# Patient Record
Sex: MALE | Race: WHITE | NOT HISPANIC OR LATINO | Employment: UNEMPLOYED | URBAN - NONMETROPOLITAN AREA
[De-identification: names, ages, dates, MRNs, and addresses within clinical notes are randomized per-mention and may not be internally consistent; named-entity substitution may affect disease eponyms.]

---

## 2017-03-21 ENCOUNTER — OFFICE VISIT (OUTPATIENT)
Dept: FAMILY MEDICINE | Facility: CLINIC | Age: 47
End: 2017-03-21
Payer: COMMERCIAL

## 2017-03-21 VITALS
HEART RATE: 74 BPM | OXYGEN SATURATION: 98 % | BODY MASS INDEX: 33.6 KG/M2 | HEIGHT: 71 IN | RESPIRATION RATE: 18 BRPM | DIASTOLIC BLOOD PRESSURE: 86 MMHG | SYSTOLIC BLOOD PRESSURE: 138 MMHG | WEIGHT: 240 LBS | TEMPERATURE: 97.1 F

## 2017-03-21 DIAGNOSIS — Z82.49 FAMILY HISTORY OF ISCHEMIC HEART DISEASE: ICD-10-CM

## 2017-03-21 DIAGNOSIS — Z83.3 FAMILY HISTORY OF DIABETES MELLITUS: ICD-10-CM

## 2017-03-21 DIAGNOSIS — H60.501 ACUTE OTITIS EXTERNA OF RIGHT EAR, UNSPECIFIED TYPE: ICD-10-CM

## 2017-03-21 DIAGNOSIS — I10 ESSENTIAL HYPERTENSION: Primary | ICD-10-CM

## 2017-03-21 DIAGNOSIS — Z13.220 LIPID SCREENING: ICD-10-CM

## 2017-03-21 DIAGNOSIS — G89.4 CHRONIC PAIN SYNDROME: ICD-10-CM

## 2017-03-21 DIAGNOSIS — H61.22 IMPACTED CERUMEN OF LEFT EAR: ICD-10-CM

## 2017-03-21 LAB
ALBUMIN SERPL-MCNC: 3.9 G/DL (ref 3.4–5)
ALP SERPL-CCNC: 67 U/L (ref 40–150)
ALT SERPL W P-5'-P-CCNC: 28 U/L (ref 0–70)
ANION GAP SERPL CALCULATED.3IONS-SCNC: 6 MMOL/L (ref 3–14)
AST SERPL W P-5'-P-CCNC: 18 U/L (ref 0–45)
BILIRUB SERPL-MCNC: 0.2 MG/DL (ref 0.2–1.3)
BUN SERPL-MCNC: 17 MG/DL (ref 7–30)
CALCIUM SERPL-MCNC: 8.9 MG/DL (ref 8.5–10.1)
CHLORIDE SERPL-SCNC: 106 MMOL/L (ref 94–109)
CHOLEST SERPL-MCNC: 214 MG/DL
CO2 SERPL-SCNC: 28 MMOL/L (ref 20–32)
CREAT SERPL-MCNC: 0.96 MG/DL (ref 0.66–1.25)
GFR SERPL CREATININE-BSD FRML MDRD: 83 ML/MIN/1.7M2
GLUCOSE SERPL-MCNC: 85 MG/DL (ref 70–99)
HDLC SERPL-MCNC: 42 MG/DL
LDLC SERPL CALC-MCNC: 119 MG/DL
NONHDLC SERPL-MCNC: 172 MG/DL
POTASSIUM SERPL-SCNC: 4.6 MMOL/L (ref 3.4–5.3)
PROT SERPL-MCNC: 7.3 G/DL (ref 6.8–8.8)
SODIUM SERPL-SCNC: 140 MMOL/L (ref 133–144)
TRIGL SERPL-MCNC: 263 MG/DL

## 2017-03-21 PROCEDURE — 80061 LIPID PANEL: CPT | Performed by: NURSE PRACTITIONER

## 2017-03-21 PROCEDURE — 36415 COLL VENOUS BLD VENIPUNCTURE: CPT | Performed by: NURSE PRACTITIONER

## 2017-03-21 PROCEDURE — 99203 OFFICE O/P NEW LOW 30 MIN: CPT | Mod: 25 | Performed by: NURSE PRACTITIONER

## 2017-03-21 PROCEDURE — 80053 COMPREHEN METABOLIC PANEL: CPT | Performed by: NURSE PRACTITIONER

## 2017-03-21 PROCEDURE — 69209 REMOVE IMPACTED EAR WAX UNI: CPT | Mod: LT | Performed by: NURSE PRACTITIONER

## 2017-03-21 RX ORDER — HYDROCODONE BITARTRATE AND ACETAMINOPHEN 5; 325 MG/1; MG/1
1 TABLET ORAL EVERY 4 HOURS PRN
COMMUNITY
Start: 2015-09-16 | End: 2017-03-21

## 2017-03-21 RX ORDER — CYCLOBENZAPRINE HCL 10 MG
10 TABLET ORAL 3 TIMES DAILY
COMMUNITY
Start: 2015-11-18 | End: 2017-03-21

## 2017-03-21 RX ORDER — PROPRANOLOL HYDROCHLORIDE 80 MG/1
80 TABLET ORAL DAILY
COMMUNITY
End: 2017-03-21

## 2017-03-21 RX ORDER — NAPROXEN 500 MG/1
500 TABLET ORAL 2 TIMES DAILY
COMMUNITY
Start: 2015-11-18 | End: 2017-03-21

## 2017-03-21 RX ORDER — PROPRANOLOL HYDROCHLORIDE 80 MG/1
80 CAPSULE, EXTENDED RELEASE ORAL DAILY
Qty: 90 CAPSULE | Refills: 3 | Status: SHIPPED | OUTPATIENT
Start: 2017-03-21 | End: 2018-01-31

## 2017-03-21 RX ORDER — PROPRANOLOL HYDROCHLORIDE 80 MG/1
80 TABLET ORAL DAILY
Qty: 90 TABLET | Status: CANCELLED | OUTPATIENT
Start: 2017-03-21

## 2017-03-21 RX ORDER — AZITHROMYCIN 250 MG/1
TABLET, FILM COATED ORAL
Qty: 6 TABLET | Refills: 0 | Status: SHIPPED | OUTPATIENT
Start: 2017-03-21 | End: 2017-04-18

## 2017-03-21 ASSESSMENT — PAIN SCALES - GENERAL: PAINLEVEL: MILD PAIN (2)

## 2017-03-21 NOTE — NURSING NOTE
"Chief Complaint   Patient presents with     Ear Problem     Medication Reconciliation     out of all medicatuions for 1+ month, was on medication for BP and lisinopril     Hypertension     Lipids     Neck Pain       Initial /86 (BP Location: Right arm, Patient Position: Chair, Cuff Size: Adult Regular)  Pulse 74  Temp 97.1  F (36.2  C) (Tympanic)  Resp 18  Ht 5' 10.5\" (1.791 m)  Wt 240 lb (108.9 kg)  SpO2 98%  BMI 33.95 kg/m2 Estimated body mass index is 33.95 kg/(m^2) as calculated from the following:    Height as of this encounter: 5' 10.5\" (1.791 m).    Weight as of this encounter: 240 lb (108.9 kg).  Medication Reconciliation: complete  "

## 2017-03-21 NOTE — PATIENT INSTRUCTIONS
You can take the Diclofenac 50 mg up to 3 times a day as needed.    Stop the Ibuprofen and Naproxen. You can't take them with Diclofenac.    Complete full course of antibiotics     We will call you with the results of your labs          Your clinic record indicates that you are due for:   fasting labs  If fasting labs are indicated, call before coming in to let the  know when you are coming in.  Need to be fasting for 10 hrs so just coming in before eating breakfast is the easiest.
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
## 2017-03-21 NOTE — MR AVS SNAPSHOT
After Visit Summary   3/21/2017    Jose Elias Reaves    MRN: [id number]           Patient Information     Date Of Birth          1970        Visit Information        Provider Department      3/21/2017 10:20 AM Patricia Booker APRN CNP [facility]        Today's Diagnoses     Essential hypertension    -  1    Lipid screening        Family history of diabetes mellitus        Family history of ischemic heart disease        Chronic pain syndrome        Impacted cerumen of left ear        Acute otitis externa of right ear, unspecified type          Care Instructions    You can take the Diclofenac 50 mg up to 3 times a day as needed.    Stop the Ibuprofen and Naproxen. You can't take them with Diclofenac.    Complete full course of antibiotics     We will call you with the results of your labs          Your clinic record indicates that you are due for:   fasting labs  If fasting labs are indicated, call before coming in to let the  know when you are coming in.  Need to be fasting for 10 hrs so just coming in before eating breakfast is the easiest.             Follow-ups after your visit        Who to contact     If you have questions or need follow up information about today's clinic visit or your schedule please contact [facility] directly at [phone].  Normal or non-critical lab and imaging results will be communicated to you by MyChart, letter or phone within 4 business days after the clinic has received the results. If you do not hear from us within 7 days, please contact the clinic through [facility] [employer] [facility] or phone. If you have a critical or abnormal lab result, we will notify you by phone as soon as possible.  Submit refill requests through [employer] or call your pharmacy and they will forward the refill request to us. Please allow 3 business days for your refill to be completed.          Additional Information About Your Visit        MyChart Information      "[employer] lets you send messages to your doctor, view your test results, renew your prescriptions, schedule appointments and more. To sign up, go to [email or web address] . Click on \"Log in\" on the left side of the screen, which will take you to the Welcome page. Then click on \"Sign up Now\" on the right side of the page.     You will be asked to enter the access code listed below, as well as some personal information. Please follow the directions to create your username and password.     Your access code is: [email or web address]  Expires: 2017 12:26 PM     Your access code will  in 90 days. If you need help or a new code, please call your [place] clinic or [phone].        Care EveryWhere ID     This is your Care EveryWhere ID. This could be used by other organizations to access your [place] medical records  [id number]        Your Vitals Were     Pulse Temperature Respirations Height Pulse Oximetry BMI (Body Mass Index)    74 97.1  F (36.2  C) (Tympanic) 18 5' 10.5\" (1.791 m) 98% 33.95 kg/m2       Blood Pressure from Last 3 Encounters:   17 138/86    Weight from Last 3 Encounters:   17 240 lb (108.9 kg)              We Performed the Following     Comprehensive metabolic panel (BMP + Alb, Alk Phos, ALT, AST, Total. Bili, TP)     Lipid panel reflex to direct LDL - FUTURE  S+90     REMOVE IMPACTED CERUMEN          Today's Medication Changes          These changes are accurate as of: 3/21/17 12:26 PM.  If you have any questions, ask your nurse or doctor.               Start taking these medicines.        Dose/Directions    diclofenac 50 MG EC tablet   Commonly known as:  VOLTAREN   Used for:  Chronic pain syndrome   Started by:  Patricia Booker APRN CNP        Dose:  50 mg   Take 1 tablet (50 mg) by mouth 3 times daily as needed for moderate pain   Quantity:  90 tablet   Refills:  1       ofloxacin 200 MG tablet   Commonly known as:  FLOXIN   Used for:  Acute otitis externa of right ear, " unspecified type   Started by:  Patricia Booker APRN CNP        Dose:  200 mg   Take 1 tablet (200 mg) by mouth 2 times daily   Quantity:  20 tablet   Refills:  0       propranolol 80 MG 24 hr capsule   Commonly known as:  INDERAL LA   Used for:  Essential hypertension   Started by:  Patricia Booker APRN CNP        Dose:  80 mg   Take 1 capsule (80 mg) by mouth daily   Quantity:  90 capsule   Refills:  3         Stop taking these medicines if you haven't already. Please contact your care team if you have questions.     HARRIET CHENEY   Stopped by:  Patricia Booker APRN CNP                Where to get your medicines      These medications were sent to [facility] [place] - [address] [address]     Phone:  [phone]     diclofenac 50 MG EC tablet    ofloxacin 200 MG tablet    propranolol 80 MG 24 hr capsule                Primary Care Provider Office Phone # Fax #    ANATOLY Morse [phone]       [address] [facility] [address]        Thank you!     Thank you for choosing [facility]  for your care. Our goal is always to provide you with excellent care. Hearing back from our patients is one way we can continue to improve our services. Please take a few minutes to complete the written survey that you may receive in the mail after your visit with us. Thank you!             Your Updated Medication List - Protect others around you: Learn how to safely use, store and throw away your medicines at www.disposemymeds.org.          This list is accurate as of: 3/21/17 12:26 PM.  Always use your most recent med list.                   Brand Name Dispense Instructions for use    diclofenac 50 MG EC tablet    VOLTAREN    90 tablet    Take 1 tablet (50 mg) by mouth 3 times daily as needed for moderate pain       ofloxacin 200 MG tablet    FLOXIN    20 tablet    Take 1 tablet (200 mg) by  mouth 2 times daily       propranolol 80 MG 24 hr capsule    INDERAL LA    90 capsule    Take 1 capsule (80 mg) by mouth daily       TYLENOL PO      Take 4,000 mg by mouth daily

## 2017-03-21 NOTE — NURSING NOTE
Jose Elias Reaves is a 46 year old  male who presents today for Ear Wash. with the complaint of pain and fullness.    Ear exam showing wax occlusion in the left ear.    The patients ear(s) were irrigated using a elephant ear was with hydrogen peroxide and tap water with mild amount of wax extracted.  Patient tolerated procedure well.        Outcome:both TMs were visualized using an otoscope. The provider checked the ear after ear irrigation using an otoscope

## 2017-04-18 ENCOUNTER — OFFICE VISIT (OUTPATIENT)
Dept: FAMILY MEDICINE | Facility: CLINIC | Age: 47
End: 2017-04-18
Payer: COMMERCIAL

## 2017-04-18 VITALS
SYSTOLIC BLOOD PRESSURE: 130 MMHG | TEMPERATURE: 96.8 F | RESPIRATION RATE: 24 BRPM | OXYGEN SATURATION: 98 % | WEIGHT: 236.2 LBS | DIASTOLIC BLOOD PRESSURE: 78 MMHG | BODY MASS INDEX: 33.41 KG/M2 | HEART RATE: 87 BPM

## 2017-04-18 DIAGNOSIS — G89.4 CHRONIC PAIN SYNDROME: ICD-10-CM

## 2017-04-18 DIAGNOSIS — I10 ESSENTIAL HYPERTENSION: Primary | ICD-10-CM

## 2017-04-18 DIAGNOSIS — E78.5 ELEVATED FASTING LIPID PROFILE: ICD-10-CM

## 2017-04-18 DIAGNOSIS — H65.05 RECURRENT ACUTE SEROUS OTITIS MEDIA OF LEFT EAR: ICD-10-CM

## 2017-04-18 PROCEDURE — 99214 OFFICE O/P EST MOD 30 MIN: CPT | Performed by: NURSE PRACTITIONER

## 2017-04-18 RX ORDER — CLARITHROMYCIN 500 MG
500 TABLET ORAL 2 TIMES DAILY
Qty: 20 TABLET | Refills: 0 | Status: SHIPPED | OUTPATIENT
Start: 2017-04-18 | End: 2017-05-10

## 2017-04-18 RX ORDER — PSEUDOEPHEDRINE HCL 30 MG
30 TABLET ORAL EVERY 4 HOURS PRN
Qty: 112 TABLET | Refills: 0 | Status: SHIPPED | OUTPATIENT
Start: 2017-04-18 | End: 2017-05-10

## 2017-04-18 NOTE — MR AVS SNAPSHOT
After Visit Summary   4/18/2017    Jose Elias Reaves    MRN: [id number]           Patient Information     Date Of Birth          1970        Visit Information        Provider Department      4/18/2017 8:00 AM Patricia Booker APRN [facility]        Today's Diagnoses     Essential hypertension    -  1    Recurrent acute serous otitis media of left ear          Care Instructions      Complete full course of antibiotics even if you start to feel better.    Work on diet changes and increase your exercise to lower your cholesterol.    We will recheck your cholesterol levels in 3 months.    Lifestyle Changes to Control Cholesterol  Diet, exercise, weight management, quitting smoking, stress management, and taking your medications right can help you control your cholesterol.    Diet  Your health care provider will give you information on changes to your diet you may need to make, based on your situation. Your provider may recommend that you see a registered dietitian for help with diet changes. Changes may include:    Reducing the amount of fat and cholesterol in your meals    Reducing the amount of sodium (salt) in your food, especially if you have high blood pressure    Eating more fresh vegetables and fruits    Eating lean proteins, such as fish, poultry, and legumes (beans and peas), and eating less red meat and processed meats    Using low-fat dairy products    Using vegetable and nut oils in limited amounts    Limiting how many sweets and processed foods like chips, cookies, and baked goods that you eat   Exercise  Regular exercise is a good way to help your body control cholesterol. Regular exercise has many benefits. It can:    Raise your good cholesterol.    Help lower your bad cholesterol.    Let blood flow better through your body.    Give more oxygen to your muscles and tissues.    Help you manage your weight.  Your health care provider may recommend that you get more  physical activity if you haven't been active. Depending on your situation, your provider may recommend that you get moderate to vigorous physical activity for at least 40 minutes each day and for at least 3 to 4 days each week. A few examples of moderate to vigorous activity include:    Walking at a brisk pace, about 3 to 4 miles per hour    Jogging or running    Swimming or water aerobics    Hiking    Dancing    Martial arts    Tennis    Riding a bicycle or stationary bike    Dancing  Weight management  If you are overweight or obese, your health care provider will work with you to help you lose weight and lower your BMI (body mass index) to a normal or near-normal level. Making diet changes and getting more physical activity can help.    Quitting smoking  Smoking and other tobacco use can raise cholesterol and make it harder to control. Quitting is tough. But millions of people have given up tobacco for good. You can quit, too! Think about some of the reasons below to quit smoking. Do any of them make you think twice about your smoking habit?  Stop smoking because it:    Keeps your cholesterol high, even if you make all the other changes you re supposed to.    Damages your body, especially your heart, lungs, and blood vessels.    Makes you more likely to have a heart attack (also known as acute myocardial infarction, or AMI), stroke, or cancer.    Stains your teeth and makes your skin, clothes, and breath smell bad.    Costs a lot of money.  Stress   Learn stress-management techniques to help you deal with stress in your home and work life.   Making the most of medications  Healthy eating and exercise are a good start to keeping your cholesterol down. But you may need some extra help from medication. If your doctor prescribes medication, be sure to take it exactly as directed. Remember:    Tell your doctor about all other medications you take, including vitamins and herbs.    Tell your doctor if you have any side  effects after starting to take a medication. Examples of side effects to watch for include: muscle aches, weakness, blurred vision, rust-colored urine, yellowing of eyes or skin (jaundice), or headache.    Don t skip a dose or stop taking your medication because you feel better or because your cholesterol numbers go down. Never stop taking your medication unless your doctor has told you it s OK.    3341-7843 [employer]. [address] [place] [address], [place] [address]. All rights reserved. This information is not intended as a substitute for professional medical care. Always follow your healthcare professional's instructions.        Diet: Low Cholesterol  Cholesterol is needed by the body to build new cells and create certain hormones. There are 2 kinds of cholesterol in the blood:      HDL, or  good  cholesterol, prevents fat deposits (plaque) from building up in the arteries. In this way it protects against heart disease and stroke.    LDL,  bad  cholesterol, stays in the body and sticks to artery walls. It may eventually block blood flow to the heart and brain causing heart attack or stroke.  Seventy-five percent of the body s cholesterol is made in the liver. Only 25% of the body s cholesterol comes from the food you eat. While the amount of cholesterol in your diet should be limited, it is the cholesterol that your body makes that creates the greatest disease risk. The biggest influence on cholesterol made by your body is the mixture of fat types in your diet. There are 2 kinds of fats you can eat:     Good fats  are the unsaturated fats (mono-unsaturated and poly-unsaturated). They raise the level of good cholesterol and lower the level of bad cholesterol. Good fats are found in vegetable oils such as olive, sunflower, corn and soybean oils, and in nuts and seeds.     Bad fats  are the saturated fats (including foods high in cholesterol) and  trans  fats. These increase the risk of disease. They lower  the good cholesterol and raise the level of bad cholesterol. Bad fats are found in animal products, including meat, whole-milk dairy products, and butter. Some plants are also high in bad fats (coconut and palm plants). Trans fats are found in hard (stick) margarines and many fast foods and commercially baked goods. Soft margarine sold in tubs has fewer trans fats and is safer to use.  High blood cholesterol is usually a due to a diet high in saturated fat combined with an inactive lifestyle. In some cases, genetics plays a role in causing high cholesterol. The following tips will help you create healthy eating habits that will help lower your blood cholesterol level.  Create a diet high in good fat, low in bad fats (and low in cholesterol)  The following steps will help you create a diet high in good fats and low in bad fats:  1. Talk with your doctor before starting a low cholesterol diet or weight loss program.  2. Learn to read nutrition labels and select appropriate portion sizes.  3. When cooking, use plant-based unsaturated vegetable oils (sunflower, corn, soybean, canola, peanut, and olive oils).  4. Avoid saturated fats found in animal products such as meat, dairy (whole-milk, cheese and ice cream), poultry skin, and egg yolks. Plants high in saturated oils include coconut oil, palm oil, and palm kernel oil.  5. If you eat meat, choose smaller portions and lean cuts, such as round, julio, sirloin, or loin. Eat more meatless meals.  6. Replace meat with fish at least 2 times a week. Fish is an important source of the unsaturated fat called omega-3 fatty acids. This fat has potential to lower the risk of heart disease.  7. Replace whole-milk dairy products with low-fat or nonfat products. Try soy products. Soy helps to reduce total cholesterol.  8. Supplement your diet with protective fibers. Eat nuts, seeds, and whole grains rather than white rice and bread. These foods lower both cholesterol and  "triglyceride levels. (Triglycerides are another fat found in the blood.) Walnuts are one of the best sources of omega-3 fatty acids.  9. Eat plenty of fresh fruits and vegetables daily.  10. Avoid fast foods and commercial baked goods. Assume they contain saturated fat unless labeled otherwise.    4611-6553 [employer]. [address] [place] [address], [place] [address]. All rights reserved. This information is not intended as a substitute for professional medical care. Always follow your healthcare professional's instructions.        Lipid Panel  Does this test have other names?  Lipid profile, lipoprotein profile  What is this test?  This group of tests measures the amount of cholesterol and other fats in your blood.  Cholesterol and triglycerides are lipids, or fats. These fats are important for cell health, but they can be harmful when they build up in the blood. Sometimes they can lead to clogged, inflamed arteries, a condition call atherosclerosis. This may keep your heart from working normally.  This panel of tests helps predict your risk for heart disease and stroke.  A lipid panel measures these fats:    Total cholesterol    LDL (\"bad\") cholesterol    HDL (\"good\") cholesterol    Triglycerides, another type of fat that causes hardening of the arteries  Why do I need this test?  You may need this panel of tests if you have a family history of heart disease or stroke.  You may also have this test if your healthcare provider believes you're at risk for heart disease. These are risk factors:    High blood pressure    Diabetes or prediabetes    Overweight or obesity    Smoking    Lack of exercise    Diet of unhealthy foods    Stress    High total cholesterol  If you are already being treated for heart disease, you may have this test to see whether treatment is working.  What other tests might I have along with this test?  Your healthcare provider may also order other tests to look at how well your heart is " working. These tests may include:    Electrocardiogram, or ECG, which tests your heart's electrical impulses to see if it is beating normally    Stress test, in which you may have to exercise while being monitored by ECG    Echocardiogram, which uses sound waves to make pictures of your heart    Cardiac catheterization. For this test, a healthcare provider puts a tube into your blood vessels and injects dye. X-rays are then done to look for clogs in the vessels.  Your provider may also order tests for high blood pressure or blood sugar, or glucose.  What do my test results mean?  Many things may affect your lab test results. These include the method each lab uses to do the test. Even if your test results are different from the normal value, you may not have a problem. To learn what the results mean for you, talk with your healthcare provider.  Results are given in milligrams per deciliter (mg/dL). Here are the ranges for total cholesterol in adults:    Normal: Less than 200 mg/dL    Borderline high: 200 to 239 mg/dL    High: At or above 240 mg/dL  These are the adult ranges for LDL cholesterol:    Optimal: Less than 100 mg/dL (this is the goal for people with diabetes or heart disease)    Near optimal: 100 to 129 mg/dL    Borderline high: 130 to 159 mg/dL    High: 160 to 189 mg/dL    Very high: 190 mg/dL and higher  The above numbers are general guidelines, because actual goals depend on the number of risk factors you have for heart disease.  Your HDL cholesterol levels should be above 40 mg/dL. This type of fat is actually good for you because it lowers your risk of heart disease. The higher the number, the lower your risk. Sixty mg/dL or above is considered the level to protect you against heart disease.    High levels of triglycerides are linked with a higher heart disease risk. Here are the adult ranges:    Normal: Less than 150 mg/dL    Borderline high: 150 to 199 mg/dL    High: 200 to 499 mg/dL    Very high:  Above 500 mg/dL  Depending on your test results, your healthcare provider will decide whether you need lifestyle changes or medicines to lower your cholesterol.  Your results and targets will vary according to your age and health. If you have high blood pressure or diabetes, you're at higher risk of having heart disease. You may have to take medicine to get your cholesterol and triglyceride levels even lower.  How is this test done?  The test requires a blood sample, which is drawn through a needle from a vein in your arm.  Does this test pose any risks?  Taking a blood sample with a needle carries risks that include bleeding, infection, bruising, or feeling dizzy. When the needle pricks your arm, you may feel a slight stinging sensation or pain. Afterward, the site may be slightly sore.  What might affect my test results?  Being sick or under stress, and taking certain medicines can affect your results.  What you eat, how often you exercise, and whether you smoke can also affect your lipid profile.  How do I prepare for the test?  You may need to not eat or drink anything but water for 12 to 14 hours before this test. In addition, be sure your healthcare provider knows about all medicines, herbs, vitamins, and supplements you are taking. This includes medicines that don't need a prescription and any illicit drugs you may use.        4989-9734 [employer]. [address] [place] [address], [place] [address]. All rights reserved. This information is not intended as a substitute for professional medical care. Always follow your healthcare professional's instructions.              Follow-ups after your visit        Who to contact     If you have questions or need follow up information about today's clinic visit or your schedule please contact [facility] directly at [phone].  Normal or non-critical lab and imaging results will be communicated to you by MyChart, letter or phone within 4 business  "days after the clinic has received the results. If you do not hear from us within 7 days, please contact the clinic through [facility] or phone. If you have a critical or abnormal lab result, we will notify you by phone as soon as possible.  Submit refill requests through [facility] or call your pharmacy and they will forward the refill request to us. Please allow 3 business days for your refill to be completed.          Additional Information About Your Visit        [facility] Information     [facility] lets you send messages to your doctor, view your test results, renew your prescriptions, schedule appointments and more. To sign up, go to [email or web address] . Click on \"Log in\" on the left side of the screen, which will take you to the Welcome page. Then click on \"Sign up Now\" on the right side of the page.     You will be asked to enter the access code listed below, as well as some personal information. Please follow the directions to create your username and password.     Your access code is: [email or web address]  Expires: 2017 12:26 PM     Your access code will  in 90 days. If you need help or a new code, please call your [facility] clinic or [phone].        Care EveryWhere ID     This is your Care EveryWhere ID. This could be used by other organizations to access your [facility] medical records  [id number]        Your Vitals Were     Pulse Temperature Respirations Pulse Oximetry BMI (Body Mass Index)       87 96.8  F (36  C) (Tympanic) 24 98% 33.41 kg/m2        Blood Pressure from Last 3 Encounters:   17 130/78   17 138/86    Weight from Last 3 Encounters:   17 236 lb 3.2 oz (107.1 kg)   17 240 lb (108.9 kg)              Today, you had the following     No orders found for display         Today's Medication Changes          These changes are accurate as of: 17  9:14 AM.  If you have any questions, ask your nurse or doctor.               Start taking these medicines.        " Dose/Directions    clarithromycin 500 MG tablet   Commonly known as:  BIAXIN   Used for:  Recurrent acute serous otitis media of left ear   Started by:  Patricia Booker APRN CNP        Dose:  500 mg   Take 1 tablet (500 mg) by mouth 2 times daily   Quantity:  20 tablet   Refills:  0       pseudoePHEDrine 30 MG tablet   Commonly known as:  SUDAFED   Used for:  Recurrent acute serous otitis media of left ear   Started by:  Patricia Booker APRN CNP        Dose:  30 mg   Take 1 tablet (30 mg) by mouth every 4 hours as needed for congestion   Quantity:  112 tablet   Refills:  0            Where to get your medicines      These medications were sent to [facility] [address]   [address] [facility] [address]     Phone:  [phone]     clarithromycin 500 MG tablet         Some of these will need a paper prescription and others can be bought over the counter.  Ask your nurse if you have questions.     Bring a paper prescription for each of these medications     pseudoePHEDrine 30 MG tablet                Primary Care Provider Office Phone # Fax #    ANATOLY Morse [phone]       [facility] [address] [facility] [address]        Thank you!     Thank you for choosing [facility]  for your care. Our goal is always to provide you with excellent care. Hearing back from our patients is one way we can continue to improve our services. Please take a few minutes to complete the written survey that you may receive in the mail after your visit with us. Thank you!             Your Updated Medication List - Protect others around you: Learn how to safely use, store and throw away your medicines at www.disposemymeds.org.          This list is accurate as of: 4/18/17  9:14 AM.  Always use your most recent med list.                   Brand Name Dispense Instructions for use    clarithromycin 500 MG tablet    BIAXIN    20  tablet    Take 1 tablet (500 mg) by mouth 2 times daily       diclofenac 50 MG EC tablet    VOLTAREN    90 tablet    Take 1 tablet (50 mg) by mouth 3 times daily as needed for moderate pain       propranolol 80 MG 24 hr capsule    INDERAL LA    90 capsule    Take 1 capsule (80 mg) by mouth daily       pseudoePHEDrine 30 MG tablet    SUDAFED    112 tablet    Take 1 tablet (30 mg) by mouth every 4 hours as needed for congestion       TYLENOL PO      Take 4,000 mg by mouth daily

## 2017-04-18 NOTE — PROGRESS NOTES
SUBJECTIVE:                                                    Jose Elias Reaves is a 47 year old male who presents to clinic today for the following health issues:      Hyperlipidemia Follow-Up      Rate your low fat/cholesterol diet?: not monitoring fat    Taking statin?  No    Other lipid medications/supplements?:  none     Lab Results   Component Value Date    CHOL 214 03/21/2017     Lab Results   Component Value Date    HDL 42 03/21/2017     Lab Results   Component Value Date     03/21/2017     Lab Results   Component Value Date    TRIG 263 03/21/2017     No results found for: CHOLHDLRATIO      Amount of exercise or physical activity: physical job 5-6 days a week    Problems taking medications regularly: was on a medication for cholesterol but ran out and did not get back on a medication    Medication side effects: not applicable    Diet: regular (no restrictions)          Neck Pain       Duration: 5 years    Description:  Location: neck  Radiation: into the right shoulder    Intensity: moderate    Accompanying signs and symptoms: 0    History (similar episodes/previous evaluation): MRI    Precipitating or alleviating factors: None    Therapies tried and outcome: tylenol 4000 mg and Aleve 1100 mg daily does help, Lortab 7.5 has helped in past,     Rx for Voltaren on 3/21/2017 has been some help with the pain but does not totally relieve the pain. Does not like taking stronger pain meds.      Hypertension Follow-up      Outpatient blood pressures are not being checked.    Low Salt Diet: not monitoring salt       Amount of exercise or physical activity: physical job 5-6 days a week    Problems taking medications regularly: No    Medication side effects: none    Diet: regular (no restrictions)    BP Readings from Last 6 Encounters:   04/18/17 130/78   03/21/17 138/86           ENT SYMPTOMS      Duration: 2 months    Description  ear plugged right and hard of hearing    Severity: moderate    Accompanying signs  and symptoms: None    History (predisposing factors):  tobacco abuse    Precipitating or alleviating factors: None    Therapies tried and outcome:  Ofloxacin tablets (3/21/2017) did not seem to help, seems to be getting more hard of hearing       Problem list and histories reviewed & adjusted, as indicated.  Additional history: as documented    Reviewed and updated as needed this visit by clinical staff       Reviewed and updated as needed this visit by Provider         ROS:  Constitutional, HEENT, cardiovascular, pulmonary, GI, , musculoskeletal, neuro, skin, endocrine and psych systems are negative, except as otherwise noted.    OBJECTIVE:                                                    /78 (BP Location: Left arm, Patient Position: Chair, Cuff Size: Adult Regular)  Pulse 87  Temp 96.8  F (36  C) (Tympanic)  Resp 24  Wt 236 lb 3.2 oz (107.1 kg)  SpO2 98%  BMI 33.41 kg/m2  Body mass index is 33.41 kg/(m^2).  GENERAL: healthy, alert and no distress  HENT: normal cephalic/atraumatic, right ear: normal: no effusions, no erythema, normal landmarks, left ear: clear effusion and erythematous, nose and mouth without ulcers or lesions, oropharynx clear and oral mucous membranes moist  NECK: no adenopathy, no asymmetry, masses, or scars and thyroid normal to palpation  RESP: lungs clear to auscultation - no rales, rhonchi or wheezes  CV: regular rate and rhythm, normal S1 S2, no S3 or S4, no murmur, click or rub, no peripheral edema and peripheral pulses strong  MS: no gross musculoskeletal defects noted, no edema  NEURO: Normal strength and tone, mentation intact and speech normal  PSYCH: mentation appears normal, affect normal/bright    Diagnostic Test Results:       ASSESSMENT/PLAN:                                                        1. Essential hypertension  Blood pressure in desired range. Continue propranolol.  No change in plan of care.      2. Recurrent acute serous otitis media of left ear  Did  not respond to ofloxacin.  Remains symptomatic and infection noted on exam  - clarithromycin (BIAXIN) 500 MG tablet; Take 1 tablet (500 mg) by mouth 2 times daily  Dispense: 20 tablet; Refill: 0  - pseudoePHEDrine (SUDAFED) 30 MG tablet; Take 1 tablet (30 mg) by mouth every 4 hours as needed for congestion  Dispense: 112 tablet; Refill: 0    3. Elevated fasting lipid profile  Labs are borderline.  Patient would like to work on diet changes and increasing his exercise to see if he can bring labs in 2 acceptable range  - Lipid Profile; Future    4. Chronic pain syndrome  Continues to have some pain, however, improvement noted with diclofenac.  Recommend take him that he take 2 tablets of diclofenac with 1000 mg of Tylenol.      Patient Instructions     Complete full course of antibiotics even if you start to feel better.    Work on diet changes and increase your exercise to lower your cholesterol.    We will recheck your cholesterol levels in 3 months.    Lifestyle Changes to Control Cholesterol  Diet, exercise, weight management, quitting smoking, stress management, and taking your medications right can help you control your cholesterol.    Diet  Your health care provider will give you information on changes to your diet you may need to make, based on your situation. Your provider may recommend that you see a registered dietitian for help with diet changes. Changes may include:    Reducing the amount of fat and cholesterol in your meals    Reducing the amount of sodium (salt) in your food, especially if you have high blood pressure    Eating more fresh vegetables and fruits    Eating lean proteins, such as fish, poultry, and legumes (beans and peas), and eating less red meat and processed meats    Using low-fat dairy products    Using vegetable and nut oils in limited amounts    Limiting how many sweets and processed foods like chips, cookies, and baked goods that you eat   Exercise  Regular exercise is a good way to  help your body control cholesterol. Regular exercise has many benefits. It can:    Raise your good cholesterol.    Help lower your bad cholesterol.    Let blood flow better through your body.    Give more oxygen to your muscles and tissues.    Help you manage your weight.  Your health care provider may recommend that you get more physical activity if you haven't been active. Depending on your situation, your provider may recommend that you get moderate to vigorous physical activity for at least 40 minutes each day and for at least 3 to 4 days each week. A few examples of moderate to vigorous activity include:    Walking at a brisk pace, about 3 to 4 miles per hour    Jogging or running    Swimming or water aerobics    Hiking    Dancing    Martial arts    Tennis    Riding a bicycle or stationary bike    Dancing  Weight management  If you are overweight or obese, your health care provider will work with you to help you lose weight and lower your BMI (body mass index) to a normal or near-normal level. Making diet changes and getting more physical activity can help.    Quitting smoking  Smoking and other tobacco use can raise cholesterol and make it harder to control. Quitting is tough. But millions of people have given up tobacco for good. You can quit, too! Think about some of the reasons below to quit smoking. Do any of them make you think twice about your smoking habit?  Stop smoking because it:    Keeps your cholesterol high, even if you make all the other changes you re supposed to.    Damages your body, especially your heart, lungs, and blood vessels.    Makes you more likely to have a heart attack (also known as acute myocardial infarction, or AMI), stroke, or cancer.    Stains your teeth and makes your skin, clothes, and breath smell bad.    Costs a lot of money.  Stress   Learn stress-management techniques to help you deal with stress in your home and work life.   Making the most of medications  Healthy eating  and exercise are a good start to keeping your cholesterol down. But you may need some extra help from medication. If your doctor prescribes medication, be sure to take it exactly as directed. Remember:    Tell your doctor about all other medications you take, including vitamins and herbs.    Tell your doctor if you have any side effects after starting to take a medication. Examples of side effects to watch for include: muscle aches, weakness, blurred vision, rust-colored urine, yellowing of eyes or skin (jaundice), or headache.    Don t skip a dose or stop taking your medication because you feel better or because your cholesterol numbers go down. Never stop taking your medication unless your doctor has told you it s OK.    2165-6739 The [employer]. [address] [place] [address], [place] [address]. All rights reserved. This information is not intended as a substitute for professional medical care. Always follow your healthcare professional's instructions.        Diet: Low Cholesterol  Cholesterol is needed by the body to build new cells and create certain hormones. There are 2 kinds of cholesterol in the blood:      HDL, or  good  cholesterol, prevents fat deposits (plaque) from building up in the arteries. In this way it protects against heart disease and stroke.    LDL,  bad  cholesterol, stays in the body and sticks to artery walls. It may eventually block blood flow to the heart and brain causing heart attack or stroke.  Seventy-five percent of the body s cholesterol is made in the liver. Only 25% of the body s cholesterol comes from the food you eat. While the amount of cholesterol in your diet should be limited, it is the cholesterol that your body makes that creates the greatest disease risk. The biggest influence on cholesterol made by your body is the mixture of fat types in your diet. There are 2 kinds of fats you can eat:     Good fats  are the unsaturated fats (mono-unsaturated and poly-unsaturated).  They raise the level of good cholesterol and lower the level of bad cholesterol. Good fats are found in vegetable oils such as olive, sunflower, corn and soybean oils, and in nuts and seeds.     Bad fats  are the saturated fats (including foods high in cholesterol) and  trans  fats. These increase the risk of disease. They lower the good cholesterol and raise the level of bad cholesterol. Bad fats are found in animal products, including meat, whole-milk dairy products, and butter. Some plants are also high in bad fats (coconut and palm plants). Trans fats are found in hard (stick) margarines and many fast foods and commercially baked goods. Soft margarine sold in tubs has fewer trans fats and is safer to use.  High blood cholesterol is usually a due to a diet high in saturated fat combined with an inactive lifestyle. In some cases, genetics plays a role in causing high cholesterol. The following tips will help you create healthy eating habits that will help lower your blood cholesterol level.  Create a diet high in good fat, low in bad fats (and low in cholesterol)  The following steps will help you create a diet high in good fats and low in bad fats:  1. Talk with your doctor before starting a low cholesterol diet or weight loss program.  2. Learn to read nutrition labels and select appropriate portion sizes.  3. When cooking, use plant-based unsaturated vegetable oils (sunflower, corn, soybean, canola, peanut, and olive oils).  4. Avoid saturated fats found in animal products such as meat, dairy (whole-milk, cheese and ice cream), poultry skin, and egg yolks. Plants high in saturated oils include coconut oil, palm oil, and palm kernel oil.  5. If you eat meat, choose smaller portions and lean cuts, such as round, julio, sirloin, or loin. Eat more meatless meals.  6. Replace meat with fish at least 2 times a week. Fish is an important source of the unsaturated fat called omega-3 fatty acids. This fat has potential  "to lower the risk of heart disease.  7. Replace whole-milk dairy products with low-fat or nonfat products. Try soy products. Soy helps to reduce total cholesterol.  8. Supplement your diet with protective fibers. Eat nuts, seeds, and whole grains rather than white rice and bread. These foods lower both cholesterol and triglyceride levels. (Triglycerides are another fat found in the blood.) Walnuts are one of the best sources of omega-3 fatty acids.  9. Eat plenty of fresh fruits and vegetables daily.  10. Avoid fast foods and commercial baked goods. Assume they contain saturated fat unless labeled otherwise.    3548-5213 [employer]. [address] [place] [address], [place] [address]. All rights reserved. This information is not intended as a substitute for professional medical care. Always follow your healthcare professional's instructions.        Lipid Panel  Does this test have other names?  Lipid profile, lipoprotein profile  What is this test?  This group of tests measures the amount of cholesterol and other fats in your blood.  Cholesterol and triglycerides are lipids, or fats. These fats are important for cell health, but they can be harmful when they build up in the blood. Sometimes they can lead to clogged, inflamed arteries, a condition call atherosclerosis. This may keep your heart from working normally.  This panel of tests helps predict your risk for heart disease and stroke.  A lipid panel measures these fats:    Total cholesterol    LDL (\"bad\") cholesterol    HDL (\"good\") cholesterol    Triglycerides, another type of fat that causes hardening of the arteries  Why do I need this test?  You may need this panel of tests if you have a family history of heart disease or stroke.  You may also have this test if your healthcare provider believes you're at risk for heart disease. These are risk factors:    High blood pressure    Diabetes or prediabetes    Overweight or obesity    Smoking    Lack of " exercise    Diet of unhealthy foods    Stress    High total cholesterol  If you are already being treated for heart disease, you may have this test to see whether treatment is working.  What other tests might I have along with this test?  Your healthcare provider may also order other tests to look at how well your heart is working. These tests may include:    Electrocardiogram, or ECG, which tests your heart's electrical impulses to see if it is beating normally    Stress test, in which you may have to exercise while being monitored by ECG    Echocardiogram, which uses sound waves to make pictures of your heart    Cardiac catheterization. For this test, a healthcare provider puts a tube into your blood vessels and injects dye. X-rays are then done to look for clogs in the vessels.  Your provider may also order tests for high blood pressure or blood sugar, or glucose.  What do my test results mean?  Many things may affect your lab test results. These include the method each lab uses to do the test. Even if your test results are different from the normal value, you may not have a problem. To learn what the results mean for you, talk with your healthcare provider.  Results are given in milligrams per deciliter (mg/dL). Here are the ranges for total cholesterol in adults:    Normal: Less than 200 mg/dL    Borderline high: 200 to 239 mg/dL    High: At or above 240 mg/dL  These are the adult ranges for LDL cholesterol:    Optimal: Less than 100 mg/dL (this is the goal for people with diabetes or heart disease)    Near optimal: 100 to 129 mg/dL    Borderline high: 130 to 159 mg/dL    High: 160 to 189 mg/dL    Very high: 190 mg/dL and higher  The above numbers are general guidelines, because actual goals depend on the number of risk factors you have for heart disease.  Your HDL cholesterol levels should be above 40 mg/dL. This type of fat is actually good for you because it lowers your risk of heart disease. The higher the  number, the lower your risk. Sixty mg/dL or above is considered the level to protect you against heart disease.    High levels of triglycerides are linked with a higher heart disease risk. Here are the adult ranges:    Normal: Less than 150 mg/dL    Borderline high: 150 to 199 mg/dL    High: 200 to 499 mg/dL    Very high: Above 500 mg/dL  Depending on your test results, your healthcare provider will decide whether you need lifestyle changes or medicines to lower your cholesterol.  Your results and targets will vary according to your age and health. If you have high blood pressure or diabetes, you're at higher risk of having heart disease. You may have to take medicine to get your cholesterol and triglyceride levels even lower.  How is this test done?  The test requires a blood sample, which is drawn through a needle from a vein in your arm.  Does this test pose any risks?  Taking a blood sample with a needle carries risks that include bleeding, infection, bruising, or feeling dizzy. When the needle pricks your arm, you may feel a slight stinging sensation or pain. Afterward, the site may be slightly sore.  What might affect my test results?  Being sick or under stress, and taking certain medicines can affect your results.  What you eat, how often you exercise, and whether you smoke can also affect your lipid profile.  How do I prepare for the test?  You may need to not eat or drink anything but water for 12 to 14 hours before this test. In addition, be sure your healthcare provider knows about all medicines, herbs, vitamins, and supplements you are taking. This includes medicines that don't need a prescription and any illicit drugs you may use.        [id number] [employer] [address] [place] [address], [place] [address]. All rights reserved. This information is not intended as a substitute for professional medical care. Always follow your healthcare professional's instructions.            Patricia Barba  Ade NP, APRN CNP  [facility]

## 2017-04-18 NOTE — PATIENT INSTRUCTIONS
Complete full course of antibiotics even if you start to feel better.    Work on diet changes and increase your exercise to lower your cholesterol.    We will recheck your cholesterol levels in 3 months.    Lifestyle Changes to Control Cholesterol  Diet, exercise, weight management, quitting smoking, stress management, and taking your medications right can help you control your cholesterol.    Diet  Your health care provider will give you information on changes to your diet you may need to make, based on your situation. Your provider may recommend that you see a registered dietitian for help with diet changes. Changes may include:    Reducing the amount of fat and cholesterol in your meals    Reducing the amount of sodium (salt) in your food, especially if you have high blood pressure    Eating more fresh vegetables and fruits    Eating lean proteins, such as fish, poultry, and legumes (beans and peas), and eating less red meat and processed meats    Using low-fat dairy products    Using vegetable and nut oils in limited amounts    Limiting how many sweets and processed foods like chips, cookies, and baked goods that you eat   Exercise  Regular exercise is a good way to help your body control cholesterol. Regular exercise has many benefits. It can:    Raise your good cholesterol.    Help lower your bad cholesterol.    Let blood flow better through your body.    Give more oxygen to your muscles and tissues.    Help you manage your weight.  Your health care provider may recommend that you get more physical activity if you haven't been active. Depending on your situation, your provider may recommend that you get moderate to vigorous physical activity for at least 40 minutes each day and for at least 3 to 4 days each week. A few examples of moderate to vigorous activity include:    Walking at a brisk pace, about 3 to 4 miles per hour    Jogging or running    Swimming or water aerobics    [employer]  arts    Tennis    Riding a bicycle or stationary bike    Dancing  Weight management  If you are overweight or obese, your health care provider will work with you to help you lose weight and lower your BMI (body mass index) to a normal or near-normal level. Making diet changes and getting more physical activity can help.    Quitting smoking  Smoking and other tobacco use can raise cholesterol and make it harder to control. Quitting is tough. But millions of people have given up tobacco for good. You can quit, too! Think about some of the reasons below to quit smoking. Do any of them make you think twice about your smoking habit?  Stop smoking because it:    Keeps your cholesterol high, even if you make all the other changes you re supposed to.    Damages your body, especially your heart, lungs, and blood vessels.    Makes you more likely to have a heart attack (also known as acute myocardial infarction, or AMI), stroke, or cancer.    Stains your teeth and makes your skin, clothes, and breath smell bad.    Costs a lot of money.  Stress   Learn stress-management techniques to help you deal with stress in your home and work life.   Making the most of medications  Healthy eating and exercise are a good start to keeping your cholesterol down. But you may need some extra help from medication. If your doctor prescribes medication, be sure to take it exactly as directed. Remember:    Tell your doctor about all other medications you take, including vitamins and herbs.    Tell your doctor if you have any side effects after starting to take a medication. Examples of side effects to watch for include: muscle aches, weakness, blurred vision, rust-colored urine, yellowing of eyes or skin (jaundice), or headache.    Don t skip a dose or stop taking your medication because you feel better or because your cholesterol numbers go down. Never stop taking your medication unless your doctor has told you it s OK.    2804-4034 The [facility]  [employer]. [address] [place] [address]. All rights reserved. This information is not intended as a substitute for professional medical care. Always follow your healthcare professional's instructions.        Diet: Low Cholesterol  Cholesterol is needed by the body to build new cells and create certain hormones. There are 2 kinds of cholesterol in the blood:      HDL, or  good  cholesterol, prevents fat deposits (plaque) from building up in the arteries. In this way it protects against heart disease and stroke.    LDL,  bad  cholesterol, stays in the body and sticks to artery walls. It may eventually block blood flow to the heart and brain causing heart attack or stroke.  Seventy-five percent of the body s cholesterol is made in the liver. Only 25% of the body s cholesterol comes from the food you eat. While the amount of cholesterol in your diet should be limited, it is the cholesterol that your body makes that creates the greatest disease risk. The biggest influence on cholesterol made by your body is the mixture of fat types in your diet. There are 2 kinds of fats you can eat:     Good fats  are the unsaturated fats (mono-unsaturated and poly-unsaturated). They raise the level of good cholesterol and lower the level of bad cholesterol. Good fats are found in vegetable oils such as olive, sunflower, corn and soybean oils, and in nuts and seeds.     Bad fats  are the saturated fats (including foods high in cholesterol) and  trans  fats. These increase the risk of disease. They lower the good cholesterol and raise the level of bad cholesterol. Bad fats are found in animal products, including meat, whole-milk dairy products, and butter. Some plants are also high in bad fats (coconut and palm plants). Trans fats are found in hard (stick) margarines and many fast foods and commercially baked goods. Soft margarine sold in tubs has fewer trans fats and is safer to use.  High blood cholesterol is usually a  due to a diet high in saturated fat combined with an inactive lifestyle. In some cases, genetics plays a role in causing high cholesterol. The following tips will help you create healthy eating habits that will help lower your blood cholesterol level.  Create a diet high in good fat, low in bad fats (and low in cholesterol)  The following steps will help you create a diet high in good fats and low in bad fats:  1. Talk with your doctor before starting a low cholesterol diet or weight loss program.  2. Learn to read nutrition labels and select appropriate portion sizes.  3. When cooking, use plant-based unsaturated vegetable oils (sunflower, corn, soybean, canola, peanut, and olive oils).  4. Avoid saturated fats found in animal products such as meat, dairy (whole-milk, cheese and ice cream), poultry skin, and egg yolks. Plants high in saturated oils include coconut oil, palm oil, and palm kernel oil.  5. If you eat meat, choose smaller portions and lean cuts, such as round, julio, sirloin, or loin. Eat more meatless meals.  6. Replace meat with fish at least 2 times a week. Fish is an important source of the unsaturated fat called omega-3 fatty acids. This fat has potential to lower the risk of heart disease.  7. Replace whole-milk dairy products with low-fat or nonfat products. Try soy products. Soy helps to reduce total cholesterol.  8. Supplement your diet with protective fibers. Eat nuts, seeds, and whole grains rather than white rice and bread. These foods lower both cholesterol and triglyceride levels. (Triglycerides are another fat found in the blood.) Walnuts are one of the best sources of omega-3 fatty acids.  9. Eat plenty of fresh fruits and vegetables daily.  10. Avoid fast foods and commercial baked goods. Assume they contain saturated fat unless labeled otherwise.    3009-6883 The [employer]. [address] [place] [address], [place] [address]. All rights reserved. This information is not intended as  "a substitute for professional medical care. Always follow your healthcare professional's instructions.        Lipid Panel  Does this test have other names?  Lipid profile, lipoprotein profile  What is this test?  This group of tests measures the amount of cholesterol and other fats in your blood.  Cholesterol and triglycerides are lipids, or fats. These fats are important for cell health, but they can be harmful when they build up in the blood. Sometimes they can lead to clogged, inflamed arteries, a condition call atherosclerosis. This may keep your heart from working normally.  This panel of tests helps predict your risk for heart disease and stroke.  A lipid panel measures these fats:    Total cholesterol    LDL (\"bad\") cholesterol    HDL (\"good\") cholesterol    Triglycerides, another type of fat that causes hardening of the arteries  Why do I need this test?  You may need this panel of tests if you have a family history of heart disease or stroke.  You may also have this test if your healthcare provider believes you're at risk for heart disease. These are risk factors:    High blood pressure    Diabetes or prediabetes    Overweight or obesity    Smoking    Lack of exercise    Diet of unhealthy foods    Stress    High total cholesterol  If you are already being treated for heart disease, you may have this test to see whether treatment is working.  What other tests might I have along with this test?  Your healthcare provider may also order other tests to look at how well your heart is working. These tests may include:    Electrocardiogram, or ECG, which tests your heart's electrical impulses to see if it is beating normally    Stress test, in which you may have to exercise while being monitored by ECG    Echocardiogram, which uses sound waves to make pictures of your heart    Cardiac catheterization. For this test, a healthcare provider puts a tube into your blood vessels and injects dye. X-rays are then done to " look for clogs in the vessels.  Your provider may also order tests for high blood pressure or blood sugar, or glucose.  What do my test results mean?  Many things may affect your lab test results. These include the method each lab uses to do the test. Even if your test results are different from the normal value, you may not have a problem. To learn what the results mean for you, talk with your healthcare provider.  Results are given in milligrams per deciliter (mg/dL). Here are the ranges for total cholesterol in adults:    Normal: Less than 200 mg/dL    Borderline high: 200 to 239 mg/dL    High: At or above 240 mg/dL  These are the adult ranges for LDL cholesterol:    Optimal: Less than 100 mg/dL (this is the goal for people with diabetes or heart disease)    Near optimal: 100 to 129 mg/dL    Borderline high: 130 to 159 mg/dL    High: 160 to 189 mg/dL    Very high: 190 mg/dL and higher  The above numbers are general guidelines, because actual goals depend on the number of risk factors you have for heart disease.  Your HDL cholesterol levels should be above 40 mg/dL. This type of fat is actually good for you because it lowers your risk of heart disease. The higher the number, the lower your risk. Sixty mg/dL or above is considered the level to protect you against heart disease.    High levels of triglycerides are linked with a higher heart disease risk. Here are the adult ranges:    Normal: Less than 150 mg/dL    Borderline high: 150 to 199 mg/dL    High: 200 to 499 mg/dL    Very high: Above 500 mg/dL  Depending on your test results, your healthcare provider will decide whether you need lifestyle changes or medicines to lower your cholesterol.  Your results and targets will vary according to your age and health. If you have high blood pressure or diabetes, you're at higher risk of having heart disease. You may have to take medicine to get your cholesterol and triglyceride levels even lower.  How is this test  done?  The test requires a blood sample, which is drawn through a needle from a vein in your arm.  Does this test pose any risks?  Taking a blood sample with a needle carries risks that include bleeding, infection, bruising, or feeling dizzy. When the needle pricks your arm, you may feel a slight stinging sensation or pain. Afterward, the site may be slightly sore.  What might affect my test results?  Being sick or under stress, and taking certain medicines can affect your results.  What you eat, how often you exercise, and whether you smoke can also affect your lipid profile.  How do I prepare for the test?  You may need to not eat or drink anything but water for 12 to 14 hours before this test. In addition, be sure your healthcare provider knows about all medicines, herbs, vitamins, and supplements you are taking. This includes medicines that don't need a prescription and any illicit drugs you may use.        8954-1820 The [employer]. [address] [place] [address], [place] [address]. All rights reserved. This information is not intended as a substitute for professional medical care. Always follow your healthcare professional's instructions.

## 2017-04-26 ENCOUNTER — TELEPHONE (OUTPATIENT)
Dept: FAMILY MEDICINE | Facility: CLINIC | Age: 47
End: 2017-04-26

## 2017-04-26 DIAGNOSIS — M54.2 NECK PAIN: ICD-10-CM

## 2017-04-26 DIAGNOSIS — H65.192 OTHER ACUTE NONSUPPURATIVE OTITIS MEDIA OF LEFT EAR, RECURRENCE NOT SPECIFIED: Primary | ICD-10-CM

## 2017-04-26 NOTE — TELEPHONE ENCOUNTER
Jose Elias calling stating he has been having issues with a fever on and off since starting to take Biaxin. States he has stopped taking and pharmacy advised him to call PCP.    Alejandra Roberts CSS

## 2017-04-28 RX ORDER — ERYTHROMYCIN 500 MG/1
500 TABLET, DELAYED RELEASE ORAL 3 TIMES DAILY
Qty: 30 TABLET | Refills: 0 | Status: SHIPPED | OUTPATIENT
Start: 2017-04-28 | End: 2017-05-10

## 2017-04-28 RX ORDER — DICLOFENAC SODIUM 100 MG/1
100 TABLET, FILM COATED, EXTENDED RELEASE ORAL DAILY
Qty: 30 TABLET | Refills: 1 | Status: SHIPPED | OUTPATIENT
Start: 2017-04-28 | End: 2018-01-31

## 2017-04-28 NOTE — TELEPHONE ENCOUNTER
Spoke with patient. Did not tolerate the Biaxin- felt lethargic and fatigued. Also had temp to 104. Will change antibiotics to Erythromycin which he has tolerated in the past. He asked about increasing the dose of the Diclofenac, wondering if he could take more than 3 tabs daily. Educated that he cannot take more than 3 per 24 hours. He is willing to try extended relief so order placed for Voltaren  mg 24hr tablet. Patient voiced understanding. Patricia Booker RNC, NP  [facility]

## 2017-05-10 ENCOUNTER — TELEPHONE (OUTPATIENT)
Dept: FAMILY MEDICINE | Facility: CLINIC | Age: 47
End: 2017-05-10

## 2017-05-10 DIAGNOSIS — H66.91 RECURRENT OTITIS MEDIA OF RIGHT EAR: Primary | ICD-10-CM

## 2017-05-10 NOTE — TELEPHONE ENCOUNTER
Call patient- referral has been entered for ENT referral. Patricia Booker RNC, NP  [facility]

## 2017-05-10 NOTE — TELEPHONE ENCOUNTER
Patient has 2 days left of the biaxin and still has ear pain and hard to hear with popping. Patient had follow-up with Alise Booker NP but missed appointment due to work. Patient states wants to see ENT.   Please have staff call patient back with ENT phone number to schedule if this is ok.

## 2017-05-10 NOTE — TELEPHONE ENCOUNTER
Patient given phone number to schedule ENT, patient should call for an appointment with Alise Booker NP if it is a long wait to see ENT. Patient understood

## 2017-05-15 ENCOUNTER — APPOINTMENT (OUTPATIENT)
Dept: GENERAL RADIOLOGY | Facility: CLINIC | Age: 47
End: 2017-05-15
Attending: EMERGENCY MEDICINE
Payer: COMMERCIAL

## 2017-05-15 ENCOUNTER — HOSPITAL ENCOUNTER (EMERGENCY)
Facility: CLINIC | Age: 47
Discharge: HOME | End: 2017-05-15
Attending: EMERGENCY MEDICINE | Admitting: EMERGENCY MEDICINE
Payer: COMMERCIAL

## 2017-05-15 VITALS
WEIGHT: 220 LBS | BODY MASS INDEX: 30.8 KG/M2 | RESPIRATION RATE: 20 BRPM | TEMPERATURE: 97.7 F | DIASTOLIC BLOOD PRESSURE: 85 MMHG | HEIGHT: 71 IN | HEART RATE: 88 BPM | OXYGEN SATURATION: 97 % | SYSTOLIC BLOOD PRESSURE: 126 MMHG

## 2017-05-15 DIAGNOSIS — J20.9 ACUTE BRONCHITIS, UNSPECIFIED ORGANISM: ICD-10-CM

## 2017-05-15 DIAGNOSIS — H69.90 ETD (EUSTACHIAN TUBE DYSFUNCTION), UNSPECIFIED LATERALITY: ICD-10-CM

## 2017-05-15 PROCEDURE — 99283 EMERGENCY DEPT VISIT LOW MDM: CPT | Mod: 25

## 2017-05-15 PROCEDURE — 25000125 ZZHC RX 250: Performed by: EMERGENCY MEDICINE

## 2017-05-15 PROCEDURE — 94640 AIRWAY INHALATION TREATMENT: CPT

## 2017-05-15 PROCEDURE — 99283 EMERGENCY DEPT VISIT LOW MDM: CPT | Performed by: EMERGENCY MEDICINE

## 2017-05-15 PROCEDURE — 71020 XR CHEST 2 VW: CPT

## 2017-05-15 RX ORDER — ALBUTEROL SULFATE 90 UG/1
2 AEROSOL, METERED RESPIRATORY (INHALATION) EVERY 6 HOURS
Qty: 1 INHALER | Refills: 0 | Status: SHIPPED | OUTPATIENT
Start: 2017-05-15 | End: 2018-02-20

## 2017-05-15 RX ORDER — PREDNISONE 20 MG/1
40 TABLET ORAL DAILY
Qty: 14 TABLET | Refills: 0 | Status: SHIPPED | OUTPATIENT
Start: 2017-05-15 | End: 2017-05-22

## 2017-05-15 RX ORDER — IPRATROPIUM BROMIDE AND ALBUTEROL SULFATE 2.5; .5 MG/3ML; MG/3ML
3 SOLUTION RESPIRATORY (INHALATION) ONCE
Status: COMPLETED | OUTPATIENT
Start: 2017-05-15 | End: 2017-05-15

## 2017-05-15 RX ADMIN — IPRATROPIUM BROMIDE AND ALBUTEROL SULFATE 3 ML: .5; 3 SOLUTION RESPIRATORY (INHALATION) at 08:29

## 2017-05-15 NOTE — ED AVS SNAPSHOT
[facility] Emergency Department    [address] [facility] [address]    Phone:  [phone]    Fax:  [phone]                                       Jose Elias Reaves   MRN: [id number]    Department:  [facility] Emergency Department   Date of Visit:  5/15/2017           Patient Information     Date Of Birth          1970        Your diagnoses for this visit were:     Acute bronchitis, unspecified organism     ETD (eustachian tube dysfunction), unspecified laterality        You were seen by Graeme Dubon MD.        Discharge Instructions         Bronchitis with Wheezing (Viral or Bacterial: Adult)    Bronchitis is an infection of the air passages. It often occurs during the common cold and is usually caused by a virus. Symptoms include cough with mucus (phlegm) and low-grade fever. This illness is contagious during the first few days and is spread through the air by coughing and sneezing, or by direct contact (touching the sick person and then touching your own eyes, nose, or mouth).  If there is a lot of inflammation, air flow is restricted. The air passages may also go into spasm, especially if you have asthma. This causes wheezing and difficulty breathing even in people who do not have asthma.  Bronchitis usually lasts 7 to 14 days. The wheezing should improve with treatment during the first week. An inhaler is often prescribed to relax the air passages and stop wheezing. Antibiotics will be prescribed if your doctor thinks there is also a secondary bacterial infection.  Home care    If symptoms are severe, rest at home for the first 2 to 3 days. When you go back to your usual activities, don't let yourself get too tired.    Do not smoke. Also avoid being exposed to secondhand smoke.    You may use over-the-counter medicine to control fever or pain, unless another medicine was prescribed. Note: If you have chronic liver or kidney disease or have ever had a stomach ulcer or  gastrointestinal bleeding, talk with your healthcare provider before using these medicines. Also talk to your provider if you are taking medicine to prevent blood clots.) Aspirin should never be given to anyone younger than 18 years of age who is ill with a viral infection or fever. It may cause severe liver or brain damage.    Your appetite may be poor, so a light diet is fine. Avoid dehydration by drinking 6 to 8 glasses of fluids per day (such as water, soft drinks, sports drinks, juices, tea, or soup). Extra fluids will help loosen secretions in the nose and lungs.    Over-the-counter cough, cold, and sore-throat medicines will not shorten the length of the illness, but they may be helpful to reduce symptoms. (Note: Do not use decongestants if you have high blood pressure.)    If you were given an inhaler, use it exactly as directed. If you need to use it more often than prescribed, your condition may be worsening. If this happens, contact your healthcare provider.    If prescribed, finish all antibiotic medicine, even if you are feeling better after only a few days.  Follow-up care  Follow up with your healthcare provider, or as advised. If you had an X-ray or ECG (electrocardiogram), a specialist will review it. You will be notified of any new findings that may affect your care.  Note: If you are age 65 or older, or if you have a chronic lung disease or condition that affects your immune system, or you smoke, talk to your healthcare provider about having a pneumococcal vaccinations and a yearly influenza vaccination (flu shot).  When to seek medical advice   Call your healthcare provider right away if any of these occur:    Fever of 100.4 F (38 C) or higher    Coughing up increasing amounts of colored sputum    Weakness, drowsiness, headache, facial pain, ear pain, or a stiff neck  Call 911, or get immediate medical care  Contact emergency services right away if any of these occur.    Coughing up  blood    Worsening weakness, drowsiness, headache, or stiff neck    Increased wheezing not helped with medication, shortness of breath, or pain with breathing    9695-7799 The [employer]. [address] [place] [address], [place] [address]. All rights reserved. This information is not intended as a substitute for professional medical care. Always follow your healthcare professional's instructions.          Future Appointments        Provider Department Dept Phone Center    7/18/2017 4:30 PM [facility] [phone] [facility]      24 Hour Appointment Hotline       To make an appointment at any [facility], call [phone] ([phone]). If you don't have a family doctor or clinic, we will help you find one. [facility] are conveniently located to serve the needs of you and your family.             Review of your medicines      START taking        Dose / Directions Last dose taken    albuterol 108 (90 BASE) MCG/ACT Inhaler   Commonly known as:  PROAIR HFA/PROVENTIL HFA/VENTOLIN HFA   Dose:  2 puff   Quantity:  1 Inhaler        Inhale 2 puffs into the lungs every 6 hours for 10 days   Refills:  0        predniSONE 20 MG tablet   Commonly known as:  DELTASONE   Dose:  40 mg   Quantity:  14 tablet        Take 2 tablets (40 mg) by mouth daily for 7 days   Refills:  0          Our records show that you are taking the medicines listed below. If these are incorrect, please call your family doctor or clinic.        Dose / Directions Last dose taken    diclofenac 100 MG 24 hr tablet   Commonly known as:  VOLTAREN-XR   Dose:  100 mg   Quantity:  30 tablet        Take 1 tablet (100 mg) by mouth daily   Refills:  1        propranolol 80 MG 24 hr capsule   Commonly known as:  INDERAL LA   Dose:  80 mg   Quantity:  90 capsule        Take 1 capsule (80 mg) by mouth daily   Refills:  3        TYLENOL PO   Dose:  4000 mg        Take 4,000 mg by mouth daily   Refills:  0                Prescriptions  were sent or printed at these locations (2 Prescriptions)                   [facility] - [place], [place] - [address] [facility]   [address], [place] [address]    Telephone:  [phone]   Fax:  [phone]   Hours:                  E-Prescribed (2 of 2)         predniSONE (DELTASONE) 20 MG tablet               albuterol (PROAIR HFA/PROVENTIL HFA/VENTOLIN HFA) 108 (90 BASE) MCG/ACT Inhaler                Procedures and tests performed during your visit     Chest XR,  PA & LAT      Orders Needing Specimen Collection     None      Pending Results     No orders found from 5/13/2017 to 5/16/2017.            Pending Culture Results     No orders found from 5/13/2017 to 5/16/2017.            Pending Results Instructions     If you had any lab results that were not finalized at the time of your Discharge, you can call the ED Lab Result RN at [phone]. You will be contacted by this team for any positive Lab results or changes in treatment. The nurses are available 7 days a week from 10A to 6:30P.  You can leave a message 24 hours per day and they will return your call.        Test Results From Your Hospital Stay        5/15/2017  9:04 AM      Narrative     CHEST TWO VIEWS  5/15/2017 8:42 AM     HISTORY: Cough.  Short of air.         Impression     IMPRESSION: PA and lateral views of the chest. Lungs are clear. Heart  is normal in size. No effusions are evident. No pneumothorax.    STEPHAN BREWSTER MD                Thank you for choosing [facility]       Thank you for choosing [facility] for your care. Our goal is always to provide you with excellent care. Hearing back from our patients is one way we can continue to improve our services. Please take a few minutes to complete the written survey that you may receive in the mail after you visit with us. Thank you!        [employer] Information     [employer] lets you send messages to your doctor, view your test results, renew your prescriptions, schedule appointments  "and more. To sign up, go to [email or web address] . Click on \"Log in\" on the left side of the screen, which will take you to the Welcome page. Then click on \"Sign up Now\" on the right side of the page.     You will be asked to enter the access code listed below, as well as some personal information. Please follow the directions to create your username and password.     Your access code is: [email or web address]  Expires: 2017 12:26 PM     Your access code will  in 90 days. If you need help or a new code, please call your [facility] clinic or [phone].        Care EveryWhere ID     This is your Care EveryWhere ID. This could be used by other organizations to access your [facility] medical records  [id number]        After Visit Summary       This is your record. Keep this with you and show to your community pharmacist(s) and doctor(s) at your next visit.                  "

## 2017-05-15 NOTE — ED NOTES
C/o cough and congestion.  Has been trying OTC meds.  Recently done with EES for ear infection.  Cough and SOA for one week. Productive cough with green phlegm. Sore throat.  Ear fullness. No nasal congestion

## 2017-05-15 NOTE — ED AVS SNAPSHOT
[facility] Emergency Department    [address] [address]    Phone:  [phone]    Fax:  [phone]                                       Jose Elias Reaves   MRN: [id number]    Department:  [facility] Emergency Department   Date of Visit:  5/15/2017           After Visit Summary Signature Page     I have received my discharge instructions, and my questions have been answered. I have discussed any challenges I see with this plan with the nurse or doctor.    ..........................................................................................................................................  Patient/Patient Representative Signature      ..........................................................................................................................................  Patient Representative Print Name and Relationship to Patient    ..................................................               ................................................  Date                                            Time    ..........................................................................................................................................  Reviewed by Signature/Title    ...................................................              ..............................................  Date                                                            Time

## 2017-05-15 NOTE — DISCHARGE INSTRUCTIONS
Bronchitis with Wheezing (Viral or Bacterial: Adult)    Bronchitis is an infection of the air passages. It often occurs during the common cold and is usually caused by a virus. Symptoms include cough with mucus (phlegm) and low-grade fever. This illness is contagious during the first few days and is spread through the air by coughing and sneezing, or by direct contact (touching the sick person and then touching your own eyes, nose, or mouth).  If there is a lot of inflammation, air flow is restricted. The air passages may also go into spasm, especially if you have asthma. This causes wheezing and difficulty breathing even in people who do not have asthma.  Bronchitis usually lasts 7 to 14 days. The wheezing should improve with treatment during the first week. An inhaler is often prescribed to relax the air passages and stop wheezing. Antibiotics will be prescribed if your doctor thinks there is also a secondary bacterial infection.  Home care    If symptoms are severe, rest at home for the first 2 to 3 days. When you go back to your usual activities, don't let yourself get too tired.    Do not smoke. Also avoid being exposed to secondhand smoke.    You may use over-the-counter medicine to control fever or pain, unless another medicine was prescribed. Note: If you have chronic liver or kidney disease or have ever had a stomach ulcer or gastrointestinal bleeding, talk with your healthcare provider before using these medicines. Also talk to your provider if you are taking medicine to prevent blood clots.) Aspirin should never be given to anyone younger than 18 years of age who is ill with a viral infection or fever. It may cause severe liver or brain damage.    Your appetite may be poor, so a light diet is fine. Avoid dehydration by drinking 6 to 8 glasses of fluids per day (such as water, soft drinks, sports drinks, juices, tea, or soup). Extra fluids will help loosen secretions in the nose  and lungs.    Over-the-counter cough, cold, and sore-throat medicines will not shorten the length of the illness, but they may be helpful to reduce symptoms. (Note: Do not use decongestants if you have high blood pressure.)    If you were given an inhaler, use it exactly as directed. If you need to use it more often than prescribed, your condition may be worsening. If this happens, contact your healthcare provider.    If prescribed, finish all antibiotic medicine, even if you are feeling better after only a few days.  Follow-up care  Follow up with your healthcare provider, or as advised. If you had an X-ray or ECG (electrocardiogram), a specialist will review it. You will be notified of any new findings that may affect your care.  Note: If you are age 65 or older, or if you have a chronic lung disease or condition that affects your immune system, or you smoke, talk to your healthcare provider about having a pneumococcal vaccinations and a yearly influenza vaccination (flu shot).  When to seek medical advice   Call your healthcare provider right away if any of these occur:    Fever of 100.4 F (38 C) or higher    Coughing up increasing amounts of colored sputum    Weakness, drowsiness, headache, facial pain, ear pain, or a stiff neck  Call 911, or get immediate medical care  Contact emergency services right away if any of these occur.    Coughing up blood    Worsening weakness, drowsiness, headache, or stiff neck    Increased wheezing not helped with medication, shortness of breath, or pain with breathing    1262-5591 [employer] (?????). [address] [place] [address], [place] [address]. All rights reserved. This information is not intended as a substitute for professional medical care. Always follow your healthcare professional's instructions.

## 2017-05-16 NOTE — ED PROVIDER NOTES
"Chief complaint cough congestion    47-year-old male presents with protracted cough, no fever, mild shortness of air, smokes a pack per day.  Initially treated with Biaxin, switched to erythromycin, minimal to no response.  Denies history of asthma.    Past medical history, medications, allergies, social history, family history all reviewed.  Patient Active Problem List   Diagnosis     Essential hypertension     Family history of ischemic heart disease     Family history of diabetes mellitus     Elevated fasting lipid profile     Chronic pain syndrome     ROS: All other systems reviewed and are negative.    /85  Pulse 88  Temp 97.7  F (36.5  C) (Oral)  Resp 20  Ht 1.803 m (5' 11\")  Wt 99.8 kg (220 lb)  SpO2 97%  BMI 30.68 kg/m2  Nontoxic appearing no respiratory distress alert and oriented ×3  Head atraumatic normocephalic  TMs/EACs unremarkable, conjunctiva noninjected, oropharynx moist without lesions or erythema  No cervical adenopathy neck supple full active painless range of motion  Lungs clear to auscultation    MDM: 47-year-old male smoker presents with protracted cough, recent treatment with antibiotic.  No indication for further antibiotics.  Short course prednisone are scribed as well as albuterol inhaler.  Return criteria.    Impression: Bronchitis         Graeme Dubon MD  05/16/17 9898    "

## 2018-01-31 ENCOUNTER — OFFICE VISIT (OUTPATIENT)
Dept: OTOLARYNGOLOGY | Facility: OTHER | Age: 48
End: 2018-01-31
Payer: COMMERCIAL

## 2018-01-31 ENCOUNTER — TELEPHONE (OUTPATIENT)
Dept: OTOLARYNGOLOGY | Facility: OTHER | Age: 48
End: 2018-01-31

## 2018-01-31 VITALS — WEIGHT: 232 LBS | BODY MASS INDEX: 32.36 KG/M2 | OXYGEN SATURATION: 99 % | HEART RATE: 90 BPM

## 2018-01-31 DIAGNOSIS — H69.93 DYSFUNCTION OF BOTH EUSTACHIAN TUBES: Primary | ICD-10-CM

## 2018-01-31 PROCEDURE — 99207 ZZC DROP WITH A PROCEDURE: CPT | Performed by: OTOLARYNGOLOGY

## 2018-01-31 PROCEDURE — 69433 CREATE EARDRUM OPENING: CPT | Mod: 50 | Performed by: OTOLARYNGOLOGY

## 2018-01-31 NOTE — TELEPHONE ENCOUNTER
Surgery Scheduled    Date of Surgery 3/13/2018   Procedure: Septoplasty, SMR turbinates  Hospital/Surgical Facility: [facility]  Surgeon: Richie  Type of Anesthesia : General  Pre-op 2/20 with Tanesha Johnson  2 week post op:3/19 with Dr. Quiroz        Surgery packet given to patient in clinic. Patients instructed to arrive 1 hours prior to surgery. Patient understood and agrees to plan.    Dalia Smiley  Surgical Scheduler

## 2018-01-31 NOTE — LETTER
1/31/2018         RE: Jose Elias Reaves  [address] [facility] [address]        Dear Colleague,    Thank you for referring your patient, Jose Elias Reaves, to the [facility]. Please see a copy of my visit note below.    Procedure: Myringotomy with Tube Placement    History of Present Illness: Jose Elias presents to me today to have bilateral  myringotomy tubes placed for severe eustachian tube dysfunction. We discussed the risks and benefits of myringotomy tubes.  The risks include but are not limited to early tube extrusion or blockage requiring replacement, risks of continued ear infections, possibility of the need to repair the tympanic membrane for a non-healing myringotomy, and the possibility other more rare complications of tube placement. He voiced understanding.      Technique- After discussion of the risks and benefits of myringotomy, including the risk of otorrhea and residual persistent perforation, informed consent was signed and placed in the chart.  I began with the right  side.  I proceeded to position the patient in a semi-supine position in the examination chair.  Using the binocular surgical microscope, I then proceeded to clean the canal of cerumen and squamous debris.  I was able to see the tympanic membrane.  Using a small suction tip, I applied a tiny coating of phenol onto the tympanic membrane.  After visualizing a good leonor, I then proceeded to use a myringotomy knife to make a radially oriented incision in the tympanic membrane.  the middle ear was clear of fluid  Next, I proceeded to place a 1.04mm inner diameter Paparella tube through the incision.  After confirming good positioning and a clearly visible open tube, the procedure was complete. I then repeated the procedure on the left placing the PE tube in ant inferior quadrant.      A/P - The patient was instructed on water precautions and given a prescription for otic antibiotic drops to use for three days.  I will see them  in 2 to 4 weeks for follow up and repeat audiogram.  The patient was instructed to call in or return sooner if there was any drainage from the ear.    Also upon our previous discussions at the [facility] the patient wishes to undergo septoplasty and SMR of turbinates.  This document serves as a record of the services and decisions personally performed and made by Dr. Piotr Quiroz MD. It was created on his behalf by Sandra Mark, a trained medical scribe. The creation of this document is based the provider's statements to the medical scribe.  Sandra Mark 12:59 PM 1/31/2018    Provider:   The information in this document, created by the medical scribe for me, accurately reflects the services I personally performed and the decisions made by me. I have reviewed and approved this document for accuracy prior to leaving the patient care area.  Dr. Piotr Quiroz MD 12:59 PM 1/31/2018    Piotr Quiroz MD      Again, thank you for allowing me to participate in the care of your patient.        Sincerely,        Piotr Quiroz MD, MD

## 2018-01-31 NOTE — PROGRESS NOTES
Procedure: Myringotomy with Tube Placement    History of Present Illness: Jose Elias presents to me today to have bilateral  myringotomy tubes placed for severe eustachian tube dysfunction. We discussed the risks and benefits of myringotomy tubes.  The risks include but are not limited to early tube extrusion or blockage requiring replacement, risks of continued ear infections, possibility of the need to repair the tympanic membrane for a non-healing myringotomy, and the possibility other more rare complications of tube placement. He voiced understanding.      Technique- After discussion of the risks and benefits of myringotomy, including the risk of otorrhea and residual persistent perforation, informed consent was signed and placed in the chart.  I began with the right  side.  I proceeded to position the patient in a semi-supine position in the examination chair.  Using the binocular surgical microscope, I then proceeded to clean the canal of cerumen and squamous debris.  I was able to see the tympanic membrane.  Using a small suction tip, I applied a tiny coating of phenol onto the tympanic membrane.  After visualizing a good leonor, I then proceeded to use a myringotomy knife to make a radially oriented incision in the tympanic membrane.  the middle ear was clear of fluid  Next, I proceeded to place a 1.04mm inner diameter Paparella tube through the incision.  After confirming good positioning and a clearly visible open tube, the procedure was complete. I then repeated the procedure on the left placing the PE tube in ant inferior quadrant.      A/P - The patient was instructed on water precautions and given a prescription for otic antibiotic drops to use for three days.  I will see them in 2 to 4 weeks for follow up and repeat audiogram.  The patient was instructed to call in or return sooner if there was any drainage from the ear.    Also upon our previous discussions at the [facility] the patient  wishes to undergo septoplasty and SMR of turbinates.  This document serves as a record of the services and decisions personally performed and made by Dr. Piotr Quiroz MD. It was created on his behalf by Sandra Mark, a trained medical scribe. The creation of this document is based the provider's statements to the medical scribe.  Sandra Mark 12:59 PM 1/31/2018    Provider:   The information in this document, created by the medical scribe for me, accurately reflects the services I personally performed and the decisions made by me. I have reviewed and approved this document for accuracy prior to leaving the patient care area.  Dr. Piotr Quiroz MD 12:59 PM 1/31/2018    Piotr Quiroz MD

## 2018-01-31 NOTE — NURSING NOTE
"Chief Complaint   Patient presents with     Procedure       Initial Pulse 90  Wt 105.2 kg (232 lb)  SpO2 99%  BMI 32.36 kg/m2 Estimated body mass index is 32.36 kg/(m^2) as calculated from the following:    Height as of 5/15/17: 1.803 m (5' 11\").    Weight as of this encounter: 105.2 kg (232 lb).  Medication Reconciliation: complete  "

## 2018-01-31 NOTE — MR AVS SNAPSHOT
"              After Visit Summary   2018    Jose Elias Reaves    MRN: [id number]           Patient Information     Date Of Birth          1970        Visit Information        Provider Department      2018 11:45 AM Piotr Quiroz MD [facility]         Follow-ups after your visit        Who to contact     If you have questions or need follow up information about today's clinic visit or your schedule please contact [facility] [place] directly at [phone].  Normal or non-critical lab and imaging results will be communicated to you by MyChart, letter or phone within 4 business days after the clinic has received the results. If you do not hear from us within 7 days, please contact the clinic through [employer] or phone. If you have a critical or abnormal lab result, we will notify you by phone as soon as possible.  Submit refill requests through [employer] or call your pharmacy and they will forward the refill request to us. Please allow 3 business days for your refill to be completed.          Additional Information About Your Visit        [employer] [facility] Information     [employer] lets you send messages to your doctor, view your test results, renew your prescriptions, schedule appointments and more. To sign up, go to [email or web address] . Click on \"Log in\" on the left side of the screen, which will take you to the Welcome page. Then click on \"Sign up Now\" on the right side of the page.     You will be asked to enter the access code listed below, as well as some personal information. Please follow the directions to create your username and password.     Your access code is: [email or web address]  Expires: 2018  1:51 PM     Your access code will  in 90 days. If you need help or a new code, please call your [facility] or [phone].        Care EveryWhere ID     This is your Care EveryWhere ID. This could be used by other organizations to access your [place] medical " records  [id number]        Your Vitals Were     Pulse Pulse Oximetry BMI (Body Mass Index)             90 99% 32.36 kg/m2          Blood Pressure from Last 3 Encounters:   05/15/17 126/85   04/18/17 130/78   03/21/17 138/86    Weight from Last 3 Encounters:   01/31/18 105.2 kg (232 lb)   05/15/17 99.8 kg (220 lb)   04/18/17 107.1 kg (236 lb 3.2 oz)              Today, you had the following     No orders found for display       Primary Care Provider Office Phone # Fax #    ANATOLY Morse [facility] [phone]       [address] [place] [address]        Equal Access to Services     COLLETTE PALMA : Hadii cuco trejoo Socarmencita, waaxda luqadaha, qaybta kaalmada adeegyada, ton garcia . So [facility] [phone].    ATENCIÓN: Si habla español, tiene a garza disposición servicios gratuitos de asistencia lingüística. [facility] [phone].    We comply with applicable federal civil rights laws and [place] laws. We do not discriminate on the basis of race, color, national origin, age, disability, sex, sexual orientation, or gender identity.            Thank you!     Thank you for choosing [facility]  for your care. Our goal is always to provide you with excellent care. Hearing back from our patients is one way we can continue to improve our services. Please take a few minutes to complete the written survey that you may receive in the mail after your visit with us. Thank you!             Your Updated Medication List - Protect others around you: Learn how to safely use, store and throw away your medicines at www.disposemymeds.org.          This list is accurate as of 1/31/18  1:51 PM.  Always use your most recent med list.                   Brand Name Dispense Instructions for use Diagnosis    albuterol 108 (90 BASE) MCG/ACT Inhaler    PROAIR HFA/PROVENTIL HFA/VENTOLIN HFA    1 Inhaler    Inhale 2 puffs into the lungs every 6 hours for 10 days        TYLENOL PO       Take 4,000 mg by mouth daily

## 2018-02-02 ENCOUNTER — TELEPHONE (OUTPATIENT)
Dept: OTOLARYNGOLOGY | Facility: CLINIC | Age: 48
End: 2018-02-02

## 2018-02-02 NOTE — TELEPHONE ENCOUNTER
Spoke to patient. He states [facility] also had him scheduled for 3/1. He wants [facility].  I informed him to call [facility] and cancel surgery with them.

## 2018-02-02 NOTE — TELEPHONE ENCOUNTER
Pt had PE tubes inserted on the 31st with  Dr. Quiroz. Pt found a soft piece of tan silicone from his left ear last evening. He states the left ear is still plugged. Per MD notes he was to have drops in his ears but no RX and pt has none.  I left MD a message on this.   Pt wants to use [facility] in [place], [place] on [address]. I told pt to call there first before he goes to see if rx is there. JASEN Maldonado

## 2018-02-02 NOTE — TELEPHONE ENCOUNTER
Reason for Call:  Other returning call    Detailed comments: Patient is calling because he is still has a plugged ear on his right side. He says that his left side cleared up pretty good but the right one is still plugged after having tubes placed on Wednesday by Dr. Quiroz. Would like to discuss this further. Please call him.     Phone Number Patient can be reached at: Home number on file   (home) [phone]     Best Time: anyt    Can we leave a detailed message on this number? YES    Call taken on 2/2/2018 at 4:02 PM by Kaycee Cotto

## 2018-02-02 NOTE — TELEPHONE ENCOUNTER
Patient is calling back with questions on time and dates that have been set up for surgery.  Please call

## 2018-02-05 NOTE — TELEPHONE ENCOUNTER
I did speak with Dr. Quiroz after I had left work. MD states there was an error in the charting, he did NOT want pt to have ear drops. Pt is to plug his nose and act like he is blowing his nose, to help him pop his ears.  I have left a message for pt to call me today sometime. JASEN Maldonado

## 2018-02-05 NOTE — TELEPHONE ENCOUNTER
Pt called back. His left ear still is plugged. I instructed him to try popping his ears for the next few days and see what happens. If he still is having trouble he is to let us know. JASEN Maldonado

## 2018-02-12 NOTE — PROGRESS NOTES
[address]  [facility] [address]  [phone]  Dept: [phone]    PRE-OP EVALUATION:  Today's date: 2018    Jose Elias Reaves (: 1970) presents for pre-operative evaluation assessment as requested by Dr. Quiroz.  He requires evaluation and anesthesia risk assessment prior to undergoing surgery/procedure for treatment of chronic rhinitis, chronic sinusitis.  Proposed procedure:   SEPTOPLASTY N/A General   septoplasty, submucosal resection of the turbinates         Date of Surgery/ Procedure: 3/13/18   Time of Surgery/ Procedure: 10:00  Hospital/Surgical Facility: [facility]  Primary Physician: Patricia Booker  Type of Anesthesia Anticipated: General  Patient has a Health Care Directive or Living Will:  NO  Primary Physician: Patricia Booker  Type of Anesthesia Anticipated: General      Preop Questions 2018   Who is doing your surgery? Dr Quiroz   What are you having done? nose   Date of Surgery/Procedure: 3\13\2018   Facility or Hospital where procedure/surgery will be performed: [facility]   1.  Do you have a history of Heart attack, stroke, stent, coronary bypass surgery, or other heart surgery? No   2.  Do you ever have any pain or discomfort in your chest? No   3.  Do you have a history of  Heart Failure? No   4.   Are you troubled by shortness of breath when:  walking on a level surface, or up a slight hill, or at night? No   5.  Do you currently have a cold, bronchitis or other respiratory infection? No   6.  Do you have a cough, shortness of breath, or wheezing? No   7.  Do you sometimes get pains in the calves of your legs when you walk? No   8. Do you or anyone in your family have previous history of blood clots? No   9.  Do you or does anyone in your family have a serious bleeding problem such as prolonged bleeding following surgeries or cuts? No   10. Have you ever had problems with anemia or been told to take iron pills? No    11. Have you had any abnormal blood loss such as black, tarry or bloody stools? No   12. Have you ever had a blood transfusion? No   13. Have you or any of your relatives ever had problems with anesthesia? No   14. Do you have sleep apnea, excessive snoring or daytime drowsiness? No   15. Do you have any prosthetic heart valves? No   16. Do you have prosthetic joints? No         HPI:                                                      Brief HPI related to upcoming procedure: Chronic ear infections and rhinitis       See problem list for active medical problems.  Problems all longstanding and stable, except as noted/documented.  See ROS for pertinent symptoms related to these conditions.                                                                                                    HYPERTENSION - Patient has longstanding history of HTN , currently denies any symptoms referable to elevated blood pressure. Specifically denies chest pain, palpitations, dyspnea, orthopnea, PND or peripheral edema. Blood pressure readings have been in normal range. Current medication regimen is as listed below. Patient denies any side effects of medication.                                                                                                                                                                                             MEDICAL HISTORY:                                                      Patient Active Problem List    Diagnosis Date Noted     Other chronic rhinitis 02/20/2018     Priority: Medium     Chronic sinusitis, unspecified location 02/20/2018     Priority: Medium     Dysfunction of both eustachian tubes 02/20/2018     Priority: Medium     Mild intermittent asthma 02/20/2018     Priority: Medium     Elevated fasting lipid profile 04/18/2017     Priority: Medium     Chronic pain syndrome 04/18/2017     Priority: Medium     Essential hypertension 03/21/2017     Priority: Medium     Family history of  "ischemic heart disease 03/21/2017     Priority: Medium     Family history of diabetes mellitus 03/21/2017     Priority: Medium      History reviewed. No pertinent past medical history.     Past Surgical History:   Procedure Laterality Date     HERNIA REPAIR Left      KNEE SURGERY Left     ACL repair, patella graft     Current Outpatient Prescriptions   Medication Sig Dispense Refill     NAPROXEN PO Take 500 mg by mouth 2 times daily (with meals)       propranolol (INDERAL LA) 80 MG 24 hr capsule Take 1 capsule (80 mg) by mouth daily 30 capsule 1     albuterol (PROAIR HFA/PROVENTIL HFA/VENTOLIN HFA) 108 (90 BASE) MCG/ACT Inhaler Inhale 2 puffs into the lungs every 6 hours 1 Inhaler 11     Acetaminophen (TYLENOL PO) Take 4,000 mg by mouth daily       [DISCONTINUED] albuterol (PROAIR HFA/PROVENTIL HFA/VENTOLIN HFA) 108 (90 BASE) MCG/ACT Inhaler Inhale 2 puffs into the lungs every 6 hours for 10 days 1 Inhaler 0     OTC products: None, except as noted above    Allergies   Allergen Reactions     Penicillins Anaphylaxis     Tolerated cefazolin on 09/16/2015.     Hydrocodone-Acetaminophen Itching     Patient claims had problem with one generic brand of Norco (but did not know which brand).      Latex Allergy: NO    Social History   Substance Use Topics     Smoking status: Current Every Day Smoker     Packs/day: 1.00     Smokeless tobacco: Never Used     Alcohol use Not on file     History   Drug Use No       REVIEW OF SYSTEMS:                                                    Constitutional, neuro, ENT, endocrine, pulmonary, cardiac, gastrointestinal, genitourinary, musculoskeletal, integument and psychiatric systems are negative, except as otherwise noted.    EXAM:                                                    /90  Pulse 82  Temp 97.4  F (36.3  C) (Oral)  Resp 18  Ht 5' 11.06\" (1.805 m)  Wt 226 lb 9.6 oz (102.8 kg)  BMI 31.55 kg/m2    GENERAL APPEARANCE: healthy, alert and no distress     EYES: EOMI,  " PERRL     HENT: ear canals and TM's normal and nose and mouth without ulcers or lesions     NECK: no adenopathy, no asymmetry, masses, or scars and thyroid normal to palpation     RESP: lungs clear to auscultation - no rales, rhonchi or wheezes     CV: regular rates and rhythm, normal S1 S2, no S3 or S4 and no murmur, click or rub     ABDOMEN:  soft, nontender, no HSM or masses and bowel sounds normal     MS: extremities normal- no gross deformities noted, no evidence of inflammation in joints, FROM in all extremities.     SKIN: no suspicious lesions or rashes     NEURO: Normal strength and tone, sensory exam grossly normal, mentation intact and speech normal     PSYCH: mentation appears normal. and affect normal/bright     LYMPHATICS: No cervical adenopathy    DIAGNOSTICS:                                                    EKG: appears normal, NSR, normal axis, normal intervals, no acute ST/T changes c/w ischemia, no LVH by voltage criteria, there are no prior tracings available    Recent Labs   Lab Test  03/21/17   1142   NA  140   POTASSIUM  4.6   CR  0.96      See orders pending in Epic     IMPRESSION:                                                    Reason for surgery/procedure: Chronic rhinitis/sinusitis and deviated septum   Diagnosis/reason for consult: Pre operative consult     The proposed surgical procedure is considered INTERMEDIATE risk.    REVISED CARDIAC RISK INDEX  The patient has the following serious cardiovascular risks for perioperative complications such as (MI, PE, VFib and 3  AV Block):  No serious cardiac risks  INTERPRETATION: 0 risks: Class I (very low risk - 0.4% complication rate)    The patient has the following additional risks for perioperative complications:  No identified additional risks  The 10-year ASCVD risk score ([place] MADELINE Jr, et al., 2013) is: 4.4%    Values used to calculate the score:      Age: 47 years      Sex: Male      Is Non- : No      Diabetic:  No      Tobacco smoker: No      Systolic Blood Pressure: 128 mmHg      Is BP treated: Yes      HDL Cholesterol: 42 mg/dL      Total Cholesterol: 214 mg/dL      ICD-10-CM    1. Preop general physical exam Z01.818 Basic metabolic panel     EKG 12-lead complete w/read - Clinics   2. Dysfunction of both eustachian tubes H69.83    3. Chronic sinusitis, unspecified location J32.9    4. Other chronic rhinitis J31.0    5. Essential hypertension I10 Basic metabolic panel     EKG 12-lead complete w/read - Clinics     propranolol (INDERAL LA) 80 MG 24 hr capsule   6. Elevated fasting lipid profile E78.5 Lipid panel reflex to direct LDL Fasting   7. Mild intermittent asthma without complication J45.20 albuterol (PROAIR HFA/PROVENTIL HFA/VENTOLIN HFA) 108 (90 BASE) MCG/ACT Inhaler   8. Deviated nasal septum J34.2    9. Chronic pain syndrome G89.4 meloxicam (MOBIC) 15 MG tablet   10. Tobacco use Z72.0    11. Family history of diabetes mellitus Z83.3    12. Family history of ischemic heart disease Z82.49        RECOMMENDATIONS:                                                      Cardiovascular Risk  Patient is already on a Beta Blocker. Continue Betablocker therapy after surgery, using Beta blocker order set as necessary for NPO status.  -- Patient has been off this awhile, will restart, has tolerated well in the past   -- Recommend BP recheck (nurse only) in 1-2 weeks (see other note for further plans)         --Patient is to take all scheduled medications on the day of surgery EXCEPT for modifications listed below.  - Hold Meloxicam 7-10 days before surgery   - TAKE only Inderal morning of procedure with small sip of water     Anesthesia - be advised patient has woken up during procedures before with general anesthesia.       APPROVAL GIVEN to proceed with proposed procedure, without further diagnostic evaluation     Patient was seen and examined additionally by PA student from [facility].       A total of 45  minutes was spent with the patient today (20 minutes for pre op and 25 min addressing other issues) , with greater than 50% of the visit involving counseling and coordination of care.        Signed Electronically by: Tanesha Posada PA-C    Copy of this evaluation report is provided to requesting physician.    [facility] Preop Guidelines

## 2018-02-12 NOTE — PATIENT INSTRUCTIONS
- Start Inderal   - Blood pressure recheck 1-2 weeks   - Recheck with a provider 1-2 months     - Start Meloxicam (mobic) once a day as needed for neck pain      Stop 7-10 days before surgery     - Take Inderal with small sip of water morning of procedure nothing else         Before Your Surgery      Call your surgeon if there is any change in your health. This includes signs of a cold or flu (such as a sore throat, runny nose, cough, rash or fever).    Do not smoke, drink alcohol or take over the counter medicine (unless your surgeon or primary care doctor tells you to) for the 24 hours before and after surgery.    If you take prescribed drugs: Follow your doctor s orders about which medicines to take and which to stop until after surgery.    Eating and drinking prior to surgery: follow the instructions from your surgeon    Take a shower or bath the night before surgery. Use the soap your surgeon gave you to gently clean your skin. If you do not have soap from your surgeon, use your regular soap. Do not shave or scrub the surgery site.  Wear clean pajamas and have clean sheets on your bed.

## 2018-02-20 ENCOUNTER — OFFICE VISIT (OUTPATIENT)
Dept: FAMILY MEDICINE | Facility: OTHER | Age: 48
End: 2018-02-20
Payer: COMMERCIAL

## 2018-02-20 VITALS
WEIGHT: 226.6 LBS | RESPIRATION RATE: 18 BRPM | SYSTOLIC BLOOD PRESSURE: 140 MMHG | DIASTOLIC BLOOD PRESSURE: 90 MMHG | BODY MASS INDEX: 31.72 KG/M2 | HEART RATE: 82 BPM | TEMPERATURE: 97.4 F | HEIGHT: 71 IN

## 2018-02-20 DIAGNOSIS — E78.5 ELEVATED FASTING LIPID PROFILE: ICD-10-CM

## 2018-02-20 DIAGNOSIS — Z82.49 FAMILY HISTORY OF ISCHEMIC HEART DISEASE: ICD-10-CM

## 2018-02-20 DIAGNOSIS — H69.93 DYSFUNCTION OF BOTH EUSTACHIAN TUBES: ICD-10-CM

## 2018-02-20 DIAGNOSIS — J34.2 DEVIATED NASAL SEPTUM: ICD-10-CM

## 2018-02-20 DIAGNOSIS — J32.9 CHRONIC SINUSITIS, UNSPECIFIED LOCATION: ICD-10-CM

## 2018-02-20 DIAGNOSIS — J31.0 OTHER CHRONIC RHINITIS: ICD-10-CM

## 2018-02-20 DIAGNOSIS — G89.4 CHRONIC PAIN SYNDROME: ICD-10-CM

## 2018-02-20 DIAGNOSIS — Z72.0 TOBACCO USE: ICD-10-CM

## 2018-02-20 DIAGNOSIS — Z83.3 FAMILY HISTORY OF DIABETES MELLITUS: ICD-10-CM

## 2018-02-20 DIAGNOSIS — I10 ESSENTIAL HYPERTENSION: ICD-10-CM

## 2018-02-20 DIAGNOSIS — J45.20 MILD INTERMITTENT ASTHMA WITHOUT COMPLICATION: ICD-10-CM

## 2018-02-20 DIAGNOSIS — Z01.818 PREOP GENERAL PHYSICAL EXAM: Primary | ICD-10-CM

## 2018-02-20 LAB
ANION GAP SERPL CALCULATED.3IONS-SCNC: 7 MMOL/L (ref 3–14)
BUN SERPL-MCNC: 23 MG/DL (ref 7–30)
CALCIUM SERPL-MCNC: 9.1 MG/DL (ref 8.5–10.1)
CHLORIDE SERPL-SCNC: 106 MMOL/L (ref 94–109)
CHOLEST SERPL-MCNC: 220 MG/DL
CO2 SERPL-SCNC: 27 MMOL/L (ref 20–32)
CREAT SERPL-MCNC: 1.01 MG/DL (ref 0.66–1.25)
GFR SERPL CREATININE-BSD FRML MDRD: 79 ML/MIN/1.7M2
GLUCOSE SERPL-MCNC: 103 MG/DL (ref 70–99)
HDLC SERPL-MCNC: 42 MG/DL
LDLC SERPL CALC-MCNC: 126 MG/DL
NONHDLC SERPL-MCNC: 178 MG/DL
POTASSIUM SERPL-SCNC: 4.4 MMOL/L (ref 3.4–5.3)
SODIUM SERPL-SCNC: 140 MMOL/L (ref 133–144)
TRIGL SERPL-MCNC: 261 MG/DL

## 2018-02-20 PROCEDURE — 80061 LIPID PANEL: CPT | Performed by: PHYSICIAN ASSISTANT

## 2018-02-20 PROCEDURE — 80048 BASIC METABOLIC PNL TOTAL CA: CPT | Performed by: PHYSICIAN ASSISTANT

## 2018-02-20 PROCEDURE — 99215 OFFICE O/P EST HI 40 MIN: CPT | Performed by: PHYSICIAN ASSISTANT

## 2018-02-20 PROCEDURE — 36415 COLL VENOUS BLD VENIPUNCTURE: CPT | Performed by: PHYSICIAN ASSISTANT

## 2018-02-20 PROCEDURE — 93000 ELECTROCARDIOGRAM COMPLETE: CPT | Performed by: PHYSICIAN ASSISTANT

## 2018-02-20 RX ORDER — MELOXICAM 15 MG/1
15 TABLET ORAL DAILY
Qty: 30 TABLET | Refills: 1 | Status: SHIPPED | OUTPATIENT
Start: 2018-02-20 | End: 2019-12-13

## 2018-02-20 RX ORDER — ALBUTEROL SULFATE 90 UG/1
2 AEROSOL, METERED RESPIRATORY (INHALATION) EVERY 6 HOURS
Qty: 1 INHALER | Refills: 11 | Status: SHIPPED | OUTPATIENT
Start: 2018-02-20 | End: 2019-12-13

## 2018-02-20 RX ORDER — PROPRANOLOL HYDROCHLORIDE 80 MG/1
80 CAPSULE, EXTENDED RELEASE ORAL DAILY
Qty: 30 CAPSULE | Refills: 1 | Status: SHIPPED | OUTPATIENT
Start: 2018-02-20 | End: 2019-12-13

## 2018-02-20 ASSESSMENT — ANXIETY QUESTIONNAIRES
GAD7 TOTAL SCORE: 0
6. BECOMING EASILY ANNOYED OR IRRITABLE: NOT AT ALL
1. FEELING NERVOUS, ANXIOUS, OR ON EDGE: NOT AT ALL
3. WORRYING TOO MUCH ABOUT DIFFERENT THINGS: NOT AT ALL
7. FEELING AFRAID AS IF SOMETHING AWFUL MIGHT HAPPEN: NOT AT ALL
2. NOT BEING ABLE TO STOP OR CONTROL WORRYING: NOT AT ALL
GAD7 TOTAL SCORE: 0
4. TROUBLE RELAXING: NOT AT ALL
GAD7 TOTAL SCORE: 0
7. FEELING AFRAID AS IF SOMETHING AWFUL MIGHT HAPPEN: NOT AT ALL
5. BEING SO RESTLESS THAT IT IS HARD TO SIT STILL: NOT AT ALL

## 2018-02-20 ASSESSMENT — PAIN SCALES - GENERAL: PAINLEVEL: SEVERE PAIN (6)

## 2018-02-20 ASSESSMENT — PATIENT HEALTH QUESTIONNAIRE - PHQ9
10. IF YOU CHECKED OFF ANY PROBLEMS, HOW DIFFICULT HAVE THESE PROBLEMS MADE IT FOR YOU TO DO YOUR WORK, TAKE CARE OF THINGS AT HOME, OR GET ALONG WITH OTHER PEOPLE: NOT DIFFICULT AT ALL
SUM OF ALL RESPONSES TO PHQ QUESTIONS 1-9: 0
SUM OF ALL RESPONSES TO PHQ QUESTIONS 1-9: 0

## 2018-02-20 NOTE — PROGRESS NOTES
"  SUBJECTIVE:   Jose Elias Reaves is a 47 year old male who presents to clinic today for the following health issues:    Establish care     HPI  Hypertension Follow-up      Outpatient blood pressures are not being checked.    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  No      Respiratory symptoms:   Cough: No   Wheezing: No   Shortness of breath: No    Use of short- acting(rescue) inhaler: Rarely     Taking controlled (daily) meds as prescribed: None     ER/UC visits or hospital admissions since last visit: none     Recent asthma triggers that patient is dealing with: upper respiratory infections, occupational exposure and cold air         Problem list and histories reviewed & adjusted, as indicated.  Additional history: as documented    BP Readings from Last 3 Encounters:   02/20/18 140/90   05/15/17 126/85   04/18/17 130/78    Wt Readings from Last 3 Encounters:   02/20/18 226 lb 9.6 oz (102.8 kg)   01/31/18 232 lb (105.2 kg)   05/15/17 220 lb (99.8 kg)                  Labs reviewed in EPIC    ROS:  Constitutional, HEENT, cardiovascular, pulmonary, gi and gu systems are negative, except as otherwise noted.    OBJECTIVE:   /90  Pulse 82  Temp 97.4  F (36.3  C) (Oral)  Resp 18  Ht 5' 11.06\" (1.805 m)  Wt 226 lb 9.6 oz (102.8 kg)  BMI 31.55 kg/m2  Body mass index is 31.55 kg/(m^2).  GENERAL APPEARANCE: healthy, alert and " no distress  EYES: Eyes grossly normal to inspection, PERRLA, conjunctivae and sclerae without injection or discharge, EOM intact   HENT: Bilateral ear canals without erythema or cerumen, bilateral TM's pearly grey with normal light reflex, no effusion, injection, or bulging, nasal turbinates without swelling, erythema, or discharge, nasal septum deviation present, mouth without ulcers or lesions, oropharynx clear and oral mucous membranes moist, no sinus tenderness   NECK: No adenopathy in cervical or supraclavicular regions, no asymmetry, masses, or scars, and thyroid normal to palpation, no JVD, normal ROM, no midline tenderness, no paracervical tenderness    RESP: Lungs clear to auscultation - no rales, rhonchi or wheezes   CV: Regular rates and rhythm, normal S1 S2, no S3 or S4, no murmur, click or rub, no peripheral edema and peripheral pulses strong and symmetric bilaterally   ABDOMEN: Soft, nontender, no hepatosplenomegaly, no masses and bowel sounds normal   MS: No musculoskeletal defects are noted and gait is age appropriate without ataxia   SKIN: No suspicious lesions or rashes, hydration status appears adeuqate with normal skin turgor   PSYCH: Alert and oriented x3; speech- coherent , normal rate and volume; able to articulate logical thoughts, able to abstract reason, no tangential thoughts, no hallucinations or delusions, mentation appears normal, Mood is euthymic. Affect is appropriate for this mood state and bright. Thought content is free of suicidal ideation, hallucinations, and delusions. Dress is adequate and upkept. Eye contact is good during conversation.       Diagnostic Test Results:  none     ASSESSMENT/PLAN:       ICD-10-CM    1. Preop general physical exam Z01.818 Basic metabolic panel     EKG 12-lead complete w/read - Clinics   2. Dysfunction of both eustachian tubes H69.83    3. Chronic sinusitis, unspecified location J32.9    4. Other chronic rhinitis J31.0    5. Essential hypertension  I10 Basic metabolic panel     EKG 12-lead complete w/read - Clinics     propranolol (INDERAL LA) 80 MG 24 hr capsule   6. Elevated fasting lipid profile E78.5 Lipid panel reflex to direct LDL Fasting   7. Mild intermittent asthma without complication J45.20 albuterol (PROAIR HFA/PROVENTIL HFA/VENTOLIN HFA) 108 (90 BASE) MCG/ACT Inhaler   8. Deviated nasal septum J34.2    9. Chronic pain syndrome G89.4 meloxicam (MOBIC) 15 MG tablet   10. Tobacco use Z72.0    11. Family history of diabetes mellitus Z83.3    12. Family history of ischemic heart disease Z82.49      - See other note for Pre-operative clearance     - Blood pressure      Elevated x2 today     Restart Inderal, reviewed use and side effects      BP recheck in clinic in 1-2 weeks      Recheck with provider in 1-2 months     - Asthma      Stable on occasional albuterol use, reviewed use and side effects     - Will get updated fasting labs today   The 10-year ASCVD risk score ([place] CORERA Jr, et al., 2013) is: 5.2%    Values used to calculate the score:      Age: 47 years      Sex: Male      Is Non- : No      Diabetic: No      Tobacco smoker: No      Systolic Blood Pressure: 140 mmHg      Is BP treated: Yes      HDL Cholesterol: 42 mg/dL      Total Cholesterol: 214 mg/dL    - Chronic pain (neck)        - CT scan of cervical spine from 11/18/2015 is reviewed.  CT scan was done at his prior clinic.  Results show moderate multilevel degenerative d, no acute abnormality, no fracture. A CT of the lumbar spine was done on the same day: Advanced disc disease at L5-S1 with moderate broad based bulge of the disc with moderate left Protrusion causing mild-to-moderate canal Stenosis.  Age indeterminant.     Will do trial of Mobic      Reviewed use and side effects      Should not take 7-10 days before his surgery (see other note)      Recheck 1-2 months      If fails on this, could try a topical Voltaren vs. Cymbalta vs. Toradol tablets vs. Imaging       (Failed on Lortab, Flexeril, and Effexor)       reviewed - no fills in the last year in [place] or [place]         Patient [facility] and [facility]       A total of 45 minutes was spent with the patient today (20 minutes for pre op and 25 min addressing other issues) , with greater than 50% of the visit involving counseling and coordination of care.      Tanesha Posada PA-C  [facility]

## 2018-02-20 NOTE — PROGRESS NOTES
Please call patient with the following message    Labs all normal. OK to proceed with surgery. Cholesterol is borderline high, no need for medication at this time. Will discuss at next appointment.     Dennis Posada PA-C    The 10-year ASCVD risk score ([place] CORREA Jr, et al., 2013) is: 5.4%    Values used to calculate the score:      Age: 47 years      Sex: Male      Is Non- : No      Diabetic: No      Tobacco smoker: No      Systolic Blood Pressure: 140 mmHg      Is BP treated: Yes      HDL Cholesterol: 42 mg/dL      Total Cholesterol: 220 mg/dL

## 2018-02-20 NOTE — NURSING NOTE
"Chief Complaint   Patient presents with     Pre-Op Exam     Panel Management     Height, Immunizations, Tobacco quit plan, PHQ-9, JOSEF-7, CSA, AUBREY       Initial BP (!) 128/94  Pulse 82  Temp 97.4  F (36.3  C) (Oral)  Resp 18  Ht 5' 11.06\" (1.805 m)  Wt 226 lb 9.6 oz (102.8 kg)  BMI 31.55 kg/m2 Estimated body mass index is 31.55 kg/(m^2) as calculated from the following:    Height as of this encounter: 5' 11.06\" (1.805 m).    Weight as of this encounter: 226 lb 9.6 oz (102.8 kg).  Medication Reconciliation: complete    "

## 2018-02-20 NOTE — Clinical Note
Sharmaine  Not sure how we should code this as I spend a very long time with him. A pre-op would be a E4, then maybe all the rest another E3-4? Or just code as a E5?   Dennis Posada PA-C [facility]

## 2018-02-20 NOTE — MR AVS SNAPSHOT
After Visit Summary   2/20/2018    Jose Elias Reaves    MRN: [id number]           Patient Information     Date Of Birth          1970        Visit Information        Provider Department      2/20/2018 9:00 AM Tanesha Posada PA-C [facility]        Today's Diagnoses     Preop general physical exam    -  1    Dysfunction of both eustachian tubes        Chronic sinusitis, unspecified location        Other chronic rhinitis        Essential hypertension        Elevated fasting lipid profile        Mild intermittent asthma without complication        Deviated nasal septum        Chronic pain syndrome          Care Instructions    - Start Inderal   - Blood pressure recheck 1-2 weeks   - Recheck with a provider 1-2 months     - Start Meloxicam (mobic) once a day as needed for neck pain     - Take Inderal with small sip of water morning of procedure nothing else         Before Your Surgery      Call your surgeon if there is any change in your health. This includes signs of a cold or flu (such as a sore throat, runny nose, cough, rash or fever).    Do not smoke, drink alcohol or take over the counter medicine (unless your surgeon or primary care doctor tells you to) for the 24 hours before and after surgery.    If you take prescribed drugs: Follow your doctor s orders about which medicines to take and which to stop until after surgery.    Eating and drinking prior to surgery: follow the instructions from your surgeon    Take a shower or bath the night before surgery. Use the soap your surgeon gave you to gently clean your skin. If you do not have soap from your surgeon, use your regular soap. Do not shave or scrub the surgery site.  Wear clean pajamas and have clean sheets on your bed.           Follow-ups after your visit        Your next 10 appointments already scheduled     Mar 13, 2018   Procedure with Piotr Quiroz MD   [facility] ([place]  "[facility])    [address]   [place] [id number] [phone]           From [address]: Exit at [address] on south side of [place]. Turn right on [address]. Turn left at stoplight on [address]. [facility] will be in view two blocks ahead            Mar 19, 2018  9:15 AM CDT   [facility] with [facility]iot[facility]   [facility]    [address] [id number] [phone]              Who to contact     If you have questions or need follow up information about today's clinic visit or your schedule please contact [facility] directly at [phone].  Normal or non-critical lab and imaging results will be communicated to you by [employer], letter or phone within 4 business days after the clinic has received the results. If you do not hear from us within 7 days, please contact the clinic through [employer] or phone. If you have a critical or abnormal lab result, we will notify you by phone as soon as possible.  Submit refill requests through [employer] or call your pharmacy and they will forward the refill request to us. Please allow 3 business days for your refill to be completed.          Additional Information About Your Visit        [facility] Information     i[employer]T lets you send messages to your doctor, view your test results, renew your prescriptions, schedule appointments and more. To sign up, go to [email or web address] . Click on \"Log in\" on the left side of the screen, which will take you to the Welcome page. Then click on \"Sign up Now\" on the right side of the page.     You will be asked to enter the access code listed below, as well as some personal information. Please follow the directions to create your username and password.     Your access code is: [email or web address]  Expires: 2018  1:51 PM     Your access code will  in 90 days. If you need help or a new code, please call your [place] " "clinic or [phone].        Care EveryWhere ID     This is your Care EveryWhere ID. This could be used by other organizations to access your [facility] medical records  [id number]        Your Vitals Were     Pulse Temperature Respirations Height BMI (Body Mass Index)       82 97.4  F (36.3  C) (Oral) 18 5' 11.06\" (1.805 m) 31.55 kg/m2        Blood Pressure from Last 3 Encounters:   02/20/18 (!) 128/94   05/15/17 126/85   04/18/17 130/78    Weight from Last 3 Encounters:   02/20/18 226 lb 9.6 oz (102.8 kg)   01/31/18 232 lb (105.2 kg)   05/15/17 220 lb (99.8 kg)              We Performed the Following     Basic metabolic panel     EKG 12-lead complete w/read - Clinics     Lipid panel reflex to direct LDL Fasting          Today's Medication Changes          These changes are accurate as of 2/20/18 10:56 AM.  If you have any questions, ask your nurse or doctor.               Start taking these medicines.        Dose/Directions    meloxicam 15 MG tablet   Commonly known as:  MOBIC   Used for:  Chronic pain syndrome   Started by:  Tanesha Posada PA-C        Dose:  15 mg   Take 1 tablet (15 mg) by mouth daily   Quantity:  30 tablet   Refills:  1       propranolol 80 MG 24 hr capsule   Commonly known as:  INDERAL LA   Used for:  Essential hypertension   Started by:  Tanesha Posada PA-C        Dose:  80 mg   Take 1 capsule (80 mg) by mouth daily   Quantity:  30 capsule   Refills:  1            Where to get your medicines      These medications were sent to [id number] [address]  [address] [address]    Hours:  Closed 10-16-08 business to [facility] Phone:  [phone]     albuterol 108 (90 BASE) MCG/ACT Inhaler    meloxicam 15 MG tablet    propranolol 80 MG 24 hr capsule                Primary Care Provider Office Phone # Fax #    ANATOLY Morse [facility] [phone]       [address] [place] [address] " [phone]        Equal Access to Services     [facility] : Hadii cuco gonzalez neilmichaelle Nehemias, waeugenieda luqadaha, qaybta maggiereedprakash alamo, ton hubbard. So [facility] [phone].    ATENCIÓN: Si habla español, tiene a garza disposición servicios gratuitos de asistencia lingüística. Meganame al [phone].    We comply with applicable federal civil rights laws and [place] laws. We do not discriminate on the basis of race, color, national origin, age, disability, sex, sexual orientation, or gender identity.            Thank you!     Thank you for choosing [facility]  for your care. Our goal is always to provide you with excellent care. Hearing back from our patients is one way we can continue to improve our services. Please take a few minutes to complete the written survey that you may receive in the mail after your visit with us. Thank you!             Your Updated Medication List - Protect others around you: Learn how to safely use, store and throw away your medicines at www.disposemymeds.org.          This list is accurate as of 2/20/18 10:56 AM.  Always use your most recent med list.                   Brand Name Dispense Instructions for use Diagnosis    albuterol 108 (90 BASE) MCG/ACT Inhaler    PROAIR HFA/PROVENTIL HFA/VENTOLIN HFA    1 Inhaler    Inhale 2 puffs into the lungs every 6 hours    Mild intermittent asthma without complication       meloxicam 15 MG tablet    MOBIC    30 tablet    Take 1 tablet (15 mg) by mouth daily    Chronic pain syndrome       NAPROXEN PO      Take 500 mg by mouth 2 times daily (with meals)        propranolol 80 MG 24 hr capsule    INDERAL LA    30 capsule    Take 1 capsule (80 mg) by mouth daily    Essential hypertension       TYLENOL PO      Take 4,000 mg by mouth daily

## 2018-02-21 ASSESSMENT — ANXIETY QUESTIONNAIRES: GAD7 TOTAL SCORE: 0

## 2018-02-21 ASSESSMENT — PATIENT HEALTH QUESTIONNAIRE - PHQ9: SUM OF ALL RESPONSES TO PHQ QUESTIONS 1-9: 0

## 2018-02-27 ENCOUNTER — DOCUMENTATION ONLY (OUTPATIENT)
Dept: FAMILY MEDICINE | Facility: OTHER | Age: 48
End: 2018-02-27

## 2018-03-12 NOTE — H&P (VIEW-ONLY)
[address]  [facility] [address]  [phone]  Dept: [phone]    PRE-OP EVALUATION:  Today's date: 2018    Jose Elias Reaves (: 1970) presents for pre-operative evaluation assessment as requested by Dr. Quiroz.  He requires evaluation and anesthesia risk assessment prior to undergoing surgery/procedure for treatment of chronic rhinitis, chronic sinusitis.  Proposed procedure:   SEPTOPLASTY N/A General   septoplasty, submucosal resection of the turbinates         Date of Surgery/ Procedure: 3/13/18   Time of Surgery/ Procedure: 10:00  Hospital/Surgical Facility: [facility]  Primary Physician: Patricia Booker  Type of Anesthesia Anticipated: General  Patient has a Health Care Directive or Living Will:  NO  Primary Physician: Patricia Booker  Type of Anesthesia Anticipated: General      Preop Questions 2018   Who is doing your surgery? Dr Quiroz   What are you having done? nose   Date of Surgery/Procedure: 3\13\2018   Facility or Hospital where procedure/surgery will be performed: [facility]   1.  Do you have a history of Heart attack, stroke, stent, coronary bypass surgery, or other heart surgery? No   2.  Do you ever have any pain or discomfort in your chest? No   3.  Do you have a history of  Heart Failure? No   4.   Are you troubled by shortness of breath when:  walking on a level surface, or up a slight hill, or at night? No   5.  Do you currently have a cold, bronchitis or other respiratory infection? No   6.  Do you have a cough, shortness of breath, or wheezing? No   7.  Do you sometimes get pains in the calves of your legs when you walk? No   8. Do you or anyone in your family have previous history of blood clots? No   9.  Do you or does anyone in your family have a serious bleeding problem such as prolonged bleeding following surgeries or cuts? No   10. Have you ever had problems with anemia or been told to take iron pills? No    11. Have you had any abnormal blood loss such as black, tarry or bloody stools? No   12. Have you ever had a blood transfusion? No   13. Have you or any of your relatives ever had problems with anesthesia? No   14. Do you have sleep apnea, excessive snoring or daytime drowsiness? No   15. Do you have any prosthetic heart valves? No   16. Do you have prosthetic joints? No         HPI:                                                      Brief HPI related to upcoming procedure: Chronic ear infections and rhinitis       See problem list for active medical problems.  Problems all longstanding and stable, except as noted/documented.  See ROS for pertinent symptoms related to these conditions.                                                                                                    HYPERTENSION - Patient has longstanding history of HTN , currently denies any symptoms referable to elevated blood pressure. Specifically denies chest pain, palpitations, dyspnea, orthopnea, PND or peripheral edema. Blood pressure readings have been in normal range. Current medication regimen is as listed below. Patient denies any side effects of medication.                                                                                                                                                                                             MEDICAL HISTORY:                                                      Patient Active Problem List    Diagnosis Date Noted     Other chronic rhinitis 02/20/2018     Priority: Medium     Chronic sinusitis, unspecified location 02/20/2018     Priority: Medium     Dysfunction of both eustachian tubes 02/20/2018     Priority: Medium     Mild intermittent asthma 02/20/2018     Priority: Medium     Elevated fasting lipid profile 04/18/2017     Priority: Medium     Chronic pain syndrome 04/18/2017     Priority: Medium     Essential hypertension 03/21/2017     Priority: Medium     Family history of  "ischemic heart disease 03/21/2017     Priority: Medium     Family history of diabetes mellitus 03/21/2017     Priority: Medium      History reviewed. No pertinent past medical history.     Past Surgical History:   Procedure Laterality Date     HERNIA REPAIR Left      KNEE SURGERY Left     ACL repair, patella graft     Current Outpatient Prescriptions   Medication Sig Dispense Refill     NAPROXEN PO Take 500 mg by mouth 2 times daily (with meals)       propranolol (INDERAL LA) 80 MG 24 hr capsule Take 1 capsule (80 mg) by mouth daily 30 capsule 1     albuterol (PROAIR HFA/PROVENTIL HFA/VENTOLIN HFA) 108 (90 BASE) MCG/ACT Inhaler Inhale 2 puffs into the lungs every 6 hours 1 Inhaler 11     Acetaminophen (TYLENOL PO) Take 4,000 mg by mouth daily       [DISCONTINUED] albuterol (PROAIR HFA/PROVENTIL HFA/VENTOLIN HFA) 108 (90 BASE) MCG/ACT Inhaler Inhale 2 puffs into the lungs every 6 hours for 10 days 1 Inhaler 0     OTC products: None, except as noted above    Allergies   Allergen Reactions     Penicillins Anaphylaxis     Tolerated cefazolin on 09/16/2015.     Hydrocodone-Acetaminophen Itching     Patient claims had problem with one generic brand of Norco (but did not know which brand).      Latex Allergy: NO    Social History   Substance Use Topics     Smoking status: Current Every Day Smoker     Packs/day: 1.00     Smokeless tobacco: Never Used     Alcohol use Not on file     History   Drug Use No       REVIEW OF SYSTEMS:                                                    Constitutional, neuro, ENT, endocrine, pulmonary, cardiac, gastrointestinal, genitourinary, musculoskeletal, integument and psychiatric systems are negative, except as otherwise noted.    EXAM:                                                    /90  Pulse 82  Temp 97.4  F (36.3  C) (Oral)  Resp 18  Ht 5' 11.06\" (1.805 m)  Wt 226 lb 9.6 oz (102.8 kg)  BMI 31.55 kg/m2    GENERAL APPEARANCE: healthy, alert and no distress     EYES: EOMI,  " PERRL     HENT: ear canals and TM's normal and nose and mouth without ulcers or lesions     NECK: no adenopathy, no asymmetry, masses, or scars and thyroid normal to palpation     RESP: lungs clear to auscultation - no rales, rhonchi or wheezes     CV: regular rates and rhythm, normal S1 S2, no S3 or S4 and no murmur, click or rub     ABDOMEN:  soft, nontender, no HSM or masses and bowel sounds normal     MS: extremities normal- no gross deformities noted, no evidence of inflammation in joints, FROM in all extremities.     SKIN: no suspicious lesions or rashes     NEURO: Normal strength and tone, sensory exam grossly normal, mentation intact and speech normal     PSYCH: mentation appears normal. and affect normal/bright     LYMPHATICS: No cervical adenopathy    DIAGNOSTICS:                                                    EKG: appears normal, NSR, normal axis, normal intervals, no acute ST/T changes c/w ischemia, no LVH by voltage criteria, there are no prior tracings available    Recent Labs   Lab Test  03/21/17   1142   NA  140   POTASSIUM  4.6   CR  0.96      See orders pending in Epic     IMPRESSION:                                                    Reason for surgery/procedure: Chronic rhinitis/sinusitis and deviated septum   Diagnosis/reason for consult: Pre operative consult     The proposed surgical procedure is considered INTERMEDIATE risk.    REVISED CARDIAC RISK INDEX  The patient has the following serious cardiovascular risks for perioperative complications such as (MI, PE, VFib and 3  AV Block):  No serious cardiac risks  INTERPRETATION: 0 risks: Class I (very low risk - 0.4% complication rate)    The patient has the following additional risks for perioperative complications:  No identified additional risks  The 10-year ASCVD risk score (Girard MADELINE Jr, et al., 2013) is: 4.4%    Values used to calculate the score:      Age: 47 years      Sex: Male      Is Non- : No      Diabetic:  No      Tobacco smoker: No      Systolic Blood Pressure: 128 mmHg      Is BP treated: Yes      HDL Cholesterol: 42 mg/dL      Total Cholesterol: 214 mg/dL      ICD-10-CM    1. Preop general physical exam Z01.818 Basic metabolic panel     EKG 12-lead complete w/read - Clinics   2. Dysfunction of both eustachian tubes H69.83    3. Chronic sinusitis, unspecified location J32.9    4. Other chronic rhinitis J31.0    5. Essential hypertension I10 Basic metabolic panel     EKG 12-lead complete w/read - Clinics     propranolol (INDERAL LA) 80 MG 24 hr capsule   6. Elevated fasting lipid profile E78.5 Lipid panel reflex to direct LDL Fasting   7. Mild intermittent asthma without complication J45.20 albuterol (PROAIR HFA/PROVENTIL HFA/VENTOLIN HFA) 108 (90 BASE) MCG/ACT Inhaler   8. Deviated nasal septum J34.2    9. Chronic pain syndrome G89.4 meloxicam (MOBIC) 15 MG tablet   10. Tobacco use Z72.0    11. Family history of diabetes mellitus Z83.3    12. Family history of ischemic heart disease Z82.49        RECOMMENDATIONS:                                                      Cardiovascular Risk  Patient is already on a Beta Blocker. Continue Betablocker therapy after surgery, using Beta blocker order set as necessary for NPO status.  -- Patient has been off this awhile, will restart, has tolerated well in the past   -- Recommend BP recheck (nurse only) in 1-2 weeks (see other note for further plans)         --Patient is to take all scheduled medications on the day of surgery EXCEPT for modifications listed below.  - Hold Meloxicam 7-10 days before surgery   - TAKE only Inderal morning of procedure with small sip of water     Anesthesia - be advised patient has woken up during procedures before with general anesthesia.       APPROVAL GIVEN to proceed with proposed procedure, without further diagnostic evaluation     Patient was seen and examined additionally by PA student from [employer] [facility].       A total of 45  minutes was spent with the patient today (20 minutes for pre op and 25 min addressing other issues) , with greater than 50% of the visit involving counseling and coordination of care.        Signed Electronically by: Tanesha Posada PA-C    Copy of this evaluation report is provided to requesting physician.    [facility] Preop Guidelines

## 2018-03-13 ENCOUNTER — ANESTHESIA (OUTPATIENT)
Dept: SURGERY | Facility: CLINIC | Age: 48
End: 2018-03-13
Payer: COMMERCIAL

## 2018-03-13 ENCOUNTER — HOSPITAL ENCOUNTER (OUTPATIENT)
Facility: CLINIC | Age: 48
Discharge: HOME | End: 2018-03-13
Attending: OTOLARYNGOLOGY | Admitting: OTOLARYNGOLOGY
Payer: COMMERCIAL

## 2018-03-13 ENCOUNTER — SURGERY (OUTPATIENT)
Age: 48
End: 2018-03-13

## 2018-03-13 ENCOUNTER — NURSE TRIAGE (OUTPATIENT)
Dept: NURSING | Facility: CLINIC | Age: 48
End: 2018-03-13

## 2018-03-13 ENCOUNTER — HOSPITAL ENCOUNTER (EMERGENCY)
Facility: CLINIC | Age: 48
Discharge: HOME | End: 2018-03-13
Attending: FAMILY MEDICINE | Admitting: FAMILY MEDICINE
Payer: COMMERCIAL

## 2018-03-13 ENCOUNTER — ANESTHESIA EVENT (OUTPATIENT)
Dept: SURGERY | Facility: CLINIC | Age: 48
End: 2018-03-13
Payer: COMMERCIAL

## 2018-03-13 VITALS
SYSTOLIC BLOOD PRESSURE: 118 MMHG | HEART RATE: 86 BPM | OXYGEN SATURATION: 93 % | RESPIRATION RATE: 17 BRPM | TEMPERATURE: 97.2 F | DIASTOLIC BLOOD PRESSURE: 98 MMHG

## 2018-03-13 VITALS
OXYGEN SATURATION: 93 % | DIASTOLIC BLOOD PRESSURE: 95 MMHG | RESPIRATION RATE: 9 BRPM | SYSTOLIC BLOOD PRESSURE: 141 MMHG | TEMPERATURE: 97.5 F

## 2018-03-13 DIAGNOSIS — R11.2 POST-OPERATIVE NAUSEA AND VOMITING: ICD-10-CM

## 2018-03-13 DIAGNOSIS — Z98.890 POST-OPERATIVE NAUSEA AND VOMITING: ICD-10-CM

## 2018-03-13 DIAGNOSIS — R04.0 EPISTAXIS: ICD-10-CM

## 2018-03-13 DIAGNOSIS — Z98.890 STATUS POST NASAL SEPTOPLASTY: ICD-10-CM

## 2018-03-13 DIAGNOSIS — L76.22 POSTOPERATIVE HEMORRHAGE OF SUBCUTANEOUS TISSUE FOLLOWING NON-DERMATOLOGIC PROCEDURE: ICD-10-CM

## 2018-03-13 DIAGNOSIS — T81.40XA POSTOPERATIVE INFECTION, INITIAL ENCOUNTER: ICD-10-CM

## 2018-03-13 DIAGNOSIS — G89.18 POST-OP PAIN: Primary | ICD-10-CM

## 2018-03-13 LAB — HGB BLD-MCNC: 15.1 G/DL (ref 13.3–17.7)

## 2018-03-13 PROCEDURE — 71000027 ZZH RECOVERY PHASE 2 EACH 15 MINS: Performed by: OTOLARYNGOLOGY

## 2018-03-13 PROCEDURE — 37000008 ZZH ANESTHESIA TECHNICAL FEE, 1ST 30 MIN: Performed by: OTOLARYNGOLOGY

## 2018-03-13 PROCEDURE — 36000056 ZZH SURGERY LEVEL 3 1ST 30 MIN: Performed by: OTOLARYNGOLOGY

## 2018-03-13 PROCEDURE — 30140 RESECT INFERIOR TURBINATE: CPT | Mod: 50 | Performed by: OTOLARYNGOLOGY

## 2018-03-13 PROCEDURE — 25000125 ZZHC RX 250: Performed by: NURSE ANESTHETIST, CERTIFIED REGISTERED

## 2018-03-13 PROCEDURE — 30520 REPAIR OF NASAL SEPTUM: CPT | Performed by: OTOLARYNGOLOGY

## 2018-03-13 PROCEDURE — 99284 EMERGENCY DEPT VISIT MOD MDM: CPT | Mod: Z6 | Performed by: FAMILY MEDICINE

## 2018-03-13 PROCEDURE — 37000009 ZZH ANESTHESIA TECHNICAL FEE, EACH ADDTL 15 MIN: Performed by: OTOLARYNGOLOGY

## 2018-03-13 PROCEDURE — 36000058 ZZH SURGERY LEVEL 3 EA 15 ADDTL MIN: Performed by: OTOLARYNGOLOGY

## 2018-03-13 PROCEDURE — 25000128 H RX IP 250 OP 636: Performed by: NURSE ANESTHETIST, CERTIFIED REGISTERED

## 2018-03-13 PROCEDURE — 25000566 ZZH SEVOFLURANE, EA 15 MIN: Performed by: OTOLARYNGOLOGY

## 2018-03-13 PROCEDURE — 25000132 ZZH RX MED GY IP 250 OP 250 PS 637: Performed by: NURSE ANESTHETIST, CERTIFIED REGISTERED

## 2018-03-13 PROCEDURE — 25000125 ZZHC RX 250: Performed by: FAMILY MEDICINE

## 2018-03-13 PROCEDURE — C9399 UNCLASSIFIED DRUGS OR BIOLOG: HCPCS | Performed by: NURSE ANESTHETIST, CERTIFIED REGISTERED

## 2018-03-13 PROCEDURE — 85018 HEMOGLOBIN: CPT | Performed by: FAMILY MEDICINE

## 2018-03-13 PROCEDURE — 99284 EMERGENCY DEPT VISIT MOD MDM: CPT

## 2018-03-13 PROCEDURE — 27210794 ZZH OR GENERAL SUPPLY STERILE: Performed by: OTOLARYNGOLOGY

## 2018-03-13 PROCEDURE — 25000128 H RX IP 250 OP 636: Performed by: OTOLARYNGOLOGY

## 2018-03-13 PROCEDURE — 71000014 ZZH RECOVERY PHASE 1 LEVEL 2 FIRST HR: Performed by: OTOLARYNGOLOGY

## 2018-03-13 PROCEDURE — 40000306 ZZH STATISTIC PRE PROC ASSESS II: Performed by: OTOLARYNGOLOGY

## 2018-03-13 PROCEDURE — 25000132 ZZH RX MED GY IP 250 OP 250 PS 637: Performed by: FAMILY MEDICINE

## 2018-03-13 RX ORDER — TRANEXAMIC ACID 100 MG/ML
500 INJECTION, SOLUTION INTRAVENOUS ONCE
Status: COMPLETED | OUTPATIENT
Start: 2018-03-13 | End: 2018-03-13

## 2018-03-13 RX ORDER — FENTANYL CITRATE 50 UG/ML
25-50 INJECTION, SOLUTION INTRAMUSCULAR; INTRAVENOUS
Status: DISCONTINUED | OUTPATIENT
Start: 2018-03-13 | End: 2018-03-13 | Stop reason: HOSPADM

## 2018-03-13 RX ORDER — ONDANSETRON 4 MG/1
4-8 TABLET, ORALLY DISINTEGRATING ORAL EVERY 8 HOURS PRN
Qty: 4 TABLET | Refills: 0 | Status: SHIPPED | OUTPATIENT
Start: 2018-03-13 | End: 2019-12-13

## 2018-03-13 RX ORDER — HYDROCODONE BITARTRATE AND ACETAMINOPHEN 5; 325 MG/1; MG/1
1-2 TABLET ORAL EVERY 4 HOURS PRN
Qty: 40 TABLET | Refills: 0 | Status: SHIPPED | OUTPATIENT
Start: 2018-03-13 | End: 2019-12-13

## 2018-03-13 RX ORDER — ONDANSETRON 2 MG/ML
4 INJECTION INTRAMUSCULAR; INTRAVENOUS EVERY 30 MIN PRN
Status: DISCONTINUED | OUTPATIENT
Start: 2018-03-13 | End: 2018-03-13 | Stop reason: HOSPADM

## 2018-03-13 RX ORDER — PROPOFOL 10 MG/ML
INJECTION, EMULSION INTRAVENOUS PRN
Status: DISCONTINUED | OUTPATIENT
Start: 2018-03-13 | End: 2018-03-13

## 2018-03-13 RX ORDER — ONDANSETRON 2 MG/ML
INJECTION INTRAMUSCULAR; INTRAVENOUS PRN
Status: DISCONTINUED | OUTPATIENT
Start: 2018-03-13 | End: 2018-03-13

## 2018-03-13 RX ORDER — OXYMETAZOLINE HYDROCHLORIDE 0.05 G/100ML
2 SPRAY NASAL 2 TIMES DAILY
Status: DISCONTINUED | OUTPATIENT
Start: 2018-03-13 | End: 2018-03-14 | Stop reason: HOSPADM

## 2018-03-13 RX ORDER — FENTANYL CITRATE 50 UG/ML
INJECTION, SOLUTION INTRAMUSCULAR; INTRAVENOUS PRN
Status: DISCONTINUED | OUTPATIENT
Start: 2018-03-13 | End: 2018-03-13

## 2018-03-13 RX ORDER — SODIUM CHLORIDE, SODIUM LACTATE, POTASSIUM CHLORIDE, CALCIUM CHLORIDE 600; 310; 30; 20 MG/100ML; MG/100ML; MG/100ML; MG/100ML
INJECTION, SOLUTION INTRAVENOUS CONTINUOUS
Status: DISCONTINUED | OUTPATIENT
Start: 2018-03-13 | End: 2018-03-13 | Stop reason: HOSPADM

## 2018-03-13 RX ORDER — HYDROCODONE BITARTRATE AND ACETAMINOPHEN 5; 325 MG/1; MG/1
2 TABLET ORAL ONCE
Status: COMPLETED | OUTPATIENT
Start: 2018-03-13 | End: 2018-03-13

## 2018-03-13 RX ORDER — NALOXONE HYDROCHLORIDE 0.4 MG/ML
.1-.4 INJECTION, SOLUTION INTRAMUSCULAR; INTRAVENOUS; SUBCUTANEOUS
Status: DISCONTINUED | OUTPATIENT
Start: 2018-03-13 | End: 2018-03-13 | Stop reason: HOSPADM

## 2018-03-13 RX ORDER — DIMENHYDRINATE 50 MG/ML
12.5 INJECTION, SOLUTION INTRAMUSCULAR; INTRAVENOUS EVERY 10 MIN PRN
Status: DISCONTINUED | OUTPATIENT
Start: 2018-03-13 | End: 2018-03-13 | Stop reason: HOSPADM

## 2018-03-13 RX ORDER — DEXAMETHASONE SODIUM PHOSPHATE 10 MG/ML
10 INJECTION INTRAMUSCULAR; INTRAVENOUS ONCE
Status: COMPLETED | OUTPATIENT
Start: 2018-03-13 | End: 2018-03-13

## 2018-03-13 RX ORDER — CIPROFLOXACIN 500 MG/1
500 TABLET, FILM COATED ORAL 2 TIMES DAILY
Qty: 14 TABLET | Refills: 0 | Status: SHIPPED | OUTPATIENT
Start: 2018-03-13 | End: 2018-04-06

## 2018-03-13 RX ORDER — ALBUTEROL SULFATE 0.83 MG/ML
2.5 SOLUTION RESPIRATORY (INHALATION) EVERY 4 HOURS PRN
Status: DISCONTINUED | OUTPATIENT
Start: 2018-03-13 | End: 2018-03-13 | Stop reason: HOSPADM

## 2018-03-13 RX ORDER — ONDANSETRON 4 MG/1
4 TABLET, ORALLY DISINTEGRATING ORAL EVERY 30 MIN PRN
Status: DISCONTINUED | OUTPATIENT
Start: 2018-03-13 | End: 2018-03-13 | Stop reason: HOSPADM

## 2018-03-13 RX ORDER — OXYCODONE HYDROCHLORIDE 5 MG/1
10 TABLET ORAL EVERY 4 HOURS PRN
Status: DISCONTINUED | OUTPATIENT
Start: 2018-03-13 | End: 2018-03-13 | Stop reason: HOSPADM

## 2018-03-13 RX ORDER — GLYCOPYRROLATE 0.2 MG/ML
INJECTION, SOLUTION INTRAMUSCULAR; INTRAVENOUS PRN
Status: DISCONTINUED | OUTPATIENT
Start: 2018-03-13 | End: 2018-03-13

## 2018-03-13 RX ORDER — EPHEDRINE SULFATE 50 MG/ML
INJECTION, SOLUTION INTRAMUSCULAR; INTRAVENOUS; SUBCUTANEOUS PRN
Status: DISCONTINUED | OUTPATIENT
Start: 2018-03-13 | End: 2018-03-13

## 2018-03-13 RX ORDER — MEPERIDINE HYDROCHLORIDE 25 MG/ML
12.5 INJECTION INTRAMUSCULAR; INTRAVENOUS; SUBCUTANEOUS
Status: DISCONTINUED | OUTPATIENT
Start: 2018-03-13 | End: 2018-03-13 | Stop reason: HOSPADM

## 2018-03-13 RX ORDER — LIDOCAINE HYDROCHLORIDE 20 MG/ML
INJECTION, SOLUTION INFILTRATION; PERINEURAL PRN
Status: DISCONTINUED | OUTPATIENT
Start: 2018-03-13 | End: 2018-03-13

## 2018-03-13 RX ADMIN — ROCURONIUM BROMIDE 50 MG: 10 INJECTION INTRAVENOUS at 10:11

## 2018-03-13 RX ADMIN — TRANEXAMIC ACID 500 MG: 100 INJECTION, SOLUTION INTRAVENOUS at 20:13

## 2018-03-13 RX ADMIN — OXYCODONE HYDROCHLORIDE 10 MG: 5 TABLET ORAL at 12:03

## 2018-03-13 RX ADMIN — MIDAZOLAM 1 MG: 1 INJECTION INTRAMUSCULAR; INTRAVENOUS at 10:03

## 2018-03-13 RX ADMIN — ONDANSETRON 4 MG: 2 INJECTION INTRAMUSCULAR; INTRAVENOUS at 10:24

## 2018-03-13 RX ADMIN — LIDOCAINE HYDROCHLORIDE 40 MG: 20 INJECTION, SOLUTION INFILTRATION; PERINEURAL at 10:11

## 2018-03-13 RX ADMIN — Medication 5 MG: at 11:04

## 2018-03-13 RX ADMIN — Medication 5 MG: at 11:00

## 2018-03-13 RX ADMIN — SODIUM CHLORIDE, POTASSIUM CHLORIDE, SODIUM LACTATE AND CALCIUM CHLORIDE: 600; 310; 30; 20 INJECTION, SOLUTION INTRAVENOUS at 09:27

## 2018-03-13 RX ADMIN — FENTANYL CITRATE 50 MCG: 50 INJECTION, SOLUTION INTRAMUSCULAR; INTRAVENOUS at 11:35

## 2018-03-13 RX ADMIN — DEXMEDETOMIDINE HYDROCHLORIDE 4 MCG: 100 INJECTION, SOLUTION INTRAVENOUS at 10:00

## 2018-03-13 RX ADMIN — DEXAMETHASONE SODIUM PHOSPHATE 10 MG: 10 INJECTION INTRAMUSCULAR; INTRAVENOUS at 10:22

## 2018-03-13 RX ADMIN — DEXMEDETOMIDINE HYDROCHLORIDE 2 MCG: 100 INJECTION, SOLUTION INTRAVENOUS at 10:06

## 2018-03-13 RX ADMIN — Medication 5 MG: at 11:05

## 2018-03-13 RX ADMIN — OXYMETAZOLINE HYDROCHLORIDE 2 SPRAY: 5 SPRAY NASAL at 21:42

## 2018-03-13 RX ADMIN — GLYCOPYRROLATE 0.2 MG: 0.2 INJECTION, SOLUTION INTRAMUSCULAR; INTRAVENOUS at 10:27

## 2018-03-13 RX ADMIN — HYDROCODONE BITARTRATE AND ACETAMINOPHEN 2 TABLET: 5; 325 TABLET ORAL at 20:10

## 2018-03-13 RX ADMIN — ONDANSETRON HYDROCHLORIDE 4 MG: 2 SOLUTION INTRAMUSCULAR; INTRAVENOUS at 11:55

## 2018-03-13 RX ADMIN — FENTANYL CITRATE 50 MCG: 50 INJECTION, SOLUTION INTRAMUSCULAR; INTRAVENOUS at 10:19

## 2018-03-13 RX ADMIN — LIDOCAINE HYDROCHLORIDE 1 ML: 10 INJECTION, SOLUTION EPIDURAL; INFILTRATION; INTRACAUDAL; PERINEURAL at 09:27

## 2018-03-13 RX ADMIN — FENTANYL CITRATE 50 MCG: 50 INJECTION, SOLUTION INTRAMUSCULAR; INTRAVENOUS at 12:02

## 2018-03-13 RX ADMIN — FENTANYL CITRATE 50 MCG: 50 INJECTION, SOLUTION INTRAMUSCULAR; INTRAVENOUS at 11:30

## 2018-03-13 RX ADMIN — FENTANYL CITRATE 50 MCG: 50 INJECTION, SOLUTION INTRAMUSCULAR; INTRAVENOUS at 11:57

## 2018-03-13 RX ADMIN — SUGAMMADEX 200 MG: 100 INJECTION, SOLUTION INTRAVENOUS at 11:16

## 2018-03-13 RX ADMIN — SODIUM CHLORIDE, POTASSIUM CHLORIDE, SODIUM LACTATE AND CALCIUM CHLORIDE: 600; 310; 30; 20 INJECTION, SOLUTION INTRAVENOUS at 10:44

## 2018-03-13 RX ADMIN — DEXMEDETOMIDINE HYDROCHLORIDE 4 MCG: 100 INJECTION, SOLUTION INTRAVENOUS at 10:03

## 2018-03-13 RX ADMIN — PROPOFOL 200 MG: 10 INJECTION, EMULSION INTRAVENOUS at 10:11

## 2018-03-13 ASSESSMENT — LIFESTYLE VARIABLES: TOBACCO_USE: 1

## 2018-03-13 NOTE — IP AVS SNAPSHOT
MRN:[id number]                      After Visit Summary   3/13/2018    Jose Elias Reaves    MRN: [id number]           Thank you!     Thank you for choosing [facility] for your care. Our goal is always to provide you with excellent care. Hearing back from our patients is one way we can continue to improve our services. Please take a few minutes to complete the written survey that you may receive in the mail after you visit with us. Thank you!        Patient Information     Date Of Birth          1970        About your hospital stay     You were admitted on:  March 13, 2018 You last received care in the:  [facility]    You were discharged on:  March 13, 2018       Who to Call     For medical emergencies, please call 911.  For non-urgent questions about your medical care, please call your primary care provider or clinic, [phone]  For questions related to your surgery, please call your surgery clinic        Attending Provider     Provider Specialty    Piotr Quiroz MD Otolaryngology       Primary Care Provider Office Phone # Fax #    Tanesha Posada PA-C [phone]      After Care Instructions     Check with Provider when to start anticoagulants           Diet Instructions       Resume pre procedure diet            Discharge Instructions - Lifting restrictions       Lifting Restrictions 30 pounds until seen at Post-op follow up appointment            No Alcohol       For 24 hours following procedure            No Aspirin, Ibuprofen or Naproxen products       for 7 - 10 days following surgery            No blowing nose           No driving or operating machinery       until the day after procedure            Notify Physician        If bleeding or dressing becomes loose or dislodged            Wound care       Keep dressing clean and dry and intact                  Your next 10 appointments already scheduled     Mar 19, 2018  9:15 AM LALIT    [facility]    [address] [id number] [phone]            Apr 20, 2018  9:45 AM CDT   [facility]ada [facility]-C   [facility]    [address]  [place] [id number] [phone]           Bring a current list of meds and any records pertaining to this visit. For Physicals, please bring immunization records and any forms needing to be filled out. Please arrive 10 minutes early to complete paperwork.              Further instructions from your care team                 HOME CARE INSTRUCTIONS AFTER ENDOSCOPIC SINUS SURGERY WITH OR WITHOUT SEPTOPLASTY AND TURBINOPLASTY    Dr. Quiroz      1.  If nasal packing has been placed, do NOT blow nose until the day after it has been removed, and then blow gently.  Most nasal packing is removed the day after surgery, but may be left in place for 5-10 days.  2.  Unless otherwise instructed, begin saline irrigations the day following your surgery, to both sides of your nose with warm salt water, three times a day.  Use a NEW rubber bulb or baby syringe, which can be purchased at a drug store.  Purchase 1 gallon of distilled water, add to that, 5   tsp salt.  Shake bottle to dissolve.  Fill clean cup with mixture and heat to lukewarm in microwave, or place entire bottle under hot tap water long enough to warm mixture to lukewarm.  Fill syringe and irrigate into each nostril, using one or 2 syringes full for each side of the nose.  Direct stream straight back.  Sit in a chair in front of the sink, irrigate gently and let the solution run out of the nostrils with head leaning forward.  Irrigations are especially important during the first month, but may be continued longer.  You may use OTC (over the counter) [place] brand of saline solution which can be purchased at a drug store/pharmacy  for irrigating.  3.  Avoid sneezing.  If sneezing cannot be avoided, sneeze with your mouth open.  4. Keep head elevated about 30 degrees for the first week after surgery.  This will reduce swelling and pain.  5. Arrange to have extra humidity in the home.  This will prevent a sore throat and promote comfort.  Keep humidity at 30%, if possible.  6. No lifting beyond 5-10 pounds for the first week after surgery.  Then, you may increase lifting gradually.  After 10-14 days, restrictions are usually lifted.  7. No heavy exercise until your physician gives you approval.  As with lifting restrictions, exercise restrictions are usually lifted after 14 days.  8. Do not strain.  Pain medications can cause constipation.  It is recommended to take a stool softener (or fiber laxative) while on prescribed pain medications.  9. Do not bend over or hang your head down for the first 2-3 weeks.        10. IF BLEEDING OCCURS:  A.  Afrin spray (OTC) every 2 hours sprayed into nostril IF bleeding.    B. Sit upright, leaning slightly forward with head tilted downward, irrigate with salt water solution.  C. Pinch nostrils together tightly for 5 minutes, timing by the clock.  D. Release  and observe with head still tilted downward.  If bleeding continues, repeat B and C.  E. If bleeding continues, go to the nearest emergency room or doctor s office.  Continue to squeeze nose tightly and keep head tilted downward.  F. If bleeding stops after the above treatment, see your physician for an exam as soon as possible, to be sure that appropriate measures are taken to prevent future problems.    11.  CALL YOUR PHYSICIAN IF:  Uncontrolled bleeding, fever develops, or if pain increases rather than decreases.  It is normal to feel very tired during the first week, or longer following surgery.  Get plenty of rest and drink lots of fluids.  Patients who have had Endoscopic Sinus Surgery should take a minimum of one week off work.      If the  nasal septum has been straightened, soft plastic splints are placed against the side of the septum.  These will be removed 5-7 days after surgery.  For the first 2 months after sinus surgery, clinic visits are usually scheduled every 2-3 weeks for endoscopic sinus cleaning.  This is done to ensure proper healing.  Our staff doctors are assisted with two Associate Doctors to promote quality post care.  DO NOT MISS these very important post-op appointments!    If you have any questions or problems, please call us at [phone].  You can reach a doctor at this number 24 hours a day or call [facility] Nurse Advice Line at [phone].  [facility]   Adult Discharge Orders & Instructions     For 24 hours after surgery    1. Get plenty of rest.  A responsible adult must stay with you for at least 24 hours after you leave the hospital.   2. Do not drive or use heavy equipment.  If you have weakness or tingling, don't drive or use heavy equipment until this feeling goes away.  3. Do not drink alcohol.  4. Avoid strenuous or risky activities.  Ask for help when climbing stairs.   5. You may feel lightheaded.  IF so, sit for a few minutes before standing.  Have someone help you get up.   6. If you have nausea (feel sick to your stomach): Drink only clear liquids such as apple juice, ginger ale, broth or 7-Up.  Rest may also help.  Be sure to drink enough fluids.  Move to a regular diet as you feel able.  7. You may have a slight fever. Call the doctor if your fever is over 100 F (37.7 C) (taken under the tongue) or lasts longer than 24 hours.  8. You may have a dry mouth, a sore throat, muscle aches or trouble sleeping.  These should go away after 24 hours.  9. Do not make important or legal decisions.   Call your doctor for any of the followin.  Signs of infection (fever, growing tenderness at the surgery site, a large amount of drainage or bleeding, severe pain, foul-smelling  "drainage, redness, swelling).    2. It has been over 8 to 10 hours since surgery and you are still not able to urinate (pass water).      [phone]    Pending Results     No orders found from 3/11/2018 to 3/14/2018.            Admission Information     Date & Time Provider Department Dept. Phone    3/13/2018 Piotr Quiroz MD [facility] [phone]      Your Vitals Were     Blood Pressure Temperature Respirations Pulse Oximetry          135/96 97.5  F (36.4  C) (Temporal) 9 92%        MyChart Information     [facility] lets you send messages to your doctor, view your test results, renew your prescriptions, schedule appointments and more. To sign up, go to [email or web address] . Click on \"Log in\" on the left side of the screen, which will take you to the Welcome page. Then click on \"Sign up Now\" on the right side of the page.     You will be asked to enter the access code listed below, as well as some personal information. Please follow the directions to create your username and password.     Your access code is: [email or web address]  Expires: 2018  2:51 PM     Your access code will  in 90 days. If you need help or a new code, please call your [place] clinic or [phone].        Care EveryWhere ID     This is your Care EveryWhere ID. This could be used by other organizations to access your [facility] medical records  [id number]        Equal Access to Services     [facility] : Hadii aad ku hadasho Soomaali, waaxda luqadaha, qaybta kaalmada adeegyada, waxay essie garcia . So [facility] [phone].    ATENCIÓN: Si habla español, tiene a garza disposición servicios gratuitos de asistencia lingüística. Llame al [phone].    We comply with applicable federal civil rights laws and [place] laws. We do not discriminate on the basis of race, color, national origin, age, disability, sex, sexual orientation, or gender identity.               Review of your " medicines      START taking        Dose / Directions    ciprofloxacin 500 MG tablet   Commonly known as:  CIPRO   Used for:  Postoperative infection, initial encounter        Dose:  500 mg   Take 1 tablet (500 mg) by mouth 2 times daily   Quantity:  14 tablet   Refills:  0       HYDROcodone-acetaminophen 5-325 MG per tablet   Commonly known as:  NORCO   Used for:  Post-op pain        Dose:  1-2 tablet   Take 1-2 tablets by mouth every 4 hours as needed for other (Moderate to Severe Pain)   Quantity:  40 tablet   Refills:  0       ondansetron 4 MG ODT tab   Commonly known as:  ZOFRAN-ODT   Used for:  Post-operative nausea and vomiting        Dose:  4-8 mg   Take 1-2 tablets (4-8 mg) by mouth every 8 hours as needed for nausea Dissolve ON the tongue.   Quantity:  4 tablet   Refills:  0         CONTINUE these medicines which have NOT CHANGED        Dose / Directions    albuterol 108 (90 BASE) MCG/ACT Inhaler   Commonly known as:  PROAIR HFA/PROVENTIL HFA/VENTOLIN HFA   Used for:  Mild intermittent asthma without complication        Dose:  2 puff   Inhale 2 puffs into the lungs every 6 hours   Quantity:  1 Inhaler   Refills:  11       meloxicam 15 MG tablet   Commonly known as:  MOBIC   Used for:  Chronic pain syndrome        Dose:  15 mg   Take 1 tablet (15 mg) by mouth daily   Quantity:  30 tablet   Refills:  1       NAPROXEN PO        Dose:  500 mg   Take 500 mg by mouth 2 times daily (with meals)   Refills:  0       propranolol 80 MG 24 hr capsule   Commonly known as:  INDERAL LA   Used for:  Essential hypertension        Dose:  80 mg   Take 1 capsule (80 mg) by mouth daily   Quantity:  30 capsule   Refills:  1       TYLENOL PO        Dose:  4000 mg   Take 4,000 mg by mouth daily   Refills:  0            Where to get your medicines      Some of these will need a paper prescription and others can be bought over the counter. Ask your nurse if you have questions.     Bring a paper prescription for each of these  medications     ciprofloxacin 500 MG tablet    HYDROcodone-acetaminophen 5-325 MG per tablet    ondansetron 4 MG ODT tab                Protect others around you: Learn how to safely use, store and throw away your medicines at www.disposemymeds.org.        ANTIBIOTIC INSTRUCTION     You've Been Prescribed an Antibiotic - Now What?  Your healthcare team thinks that you or your loved one might have an infection. Some infections can be treated with antibiotics, which are powerful, life-saving drugs. Like all medications, antibiotics have side effects and should only be used when necessary. There are some important things you should know about your antibiotic treatment.      Your healthcare team may run tests before you start taking an antibiotic.    Your team may take samples (e.g., from your blood, urine or other areas) to run tests to look for bacteria. These test can be important to determine if you need an antibiotic at all and, if you do, which antibiotic will work best.      Within a few days, your healthcare team might change or even stop your antibiotic.    Your team may start you on an antibiotic while they are working to find out what is making you sick.    Your team might change your antibiotic because test results show that a different antibiotic would be better to treat your infection.    In some cases, once your team has more information, they learn that you do not need an antibiotic at all. They may find out that you don't have an infection, or that the antibiotic you're taking won't work against your infection. For example, an infection caused by a virus can't be treated with antibiotics. Staying on an antibiotic when you don't need it is more likely to be harmful than helpful.      You may experience side effects from your antibiotic.    Like all medications, antibiotics have side effects. Some of these can be serious.    Let you healthcare team know if you have any known allergies when you are  admitted to the hospital.    One significant side effect of nearly all antibiotics is the risk of severe and sometimes deadly diarrhea caused by Clostridium difficile (C. Difficile). This occurs when a person takes antibiotics because some good germs are destroyed. Antibiotic use allows C. diificile to take over, putting patients at high risk for this serious infection.    As a patient or caregiver, it is important to understand your or your loved one's antibiotic treatment. It is especially important for caregivers to speak up when patients can't speak for themselves. Here are some important questions to ask your healthcare team.    What infection is this antibiotic treating and how do you know I have that infection?    What side effects might occur from this antibiotic?    How long will I need to take this antibiotic?    Is it safe to take this antibiotic with other medications or supplements (e.g., vitamins) that I am taking?     Are there any special directions I need to know about taking this antibiotic? For example, should I take it with food?    How will I be monitored to know whether my infection is responding to the antibiotic?    What tests may help to make sure the right antibiotic is prescribed for me?      Information provided by:  www.cdc.gov/getsmart  U.S. Department of Health and Human Services  Centers for disease Control and Prevention  National Center for Emerging and Zoonotic Infectious Diseases  Division of Healthcare Quality Promotion        Information about OPIOIDS     PRESCRIPTION OPIOIDS: WHAT YOU NEED TO KNOW    Prescription opioids can be used to help relieve moderate to severe pain and are often prescribed following a surgery or injury, or for certain health conditions. These medications can be an important part of treatment but also come with serious risks. It is important to work with your health care provider to make sure you are getting the safest, most effective care.    WHAT ARE  THE RISKS AND SIDE EFFECTS OF OPIOID USE?  Prescription opioids carry serious risks of addiction and overdose, especially with prolonged use. An opioid overdose, often marked by slowed breathing can cause sudden death. The use of prescription opioids can have a number of side effects as well, even when taken as directed:      Tolerance - meaning you might need to take more of a medication for the same pain relief    Physical dependence - meaning you have symptoms of withdrawal when a medication is stopped    Increased sensitivity to pain    Constipation    Nausea, vomiting, and dry mouth    Sleepiness and dizziness    Confusion    Depression    Low levels of testosterone that can result in lower sex drive, energy, and strength    Itching and sweating    RISKS ARE GREATER WITH:    History of drug misuse, substance use disorder, or overdose    Mental health conditions (such as depression or anxiety)    Sleep apnea    Older age (65 years or older)    Pregnancy    Avoid alcohol while taking prescription opioids.   Also, unless specifically advised by your health care provider, medications to avoid include:    Benzodiazepines (such as Xanax or Valium)    Muscle relaxants (such as Soma or Flexeril)    Hypnotics (such as Ambien or Lunesta)    Other prescription opioids    KNOW YOUR OPTIONS:  Talk to your health care provider about ways to manage your pain that do not involve prescription opioids. Some of these options may actually work better and have fewer risks and side effects:    Pain relievers such as acetaminophen, ibuprofen, and naproxen    Some medications that are also used for depression or seizures    Physical therapy and exercise    Cognitive behavioral therapy, a psychological, goal-directed approach, in which patients learn how to modify physical, behavioral, and emotional triggers of pain and stress    IF YOU ARE PRESCRIBED OPIOIDS FOR PAIN:    Never take opioids in greater amounts or more often than  prescribed    Follow up with your primary health care provider and work together to create a plan on how to manage your pain.    Talk about ways to help manage your pain that do not involve prescription opioids    Talk about all concerns and side effects    Help prevent misuse and abuse    Never sell or share prescription opioids    Never use another person's prescription opioids    Store prescription opioids in a secure place and out of reach of others (this may include visitors, children, friends, and family)    Visit www.cdc.gov/drugoverdose to learn about risks of opioid abuse and overdose    If you believe you may be struggling with addiction, tell your health care provider and ask for guidance or call [facility]'s National Helpline at 8-107-760-HELP    LEARN MORE / www.cdc.gov/drugoverdose/prescribing/guideline.html    Safely dispose of unused prescription opioids: Find your local drug take-back programs and more information about the importance of safe disposal at www.doseofreality.mn.gov             Medication List: This is a list of all your medications and when to take them. Check marks below indicate your daily home schedule. Keep this list as a reference.      Medications           Morning Afternoon Evening Bedtime As Needed    albuterol 108 (90 BASE) MCG/ACT Inhaler   Commonly known as:  PROAIR HFA/PROVENTIL HFA/VENTOLIN HFA   Inhale 2 puffs into the lungs every 6 hours                                ciprofloxacin 500 MG tablet   Commonly known as:  CIPRO   Take 1 tablet (500 mg) by mouth 2 times daily                                HYDROcodone-acetaminophen 5-325 MG per tablet   Commonly known as:  NORCO   Take 1-2 tablets by mouth every 4 hours as needed for other (Moderate to Severe Pain)                                meloxicam 15 MG tablet   Commonly known as:  MOBIC   Take 1 tablet (15 mg) by mouth daily                                NAPROXEN PO   Take 500 mg by mouth 2 times daily (with meals)                                 ondansetron 4 MG ODT tab   Commonly known as:  ZOFRAN-ODT   Take 1-2 tablets (4-8 mg) by mouth every 8 hours as needed for nausea Dissolve ON the tongue.                                propranolol 80 MG 24 hr capsule   Commonly known as:  INDERAL LA   Take 1 capsule (80 mg) by mouth daily                                TYLENOL PO   Take 4,000 mg by mouth daily

## 2018-03-13 NOTE — OP NOTE
OTOLARYNGOLOGY OPERATIVE NOTE    SURGEON: Piotr Quiroz MD     ASSISTANT: none     PREOPERATIVE DIAGNOSES:   1. Nasal obstruction  2. Deviated septum  3. Inferior turbinate hypertrophy    POSTOPERATIVE DIAGNOSES:   Same    PROCEDURE:   1. Septoplasty  2. Submucosal resection of turbinates    INDICATIONS: As above.     SURGICAL FINDINGS:   deviated septum to the right, large turbinates    BRIEF HISTORY: Patient has a history of deviated septum, large inferior turbinates. The patient understands the risks and benefits of surgery, limitations, complications including but not limited to bleeding, infection, nasal septum perforation, recurrence of symptoms, need for additional procedures, need for repeat procedures, chronic epistaxis, risks related to anesthesia amongst others. Wishes surgery and now fully agrees to it.     DESCRIPTION OF PROCEDURE: The patient is taken to the operating room, placed under general endotracheal anesthetic, appropriately positioned, prepped and draped. We examined the nose, saw that indeed she is quite deflected to the right side. We started a septoplasty on the left side in an anterior hemitransfixion position, carrying down the incision to the floor of the nose. We identified the anterior aspect of the quadrangular cartilage and with the conor elevator, carefully cleared up the mucoperichondrium. Once we started elevating the flap, we switched to a [place] elevator. Further developed the flap superiorly, posteriorly, inferiorly towards the floor of the nose. We identified bony cartilaginous . About 1/3 of the way in, we bisected the cartilage in a vertical fashion and in a swing-door type of opening, started elevation of the mucoperichondrium on both sides. We used a swivel knife to remove some of the posterior quadrangular cartilage. We now defined the upper plate of the ethmoid and had some spurs as well as some spurs in the vomer and maxillary crest. We used 4 mm osteotome to remove  some of the spurs off the maxillary crest and the vomer. We elevated the mucoperiosteum with Judge elevator. We also removed some of the spurs including the perpendicular plate of the ethmoid with a double-action rongeur. We then morcellized some of the posterior quadrangular cartilage for posterior support and stability. We brought the flap back into anatomic position, closed it anteriorly with interrupted 4-0 chromic sutures. At this point, we also addressed the inferior turbinates.  We injected the inferior turbinates with 1% lidocaine with epinephrine at 1:100,000.  Using a 2.9mm noa microdebrider, two insertion points were created and the microdebrider was used to remove submucosal tissue and bone in each of the inferior turbinates.  Bipolar cautery was used to control hemostasis. We  used the Breathe Easy splints to stabilize the septum and secure it with a 3-0 nylon suture. The patient tolerated the procedure well with about 25mL blood loss. Extubated in the OR, taken to recovery in stable condition.   MONTY GUZMAN MD

## 2018-03-13 NOTE — DISCHARGE INSTRUCTIONS
HOME CARE INSTRUCTIONS AFTER ENDOSCOPIC SINUS SURGERY WITH OR WITHOUT SEPTOPLASTY AND TURBINOPLASTY    Dr. Quiroz      1.  If nasal packing has been placed, do NOT blow nose until the day after it has been removed, and then blow gently.  Most nasal packing is removed the day after surgery, but may be left in place for 5-10 days.  2.  Unless otherwise instructed, begin saline irrigations the day following your surgery, to both sides of your nose with warm salt water, three times a day.  Use a NEW rubber bulb or baby syringe, which can be purchased at a drug store.  Purchase 1 gallon of distilled water, add to that, 5   tsp salt.  Shake bottle to dissolve.  Fill clean cup with mixture and heat to lukewarm in microwave, or place entire bottle under hot tap water long enough to warm mixture to lukewarm.  Fill syringe and irrigate into each nostril, using one or 2 syringes full for each side of the nose.  Direct stream straight back.  Sit in a chair in front of the sink, irrigate gently and let the solution run out of the nostrils with head leaning forward.  Irrigations are especially important during the first month, but may be continued longer.  You may use OTC (over the counter) Ayr brand of saline solution which can be purchased at a drug store/pharmacy for irrigating.  3.  Avoid sneezing.  If sneezing cannot be avoided, sneeze with your mouth open.  4. Keep head elevated about 30 degrees for the first week after surgery.  This will reduce swelling and pain.  5. Arrange to have extra humidity in the home.  This will prevent a sore throat and promote comfort.  Keep humidity at 30%, if possible.  6. No lifting beyond 5-10 pounds for the first week after surgery.  Then, you may increase lifting gradually.  After 10-14 days, restrictions are usually lifted.  7. No heavy exercise until your physician gives you approval.  As with lifting restrictions, exercise restrictions are usually lifted after 14  days.  8. Do not strain.  Pain medications can cause constipation.  It is recommended to take a stool softener (or fiber laxative) while on prescribed pain medications.  9. Do not bend over or hang your head down for the first 2-3 weeks.        10. IF BLEEDING OCCURS:  A.  Afrin spray (OTC) every 2 hours sprayed into nostril IF bleeding.    B. Sit upright, leaning slightly forward with head tilted downward, irrigate with salt water solution.  C. Pinch nostrils together tightly for 5 minutes, timing by the clock.  D. Release  and observe with head still tilted downward.  If bleeding continues, repeat B and C.  E. If bleeding continues, go to the nearest emergency room or doctor s office.  Continue to squeeze nose tightly and keep head tilted downward.  F. If bleeding stops after the above treatment, see your physician for an exam as soon as possible, to be sure that appropriate measures are taken to prevent future problems.    11.  CALL YOUR PHYSICIAN IF:  Uncontrolled bleeding, fever develops, or if pain increases rather than decreases.  It is normal to feel very tired during the first week, or longer following surgery.  Get plenty of rest and drink lots of fluids.  Patients who have had Endoscopic Sinus Surgery should take a minimum of one week off work.      If the nasal septum has been straightened, soft plastic splints are placed against the side of the septum.  These will be removed 5-7 days after surgery.  For the first 2 months after sinus surgery, clinic visits are usually scheduled every 2-3 weeks for endoscopic sinus cleaning.  This is done to ensure proper healing.  Our staff doctors are assisted with two Associate Doctors to promote quality post care.  DO NOT MISS these very important post-op appointments!    If you have any questions or problems, please call us at [phone].  You can reach a doctor at this number 24 hours a day or call [facility] Nurse Advice Line at  [phone].  [facility]   Adult Discharge Orders & Instructions     For 24 hours after surgery    1. Get plenty of rest.  A responsible adult must stay with you for at least 24 hours after you leave the hospital.   2. Do not drive or use heavy equipment.  If you have weakness or tingling, don't drive or use heavy equipment until this feeling goes away.  3. Do not drink alcohol.  4. Avoid strenuous or risky activities.  Ask for help when climbing stairs.   5. You may feel lightheaded.  IF so, sit for a few minutes before standing.  Have someone help you get up.   6. If you have nausea (feel sick to your stomach): Drink only clear liquids such as apple juice, ginger ale, broth or 7-Up.  Rest may also help.  Be sure to drink enough fluids.  Move to a regular diet as you feel able.  7. You may have a slight fever. Call the doctor if your fever is over 100 F (37.7 C) (taken under the tongue) or lasts longer than 24 hours.  8. You may have a dry mouth, a sore throat, muscle aches or trouble sleeping.  These should go away after 24 hours.  9. Do not make important or legal decisions.   Call your doctor for any of the followin.  Signs of infection (fever, growing tenderness at the surgery site, a large amount of drainage or bleeding, severe pain, foul-smelling drainage, redness, swelling).    2. It has been over 8 to 10 hours since surgery and you are still not able to urinate (pass water).      [phone]

## 2018-03-13 NOTE — ANESTHESIA POSTPROCEDURE EVALUATION
Patient: Jose Elias Reaves    Procedure(s):  septoplasty, submucosal resection of the turbinates - Wound Class: II-Clean Contaminated    Diagnosis:deviated septum large turbinates  Diagnosis Additional Information: No value filed.    Anesthesia Type:  General, ETT    Note:  Anesthesia Post Evaluation    Patient location during evaluation: Phase 2  Patient participation: Able to fully participate in evaluation  Level of consciousness: awake and alert  Pain management: adequate  Airway patency: patent  Cardiovascular status: acceptable  Respiratory status: acceptable  Hydration status: acceptable  PONV: none     Anesthetic complications: None          Last vitals:  Vitals:    03/13/18 1230 03/13/18 1234 03/13/18 1240   BP: (!) 142/99  (!) 141/95   Resp:      Temp:      SpO2: 93% 93%          Electronically Signed By: ANATOLY Choi CRNA  March 13, 2018  1:20 PM

## 2018-03-13 NOTE — OR NURSING
Patient discharged to home via wheelchair, accompanied by KRISTINA Escalona S.O. picked up prescriptions from pharmacy. Pt and S.O. verbally state understanding of all discharge instructions. VSS. Pt reports pain moderate; oral pain meds effective. Pt states readiness for discharge at this time.

## 2018-03-13 NOTE — ANESTHESIA CARE TRANSFER NOTE
Patient: Jose Elias Reaves    Procedure(s):  septoplasty, submucosal resection of the turbinates - Wound Class: II-Clean Contaminated    Diagnosis: deviated septum large turbinates  Diagnosis Additional Information: No value filed.    Anesthesia Type:   General, ETT     Note:  Airway :Face Mask  Patient transferred to:PACU  Handoff Report: Identifed the Patient, Identified the Reponsible Provider, Reviewed the pertinent medical history, Discussed the surgical course, Reviewed Intra-OP anesthesia mangement and issues during anesthesia, Set expectations for post-procedure period and Allowed opportunity for questions and acknowledgement of understanding      Vitals: (Last set prior to Anesthesia Care Transfer)    CRNA VITALS  3/13/2018 1106 - 3/13/2018 1137      3/13/2018             SpO2: 100 %                Electronically Signed By: ANATOLY Choi CRNA  March 13, 2018  11:37 AM

## 2018-03-13 NOTE — ANESTHESIA PREPROCEDURE EVALUATION
Anesthesia Evaluation     . Pt has had prior anesthetic. Type: General           ROS/MED HX    ENT/Pulmonary:     (+)MITZY risk factors hypertension, obese, tobacco use, Current use Intermittent asthma , . .    Neurologic:  - neg neurologic ROS     Cardiovascular:     (+) hypertension----. : . . . :. . Previous cardiac testing date:results:date: results:ECG reviewed date: results:NSR date: results:          METS/Exercise Tolerance:  >4 METS   Hematologic:  - neg hematologic  ROS       Musculoskeletal:   (+) , , other musculoskeletal- S/P knee surgery      GI/Hepatic:  - neg GI/hepatic ROS       Renal/Genitourinary:  - ROS Renal section negative       Endo:     (+) Obesity, .      Psychiatric:  - neg psychiatric ROS       Infectious Disease:  - neg infectious disease ROS       Malignancy:      - no malignancy   Other:    (+) H/O Chronic Pain,                   Physical Exam  Normal systems: cardiovascular, pulmonary and dental    Airway   Mallampati: I  TM distance: >3 FB  Neck ROM: full    Dental   (+) missing  Comment: Upper left incisor and canine missing    Cardiovascular   Rhythm and rate: regular and normal      Pulmonary    breath sounds clear to auscultation    Other findings: Took his Inderal this am                Anesthesia Plan      History & Physical Review  History and physical reviewed and following examination; no interval change.    ASA Status:  3 .    NPO Status:  > 8 hours    Plan for General and ETT with Intravenous and Propofol induction. Maintenance will be Inhalation.    PONV prophylaxis:  Ondansetron (or other 5HT-3) and Dexamethasone or Solumedrol       Postoperative Care  Postoperative pain management:  IV analgesics and Oral pain medications.      Consents  Anesthetic plan, risks, benefits and alternatives discussed with:  Patient..                          .

## 2018-03-13 NOTE — TELEPHONE ENCOUNTER
Patient had nasal surgery today. Said his nose has been dripping blood since 1PM when he was discharged from the hospital. Has used the Afrin Nasal Spray as directed, but the bleeding continues. Nurse told patient to go to the ER for evaluation and to have another adult drive him there. He was agreeable with this plan.     Protocol and care advice reviewed.   Caller states understanding of the recommended disposition.    Nicki Bey RN/DANNY    Reason for Disposition    Sounds like a serious complication to the triager    Additional Information    Negative: Sounds like a life-threatening emergency to the triager    Negative: Chest pain    Negative: Difficulty breathing    Negative: Surgical incision symptoms and questions    Negative: [1] Discomfort (pain, burning or stinging) when passing urine AND [2] male    Negative: [1] Discomfort (pain, burning or stinging) when passing urine AND [2] female    Negative: Constipation    Negative: Calf pain    Negative: Dizziness is severe, or persists > 24 hours after surgery    Negative: Pain, redness, swelling, or pus at IV Site    Negative: Symptoms arising from use of a urinary catheter (Alvares or Coude)    Negative: Cast problems or questions    Negative: Medication question    Negative: [1] Widespread rash AND [2] bright red, sunburn-like    Negative: [1] SEVERE headache AND [2] after spinal (epidural) anesthesia    Negative: [1] Vomiting AND [2] persists > 4 hours    Negative: [1] Vomiting AND [2] abdomen looks much more swollen than usual    Negative: [1] Drinking very little AND [2] dehydration suspected (e.g., no urine > 12 hours, very dry mouth, very lightheaded)    Negative: Patient sounds very sick or weak to the triager    Protocols used: POST-OP SYMPTOMS AND QUESTIONS[facility]

## 2018-03-13 NOTE — IP AVS SNAPSHOT
[facility]    [address] [place] [address]    Phone:  [phone]                                       After Visit Summary   3/13/2018    Jose Elias Reaves    MRN: [id number]           After Visit Summary Signature Page     I have received my discharge instructions, and my questions have been answered. I have discussed any challenges I see with this plan with the nurse or doctor.    ..........................................................................................................................................  Patient/Patient Representative Signature      ..........................................................................................................................................  Patient Representative Print Name and Relationship to Patient    ..................................................               ................................................  Date                                            Time    ..........................................................................................................................................  Reviewed by Signature/Title    ...................................................              ..............................................  Date                                                            Time

## 2018-03-13 NOTE — ED AVS SNAPSHOT
[facility]    [address] [address]    Phone:  [phone]    Fax:  [phone]                                       Jose Elias Reaves   MRN: [id number]    Department:  [facility]   Date of Visit:  3/13/2018           Patient Information     Date Of Birth          1970        Your diagnoses for this visit were:     Epistaxis afrin twice daily for 2 days.  irrigation may start tomorrow if no bleeding. ice to the area on for 20 min/hour.  intermittent clamp.  return for worsening,    Postoperative hemorrhage of subcutaneous tissue following non-dermatologic procedure     Status post nasal septoplasty        You were seen by Graeme Fisher MD.      Follow-up Information     Call Dr. Brown.    Why:  if persistent symptoms/bleeding        Follow up with [facility].    Specialty:  EMERGENCY MEDICINE    Why:  As needed, If symptoms worsen    Contact information:    [address] [place] [id number] [phone]    Additional information:    The medical center is located at   [address] [place] [address]. (between [facility] and   [facility] in [place], four miles north   of [place]).        Discharge Instructions         ICD-10-CM    1. Epistaxis R04.0     afrin twice daily for 2 days.  irrigation may start tomorrow if no bleeding. ice to the area on for 20 min/hour.  intermittent clamp.  return for worsening,   2. Postoperative hemorrhage of subcutaneous tissue following non-dermatologic procedure L76.22    3. Status post nasal septoplasty Z98.890          Nosebleed (Adult)    Bleeding from the nose most commonly occurs because of injury or drying and cracking of the inner lining of the nose. Most nosebleeds are because of dry air or nose-picking. They can occur during a common cold or an allergy attack. They can also occur on a very hot day, or from dry air in the winter.  If the bleeding site is found, it may be  cauterized. This means it is treated to cause a blood clot to form. This may be done with a chemical, heat, or electricity. If the bleeding continues after the site is cauterized, or if the site cannot be found, packing may be put in your nose. This is to apply pressure and stop the bleeding. The packing may be made of gauze or sponge. A small balloon catheter is sometimes used. These must be removed by your doctor. Some types of packing dissolve on their own.  Home care    If packing was put in your nose, unless told otherwise, do not pull on it or try to remove it yourself. You will be given an appointment to have it removed. You may also have been given antibiotics to prevent a sinus infection. If so, finish all of the medicine.    Do not blow your nose for 12 hours after the bleeding stops. This will allow a strong blood clot to form. Do not pick your nose. This may restart bleeding.    Avoid drinking alcohol and hot liquids for the next 2 days. Alcohol or hot liquids in your mouth can dilate blood vessels in your nose. This can cause bleeding to start again.    Do not take ibuprofen, naproxen, or medicines that contain aspirin. These thin the blood and may cause your nose to bleed. You may take acetaminophen for pain, unless another pain medicine was prescribed.    If the bleeding starts again, sit up and lean forward to prevent swallowing blood. Pinch your nose tightly on both sides, as shown above, for 10 to 15 minutes. Time yourself. Don t release the pressure on your nose until 10 minutes is up. If bleeding does not stop, continue to pinch your nose and call your healthcare provider or return to this facility.    If you have a cold, allergies, or dry nasal membranes, lubricate the nasal passages. Apply a small amount of petroleum jelly inside the nose with a cotton swab twice a day (morning and night).    Avoid overheating your home. This can dry the air and make your condition worse.    Put a humidifier in  the room where you sleep. This will add moisture to the air.    Use a saline nasal spray to keep nasal passages moist.    Do not pick your nose. Keep fingernails trimmed to decrease risk of bleeds.    Do not smoke.  Follow-up care  Follow up with your healthcare provider, or as advised. Nasal packing should be rechecked or removed within 2 to 3 days.  When to seek medical advice  Call your healthcare provider right away if any of these occur.    You have another nosebleed that you cannot control    Dizziness, weakness, or fainting    You become tired or confused    Fever of 100.4 F (38 C) or higher, or as directed by your healthcare provider    Headache    Sinus or facial pain    Shortness of breath or trouble breathing  Date Last Reviewed: 3/22/2015    5522-5879 [employer]. [address] [place] [address]. All rights reserved. This information is not intended as a substitute for professional medical care. Always follow your healthcare professional's instructions.          Future Appointments        Provider Department Dept Phone Center    3/19/2018 9:15 AM Piotr Quiroz MD, MD [employer] [phone] [facility]    4/20/2018 9:45 AM Tanesha Posada PA-C [facility] [phone] [facility]      24 Hour Appointment Hotline       To make an appointment at any [facility], call [phone] ([phone]). If you don't have a family doctor or clinic, we will help you find one. [facility] are conveniently located to serve the needs of you and your family.             Review of your medicines      Our records show that you are taking the medicines listed below. If these are incorrect, please call your family doctor or clinic.        Dose / Directions Last dose taken    AFRIN 12 HOUR NA        Refills:  0        albuterol 108 (90 BASE) MCG/ACT Inhaler   Commonly known as:  PROAIR HFA/PROVENTIL HFA/VENTOLIN HFA   Dose:  2 puff    Quantity:  1 Inhaler        Inhale 2 puffs into the lungs every 6 hours   Refills:  11        ciprofloxacin 500 MG tablet   Commonly known as:  CIPRO   Dose:  500 mg   Quantity:  14 tablet        Take 1 tablet (500 mg) by mouth 2 times daily   Refills:  0        HYDROcodone-acetaminophen 5-325 MG per tablet   Commonly known as:  NORCO   Dose:  1-2 tablet   Quantity:  40 tablet        Take 1-2 tablets by mouth every 4 hours as needed for other (Moderate to Severe Pain)   Refills:  0        meloxicam 15 MG tablet   Commonly known as:  MOBIC   Dose:  15 mg   Quantity:  30 tablet        Take 1 tablet (15 mg) by mouth daily   Refills:  1        NAPROXEN PO   Dose:  500 mg        Take 500 mg by mouth 2 times daily (with meals)   Refills:  0        ondansetron 4 MG ODT tab   Commonly known as:  ZOFRAN-ODT   Dose:  4-8 mg   Quantity:  4 tablet        Take 1-2 tablets (4-8 mg) by mouth every 8 hours as needed for nausea Dissolve ON the tongue.   Refills:  0        propranolol 80 MG 24 hr capsule   Commonly known as:  INDERAL LA   Dose:  80 mg   Quantity:  30 capsule        Take 1 capsule (80 mg) by mouth daily   Refills:  1        TYLENOL PO   Dose:  1000 mg        Take 1,000 mg by mouth daily   Refills:  0                Procedures and tests performed during your visit     Hemaglobin      Orders Needing Specimen Collection     None      Pending Results     No orders found from 3/11/2018 to 3/14/2018.            Pending Culture Results     No orders found from 3/11/2018 to 3/14/2018.            Pending Results Instructions     If you had any lab results that were not finalized at the time of your Discharge, you can call the ED Lab Result RN at [phone]. You will be contacted by this team for any positive Lab results or changes in treatment. The nurses are available 7 days a week from 10A to 6:30P.  You can leave a message 24 hours per day and they will return your call.        Test Results From Your Hospital Stay       "  3/13/2018  8:32 PM      Component Results     Component Value Ref Range & Units Status    Hemoglobin 15.1 13.3 - 17.7 g/dL Final                Thank you for choosing [facility]       Thank you for choosing [facility] for your care. Our goal is always to provide you with excellent care. Hearing back from our patients is one way we can continue to improve our services. Please take a few minutes to complete the written survey that you may receive in the mail after you visit with us. Thank you!        [employer] Information     [employer] lets you send messages to your doctor, view your test results, renew your prescriptions, schedule appointments and more. To sign up, go to [email or web address] . Click on \"Log in\" on the left side of the screen, which will take you to the Welcome page. Then click on \"Sign up Now\" on the right side of the page.     You will be asked to enter the access code listed below, as well as some personal information. Please follow the directions to create your username and password.     Your access code is: [email or web address]  Expires: 2018  2:51 PM     Your access code will  in 90 days. If you need help or a new code, please call your [facility] clinic or [phone].        Care EveryWhere ID     This is your Care EveryWhere ID. This could be used by other organizations to access your [facility] medical records  [id number]        Equal Access to Services     COLLETTE PALMA AH: Hadii cuco Del Cid, waaxda lukaciadaha, qaybta kaalmada cally, ton garcia . So [facility] [phone].    ATENCIÓN: Si habla español, tiene a garza disposición servicios gratuitos de asistencia lingüística. Llame al [phone].    We comply with applicable federal civil rights laws and [place] laws. We do not discriminate on the basis of race, color, national origin, age, disability, sex, sexual orientation, or gender identity.            After Visit Summary       This is your record. " Keep this with you and show to your community pharmacist(s) and doctor(s) at your next visit.

## 2018-03-13 NOTE — ED AVS SNAPSHOT
[facility]    [address] [address]    Phone:  [phone]    Fax:  [phone]                                       Jose Elias Reaves   MRN: [id number]    Department:  [facility]   Date of Visit:  3/13/2018           After Visit Summary Signature Page     I have received my discharge instructions, and my questions have been answered. I have discussed any challenges I see with this plan with the nurse or doctor.    ..........................................................................................................................................  Patient/Patient Representative Signature      ..........................................................................................................................................  Patient Representative Print Name and Relationship to Patient    ..................................................               ................................................  Date                                            Time    ..........................................................................................................................................  Reviewed by Signature/Title    ...................................................              ..............................................  Date                                                            Time

## 2018-03-14 ENCOUNTER — TELEPHONE (OUTPATIENT)
Dept: OTOLARYNGOLOGY | Facility: CLINIC | Age: 48
End: 2018-03-14

## 2018-03-14 NOTE — PROVIDER NOTIFICATION
Safe Accounts (purposefully retained items) Examples: KERRI's, Hemovac, penrose,Wound packing, Ureteral stents, Alvares, PIV/Picc Line    We care about the coordination of your care  1. Do you still have nasal splints  in place? yew  2. If the item is in place, Can you review the plan for removal with me? At my follow up visit on the 19th

## 2018-03-14 NOTE — TELEPHONE ENCOUNTER
Pt had septoplasty with Dr. Quiroz yesterday. Went to ED at [facility] last night due to bldg. States he continues to bleed. He has a 4x4 folded into quarters under his nose. He is going to change this for the 3rd time, amts have been dime size. I told pt this was not excessive. He had not looked or read his AVS so has not used the Afrin today, I instructed him to use it now and then at bedtime and the he was to use it BID tomorrow and only start saline irrigations tomorrow if he has stopped bldg, if he is still doing some bldg then he should wait another day to irrigate. Did not sleep much last noc, I said that was not unusual, he is going to try Benadryl tonight. Drinking OK, no nausea. Instructed to sleep upright. Can feel something hard at the end of his nostril, asking if this is the stent, I said probably but try not to disturb it at all. Tried ice to nose-too heavy, told him to put it on cheeks/forehead/eyes. Drink cold fluids. Did note more bldg if up moving much so I said for now stay down mostly.  Confirmed he knew to put pressure on his nose with excess bldg, if this will not stop it then to ED again. Pt will call again if questions. JASEN Maldonado

## 2018-03-14 NOTE — DISCHARGE INSTRUCTIONS
ICD-10-CM    1. Epistaxis R04.0     afrin twice daily for 2 days.  irrigation may start tomorrow if no bleeding. ice to the area on for 20 min/hour.  intermittent clamp.  return for worsening,   2. Postoperative hemorrhage of subcutaneous tissue following non-dermatologic procedure L76.22    3. Status post nasal septoplasty Z98.890          Nosebleed (Adult)    Bleeding from the nose most commonly occurs because of injury or drying and cracking of the inner lining of the nose. Most nosebleeds are because of dry air or nose-picking. They can occur during a common cold or an allergy attack. They can also occur on a very hot day, or from dry air in the winter.  If the bleeding site is found, it may be cauterized. This means it is treated to cause a blood clot to form. This may be done with a chemical, heat, or electricity. If the bleeding continues after the site is cauterized, or if the site cannot be found, packing may be put in your nose. This is to apply pressure and stop the bleeding. The packing may be made of gauze or sponge. A small balloon catheter is sometimes used. These must be removed by your doctor. Some types of packing dissolve on their own.  Home care    If packing was put in your nose, unless told otherwise, do not pull on it or try to remove it yourself. You will be given an appointment to have it removed. You may also have been given antibiotics to prevent a sinus infection. If so, finish all of the medicine.    Do not blow your nose for 12 hours after the bleeding stops. This will allow a strong blood clot to form. Do not pick your nose. This may restart bleeding.    Avoid drinking alcohol and hot liquids for the next 2 days. Alcohol or hot liquids in your mouth can dilate blood vessels in your nose. This can cause bleeding to start again.    Do not take ibuprofen, naproxen, or medicines that contain aspirin. These thin the blood and may cause your nose to bleed. You may take acetaminophen for  pain, unless another pain medicine was prescribed.    If the bleeding starts again, sit up and lean forward to prevent swallowing blood. Pinch your nose tightly on both sides, as shown above, for 10 to 15 minutes. Time yourself. Don t release the pressure on your nose until 10 minutes is up. If bleeding does not stop, continue to pinch your nose and call your healthcare provider or return to this facility.    If you have a cold, allergies, or dry nasal membranes, lubricate the nasal passages. Apply a small amount of petroleum jelly inside the nose with a cotton swab twice a day (morning and night).    Avoid overheating your home. This can dry the air and make your condition worse.    Put a humidifier in the room where you sleep. This will add moisture to the air.    Use a saline nasal spray to keep nasal passages moist.    Do not pick your nose. Keep fingernails trimmed to decrease risk of bleeds.    Do not smoke.  Follow-up care  Follow up with your healthcare provider, or as advised. Nasal packing should be rechecked or removed within 2 to 3 days.  When to seek medical advice  Call your healthcare provider right away if any of these occur.    You have another nosebleed that you cannot control    Dizziness, weakness, or fainting    You become tired or confused    Fever of 100.4 F (38 C) or higher, or as directed by your healthcare provider    Headache    Sinus or facial pain    Shortness of breath or trouble breathing  Date Last Reviewed: 3/22/2015    3427-7844 [employer]. [address] [place] [address], [place] [address]. All rights reserved. This information is not intended as a substitute for professional medical care. Always follow your healthcare professional's instructions.

## 2018-03-14 NOTE — TELEPHONE ENCOUNTER
Reason for Call:  Other NASAL STENTS?????    Detailed comments: Please call patient regarding his sinus surgery. Has questions about his stents.     Phone Number Patient can be reached at: Home number on file [phone] (home)    Best Time: na    Can we leave a detailed message on this number? YES    Call taken on 3/14/2018 at 2:59 PM by Karli Joseph

## 2018-03-14 NOTE — ED PROVIDER NOTES
History     Chief Complaint   Patient presents with     Post-op Problem     had surgery this am, now continues to bleed through      HPI  Jose Elias Reaves is a 47 year old male who presents with a history of prior tobacco use deviated nasal septum eustachian tube dysfunction and chronic sinusitis who underwent a septoplasty and submucosal resection of turbinates today by  at [facility].  He presents here after bleeding for most of the afternoon as a steady drip from bilateral nares.  He notes that the bleeding was even present at the time of his discharge but told that this would stop.  He used Afrin at home without success.  He is not on anticoagulants or antiplatelet drugs.  Takes no aspirin.  Has stopped at ibuprofen at least 4 days before procedure.  There was no trauma other than the surgery today.  He does not have lightheadedness, no fever, no chest pain or shortness of breath no other associated symptoms.      Problem List:    Patient Active Problem List    Diagnosis Date Noted     Other chronic rhinitis 02/20/2018     Priority: Medium     Chronic sinusitis, unspecified location 02/20/2018     Priority: Medium     Dysfunction of both eustachian tubes 02/20/2018     Priority: Medium     Mild intermittent asthma 02/20/2018     Priority: Medium     Deviated nasal septum 02/20/2018     Priority: Medium     Tobacco use 02/20/2018     Priority: Medium     Elevated fasting lipid profile 04/18/2017     Priority: Medium     Chronic pain syndrome - Neck 04/18/2017     Priority: Medium     Essential hypertension 03/21/2017     Priority: Medium     Family history of ischemic heart disease 03/21/2017     Priority: Medium     Family history of diabetes mellitus 03/21/2017     Priority: Medium        Past Medical History:    Past Medical History:   Diagnosis Date     Hypertension        Past Surgical History:    Past Surgical History:   Procedure Laterality Date     HERNIA REPAIR Left      KNEE SURGERY  Left     ACL repair, patella graft       Family History:    Family History   Problem Relation Age of Onset     DIABETES Mother      Myocardial Infarction Father        Social History:  Marital Status:   [4]  Social History   Substance Use Topics     Smoking status: Former Smoker     Packs/day: 1.00     Smokeless tobacco: Never Used     Alcohol use 1.2 oz/week     2 Cans of beer per week      Comment: moderately        Medications:      HYDROcodone-acetaminophen (NORCO) 5-325 MG per tablet   ciprofloxacin (CIPRO) 500 MG tablet   Oxymetazoline HCl (AFRIN 12 HOUR NA)   NAPROXEN PO   propranolol (INDERAL LA) 80 MG 24 hr capsule   albuterol (PROAIR HFA/PROVENTIL HFA/VENTOLIN HFA) 108 (90 BASE) MCG/ACT Inhaler   meloxicam (MOBIC) 15 MG tablet   Acetaminophen (TYLENOL PO)   ondansetron (ZOFRAN-ODT) 4 MG ODT tab         Review of Systems   5 point ROS negative except as noted above in HPI, including Gen., Resp., CV, GI &  system review.      Physical Exam   BP: (!) 149/101  Pulse: 86  Temp: 97.2  F (36.2  C)  Resp: 17  SpO2: 100 %      Physical Exam  Bilateral nares have a splint in place and this is blood soaked.  There is a small amount of bleeding from the nares that spots his gauze that is holding.  He also has a sense of blood in the back of his throat but I do not see any obvious flow in the posterior pharynx.    His external nose is without obvious signs of deformity or injury.    ED Course     ED Course     Procedures               Critical Care time:  none    Procedure:  On presentation, I placed a clamp and removed clot that was present in the nares.  Instilled tranexamic acid to soak both splints -5 cc per nare he was observed for approximately 2 hours and never had  heavy bleeding but there was a spotting of the gauze that was used and this would continue regardless of any intervention.  I also applied Afrin nasal spray given that it was unclear if he had any.  Installation of it when he tried earlier  at home.  Could not place pack as he is post-op.            Results for orders placed or performed during the hospital encounter of 03/13/18 (from the past 24 hour(s))   Hemaglobin   Result Value Ref Range    Hemoglobin 15.1 13.3 - 17.7 g/dL       Medications   HYDROcodone-acetaminophen (NORCO) 5-325 MG per tablet 2 tablet (2 tablets Oral Given 3/13/18 2010)   tranexamic acid (CYKLOKAPRON) spray 500 mg (500 mg Both Nostrils Given by Other 3/13/18 2013)       Assessments & Plan (with Medical Decision Making)     MDM: Jose Elias Reaves is a 47 year old male who presented with a postoperative complication of septoplasty and turbinate resection with secondary bleeding that had persisted since he returned home.  There is no obvious additional trauma to the nose and he had 2 splints in place for each nare.  Bilateral nares showed blood that was a mild but off-and-on persistent drip -but on observation over time it appeared that the right nare was responsible for most of the bleeding.  I removed clot that was present in the distal nose and applied tranexamic acid topically on his presentation - soaking both splints that are in place.  I later attempted Afrin in the naris because it was unclear if the Afrin he had used at home had any penetration.  There is no continuous hemorrhage but he would spot gauze over time.  There is no significant drop in his hemoglobin although we do not have a baseline but he still over 15 despite a date of having the symptoms.  I spoke with Dr. Quiroz -who noted nothing else inside the nose in terms of any additional packing and agreed with a trial of tranexamic acid although thought it would be less likely to be effective, and with the Afrin.  Described the use starting irrigation in the morning and avoiding any straining tonight and staying primarily at bed rest.  He notes this should stop overnight and requires no additional intervention at this point.    He remained hemodynamically stable  while in the emergency department and is otherwise asymptomatic of the bleeding.    I have given precautions for the patient to return immediately for worsening.      I have reviewed the nursing notes.    I have reviewed the findings, diagnosis, plan and need for follow up with the patient.       New Prescriptions    No medications on file       Final diagnoses:   Epistaxis - afrin twice daily for 2 days.  irrigation may start tomorrow if no bleeding. ice to the area on for 20 min/hour.  intermittent clamp.  return for worsening,   Postoperative hemorrhage of subcutaneous tissue following non-dermatologic procedure   Status post nasal septoplasty       3/13/2018   [facility] EMERGENCY DEPARTMENT     Graeme Fisher MD  03/14/18 [id number]

## 2018-03-16 NOTE — PROGRESS NOTES
[facility] Otolaryngology Post-Op / Progress Note         Assessment and Plan:    Assessment:   Post-operative day #6 days  Septoplasty  Turbinate reduction surgery     Doing well.      Plan:  resume normal activities on Wed.            Interval History:   Doing well.  Continues to improve.  Pain is well-controlled.  No fevers.            Significant Problems:      Patient Active Problem List   Diagnosis     Essential hypertension     Family history of ischemic heart disease     Family history of diabetes mellitus     Elevated fasting lipid profile     Chronic pain syndrome - Neck     Other chronic rhinitis     Chronic sinusitis, unspecified location     Dysfunction of both eustachian tubes     Mild intermittent asthma     Deviated nasal septum     Tobacco use             Review of Systems:    The patient denies any chest pain, shortness of breath, excessive pain, fever, chills, purulent drainage from the wound, nausea or vomiting.          Medications:     Current Outpatient Prescriptions   Medication Sig     HYDROcodone-acetaminophen (NORCO) 5-325 MG per tablet Take 1-2 tablets by mouth every 4 hours as needed for other (Moderate to Severe Pain)     ondansetron (ZOFRAN-ODT) 4 MG ODT tab Take 1-2 tablets (4-8 mg) by mouth every 8 hours as needed for nausea Dissolve ON the tongue.     ciprofloxacin (CIPRO) 500 MG tablet Take 1 tablet (500 mg) by mouth 2 times daily     Oxymetazoline HCl (AFRIN 12 HOUR NA)      NAPROXEN PO Take 500 mg by mouth 2 times daily (with meals)     propranolol (INDERAL LA) 80 MG 24 hr capsule Take 1 capsule (80 mg) by mouth daily     albuterol (PROAIR HFA/PROVENTIL HFA/VENTOLIN HFA) 108 (90 BASE) MCG/ACT Inhaler Inhale 2 puffs into the lungs every 6 hours     meloxicam (MOBIC) 15 MG tablet Take 1 tablet (15 mg) by mouth daily     Acetaminophen (TYLENOL PO) Take 1,000 mg by mouth daily      No current facility-administered medications for this visit.              Physical Exam:   All  vitals have been reviewed  No data found.    [unfilled]  Wound is clean with minimal or no drainage.  Splints were removed without difficulty and patient is breathing much better through his nose.       Data:   All laboratory data related to this surgery reviewed  All imaging studies related to this surgery reviewed    Katey Paz CMA

## 2018-03-19 ENCOUNTER — OFFICE VISIT (OUTPATIENT)
Dept: OTOLARYNGOLOGY | Facility: CLINIC | Age: 48
End: 2018-03-19
Payer: COMMERCIAL

## 2018-03-19 VITALS — BODY MASS INDEX: 31.33 KG/M2 | OXYGEN SATURATION: 98 % | WEIGHT: 225 LBS | HEART RATE: 78 BPM

## 2018-03-19 DIAGNOSIS — Z98.890 POSTOPERATIVE STATE: Primary | ICD-10-CM

## 2018-03-19 PROCEDURE — 99024 POSTOP FOLLOW-UP VISIT: CPT | Performed by: OTOLARYNGOLOGY

## 2018-03-19 NOTE — MR AVS SNAPSHOT
After Visit Summary   3/19/2018    Jose Elias Reaves    MRN: [id number]           Patient Information     Date Of Birth          1970        Visit Information        Provider Department      3/19/2018 9:15 AM Piotr Quiroz MD [facility]         Follow-ups after your visit        Your next 10 appointments already scheduled     Apr 20, 2018  9:45 AM CDT   Office Visit with Tanesha Posada PA-C   [facility] ([facility])    [address]  [facility] [id number] [phone]           Bring a current list of meds and any records pertaining to this visit. For Physicals, please bring immunization records and any forms needing to be filled out. Please arrive 10 minutes early to complete paperwork.            Apr 30, 2018  8:45 AM CDT   Return Visit with Piotr Quiroz MD   [facility] ([facility])    [address] [address] [phone]              Who to contact     If you have questions or need follow up information about today's clinic visit or your schedule please contact [facility] directly at [phone].  Normal or non-critical lab and imaging results will be communicated to you by MyChart, letter or phone within 4 business days after the clinic has received the results. If you do not hear from us within 7 days, please contact the clinic through [employer] or phone. If you have a critical or abnormal lab result, we will notify you by phone as soon as possible.  Submit refill requests through [employer] or call your pharmacy and they will forward the refill request to us. Please allow 3 business days for your refill to be completed.          Additional Information About Your Visit        [employer] Information     [employer] lets you send messages to your doctor, view your test results, renew your prescriptions, schedule appointments and more. To sign up, go to  [email or web address] . Click on \"Log in\" on the left side of the screen, which will take you to the Welcome page. Then click on \"Sign up Now\" on the right side of the page.     You will be asked to enter the access code listed below, as well as some personal information. Please follow the directions to create your username and password.     Your access code is: [email or web address]  Expires: 2018  2:51 PM     Your access code will  in 90 days. If you need help or a new code, please call your [place] clinic or [phone].        Care EveryWhere ID     This is your Care EveryWhere ID. This could be used by other organizations to access your [place] medical records  [id number]        Your Vitals Were     Pulse Pulse Oximetry BMI (Body Mass Index)             78 98% 31.33 kg/m2          Blood Pressure from Last 3 Encounters:   18 (!) 118/98   18 (!) 141/95   18 140/90    Weight from Last 3 Encounters:   18 102.1 kg (225 lb)   18 102.8 kg (226 lb 9.6 oz)   18 105.2 kg (232 lb)              Today, you had the following     No orders found for display       Primary Care Provider Office Phone # Fax #    Tanesha GREENE BEATRIZ Posada [phone]       [address]  [facility] [address]        Equal Access to Services     [facility] AH: Hadii aad ku hadasho Soomaali, waaxda luqadaha, qaybta kaalmada adeegyada, waxay essie garcia . So [facility] [phone].    ATENCIÓN: Si habla español, tiene a garza disposición servicios gratuitos de asistencia lingüística. Llame al [phone].    We comply with applicable federal civil rights laws and [place] laws. We do not discriminate on the basis of race, color, national origin, age, disability, sex, sexual orientation, or gender identity.            Thank you!     Thank you for choosing [facility]  for your care. Our goal is always to provide you with excellent care. Hearing " back from our patients is one way we can continue to improve our services. Please take a few minutes to complete the written survey that you may receive in the mail after your visit with us. Thank you!             Your Updated Medication List - Protect others around you: Learn how to safely use, store and throw away your medicines at www.disposemymeds.org.          This list is accurate as of 3/19/18  9:45 AM.  Always use your most recent med list.                   Brand Name Dispense Instructions for use Diagnosis    AFRIN 12 HOUR NA           albuterol 108 (90 BASE) MCG/ACT Inhaler    PROAIR HFA/PROVENTIL HFA/VENTOLIN HFA    1 Inhaler    Inhale 2 puffs into the lungs every 6 hours    Mild intermittent asthma without complication       ciprofloxacin 500 MG tablet    CIPRO    14 tablet    Take 1 tablet (500 mg) by mouth 2 times daily    Postoperative infection, initial encounter       HYDROcodone-acetaminophen 5-325 MG per tablet    NORCO    40 tablet    Take 1-2 tablets by mouth every 4 hours as needed for other (Moderate to Severe Pain)    Post-op pain       meloxicam 15 MG tablet    MOBIC    30 tablet    Take 1 tablet (15 mg) by mouth daily    Chronic pain syndrome       NAPROXEN PO      Take 500 mg by mouth 2 times daily (with meals)        ondansetron 4 MG ODT tab    ZOFRAN-ODT    4 tablet    Take 1-2 tablets (4-8 mg) by mouth every 8 hours as needed for nausea Dissolve ON the tongue.    Post-operative nausea and vomiting       propranolol 80 MG 24 hr capsule    INDERAL LA    30 capsule    Take 1 capsule (80 mg) by mouth daily    Essential hypertension       TYLENOL PO      Take 1,000 mg by mouth daily

## 2018-03-19 NOTE — LETTER
[address] [address]  Phone: [phone]        March 19, 2018        Jose Elias Reaves  [address] [place] [address] [address] [place] [id number]          To whom it may concern:      RE: Jose Elias Reaves        Patient may return to work 03/21/18 with the following:  No working or lifting restrictions. Patient underwent a Septoplasty with Submucosal resection of the turbinates on 03/13/18.        Please contact me for questions or concerns.      Sincerely,            Piotr Quiroz MD, MD

## 2018-03-19 NOTE — LETTER
3/19/2018         RE: Jose Elias Reaves  [address]  [place] [address]        Dear Colleague,    Thank you for referring your patient, Jose Elias Reaves, to the [facility]. Please see a copy of my visit note below.    [facility] Otolaryngology Post-Op / Progress Note         Assessment and Plan:    Assessment:   Post-operative day #6 days  Septoplasty  Turbinate reduction surgery     Doing well.      Plan:  resume normal activities on Wed.            Interval History:   Doing well.  Continues to improve.  Pain is well-controlled.  No fevers.            Significant Problems:      Patient Active Problem List   Diagnosis     Essential hypertension     Family history of ischemic heart disease     Family history of diabetes mellitus     Elevated fasting lipid profile     Chronic pain syndrome - Neck     Other chronic rhinitis     Chronic sinusitis, unspecified location     Dysfunction of both eustachian tubes     Mild intermittent asthma     Deviated nasal septum     Tobacco use             Review of Systems:    The patient denies any chest pain, shortness of breath, excessive pain, fever, chills, purulent drainage from the wound, nausea or vomiting.          Medications:     Current Outpatient Prescriptions   Medication Sig     HYDROcodone-acetaminophen (NORCO) 5-325 MG per tablet Take 1-2 tablets by mouth every 4 hours as needed for other (Moderate to Severe Pain)     ondansetron (ZOFRAN-ODT) 4 MG ODT tab Take 1-2 tablets (4-8 mg) by mouth every 8 hours as needed for nausea Dissolve ON the tongue.     ciprofloxacin (CIPRO) 500 MG tablet Take 1 tablet (500 mg) by mouth 2 times daily     Oxymetazoline HCl (AFRIN 12 HOUR NA)      NAPROXEN PO Take 500 mg by mouth 2 times daily (with meals)     propranolol (INDERAL LA) 80 MG 24 hr capsule Take 1 capsule (80 mg) by mouth daily     albuterol (PROAIR HFA/PROVENTIL HFA/VENTOLIN HFA) 108 (90 BASE) MCG/ACT Inhaler Inhale 2 puffs into the  lungs every 6 hours     meloxicam (MOBIC) 15 MG tablet Take 1 tablet (15 mg) by mouth daily     Acetaminophen (TYLENOL PO) Take 1,000 mg by mouth daily      No current facility-administered medications for this visit.              Physical Exam:   All vitals have been reviewed  No data found.    [unfilled]  Wound is clean with minimal or no drainage.  Splints were removed without difficulty and patient is breathing much better through his nose.       Data:   All laboratory data related to this surgery reviewed  All imaging studies related to this surgery reviewed    Katey Paz CMA         Again, thank you for allowing me to participate in the care of your patient.        Sincerely,        Piotr Quiroz MD, MD

## 2018-04-01 ENCOUNTER — NURSE TRIAGE (OUTPATIENT)
Dept: NURSING | Facility: CLINIC | Age: 48
End: 2018-04-01

## 2018-04-02 ENCOUNTER — TELEPHONE (OUTPATIENT)
Dept: FAMILY MEDICINE | Facility: OTHER | Age: 48
End: 2018-04-02

## 2018-04-02 ENCOUNTER — HOSPITAL ENCOUNTER (EMERGENCY)
Facility: CLINIC | Age: 48
Discharge: HOME | End: 2018-04-02
Attending: EMERGENCY MEDICINE | Admitting: EMERGENCY MEDICINE
Payer: COMMERCIAL

## 2018-04-02 VITALS
RESPIRATION RATE: 8 BRPM | SYSTOLIC BLOOD PRESSURE: 124 MMHG | DIASTOLIC BLOOD PRESSURE: 70 MMHG | TEMPERATURE: 97.4 F | OXYGEN SATURATION: 99 % | HEART RATE: 79 BPM

## 2018-04-02 DIAGNOSIS — T78.40XA ALLERGIC REACTION, INITIAL ENCOUNTER: ICD-10-CM

## 2018-04-02 DIAGNOSIS — I10 ESSENTIAL HYPERTENSION: Primary | ICD-10-CM

## 2018-04-02 PROCEDURE — 99284 EMERGENCY DEPT VISIT MOD MDM: CPT | Performed by: EMERGENCY MEDICINE

## 2018-04-02 PROCEDURE — 96372 THER/PROPH/DIAG INJ SC/IM: CPT | Performed by: EMERGENCY MEDICINE

## 2018-04-02 PROCEDURE — 25000132 ZZH RX MED GY IP 250 OP 250 PS 637: Performed by: EMERGENCY MEDICINE

## 2018-04-02 PROCEDURE — 25000125 ZZHC RX 250: Performed by: EMERGENCY MEDICINE

## 2018-04-02 PROCEDURE — 99284 EMERGENCY DEPT VISIT MOD MDM: CPT | Mod: Z6 | Performed by: EMERGENCY MEDICINE

## 2018-04-02 RX ORDER — PREDNISONE 20 MG/1
60 TABLET ORAL ONCE
Status: COMPLETED | OUTPATIENT
Start: 2018-04-02 | End: 2018-04-02

## 2018-04-02 RX ORDER — PREDNISONE 20 MG/1
TABLET ORAL
Qty: 10 TABLET | Refills: 0 | Status: SHIPPED | OUTPATIENT
Start: 2018-04-02 | End: 2019-12-13

## 2018-04-02 RX ORDER — DIPHENHYDRAMINE HCL 25 MG
50 CAPSULE ORAL ONCE
Status: COMPLETED | OUTPATIENT
Start: 2018-04-02 | End: 2018-04-02

## 2018-04-02 RX ORDER — EPINEPHRINE 1 MG/ML
0.3 INJECTION, SOLUTION, CONCENTRATE INTRAVENOUS ONCE
Status: DISCONTINUED | OUTPATIENT
Start: 2018-04-02 | End: 2018-04-02

## 2018-04-02 RX ORDER — DIPHENHYDRAMINE HCL 25 MG
25-50 TABLET ORAL EVERY 6 HOURS PRN
Qty: 60 TABLET | Refills: 1 | COMMUNITY
Start: 2018-04-02 | End: 2019-12-13

## 2018-04-02 RX ADMIN — EPINEPHRINE 0.3 MG: 1 INJECTION, SOLUTION, CONCENTRATE INTRAVENOUS at 05:15

## 2018-04-02 RX ADMIN — PREDNISONE 60 MG: 20 TABLET ORAL at 05:16

## 2018-04-02 RX ADMIN — DIPHENHYDRAMINE HYDROCHLORIDE 50 MG: 25 CAPSULE ORAL at 05:16

## 2018-04-02 ASSESSMENT — ENCOUNTER SYMPTOMS
CHILLS: 0
HEADACHES: 0
FATIGUE: 0
FEVER: 0
APPETITE CHANGE: 0
CHEST TIGHTNESS: 0
BACK PAIN: 0
WHEEZING: 0
LIGHT-HEADEDNESS: 0
STRIDOR: 0
ABDOMINAL PAIN: 0
SHORTNESS OF BREATH: 0

## 2018-04-02 NOTE — ED NOTES
Patient stated since Thursday has had rash and hives.  Started on his feet and hands first  Now inner thighs and both hands and lower arms.  Has taken 2 benadryl every 4 hours since Friday.  Last benadryl yesterday morning.  Also tried hydocortisone cream .  No respiratory distress.  No swelling tongue or lips.   No other complaints   Itching and burning to skin

## 2018-04-02 NOTE — ED PROVIDER NOTES
History     Chief Complaint   Patient presents with     Hives     HPI  Jose Elias Reaves is a 47 year old male with a history of hypertension as well as a recent treatment for deviated septum for which she was recently put on clindamycin presents for evaluation of allergic reaction.  He reports roughly 1 week of generalized pruritus with urticaria.  Patient first noticed itchiness of his feet but this is subsequently spread to his entire body.  Denies difficulty swallowing or breathing.  Patient is self treated with Benadryl with temporary relief but symptoms return.  Patient does admit to recent change in laundry detergent but does not believe he is more closed with a new laundry detergent yet.  He did complete a course of clindamycin for his septal surgery recently but symptoms did not begin until several days after his last dose of antibiotic.  No known history of allergic reactions to anything other than penicillin.  No known new soaps or body cleansers.  Denies new clothing.    Problem List:    Patient Active Problem List    Diagnosis Date Noted     Other chronic rhinitis 02/20/2018     Priority: Medium     Chronic sinusitis, unspecified location 02/20/2018     Priority: Medium     Dysfunction of both eustachian tubes 02/20/2018     Priority: Medium     Mild intermittent asthma 02/20/2018     Priority: Medium     Deviated nasal septum 02/20/2018     Priority: Medium     Tobacco use 02/20/2018     Priority: Medium     Elevated fasting lipid profile 04/18/2017     Priority: Medium     Chronic pain syndrome - Neck 04/18/2017     Priority: Medium     Essential hypertension 03/21/2017     Priority: Medium     Family history of ischemic heart disease 03/21/2017     Priority: Medium     Family history of diabetes mellitus 03/21/2017     Priority: Medium        Past Medical History:    Past Medical History:   Diagnosis Date     Hypertension        Past Surgical History:    Past Surgical History:   Procedure Laterality  Date     HERNIA REPAIR Left      KNEE SURGERY Left     ACL repair, patella graft     SEPTOPLASTY N/A 3/13/2018    Procedure: SEPTOPLASTY;  septoplasty, submucosal resection of the turbinates;  Surgeon: Piotr Quiroz MD;  Location: [facility] OR       Family History:    Family History   Problem Relation Age of Onset     DIABETES Mother      Myocardial Infarction Father        Social History:  Marital Status:   [4]  Social History   Substance Use Topics     Smoking status: Former Smoker     Packs/day: 1.00     Smokeless tobacco: Never Used     Alcohol use 1.2 oz/week     2 Cans of beer per week      Comment: moderately        Medications:      predniSONE (DELTASONE) 20 MG tablet   diphenhydrAMINE (BENADRYL) 25 MG tablet   HYDROcodone-acetaminophen (NORCO) 5-325 MG per tablet   ondansetron (ZOFRAN-ODT) 4 MG ODT tab   ciprofloxacin (CIPRO) 500 MG tablet   Oxymetazoline HCl (AFRIN 12 HOUR NA)   NAPROXEN PO   propranolol (INDERAL LA) 80 MG 24 hr capsule   albuterol (PROAIR HFA/PROVENTIL HFA/VENTOLIN HFA) 108 (90 BASE) MCG/ACT Inhaler   meloxicam (MOBIC) 15 MG tablet   Acetaminophen (TYLENOL PO)         Review of Systems   Constitutional: Negative for appetite change, chills, fatigue and fever.   HENT: Negative for congestion.    Respiratory: Negative for chest tightness, shortness of breath, wheezing and stridor.    Cardiovascular: Negative for chest pain.   Gastrointestinal: Negative for abdominal pain.   Musculoskeletal: Negative for back pain.   Skin: Positive for rash.   Neurological: Negative for light-headedness and headaches.   All other systems reviewed and are negative.      Physical Exam   BP: (!) 172/103  Pulse: 89  Temp: 97.4  F (36.3  C)  Resp: 8  SpO2: 99 %      Physical Exam   Constitutional: He is oriented to person, place, and time. He appears well-developed and well-nourished. No distress.   HENT:   Head: Normocephalic and atraumatic.   Mouth/Throat: Oropharynx is clear and moist.   Eyes:  Conjunctivae are normal.   Neck: Normal range of motion. Neck supple.   Cardiovascular: Normal rate and regular rhythm.    Pulmonary/Chest: Effort normal and breath sounds normal. He has no wheezes.   Abdominal: Soft. There is no tenderness.   Musculoskeletal: Normal range of motion.   Neurological: He is alert and oriented to person, place, and time.   Skin: Skin is dry. Rash (Systemic urticarial rash consistent with allergic reaction) noted.   Psychiatric: He has a normal mood and affect.   Nursing note and vitals reviewed.      ED Course     ED Course     Procedures               No results found for this or any previous visit (from the past 24 hour(s)).    Medications   predniSONE (DELTASONE) tablet 60 mg (60 mg Oral Given 4/2/18 0516)   diphenhydrAMINE (BENADRYL) capsule 50 mg (50 mg Oral Given 4/2/18 0516)   EPINEPHrine (ADRENALIN) kit 0.3 mg (0.3 mg Intramuscular Given 4/2/18 0515)     5:31 AM: PT re-assessed. Feeling slightly better. BP improved.     Assessments & Plan (with Medical Decision Making)  47-year-old male presented for evaluation of diffuse pruritic urticarial rash.  Symptoms present for several days.  No clear cause to agents but his symptoms did begin several days after taking a course of clindamycin.  Treated with intramuscular epinephrine, redness own, and Benadryl in the emergency department with a prescription for prednisone to go home.  Recommended follow-up with allergy clinic for further testing and help with identification of the possible cause of his allergen.  Symptoms not anaphylactic and no EpiPen prescription was provided.  Patient advised that if his symptoms do worsen, he should contact 911 and/or return to the emergency department for repeat evaluation.     I have reviewed the nursing notes.    I have reviewed the findings, diagnosis, plan and need for follow up with the patient.       New Prescriptions    DIPHENHYDRAMINE (BENADRYL) 25 MG TABLET    Take 1-2 tablets (25-50 mg) by  mouth every 6 hours as needed for itching or allergies    PREDNISONE (DELTASONE) 20 MG TABLET    Take two tablets (= 40mg) each day for 5 (five) days       Final diagnoses:   Allergic reaction, initial encounter       4/2/2018   [facility] EMERGENCY DEPARTMENT     [place], Lino Moctezuma MD  04/02/18 0595

## 2018-04-02 NOTE — ED AVS SNAPSHOT
[facility] Emergency Department    [address] [address]    Phone:  [phone]    Fax:  [phone]                                       Jose Elias Reaves   MRN: [id number]    Department:  [facility] Emergency Department   Date of Visit:  4/2/2018           After Visit Summary Signature Page     I have received my discharge instructions, and my questions have been answered. I have discussed any challenges I see with this plan with the nurse or doctor.    ..........................................................................................................................................  Patient/Patient Representative Signature      ..........................................................................................................................................  Patient Representative Print Name and Relationship to Patient    ..................................................               ................................................  Date                                            Time    ..........................................................................................................................................  Reviewed by Signature/Title    ...................................................              ..............................................  Date                                                            Time

## 2018-04-02 NOTE — TELEPHONE ENCOUNTER
Recommend appointment to discuss. OK to wait until apt scheduled 4/20/18 or can move sooner.     Dennis Johnson-BEATRIZ Bravo  [facility]

## 2018-04-02 NOTE — TELEPHONE ENCOUNTER
Reason for call:  Patient reporting a symptom    Symptom or request: symptoms    Duration (how long have symptoms been present): seen in the ER today    Have you been treated for this before? Yes    Additional comments: patient states he was seen in the ER for itching he states the medications they gave him doesn't take the itching away for very long. Please advise    Phone Number patient can be reached at:  Home number on file [phone] (home) or Cell number on file:    Telephone Information:   Mobile [phone]       Best Time:  anytime    Can we leave a detailed message on this number:  YES    Call taken on 4/2/2018 at 2:04 PM by Alysha Thomason

## 2018-04-02 NOTE — TELEPHONE ENCOUNTER
Reason for Disposition    [1] MODERATE-SEVERE widespread itching (i.e., interferes with sleep, normal activities or school) AND [2] not improved after 24 hours of itching Care Advice    Additional Information    Negative: [1] Life-threatening reaction (anaphylaxis) in the past to similar substance (e.g., food, insect bite/sting, chemical, etc.) AND [2] < 2 hours since exposure    Negative: Difficulty breathing or wheezing    Negative: [1] Difficulty swallowing or slurred speech AND [2] sudden onset    Negative: Sounds like a life-threatening emergency to the triager    Negative: Could be severe allergic reaction    Negative: Insect bites suspected    Negative: Looks like hives (pink bumps with pale centers)    Negative: Yellowish color of the skin AND sclera (white of the eye)    Negative: Itching in just one area or spot    Negative: Widespread rash also present    Negative: Patient sounds very sick or weak to the triager    Protocols used: ITCHING - WIDESPREAD-ADULT-    Jose Elias calls and says that that he has been itching his skin since Thursday. Jose Elias says that he has been taking Benadryl and using Hydocortisone Cream, for the itching. Jose Elias says that his skin itches all over. Jose Elias will see a , tomorrow, after he is done with his work.

## 2018-04-02 NOTE — ED AVS SNAPSHOT
[facility] Emergency Department    [address] [facility] [address]    Phone:  [phone]    Fax:  [phone]                                       Jose Elias Reaves   MRN: [id number]    Department:  [facility] Emergency Department   Date of Visit:  4/2/2018           Patient Information     Date Of Birth          1970        Your diagnoses for this visit were:     Allergic reaction, initial encounter        You were seen by Lino Middleton MD.      Follow-up Information     Schedule an appointment as soon as possible for a visit with [facility].    Specialty:  Allergy    Why:  For follow up    Contact information:    [address] [facility] [address] [phone]    Additional information:    The medical center is located at   [address] [place] [address] (between [address] in [place], four miles north   of [place]).        Schedule an appointment as soon as possible for a visit with Tanesha Posada PA-C.    Specialty:  Physician Assistant - Medical    Why:  As needed    Contact information:    [address] [place] [address] [address]  [employer] [address]  [phone]          Discharge Instructions         Medicine Reaction: Allergic  You are having an allergic reaction to a medicine you have taken. This causes an itchy rash and sometimes swelling of various parts of the body. It may also cause trouble swallowing or breathing. The rash may take a few hours or up to 2 weeks to go away. In the future, remember to tell your healthcare provider about your allergy to this medicine so that medicines of this type won't be used again.  Any medicine can cause an allergic reaction. But the most allergic reactions are caused by:    Penicillin and related medicines    Aspirin    Ibuprofen    Seizure medicines  Vaccines may also trigger allergies. People whose parents or siblings have allergies are at a higher risk of developing a medicine allergy.  Allergy testing may sometimes be needed to figure out the cause.  Symptoms may occur within minutes, hours, or even weeks after exposure to the medicine. It can be a mild or severe reaction, or potentially life threatening. Most of us think of allergic reactions when we have a rash or itchy skin. Symptoms can include:    Rash, hives, redness, welts, blisters    Itching, burning, stinging, pain    Dry, flaky, cracking, scaly skin    Belly (abdominal) cramps or nausea or stomach pain    Fever.  Sometimes fever is the only symptom of a drug reaction. In older adults, the risk of fever increases with the number of medicines the person takes.  More severe symptoms include:    Swelling of the face or lips, or drooling    Trouble swallowing, feeling like your throat is closing    Trouble breathing, wheezing    Hoarse voice or trouble speaking    Severe nausea or vomiting or diarrhea      Feeling faint or lightheaded, rapid heart rate  Home care    The goal of treatment is to help relieve the symptoms, and get you feeling better. Mild to medium medicine reactions usually respond quickly to antihistamines and steroids. The rash will usually fade over several days. But it can sometimes last a couple of weeks. Over the next couple of days, there may be times when it is gets a little worse, and then better again. Here are some things to do:    Throw the medicine away and don t take it again. The next reaction may be much worse.    Add this medicine reaction to your electronic medical record.    When getting a new medicine, always tell the healthcare provider that you are allergic to this medicine. Make certain the provider writes it down in your medical record.    Avoid tight clothing and anything that heats up your skin (hot showers or baths, direct sunlight). Heat will make itching worse.    An ice pack will relieve local areas of intense itching and redness. To make an ice pack, put ice cubes in a plastic bag that seals at  the top. Wrap the bag in a clean, thin towel or cloth. Don t put ice directly on the skin.    To help prevent an infection, don't scratch the affected area. Scratching may worsen the reaction. It can damage your skin and lead to an infection. Always check the affected site for signs of an infection.    Your provider may give you a prescription antihistamine.    If you are not given a prescription antihistamine, oral diphenhydramine is an over-the-counter antihistamine available at pharmacies and grocery stores. This may be used to reduce itching if large areas of the skin are involved. This antihistamine may make you sleepy, so be careful using it in the daytime or when going to school, working, or driving. Note: Don t use diphenhydramine if you have glaucoma or if you are a man with trouble urinating due to an enlarged prostate. There are other antihistamines that cause less drowsiness and are a good choice for daytime use. Ask your pharmacist for suggestions.    Don't use diphenhydramine cream on your skin. It can cause a further skin reaction for some people.    Contact your healthcare provider and ask what can be used on the affected area to help decrease the itching.  Follow-up care  Follow up with your healthcare provider, or as advised if your symptoms do not continue to improve or they get worse.  Call 911  Call 911 if any of these occur:    Shortness of breath    Cool, moist, pale skin    Swelling in the face, eyelids, mouth, tongue, or lips    Drooling    Trouble breathing or swallowing, wheezing    New or worsening swelling in the mouth, throat, or tongue    Hoarse voice or trouble speaking     Fainting or loss of consciousness    Rapid heart rate    Feeling of dizziness or weakness or a sudden drop in blood pressure    Feeling of doom    Feeling lightheaded    Severe nausea, vomiting, or diarrhea  When to seek medical advice  Call your healthcare provider right away if any of these occur:    Continuing  or recurring symptoms    Nausea, abdominal cramps or stomach pain    Spreading areas of itching, redness or swelling    Signs of infection:    Spreading redness    Increased pain or swelling    Fever of 100.4 F (38 C) or above lasting for 24 to 48 hours, or as directed by your provider    Fluid or colored drainage from the affected area  Date Last Reviewed: 3/1/2017    7304-5830 [employer]. [address] [place] [address]. All rights reserved. This information is not intended as a substitute for professional medical care. Always follow your healthcare professional's instructions.          Your next 10 appointments already scheduled     Apr 20, 2018  9:45 AM CDT   [facility]    [address] [place] [address] [phone]           Bring a current list of meds and any records pertaining to this visit. For Physicals, please bring immunization records and any forms needing to be filled out. Please arrive 10 minutes early to complete paperwork.            Apr 30, 2018  8:45 AM CDT   [facility]iot[facility]z MD   [facility]    [address] [facility] [address] [phone]              24 Hour Appointment Hotline       To make an appointment at any [facility], call [phone] ([phone]). If you don't have a family doctor or clinic, we will help you find one. [facility] are conveniently located to serve the needs of you and your family.             Review of your medicines      START taking        Dose / Directions Last dose taken    diphenhydrAMINE 25 MG tablet   Commonly known as:  BENADRYL   Dose:  25-50 mg   Quantity:  60 tablet        Take 1-2 tablets (25-50 mg) by mouth every 6 hours as needed for itching or allergies   Refills:  1        predniSONE 20 MG tablet   Commonly known as:   DELTASONE   Quantity:  10 tablet        Take two tablets (= 40mg) each day for 5 (five) days   Refills:  0          Our records show that you are taking the medicines listed below. If these are incorrect, please call your family doctor or clinic.        Dose / Directions Last dose taken    AFRIN 12 HOUR NA        Refills:  0        albuterol 108 (90 BASE) MCG/ACT Inhaler   Commonly known as:  PROAIR HFA/PROVENTIL HFA/VENTOLIN HFA   Dose:  2 puff   Quantity:  1 Inhaler        Inhale 2 puffs into the lungs every 6 hours   Refills:  11        ciprofloxacin 500 MG tablet   Commonly known as:  CIPRO   Dose:  500 mg   Quantity:  14 tablet        Take 1 tablet (500 mg) by mouth 2 times daily   Refills:  0        HYDROcodone-acetaminophen 5-325 MG per tablet   Commonly known as:  NORCO   Dose:  1-2 tablet   Quantity:  40 tablet        Take 1-2 tablets by mouth every 4 hours as needed for other (Moderate to Severe Pain)   Refills:  0        meloxicam 15 MG tablet   Commonly known as:  MOBIC   Dose:  15 mg   Quantity:  30 tablet        Take 1 tablet (15 mg) by mouth daily   Refills:  1        NAPROXEN PO   Dose:  500 mg        Take 500 mg by mouth 2 times daily (with meals)   Refills:  0        ondansetron 4 MG ODT tab   Commonly known as:  ZOFRAN-ODT   Dose:  4-8 mg   Quantity:  4 tablet        Take 1-2 tablets (4-8 mg) by mouth every 8 hours as needed for nausea Dissolve ON the tongue.   Refills:  0        propranolol 80 MG 24 hr capsule   Commonly known as:  INDERAL LA   Dose:  80 mg   Quantity:  30 capsule        Take 1 capsule (80 mg) by mouth daily   Refills:  1        TYLENOL PO   Dose:  1000 mg        Take 1,000 mg by mouth daily   Refills:  0                Prescriptions were sent or printed at these locations (2 Prescriptions)                   Other Prescriptions                Not Printed or Sent (1 of 2)         diphenhydrAMINE (BENADRYL) 25 MG tablet                 Printed at Department/Unit printer (1 of 2)     "     predniSONE (DELTASONE) 20 MG tablet                Orders Needing Specimen Collection     None      Pending Results     No orders found from 3/31/2018 to 4/3/2018.            Pending Culture Results     No orders found from 3/31/2018 to 4/3/2018.            Pending Results Instructions     If you had any lab results that were not finalized at the time of your Discharge, you can call the ED Lab Result RN at [phone]. You will be contacted by this team for any positive Lab results or changes in treatment. The nurses are available 7 days a week from 10A to 6:30P.  You can leave a message 24 hours per day and they will return your call.        Test Results From Your Hospital Stay               Thank you for choosing [facility]       Thank you for choosing [facility] for your care. Our goal is always to provide you with excellent care. Hearing back from our patients is one way we can continue to improve our services. Please take a few minutes to complete the written survey that you may receive in the mail after you visit with us. Thank you!        [employer] Information     [employer] lets you send messages to your doctor, view your test results, renew your prescriptions, schedule appointments and more. To sign up, go to [email or web address] . Click on \"Log in\" on the left side of the screen, which will take you to the Welcome page. Then click on \"Sign up Now\" on the right side of the page.     You will be asked to enter the access code listed below, as well as some personal information. Please follow the directions to create your username and password.     Your access code is: [email or web address]  Expires: 2018  2:51 PM     Your access code will  in 90 days. If you need help or a new code, please call your [facility] clinic or [phone].        Care EveryWhere ID     This is your Care EveryWhere ID. This could be used by other organizations to access your [facility] medical records  [id number]        Equal Access " to Services     COLLETTE PALMA : [place], ayse lamb, ton torres. So [facility] [phone].    ATENCIÓN: Si habla español, tiene a garza disposición servicios gratuitos de asistencia lingüística. Llame al [phone].    We comply with applicable federal civil rights laws and [place] laws. We do not discriminate on the basis of race, color, national origin, age, disability, sex, sexual orientation, or gender identity.            After Visit Summary       This is your record. Keep this with you and show to your community pharmacist(s) and doctor(s) at your next visit.

## 2018-04-02 NOTE — TELEPHONE ENCOUNTER
Routing to CDL: Patiet is wondering if there is another medication similar to Propranolol that is better covered by insurance. Please review and advise.      Jose Elias Reaves is a 47 year old male who calls with itching.    NURSING ASSESSMENT:  Description:  Hives with itching was seen in the ED today. Has been taking Benadryl today. Given an epinephrine injection and prednisone. Itching and burning sensation in hands. Using Hydrocortisone cream. Marcie new exposures, breathing concerns. Discussed has a propranolol refill at the pharmacy, wondering if there is another medication similar to Propranolol that is better covered by insurance.   Onset/duration:  2-3 days   Precip. factors:  none  Associated symptoms:  Hives and itching   Improves/worsens symptoms:  Same   Pain scale (0-10)   0/10 pain, itching   Last exam/Treatment:  ED today 04/02/2018  Allergies:   Allergies   Allergen Reactions     Penicillins Anaphylaxis     Tolerated cefazolin on 09/16/2015.     NURSING PLAN: Nursing advice to patient home carea measures with ED recommendations    RECOMMENDED DISPOSITION:  Home care advice   Will comply with recommendation: Yes  If further questions/concerns or if symptoms do not improve, worsen or new symptoms develop, call your PCP or [facility] as soon as possible.    NOTES:  Disposition was determined by the first positive assessment question, therefore all previous assessment questions were negative    Guideline used:  Telephone Triage Protocols for Nurses, Fifth Edition, Cate Ramsey  Itching  Nursing Judgment  Routing to CDL    Roxann Rodriguez RN, BSN

## 2018-04-02 NOTE — DISCHARGE INSTRUCTIONS
Medicine Reaction: Allergic  You are having an allergic reaction to a medicine you have taken. This causes an itchy rash and sometimes swelling of various parts of the body. It may also cause trouble swallowing or breathing. The rash may take a few hours or up to 2 weeks to go away. In the future, remember to tell your healthcare provider about your allergy to this medicine so that medicines of this type won't be used again.  Any medicine can cause an allergic reaction. But the most allergic reactions are caused by:    Penicillin and related medicines    Aspirin    Ibuprofen    Seizure medicines  Vaccines may also trigger allergies. People whose parents or siblings have allergies are at a higher risk of developing a medicine allergy. Allergy testing may sometimes be needed to figure out the cause.  Symptoms may occur within minutes, hours, or even weeks after exposure to the medicine. It can be a mild or severe reaction, or potentially life threatening. Most of us think of allergic reactions when we have a rash or itchy skin. Symptoms can include:    Rash, hives, redness, welts, blisters    Itching, burning, stinging, pain    Dry, flaky, cracking, scaly skin    Belly (abdominal) cramps or nausea or stomach pain    Fever.  Sometimes fever is the only symptom of a drug reaction. In older adults, the risk of fever increases with the number of medicines the person takes.  More severe symptoms include:    Swelling of the face or lips, or drooling    Trouble swallowing, feeling like your throat is closing    Trouble breathing, wheezing    Hoarse voice or trouble speaking    Severe nausea or vomiting or diarrhea      Feeling faint or lightheaded, rapid heart rate  Home care    The goal of treatment is to help relieve the symptoms, and get you feeling better. Mild to medium medicine reactions usually respond quickly to antihistamines and steroids. The rash will usually fade over several days. But it can sometimes last a  couple of weeks. Over the next couple of days, there may be times when it is gets a little worse, and then better again. Here are some things to do:    Throw the medicine away and don t take it again. The next reaction may be much worse.    Add this medicine reaction to your electronic medical record.    When getting a new medicine, always tell the healthcare provider that you are allergic to this medicine. Make certain the provider writes it down in your medical record.    Avoid tight clothing and anything that heats up your skin (hot showers or baths, direct sunlight). Heat will make itching worse.    An ice pack will relieve local areas of intense itching and redness. To make an ice pack, put ice cubes in a plastic bag that seals at the top. Wrap the bag in a clean, thin towel or cloth. Don t put ice directly on the skin.    To help prevent an infection, don't scratch the affected area. Scratching may worsen the reaction. It can damage your skin and lead to an infection. Always check the affected site for signs of an infection.    Your provider may give you a prescription antihistamine.    If you are not given a prescription antihistamine, oral diphenhydramine is an over-the-counter antihistamine available at pharmacies and grocery stores. This may be used to reduce itching if large areas of the skin are involved. This antihistamine may make you sleepy, so be careful using it in the daytime or when going to school, working, or driving. Note: Don t use diphenhydramine if you have glaucoma or if you are a man with trouble urinating due to an enlarged prostate. There are other antihistamines that cause less drowsiness and are a good choice for daytime use. Ask your pharmacist for suggestions.    Don't use diphenhydramine cream on your skin. It can cause a further skin reaction for some people.    Contact your healthcare provider and ask what can be used on the affected area to help decrease the itching.  Follow-up  care  Follow up with your healthcare provider, or as advised if your symptoms do not continue to improve or they get worse.  Call 911  Call 911 if any of these occur:    Shortness of breath    Cool, moist, pale skin    Swelling in the face, eyelids, mouth, tongue, or lips    Drooling    Trouble breathing or swallowing, wheezing    New or worsening swelling in the mouth, throat, or tongue    Hoarse voice or trouble speaking     Fainting or loss of consciousness    Rapid heart rate    Feeling of dizziness or weakness or a sudden drop in blood pressure    Feeling of doom    Feeling lightheaded    Severe nausea, vomiting, or diarrhea  When to seek medical advice  Call your healthcare provider right away if any of these occur:    Continuing or recurring symptoms    Nausea, abdominal cramps or stomach pain    Spreading areas of itching, redness or swelling    Signs of infection:    Spreading redness    Increased pain or swelling    Fever of 100.4 F (38 C) or above lasting for 24 to 48 hours, or as directed by your provider    Fluid or colored drainage from the affected area  Date Last Reviewed: 3/1/2017    7259-0748 [employer]. [address] [place] [address]. All rights reserved. This information is not intended as a substitute for professional medical care. Always follow your healthcare professional's instructions.

## 2018-05-22 ENCOUNTER — TELEPHONE (OUTPATIENT)
Dept: FAMILY MEDICINE | Facility: OTHER | Age: 48
End: 2018-05-22

## 2018-05-22 NOTE — TELEPHONE ENCOUNTER
Summary:    Patient is due/failing the following:   Hypertension follow up    Action needed:   Patient needs office visit for hypertension follow up.    Type of outreach:    Phone, spoke to patient.  patient scheduled     Questions for provider review:    None                                                                                                                                    Jennifer Schneider     Chart routed to Care Team .    Panel Management Review      Patient has the following on his problem list:     Depression / Dysthymia review    Measure:  Needs PHQ-9 score of 4 or less during index window.  Administer PHQ-9 and if score is 5 or more, send encounter to provider for next steps.        PHQ-9 SCORE 9/4/2015 2/20/2018   Total Score MyChart - 0   Total Score 2 0       If PHQ-9 recheck is 5 or more, route to provider for next steps.    Patient is due for:  None    Asthma review   No flowsheet data found.   1. Is Asthma diagnosis on the Problem List? Yes    2. Is Asthma listed on Health Maintenance? Yes    3. Patient is due for:  ACT    Hypertension   Last three blood pressure readings:  BP Readings from Last 3 Encounters:   04/02/18 124/70   03/13/18 (!) 118/98   03/13/18 (!) 141/95     Blood pressure: FAILED    HTN Guidelines:  Age 18-59 BP range:  Less than 140/90  Age 60-85 with Diabetes:  Less than 140/90  Age 60-85 without Diabetes:  less than 150/90      Composite cancer screening  Chart review shows that this patient is due/due soon for the following None

## 2018-05-22 NOTE — LETTER
[facility]  [address]   [facility] [address]  Phone: [phone]  October 19, 2018      Jose Elias Reaves  [address] [place] [address] [address] [place] [id number]      Dear Jose Elias,    We care about your health and have reviewed your health plan including your medical conditions, medications, and lab results.  Based on this review, it is recommended that you follow up regarding the following health topic(s):  -High Blood Pressure    We recommend you take the following action(s):  -schedule a FOLLOWUP APPOINTMENT.     Please call us at the [facility] - [phone] (or use [employer]) to address the above recommendations.     Thank you for trusting [facility] and we appreciate the opportunity to serve you.  We look forward to supporting your healthcare needs in the future.    Healthy Regards,    Your Health Care Team  [facility]

## 2019-04-30 ENCOUNTER — APPOINTMENT (OUTPATIENT)
Dept: OCCUPATIONAL MEDICINE | Facility: CLINIC | Age: 49
End: 2019-04-30

## 2019-04-30 PROCEDURE — 99000 SPECIMEN HANDLING OFFICE-LAB: CPT | Performed by: FAMILY MEDICINE

## 2019-12-13 ENCOUNTER — OFFICE VISIT (OUTPATIENT)
Dept: FAMILY MEDICINE | Facility: OTHER | Age: 49
End: 2019-12-13
Attending: PHYSICIAN ASSISTANT

## 2019-12-13 VITALS
RESPIRATION RATE: 20 BRPM | HEIGHT: 71 IN | SYSTOLIC BLOOD PRESSURE: 148 MMHG | WEIGHT: 227.6 LBS | HEART RATE: 79 BPM | TEMPERATURE: 97.9 F | OXYGEN SATURATION: 98 % | BODY MASS INDEX: 31.86 KG/M2 | DIASTOLIC BLOOD PRESSURE: 98 MMHG

## 2019-12-13 DIAGNOSIS — J06.9 VIRAL URI WITH COUGH: ICD-10-CM

## 2019-12-13 DIAGNOSIS — H61.23 BILATERAL IMPACTED CERUMEN: Primary | ICD-10-CM

## 2019-12-13 DIAGNOSIS — I10 ESSENTIAL HYPERTENSION: ICD-10-CM

## 2019-12-13 PROCEDURE — 69210 REMOVE IMPACTED EAR WAX UNI: CPT | Mod: 52 | Performed by: NURSE PRACTITIONER

## 2019-12-13 PROCEDURE — 99202 OFFICE O/P NEW SF 15 MIN: CPT | Mod: 25 | Performed by: NURSE PRACTITIONER

## 2019-12-13 ASSESSMENT — PAIN SCALES - GENERAL: PAINLEVEL: EXTREME PAIN (8)

## 2019-12-13 ASSESSMENT — MIFFLIN-ST. JEOR: SCORE: 1912.39

## 2019-12-13 NOTE — PROGRESS NOTES
Subjective     Jose Elias Reaves is a 49 year old male who presents to clinic today for the following health issues:    HPI   ENT Symptoms             Symptoms: cc Present Absent Comment   Fever/Chills  x  Gets chills, no fever   Fatigue  x     Muscle Aches  x  Very much so   Eye Irritation   x    Sneezing  x     Nasal Guilherme/Drg  x  green   Sinus Pressure/Pain  x  Pressure in maxillary   Loss of smell   x    Dental pain   x    Sore Throat   x    Swollen Glands   x    Ear Pain/Fullness  x  Both ears   Cough  x  Productive of blood tinged, jovany   Wheeze  x     Chest Pain   x    Shortness of breath  x     Rash   x    Other   x      Symptom duration:  1 week   Symptom severity:  moderate   Treatments tried:  sudafed, tylenol, aleve, vitamin C/airbourne   Contacts:  none known     Patient Active Problem List   Diagnosis     Essential hypertension     Family history of ischemic heart disease     Family history of diabetes mellitus     Hyperlipidemia LDL goal <100     Chronic pain syndrome - Neck     Other chronic rhinitis     Chronic sinusitis, unspecified location     Dysfunction of both eustachian tubes     Mild intermittent asthma     Deviated nasal septum     Tobacco use     Alcohol abuse     Major depressive disorder, recurrent episode, moderate (H)     Personality disorder (H)     Suicidal ideation     Past Surgical History:   Procedure Laterality Date     HERNIA REPAIR Left      KNEE SURGERY Left     ACL repair, patella graft     SEPTOPLASTY N/A 3/13/2018    Procedure: SEPTOPLASTY;  septoplasty, submucosal resection of the turbinates;  Surgeon: Piotr Quiroz MD;  Location:  OR       Social History     Tobacco Use     Smoking status: Light Tobacco Smoker     Packs/day: 1.00     Smokeless tobacco: Never Used   Substance Use Topics     Alcohol use: Yes     Alcohol/week: 2.0 standard drinks     Types: 2 Cans of beer per week     Comment: moderately     Family History   Problem Relation Age of Onset     Diabetes  "Mother      Myocardial Infarction Father          Current Outpatient Medications   Medication Sig Dispense Refill     Acetaminophen (TYLENOL PO) Take 1,000 mg by mouth daily        NAPROXEN PO Take 500 mg by mouth 2 times daily (with meals)       albuterol (PROAIR HFA/PROVENTIL HFA/VENTOLIN HFA) 108 (90 BASE) MCG/ACT Inhaler Inhale 2 puffs into the lungs every 6 hours (Patient not taking: Reported on 12/13/2019) 1 Inhaler 11     Allergies   Allergen Reactions     Penicillins Anaphylaxis     Tolerated cefazolin on 09/16/2015.     Clindamycin Hives     Reviewed and updated as needed this visit by Provider         Review of Systems   ROS COMP: As above      Objective    BP (!) 148/98   Pulse 79   Temp 97.9  F (36.6  C) (Tympanic)   Resp 20   Ht 1.8 m (5' 10.87\")   Wt 103.2 kg (227 lb 9.6 oz)   SpO2 98%   BMI 31.86 kg/m    Body mass index is 31.86 kg/m .  Physical Exam   GENERAL: healthy, alert and no distress  EYES: Eyes grossly normal to inspection, PERRL and conjunctivae and sclerae normal  HENT: normal cephalic/atraumatic, both ears: impacted with cerumen; L ear with visible PE tube in cerumen, R ear with no PE tube visible in cerumen impaction, nasal mucosa edematous , rhinorrhea clear, oral mucous membranes moist, sinuses: not tender and cobblestoning of posterior oropharynx  NECK: no adenopathy, no asymmetry, masses, or scars and thyroid normal to palpation  RESP: lungs clear to auscultation - no rales, rhonchi or wheezes  CV: regular rate and rhythm, normal S1 S2, no S3 or S4, no murmur, click or rub, no peripheral edema and peripheral pulses strong  MS: no gross musculoskeletal defects noted, no edema  SKIN: no suspicious lesions or rashes  NEURO: Normal strength and tone, mentation intact and speech normal  PSYCH: mentation appears normal, tangential and affect normal/bright    Diagnostic Test Results:  Labs reviewed in Epic  none         Assessment & Plan     1. Bilateral impacted cerumen  Attempted " "removal of impacted cerumen with lighted curette. L ear with some cerumen removed, though unable to clear completely. R ear unable to remove cerumen. History of PE tubes, L PE tube noted in cerumen impaction and unable to see R PE tube. Encouraged him to follow up with ENT for cerumen removal and PE tube check.     2. Viral URI with cough  Symptomatic treatments recommended.  -Discussed that antibiotics would not help symptoms of viral URI. Education provided on symptoms of secondary bacterial infection such as new fever, chills, rigors, shortness of breath, increased work of breathing, that can occur with viral URI and need for further evaluation, if they occur.   - Ensure you are staying hydrated by drinking plenty of fluids or eating foods such as popsicles, jello, pudding.  - Honey and Salt water gurgles can help soothe sore throat  - Rest  - Humidifier can help with congestion and help keep mucus membranes such as throat and nose from drying out.  - Sleeping slightly propped up can help with congestion and postnasal drainage that can worsen cough at bedtime.  - As long as you have never been told to take Tylenol and/or Ibuprofen you can use them to manage fever and body aches per package instructions  Make sure you eat when you take ibuprofen to avoid stomach upset.  _OTC Coricidin is safe to take with HTN only. Avoid other OTC medications.   - If sudden onset of new fever, worsening symptoms return for further evaluation.  - OTC antihistamine such as Allegra, Zyrtec, Claritin (generic is okay) can help with nasal/sinus congestion and OTC nasal steroid such as Flonase can help decrease sinus inflammation to help with congestion.    3. Essential Hypertension  History of essential HTN, used to be on medications though has not for \"a long time\" due to a move and lack of return to clinic to establish care in the area. Encouraged this ASAP.       Kourtney Walton NP  [facility]        "

## 2019-12-13 NOTE — LETTER
December 13, 2019      Jose Elias Reaves  [address]  [place] [address]        To Whom It May Concern:    Jose Elias Reaves  was seen on 12/13/19.  Please excuse him until Tuesday due to illness.        Sincerely,        Kourtney Walton NP

## 2019-12-13 NOTE — NURSING NOTE
"Chief Complaint   Patient presents with     Nasal Congestion       Initial BP (!) 148/98   Pulse 79   Temp 97.9  F (36.6  C) (Tympanic)   Resp 20   Ht 1.8 m (5' 10.87\")   Wt 103.2 kg (227 lb 9.6 oz)   SpO2 98%   BMI 31.86 kg/m   Estimated body mass index is 31.86 kg/m  as calculated from the following:    Height as of this encounter: 1.8 m (5' 10.87\").    Weight as of this encounter: 103.2 kg (227 lb 9.6 oz).  Medication Reconciliation: complete    Ainsley Garrett LPN  "

## 2019-12-23 ENCOUNTER — OFFICE VISIT (OUTPATIENT)
Dept: FAMILY MEDICINE | Facility: OTHER | Age: 49
End: 2019-12-23
Attending: NURSE PRACTITIONER

## 2019-12-23 VITALS
TEMPERATURE: 96.1 F | SYSTOLIC BLOOD PRESSURE: 136 MMHG | BODY MASS INDEX: 32.53 KG/M2 | DIASTOLIC BLOOD PRESSURE: 90 MMHG | RESPIRATION RATE: 20 BRPM | HEART RATE: 96 BPM | HEIGHT: 71 IN | OXYGEN SATURATION: 96 % | WEIGHT: 232.38 LBS

## 2019-12-23 DIAGNOSIS — I10 ESSENTIAL HYPERTENSION: ICD-10-CM

## 2019-12-23 DIAGNOSIS — G89.4 CHRONIC PAIN SYNDROME: ICD-10-CM

## 2019-12-23 DIAGNOSIS — J40 BRONCHITIS: ICD-10-CM

## 2019-12-23 DIAGNOSIS — H61.23 BILATERAL IMPACTED CERUMEN: Primary | ICD-10-CM

## 2019-12-23 LAB
ANION GAP SERPL CALCULATED.3IONS-SCNC: 7 MMOL/L (ref 3–14)
BUN SERPL-MCNC: 16 MG/DL (ref 7–25)
CALCIUM SERPL-MCNC: 9 MG/DL (ref 8.6–10.3)
CHLORIDE SERPL-SCNC: 107 MMOL/L (ref 98–107)
CO2 SERPL-SCNC: 24 MMOL/L (ref 21–31)
CREAT SERPL-MCNC: 0.95 MG/DL (ref 0.7–1.3)
GFR SERPL CREATININE-BSD FRML MDRD: 84 ML/MIN/{1.73_M2}
GLUCOSE SERPL-MCNC: 103 MG/DL (ref 70–105)
POTASSIUM SERPL-SCNC: 4.5 MMOL/L (ref 3.5–5.1)
SODIUM SERPL-SCNC: 138 MMOL/L (ref 134–144)

## 2019-12-23 PROCEDURE — 80048 BASIC METABOLIC PNL TOTAL CA: CPT | Mod: ZL | Performed by: NURSE PRACTITIONER

## 2019-12-23 PROCEDURE — 36415 COLL VENOUS BLD VENIPUNCTURE: CPT | Mod: ZL | Performed by: NURSE PRACTITIONER

## 2019-12-23 PROCEDURE — 99214 OFFICE O/P EST MOD 30 MIN: CPT | Performed by: NURSE PRACTITIONER

## 2019-12-23 PROCEDURE — 69209 REMOVE IMPACTED EAR WAX UNI: CPT

## 2019-12-23 RX ORDER — AZITHROMYCIN 250 MG/1
TABLET, FILM COATED ORAL
Qty: 6 TABLET | Refills: 0 | Status: SHIPPED | OUTPATIENT
Start: 2019-12-23 | End: 2020-03-10

## 2019-12-23 RX ORDER — LISINOPRIL 20 MG/1
20 TABLET ORAL DAILY
Qty: 90 TABLET | Refills: 0 | Status: SHIPPED | OUTPATIENT
Start: 2019-12-23 | End: 2020-10-15

## 2019-12-23 RX ORDER — DICLOFENAC POTASSIUM 50 MG/1
50 TABLET, FILM COATED ORAL 3 TIMES DAILY
Qty: 90 TABLET | Refills: 0 | Status: SHIPPED | OUTPATIENT
Start: 2019-12-23 | End: 2020-10-15

## 2019-12-23 ASSESSMENT — MIFFLIN-ST. JEOR: SCORE: 1934.11

## 2019-12-23 ASSESSMENT — PAIN SCALES - GENERAL: PAINLEVEL: EXTREME PAIN (8)

## 2019-12-23 NOTE — NURSING NOTE
Patient presents to clinic today for cough, runny nose, and chest congestion. He states it has been going on for about a week and was seen and was told it was viral. He is also needing his blood pressure checked.     No LMP for male patient.  Medication Reconciliation: complete    Claudia Alejandre LPN  12/23/2019 8:39 AM

## 2019-12-23 NOTE — PROGRESS NOTES
HPI:    Jose Elias Reaves is a 49 year old male who presents to clinic today for multiple issues.    He was seen in the rapid clinic about 1 week ago for cough, congestion and body aches.  He also reports he has had lightheadedness and dizziness.  At that time he was told that he had a viral illness.  He feels that something more is going on.  He denies any fevers.  His symptoms have been present for about 2 to 3 weeks.  He did not get a flu shot this year.  No ill contacts at home.  He has been using ibuprofen and Tylenol, DayQuil and NyQuil and Sudafed.  Reports he does have pro-air at home that he uses for reported asthma.  He does continue to smoke a small amount.    He also has elevated blood pressure.  He was seen in the rapid clinic for the cough and it was noted he was having uncontrolled hypertension.  He states he has a history of being on blood pressure medications but does not know what these were.  He has not had medications in 5 to 6 months as he did not have a primary care provider and was no longer able to get refills.  Per his records I am able to see that he was on lisinopril 40 mg most recently.  He does not check his blood pressures at home.  Denies any symptoms.    He also has chronic neck pain, reports he has arthritis in his neck.  He has been using 5+ tablets of naproxen as well as Tylenol daily.  History of using hydrocodone as well.  He is wondering if there something more that can be done to help with the neck pain.  He cannot tell me what he has been on the past but review of his records do show that he has been on diclofenac as well as meloxicam in the past.  He does not feel the meloxicam was helpful but he cannot speak to the diclofenac.  Past Medical History:   Diagnosis Date     Hypertension        Past Surgical History:   Procedure Laterality Date     HERNIA REPAIR Left      KNEE SURGERY Left     ACL repair, patella graft     SEPTOPLASTY N/A 3/13/2018    Procedure: SEPTOPLASTY;   septoplasty, submucosal resection of the turbinates;  Surgeon: Piotr Quiroz MD;  Location: [facility]       Family History   Problem Relation Age of Onset     Diabetes Mother      Myocardial Infarction Father        Social History     Socioeconomic History     Marital status:      Spouse name: Not on file     Number of children: Not on file     Years of education: Not on file     Highest education level: Not on file   Occupational History     Not on file   Social Needs     Financial resource strain: Not on file     Food insecurity:     Worry: Not on file     Inability: Not on file     Transportation needs:     Medical: Not on file     Non-medical: Not on file   Tobacco Use     Smoking status: Light Tobacco Smoker     Packs/day: 1.00     Smokeless tobacco: Never Used   Substance and Sexual Activity     Alcohol use: Yes     Alcohol/week: 2.0 standard drinks     Types: 2 Cans of beer per week     Comment: moderately     Drug use: No     Sexual activity: Yes     Partners: Female   Lifestyle     Physical activity:     Days per week: Not on file     Minutes per session: Not on file     Stress: Not on file   Relationships     Social connections:     Talks on phone: Not on file     Gets together: Not on file     Attends [employer] service: Not on file     Active member of club or organization: Not on file     Attends meetings of clubs or organizations: Not on file     Relationship status: Not on file     Intimate partner violence:     Fear of current or ex partner: Not on file     Emotionally abused: Not on file     Physically abused: Not on file     Forced sexual activity: Not on file   Other Topics Concern     Parent/sibling w/ CABG, MI or angioplasty before 65F 55M? Not Asked   Social History Narrative     Not on file       Current Outpatient Medications   Medication Sig Dispense Refill     Acetaminophen (TYLENOL PO) Take 1,000 mg by mouth daily        azithromycin (ZITHROMAX) 250 MG tablet Take 2 tablets (500 mg)  "by mouth daily for 1 day, THEN 1 tablet (250 mg) daily for 4 days. 6 tablet 0     diclofenac (CATAFLAM) 50 MG tablet Take 1 tablet (50 mg) by mouth 3 times daily 90 tablet 0     lisinopril (PRINIVIL/ZESTRIL) 20 MG tablet Take 1 tablet (20 mg) by mouth daily 90 tablet 0     NAPROXEN PO Take 500 mg by mouth 2 times daily (with meals)         Allergies   Allergen Reactions     Penicillins Anaphylaxis     Tolerated cefazolin on 09/16/2015.     Clindamycin Hives       ROS:  Pertinent positives and negatives are noted in HPI.    EXAM:  BP (!) 136/90 (BP Location: Right arm, Patient Position: Sitting, Cuff Size: Adult Large)   Pulse 96   Temp 96.1  F (35.6  C) (Tympanic)   Resp 20   Ht 1.8 m (5' 10.87\")   Wt 105.4 kg (232 lb 6 oz)   SpO2 96%   BMI 32.53 kg/m    General appearance: well appearing male, in no acute distress  Head: normocephalic, atraumatic  Ears: TM's with cone of light, no erythema, canals clear bilaterally after flushed by nursing.  PE tubes were embedded in the wax.  Eyes: conjunctivae normal  Oropharynx: moist mucous membranes, tonsils without erythema, exudates or petechiae, no post nasal drip seen  Neck: supple without adenopathy  Respiratory: clear to auscultation bilaterally  Cardiac: RRR with no murmurs  Musculoskeletal: Normal range of motion of cervical spine, tender with palpation  Dermatological: no rashes or lesions  Psychological: normal affect, alert and pleasant  No results found for any visits on 12/23/19.    PHQ Depression Screen  PHQ-9 SCORE 9/4/2015 2/20/2018   PHQ-9 Total Score MyChart - 0   PHQ-9 Total Score 2 0       ASSESSMENT AND PLAN:    1. Bilateral impacted cerumen    2. Essential hypertension    3. Bronchitis    4. Chronic pain syndrome - Neck      Earwax was flushed by nursing.  Blood pressure today is 136/90.  We will start him on lisinopril 20 mg daily.  BMP is pending.  Plan to follow-up in 1 month and will adjust medications as needed.  Treated bronchitis with " azithromycin daily for 5 days.  Recommend smoking cessation and may use inhaler as needed.  Unwilling to use narcotics to treat chronic neck pain.  Started diclofenac 3 times daily as needed, should not use ibuprofen or naproxen but may use Tylenol.  He is not open to any other treatments at this time.  Plan to follow-up in 1 month.      ANATOLY Kaur CNP..................12/23/2019 8:44 AM      This document was prepared using voice generated software.  While every attempt was made for accuracy, grammatical errors may exist.

## 2019-12-23 NOTE — PATIENT INSTRUCTIONS
Azithromycin daily for 5 days  Will call with lab work  Refilled lisinopril 20 mg daily  Follow up in 1 month for blood pressure check and on neck pain  Diclofenac 3 times daily as needed for neck pain-no ibuprofen or naproxen

## 2020-03-10 ENCOUNTER — APPOINTMENT (OUTPATIENT)
Dept: GENERAL RADIOLOGY | Facility: HOSPITAL | Age: 50
End: 2020-03-10
Attending: EMERGENCY MEDICINE
Payer: COMMERCIAL

## 2020-03-10 ENCOUNTER — HOSPITAL ENCOUNTER (EMERGENCY)
Facility: HOSPITAL | Age: 50
Discharge: HOME | End: 2020-03-10
Attending: EMERGENCY MEDICINE | Admitting: EMERGENCY MEDICINE
Payer: COMMERCIAL

## 2020-03-10 VITALS
SYSTOLIC BLOOD PRESSURE: 151 MMHG | TEMPERATURE: 96.3 F | OXYGEN SATURATION: 96 % | DIASTOLIC BLOOD PRESSURE: 104 MMHG | RESPIRATION RATE: 14 BRPM

## 2020-03-10 DIAGNOSIS — R20.2 PARESTHESIA OF BOTH HANDS: ICD-10-CM

## 2020-03-10 DIAGNOSIS — M94.0 COSTOCHONDRITIS: Primary | ICD-10-CM

## 2020-03-10 LAB
ALBUMIN SERPL-MCNC: 3.9 G/DL (ref 3.4–5)
ALP SERPL-CCNC: 66 U/L (ref 40–150)
ALT SERPL W P-5'-P-CCNC: 30 U/L (ref 0–70)
ANION GAP SERPL CALCULATED.3IONS-SCNC: 6 MMOL/L (ref 3–14)
AST SERPL W P-5'-P-CCNC: 16 U/L (ref 0–45)
BASOPHILS # BLD AUTO: 0.1 10E9/L (ref 0–0.2)
BASOPHILS NFR BLD AUTO: 0.7 %
BILIRUB SERPL-MCNC: 0.2 MG/DL (ref 0.2–1.3)
BUN SERPL-MCNC: 19 MG/DL (ref 7–30)
CALCIUM SERPL-MCNC: 9 MG/DL (ref 8.5–10.1)
CHLORIDE SERPL-SCNC: 106 MMOL/L (ref 94–109)
CO2 SERPL-SCNC: 28 MMOL/L (ref 20–32)
CREAT SERPL-MCNC: 1.03 MG/DL (ref 0.66–1.25)
D DIMER PPP FEU-MCNC: 0.4 UG/ML FEU (ref 0–0.5)
DIFFERENTIAL METHOD BLD: NORMAL
EOSINOPHIL # BLD AUTO: 0.2 10E9/L (ref 0–0.7)
EOSINOPHIL NFR BLD AUTO: 2.8 %
ERYTHROCYTE [DISTWIDTH] IN BLOOD BY AUTOMATED COUNT: 14.6 % (ref 10–15)
GFR SERPL CREATININE-BSD FRML MDRD: 84 ML/MIN/{1.73_M2}
GLUCOSE SERPL-MCNC: 86 MG/DL (ref 70–99)
HCT VFR BLD AUTO: 45 % (ref 40–53)
HGB BLD-MCNC: 14.6 G/DL (ref 13.3–17.7)
IMM GRANULOCYTES # BLD: 0 10E9/L (ref 0–0.4)
IMM GRANULOCYTES NFR BLD: 0.4 %
LYMPHOCYTES # BLD AUTO: 1.7 10E9/L (ref 0.8–5.3)
LYMPHOCYTES NFR BLD AUTO: 25.7 %
MCH RBC QN AUTO: 28.5 PG (ref 26.5–33)
MCHC RBC AUTO-ENTMCNC: 32.4 G/DL (ref 31.5–36.5)
MCV RBC AUTO: 88 FL (ref 78–100)
MONOCYTES # BLD AUTO: 1 10E9/L (ref 0–1.3)
MONOCYTES NFR BLD AUTO: 15.3 %
NEUTROPHILS # BLD AUTO: 3.7 10E9/L (ref 1.6–8.3)
NEUTROPHILS NFR BLD AUTO: 55.1 %
NRBC # BLD AUTO: 0 10*3/UL
NRBC BLD AUTO-RTO: 0 /100
PLATELET # BLD AUTO: 404 10E9/L (ref 150–450)
POTASSIUM SERPL-SCNC: 4.2 MMOL/L (ref 3.4–5.3)
PROT SERPL-MCNC: 7.4 G/DL (ref 6.8–8.8)
RBC # BLD AUTO: 5.13 10E12/L (ref 4.4–5.9)
SODIUM SERPL-SCNC: 140 MMOL/L (ref 133–144)
TROPONIN I SERPL-MCNC: <0.015 UG/L (ref 0–0.04)
WBC # BLD AUTO: 6.7 10E9/L (ref 4–11)

## 2020-03-10 PROCEDURE — 93010 ELECTROCARDIOGRAM REPORT: CPT | Performed by: INTERNAL MEDICINE

## 2020-03-10 PROCEDURE — 71046 X-RAY EXAM CHEST 2 VIEWS: CPT | Mod: TC

## 2020-03-10 PROCEDURE — 85379 FIBRIN DEGRADATION QUANT: CPT | Performed by: EMERGENCY MEDICINE

## 2020-03-10 PROCEDURE — 84484 ASSAY OF TROPONIN QUANT: CPT | Performed by: EMERGENCY MEDICINE

## 2020-03-10 PROCEDURE — 85025 COMPLETE CBC W/AUTO DIFF WBC: CPT | Performed by: EMERGENCY MEDICINE

## 2020-03-10 PROCEDURE — 99285 EMERGENCY DEPT VISIT HI MDM: CPT | Mod: Z6 | Performed by: EMERGENCY MEDICINE

## 2020-03-10 PROCEDURE — 36415 COLL VENOUS BLD VENIPUNCTURE: CPT | Performed by: EMERGENCY MEDICINE

## 2020-03-10 PROCEDURE — 93005 ELECTROCARDIOGRAM TRACING: CPT

## 2020-03-10 PROCEDURE — 99285 EMERGENCY DEPT VISIT HI MDM: CPT | Mod: 25

## 2020-03-10 PROCEDURE — 80053 COMPREHEN METABOLIC PANEL: CPT | Performed by: EMERGENCY MEDICINE

## 2020-03-10 ASSESSMENT — ENCOUNTER SYMPTOMS
SHORTNESS OF BREATH: 0
FEVER: 0
ABDOMINAL PAIN: 0

## 2020-03-10 NOTE — ED TRIAGE NOTES
Pt states he has had left lateral pain pointing to left axilla area. For 1.5 weeks that has been worse today. C/O right arm tingling for 1 week. BEFAST negative. Pt c/o varicose in right legs that have become larger.

## 2020-03-10 NOTE — ED PROVIDER NOTES
History     Chief Complaint   Patient presents with     Chest Wall Pain     Tingling right arm/hand.     HPI  Jose Elias Reaves is a 49 year old male who presents to the ED with complaints of left-sided chest wall pain around the axilla.  Pain has been present for the last 1-1/2 weeks.  Pain is associated with tingling and numbness in the fingers of both hands.  He reports history of recurrent left-sided chest pain on and off for several years but normally resolves without any treatment with him days.  He is worried that this pain has persisted for 1-1/2 weeks.  He denies shortness of breath, palpitations, pleuritic chest pain, fever, chills, runny nose.  He denies any history of recent chest wall trauma.  No recent travel or sick contacts.    Allergies:  Allergies   Allergen Reactions     Penicillins Anaphylaxis     Tolerated cefazolin on 09/16/2015.     Clindamycin Hives       Problem List:    Patient Active Problem List    Diagnosis Date Noted     Other chronic rhinitis 02/20/2018     Priority: Medium     Chronic sinusitis, unspecified location 02/20/2018     Priority: Medium     Dysfunction of both eustachian tubes 02/20/2018     Priority: Medium     Mild intermittent asthma 02/20/2018     Priority: Medium     Deviated nasal septum 02/20/2018     Priority: Medium     Tobacco use 02/20/2018     Priority: Medium     Hyperlipidemia LDL goal <100 04/18/2017     Priority: Medium     Chronic pain syndrome - Neck 04/18/2017     Priority: Medium     Essential hypertension 03/21/2017     Priority: Medium     Family history of ischemic heart disease 03/21/2017     Priority: Medium     Family history of diabetes mellitus 03/21/2017     Priority: Medium     Alcohol abuse 05/05/2012     Priority: Medium     Major depressive disorder, recurrent episode, moderate (H) 05/05/2012     Priority: Medium     Personality disorder (H) 05/05/2012     Priority: Medium     Suicidal ideation 05/05/2012     Priority: Medium        Past  Medical History:    Past Medical History:   Diagnosis Date     Hypertension        Past Surgical History:    Past Surgical History:   Procedure Laterality Date     HERNIA REPAIR Left      KNEE SURGERY Left     ACL repair, patella graft     SEPTOPLASTY N/A 3/13/2018    Procedure: SEPTOPLASTY;  septoplasty, submucosal resection of the turbinates;  Surgeon: Piotr Quiroz MD;  Location: [facility] OR       Family History:    Family History   Problem Relation Age of Onset     Diabetes Mother      Myocardial Infarction Father        Social History:  Marital Status:   [4]  Social History     Tobacco Use     Smoking status: Light Tobacco Smoker     Packs/day: 1.00     Smokeless tobacco: Never Used   Substance Use Topics     Alcohol use: Yes     Alcohol/week: 2.0 standard drinks     Types: 2 Cans of beer per week     Comment: moderately     Drug use: No        Medications:    Acetaminophen (TYLENOL PO)  lisinopril (PRINIVIL/ZESTRIL) 20 MG tablet  MELOXICAM PO  NAPROXEN PO  diclofenac (CATAFLAM) 50 MG tablet          Review of Systems   Constitutional: Negative for fever.   Respiratory: Negative for shortness of breath.    Cardiovascular: Positive for chest pain.   Gastrointestinal: Negative for abdominal pain.   All other systems reviewed and are negative.      Physical Exam   BP: 158/100  Heart Rate: 83  Temp: 96.5  F (35.8  C)  Resp: 16  SpO2: 99 %      Physical Exam  Vitals signs and nursing note reviewed.   Constitutional:       General: He is not in acute distress.     Appearance: He is well-developed. He is not diaphoretic.   HENT:      Head: Normocephalic and atraumatic.   Eyes:      Pupils: Pupils are equal, round, and reactive to light.   Cardiovascular:      Rate and Rhythm: Normal rate and regular rhythm.      Heart sounds: Normal heart sounds.   Pulmonary:      Effort: Pulmonary effort is normal. No respiratory distress.      Breath sounds: Normal breath sounds. No stridor.   Chest:      Chest wall:  Tenderness present.       Abdominal:      General: Bowel sounds are normal. There is no distension.      Tenderness: There is no abdominal tenderness.   Musculoskeletal:         General: No tenderness or deformity.   Neurological:      Mental Status: He is alert and oriented to person, place, and time.      Cranial Nerves: No cranial nerve deficit.         ED Course   Patient evaluated upon arrival to the ED and laboratory tests ordered.      Procedures       EKG Interpretation:      Interpreted by Ziggy Hilton MD  Time reviewed: 5:34 PM  Symptoms at time of EKG: Left-sided chest wall pain  Rhythm: normal sinus   Rate: normal  Axis: normal  Ectopy: none  Conduction: normal  ST Segments/ T Waves: No ST-T wave changes  Q Waves: none  Comparison to prior: No old EKG available    Clinical Impression: Normal sinus rhythm      Results for orders placed or performed during the hospital encounter of 03/10/20 (from the past 24 hour(s))   CBC with platelets differential   Result Value Ref Range    WBC 6.7 4.0 - 11.0 10e9/L    RBC Count 5.13 4.4 - 5.9 10e12/L    Hemoglobin 14.6 13.3 - 17.7 g/dL    Hematocrit 45.0 40.0 - 53.0 %    MCV 88 78 - 100 fl    MCH 28.5 26.5 - 33.0 pg    MCHC 32.4 31.5 - 36.5 g/dL    RDW 14.6 10.0 - 15.0 %    Platelet Count 404 150 - 450 10e9/L    Diff Method Automated Method     % Neutrophils 55.1 %    % Lymphocytes 25.7 %    % Monocytes 15.3 %    % Eosinophils 2.8 %    % Basophils 0.7 %    % Immature Granulocytes 0.4 %    Nucleated RBCs 0 0 /100    Absolute Neutrophil 3.7 1.6 - 8.3 10e9/L    Absolute Lymphocytes 1.7 0.8 - 5.3 10e9/L    Absolute Monocytes 1.0 0.0 - 1.3 10e9/L    Absolute Eosinophils 0.2 0.0 - 0.7 10e9/L    Absolute Basophils 0.1 0.0 - 0.2 10e9/L    Abs Immature Granulocytes 0.0 0 - 0.4 10e9/L    Absolute Nucleated RBC 0.0    Comprehensive metabolic panel   Result Value Ref Range    Sodium 140 133 - 144 mmol/L    Potassium 4.2 3.4 - 5.3 mmol/L    Chloride 106 94 - 109 mmol/L     Carbon Dioxide 28 20 - 32 mmol/L    Anion Gap 6 3 - 14 mmol/L    Glucose 86 70 - 99 mg/dL    Urea Nitrogen 19 7 - 30 mg/dL    Creatinine 1.03 0.66 - 1.25 mg/dL    GFR Estimate 84 >60 mL/min/[1.73_m2]    GFR Estimate If Black >90 >60 mL/min/[1.73_m2]    Calcium 9.0 8.5 - 10.1 mg/dL    Bilirubin Total 0.2 0.2 - 1.3 mg/dL    Albumin 3.9 3.4 - 5.0 g/dL    Protein Total 7.4 6.8 - 8.8 g/dL    Alkaline Phosphatase 66 40 - 150 U/L    ALT 30 0 - 70 U/L    AST 16 0 - 45 U/L   D dimer quantitative   Result Value Ref Range    D Dimer 0.4 0.0 - 0.50 ug/ml FEU   Troponin I   Result Value Ref Range    Troponin I ES <0.015 0.000 - 0.045 ug/L   XR Chest 2 Views    Narrative    Procedure:XR CHEST 2 VW    Clinical history:Male, 49 years, Chest pain    Technique: Two views are submitted.    Comparison: 5/15/2017    Findings: The cardiac silhouette is normal. The pulmonary vasculature  is normal.    The lungs are again hyperinflated however appear to be clear. Pleural  thickening is again seen at the lung apices. Bony structures are  unremarkable.      Impression    Impression:   Persistent hyperinflation of the lungs. No evidence of pneumonia, CHF  or other acute abnormality.    WAQAS RAINES MD       Medications - No data to display    Assessments & Plan (with Medical Decision Making)   Costochondritis: Presented to the ED with chest wall pain for the last 1 and half weeks.  EKG did not show any acute changes with chest x-ray showing persistent hyperinflation of lungs but no evidence of pneumonia, CHF or other acute abnormality.  Labs done showed normal CBC, CMP, d-dimer, troponin.  Discussed with patient results with patient and advised to use OTC Motrin or Tylenol as needed for pain.  He has had long history of paresthesia in both hands and he was referred to neurologist for EMG to rule out entrapment syndrome.  Discharged home stable condition and may certainly return to ED if condition worsens.    I have reviewed the nursing  notes.    I have reviewed the findings, diagnosis, plan and need for follow up with the patient.    Discharge Medication List as of 3/10/2020  6:30 PM          Final diagnoses:   Costochondritis   Paresthesia of both hands       3/10/2020   [facility]     Ziggy Hilton MD  03/11/20 0822

## 2020-03-10 NOTE — ED AVS SNAPSHOT
[facility]  [address] [address]  Phone:  [phone]                                    Jose Elias Reaves   MRN: [id number]    Department:  [facility]   Date of Visit:  3/10/2020           After Visit Summary Signature Page    I have received my discharge instructions, and my questions have been answered. I have discussed any challenges I see with this plan with the nurse or doctor.    ..........................................................................................................................................  Patient/Patient Representative Signature      ..........................................................................................................................................  Patient Representative Print Name and Relationship to Patient    ..................................................               ................................................  Date                                   Time    ..........................................................................................................................................  Reviewed by Signature/Title    ...................................................              ..............................................  Date                                               Time          22EPIC Rev 08/18

## 2020-03-23 ENCOUNTER — HOSPITAL ENCOUNTER (EMERGENCY)
Facility: OTHER | Age: 50
Discharge: HOME | End: 2020-03-23
Attending: EMERGENCY MEDICINE | Admitting: EMERGENCY MEDICINE
Payer: COMMERCIAL

## 2020-03-23 ENCOUNTER — APPOINTMENT (OUTPATIENT)
Dept: GENERAL RADIOLOGY | Facility: OTHER | Age: 50
End: 2020-03-23
Attending: EMERGENCY MEDICINE
Payer: COMMERCIAL

## 2020-03-23 VITALS
SYSTOLIC BLOOD PRESSURE: 144 MMHG | BODY MASS INDEX: 34.85 KG/M2 | HEART RATE: 87 BPM | OXYGEN SATURATION: 97 % | DIASTOLIC BLOOD PRESSURE: 93 MMHG | RESPIRATION RATE: 17 BRPM | TEMPERATURE: 97.5 F | HEIGHT: 71 IN | WEIGHT: 248.9 LBS

## 2020-03-23 DIAGNOSIS — J40 BRONCHITIS: ICD-10-CM

## 2020-03-23 DIAGNOSIS — R07.89 CHEST WALL PAIN: ICD-10-CM

## 2020-03-23 LAB
ALBUMIN SERPL-MCNC: 3.9 G/DL (ref 3.5–5.7)
ALP SERPL-CCNC: 45 U/L (ref 34–104)
ALT SERPL W P-5'-P-CCNC: 26 U/L (ref 7–52)
ANION GAP SERPL CALCULATED.3IONS-SCNC: 7 MMOL/L (ref 3–14)
AST SERPL W P-5'-P-CCNC: 19 U/L (ref 13–39)
BASOPHILS # BLD AUTO: 0 10E9/L (ref 0–0.2)
BASOPHILS NFR BLD AUTO: 0.4 %
BILIRUB SERPL-MCNC: 0.2 MG/DL (ref 0.3–1)
BUN SERPL-MCNC: 19 MG/DL (ref 7–25)
CALCIUM SERPL-MCNC: 8.7 MG/DL (ref 8.6–10.3)
CHLORIDE SERPL-SCNC: 103 MMOL/L (ref 98–107)
CO2 SERPL-SCNC: 27 MMOL/L (ref 21–31)
CREAT SERPL-MCNC: 1.01 MG/DL (ref 0.7–1.3)
D DIMER PPP DDU-MCNC: <200 NG/ML D-DU (ref 0–230)
DIFFERENTIAL METHOD BLD: ABNORMAL
EOSINOPHIL # BLD AUTO: 0.2 10E9/L (ref 0–0.7)
EOSINOPHIL NFR BLD AUTO: 1.6 %
ERYTHROCYTE [DISTWIDTH] IN BLOOD BY AUTOMATED COUNT: 15.2 % (ref 10–15)
GFR SERPL CREATININE-BSD FRML MDRD: 78 ML/MIN/{1.73_M2}
GLUCOSE SERPL-MCNC: 142 MG/DL (ref 70–105)
HCT VFR BLD AUTO: 42.2 % (ref 40–53)
HGB BLD-MCNC: 13.6 G/DL (ref 13.3–17.7)
IMM GRANULOCYTES # BLD: 0.1 10E9/L (ref 0–0.4)
IMM GRANULOCYTES NFR BLD: 0.8 %
LYMPHOCYTES # BLD AUTO: 2.5 10E9/L (ref 0.8–5.3)
LYMPHOCYTES NFR BLD AUTO: 22.6 %
MCH RBC QN AUTO: 28.8 PG (ref 26.5–33)
MCHC RBC AUTO-ENTMCNC: 32.2 G/DL (ref 31.5–36.5)
MCV RBC AUTO: 89 FL (ref 78–100)
MONOCYTES # BLD AUTO: 1 10E9/L (ref 0–1.3)
MONOCYTES NFR BLD AUTO: 8.9 %
NEUTROPHILS # BLD AUTO: 7.3 10E9/L (ref 1.6–8.3)
NEUTROPHILS NFR BLD AUTO: 65.7 %
PLATELET # BLD AUTO: 405 10E9/L (ref 150–450)
POTASSIUM SERPL-SCNC: 4.2 MMOL/L (ref 3.5–5.1)
PROT SERPL-MCNC: 6.2 G/DL (ref 6.4–8.9)
RBC # BLD AUTO: 4.72 10E12/L (ref 4.4–5.9)
SODIUM SERPL-SCNC: 137 MMOL/L (ref 134–144)
WBC # BLD AUTO: 11.1 10E9/L (ref 4–11)

## 2020-03-23 PROCEDURE — 25000132 ZZH RX MED GY IP 250 OP 250 PS 637: Performed by: EMERGENCY MEDICINE

## 2020-03-23 PROCEDURE — 85379 FIBRIN DEGRADATION QUANT: CPT | Performed by: EMERGENCY MEDICINE

## 2020-03-23 PROCEDURE — 80053 COMPREHEN METABOLIC PANEL: CPT | Performed by: EMERGENCY MEDICINE

## 2020-03-23 PROCEDURE — 36415 COLL VENOUS BLD VENIPUNCTURE: CPT | Performed by: EMERGENCY MEDICINE

## 2020-03-23 PROCEDURE — 85025 COMPLETE CBC W/AUTO DIFF WBC: CPT | Performed by: EMERGENCY MEDICINE

## 2020-03-23 PROCEDURE — 93010 ELECTROCARDIOGRAM REPORT: CPT | Performed by: INTERNAL MEDICINE

## 2020-03-23 PROCEDURE — 99283 EMERGENCY DEPT VISIT LOW MDM: CPT | Mod: Z6 | Performed by: EMERGENCY MEDICINE

## 2020-03-23 PROCEDURE — 93005 ELECTROCARDIOGRAM TRACING: CPT | Performed by: EMERGENCY MEDICINE

## 2020-03-23 PROCEDURE — 99285 EMERGENCY DEPT VISIT HI MDM: CPT | Mod: 25 | Performed by: EMERGENCY MEDICINE

## 2020-03-23 PROCEDURE — 71045 X-RAY EXAM CHEST 1 VIEW: CPT

## 2020-03-23 RX ORDER — LORAZEPAM 0.5 MG/1
0.5 TABLET ORAL ONCE
Status: COMPLETED | OUTPATIENT
Start: 2020-03-23 | End: 2020-03-23

## 2020-03-23 RX ORDER — OXYCODONE HYDROCHLORIDE 5 MG/1
5 TABLET ORAL EVERY 4 HOURS PRN
Qty: 10 TABLET | Refills: 0 | Status: SHIPPED | OUTPATIENT
Start: 2020-03-23 | End: 2020-10-15

## 2020-03-23 RX ADMIN — LORAZEPAM 0.5 MG: 0.5 TABLET ORAL at 19:00

## 2020-03-23 RX ADMIN — ACETAMINOPHEN AND CODEINE 2 TABLET: 300; 30 TABLET ORAL at 17:50

## 2020-03-23 ASSESSMENT — ENCOUNTER SYMPTOMS
NAUSEA: 0
VOMITING: 0
ARTHRALGIAS: 0
CHILLS: 0
COUGH: 1
DYSURIA: 0
SHORTNESS OF BREATH: 1
CHEST TIGHTNESS: 0
LIGHT-HEADEDNESS: 0
AGITATION: 0
FEVER: 0

## 2020-03-23 ASSESSMENT — MIFFLIN-ST. JEOR: SCORE: 2011.13

## 2020-03-23 NOTE — ED AVS SNAPSHOT
[facility]  [address]  [place] [address]  Phone:  [phone]  Fax:  [phone]                                    Jose Elias Reaves   MRN: [id number]    Department:  [facility]   Date of Visit:  3/23/2020           After Visit Summary Signature Page    I have received my discharge instructions, and my questions have been answered. I have discussed any challenges I see with this plan with the nurse or doctor.    ..........................................................................................................................................  Patient/Patient Representative Signature      ..........................................................................................................................................  Patient Representative Print Name and Relationship to Patient    ..................................................               ................................................  Date                                   Time    ..........................................................................................................................................  Reviewed by Signature/Title    ...................................................              ..............................................  Date                                               Time          22EPIC Rev 08/18

## 2020-03-23 NOTE — ED PROVIDER NOTES
History     Chief Complaint   Patient presents with     Shortness of Breath     HPI  Jose Elias Reaves is a 49 year old male who is here complaining of cough and shortness of breath.  He says he has been ill now for about 15 days.  He has been seen twice, first in the emergency department up in [place] and subsequently in clinic.  Initially it was felt to be costochondritis, however on the second visit he was diagnosed with a bronchitis and given Zithromax and prednisone.  This was about a week ago.  He says he is feeling absolutely no better after those medications.  He reports coughing fits that will make him have to stop and hold onto something so that he does not fall.  These are very painful.  He is coughing up a small amount of blood.  He is sleeping okay but does occasionally get woken up with a cough.  He can lie flat and does not need to prop himself up.  He has not noticed any increasing pedal edema.  Denies any fevers or chills that he is aware of however he does sometimes wake up in sweats.  During both of his last visits he was afebrile.  Does have some body aches.    Allergies:  Allergies   Allergen Reactions     Penicillins Anaphylaxis     Tolerated cefazolin on 09/16/2015.     Clindamycin Hives       Problem List:    Patient Active Problem List    Diagnosis Date Noted     Other chronic rhinitis 02/20/2018     Priority: Medium     Chronic sinusitis, unspecified location 02/20/2018     Priority: Medium     Dysfunction of both eustachian tubes 02/20/2018     Priority: Medium     Mild intermittent asthma 02/20/2018     Priority: Medium     Deviated nasal septum 02/20/2018     Priority: Medium     Tobacco use 02/20/2018     Priority: Medium     Hyperlipidemia LDL goal <100 04/18/2017     Priority: Medium     Chronic pain syndrome - Neck 04/18/2017     Priority: Medium     Essential hypertension 03/21/2017     Priority: Medium     Family history of ischemic heart disease 03/21/2017     Priority: Medium      Family history of diabetes mellitus 03/21/2017     Priority: Medium     Alcohol abuse 05/05/2012     Priority: Medium     Major depressive disorder, recurrent episode, moderate (H) 05/05/2012     Priority: Medium     Personality disorder (H) 05/05/2012     Priority: Medium     Suicidal ideation 05/05/2012     Priority: Medium        Past Medical History:    Past Medical History:   Diagnosis Date     Hypertension        Past Surgical History:    Past Surgical History:   Procedure Laterality Date     HERNIA REPAIR Left      KNEE SURGERY Left     ACL repair, patella graft     SEPTOPLASTY N/A 3/13/2018    Procedure: SEPTOPLASTY;  septoplasty, submucosal resection of the turbinates;  Surgeon: Piotr Quiroz MD;  Location: [facility] OR       Family History:    Family History   Problem Relation Age of Onset     Diabetes Mother      Myocardial Infarction Father        Social History:  Marital Status:   [4]  Social History     Tobacco Use     Smoking status: Light Tobacco Smoker     Packs/day: 1.00     Smokeless tobacco: Never Used     Tobacco comment: cutting back   Substance Use Topics     Alcohol use: Yes     Alcohol/week: 2.0 standard drinks     Types: 2 Cans of beer per week     Comment: moderately     Drug use: No        Medications:    Acetaminophen (TYLENOL PO)  lisinopril (PRINIVIL/ZESTRIL) 20 MG tablet  MEDS UNK - RECORDS REQUESTED  MELOXICAM PO  NAPROXEN PO  oxyCODONE (ROXICODONE) 5 MG tablet  diclofenac (CATAFLAM) 50 MG tablet          Review of Systems   Constitutional: Negative for chills and fever.   HENT: Negative for congestion.    Eyes: Negative for visual disturbance.   Respiratory: Positive for cough and shortness of breath. Negative for chest tightness.    Cardiovascular: Negative for chest pain.   Gastrointestinal: Negative for nausea and vomiting.   Genitourinary: Negative for dysuria.   Musculoskeletal: Negative for arthralgias.   Skin: Negative for rash.   Neurological: Negative for  "light-headedness.   Psychiatric/Behavioral: Negative for agitation.       Physical Exam   BP: (!) 181/105  Pulse: 109  Heart Rate: 105  Temp: 97.5  F (36.4  C)  Resp: 18  Height: 180.3 cm (5' 11\")  Weight: 112.9 kg (248 lb 14.4 oz)([facility])  SpO2: 97 %      Physical Exam  Vitals signs and nursing note reviewed.   Constitutional:       Appearance: He is well-developed.   HENT:      Head: Normocephalic and atraumatic.      Mouth/Throat:      Mouth: Mucous membranes are moist.   Eyes:      Conjunctiva/sclera: Conjunctivae normal.   Cardiovascular:      Rate and Rhythm: Normal rate and regular rhythm.      Heart sounds: Normal heart sounds.   Pulmonary:      Effort: Pulmonary effort is normal.      Breath sounds: Normal breath sounds.      Comments: Some lower anterior chest wall tenderness bilaterally  Chest:      Chest wall: Tenderness present.   Abdominal:      General: Bowel sounds are normal. There is no distension.      Palpations: Abdomen is soft.   Skin:     General: Skin is warm and dry.   Neurological:      Mental Status: He is oriented to person, place, and time.   Psychiatric:         Mood and Affect: Mood normal.         Behavior: Behavior normal.         ED Course     ED Course as of Mar 23 1902   Mon Mar 23, 2020   1835 Patient's labs are reassuring.  Normal white blood count.  Normal metabolic panel.  X-ray also reassuring no evidence of any type of infiltrate or effusion.  I believe he may well have a viral illness and has been coughing so hard that he has some chest wall pain.  His quite anxious about this.  He is still hypertensive and still reporting a good deal of pain today in spite of a couple of Tylenol with codeine.  As I do not have a clear answer for his pain yet I am going to add a d-dimer onto his labs.  We will try 1/2 mg of oral Ativan as well.        Procedures           EKG shows sinus tachycardia at 101 bpm.  No acute ST segment or T wave changes.  No ectopy.  Results for orders placed " or performed during the hospital encounter of 03/23/20 (from the past 24 hour(s))   EKG 12-lead   Result Value Ref Range    Interpretation ECG Click View Image link to view waveform and result    CBC with platelets differential   Result Value Ref Range    WBC 11.1 (H) 4.0 - 11.0 10e9/L    RBC Count 4.72 4.4 - 5.9 10e12/L    Hemoglobin 13.6 13.3 - 17.7 g/dL    Hematocrit 42.2 40.0 - 53.0 %    MCV 89 78 - 100 fl    MCH 28.8 26.5 - 33.0 pg    MCHC 32.2 31.5 - 36.5 g/dL    RDW 15.2 (H) 10.0 - 15.0 %    Platelet Count 405 150 - 450 10e9/L    Diff Method Automated Method     % Neutrophils 65.7 %    % Lymphocytes 22.6 %    % Monocytes 8.9 %    % Eosinophils 1.6 %    % Basophils 0.4 %    % Immature Granulocytes 0.8 %    Absolute Neutrophil 7.3 1.6 - 8.3 10e9/L    Absolute Lymphocytes 2.5 0.8 - 5.3 10e9/L    Absolute Monocytes 1.0 0.0 - 1.3 10e9/L    Absolute Eosinophils 0.2 0.0 - 0.7 10e9/L    Absolute Basophils 0.0 0.0 - 0.2 10e9/L    Abs Immature Granulocytes 0.1 0 - 0.4 10e9/L   Comprehensive metabolic panel   Result Value Ref Range    Sodium 137 134 - 144 mmol/L    Potassium 4.2 3.5 - 5.1 mmol/L    Chloride 103 98 - 107 mmol/L    Carbon Dioxide 27 21 - 31 mmol/L    Anion Gap 7 3 - 14 mmol/L    Glucose 142 (H) 70 - 105 mg/dL    Urea Nitrogen 19 7 - 25 mg/dL    Creatinine 1.01 0.70 - 1.30 mg/dL    GFR Estimate 78 >60 mL/min/[1.73_m2]    GFR Estimate If Black >90 >60 mL/min/[1.73_m2]    Calcium 8.7 8.6 - 10.3 mg/dL    Bilirubin Total 0.2 (L) 0.3 - 1.0 mg/dL    Albumin 3.9 3.5 - 5.7 g/dL    Protein Total 6.2 (L) 6.4 - 8.9 g/dL    Alkaline Phosphatase 45 34 - 104 U/L    ALT 26 7 - 52 U/L    AST 19 13 - 39 U/L   D-Dimer GH   Result Value Ref Range    D-Dimer ng/mL <200 0 - 230 ng/ml D-DU   XR Chest Port 1 View    Narrative    PROCEDURE:  XR CHEST PORT 1 VW    HISTORY: sob. .    COMPARISON:  3/10/20    FINDINGS:    The cardiomediastinal contours are stable.  No focal consolidation, effusion or pneumothorax.      Impression     IMPRESSION:  Stable portable chest.      GRAYSON MENDIETA MD       Medications   acetaminophen-codeine (TYLENOL #3) 300-30 MG per tablet 2 tablet (2 tablets Oral Given 3/23/20 1750)   LORazepam (ATIVAN) tablet 0.5 mg (0.5 mg Oral Given 3/23/20 1900)       Assessments & Plan (with Medical Decision Making)     I have reviewed the nursing notes.    I have reviewed the findings, diagnosis, plan and need for follow up with the patient.  I believe you have chest wall pain from all the coughing you have been doing.  I do not see any signs of any ongoing infection.  At this time we will just treat symptomatically.  You can use the Tylenol and naproxen.  I did give you a few oxycodone as well if you need this.  You could also try Robitussin with codeine.  Follow-up in clinic in 1 week as planned    New Prescriptions    OXYCODONE (ROXICODONE) 5 MG TABLET    Take 1 tablet (5 mg) by mouth every 4 hours as needed for pain       Final diagnoses:   Chest wall pain   Bronchitis       3/23/2020   [facility]     Raymundo Varags MD  03/23/20 1902

## 2020-03-23 NOTE — ED TRIAGE NOTES
"ED Nursing Triage Note (General)   ________________________________    Jose Elias Reaves is a 49 year old Male that presents to triage private car  With history of  Cough, SOB since 15 days ago, has been seen twice since then and just finished an antibiotic and steroids which have not helped, reported by patient   Significant symptoms had onset greater than 15 day(s) ago.  BP (!) 181/105   Pulse 108   Temp 97.5  F (36.4  C) (Tympanic)   Resp 18   Ht 1.803 m (5' 11\")   Wt 112.9 kg (248 lb 14.4 oz)   SpO2 98%   BMI 34.71 kg/m  t  Patient appears alert , in mild distress., and cooperative behavior.    GCS 15  Airway: intact  Breathing noted as SOB with rest and activity.  Circulation complaints of intermittent CP  Skin normal  Action taken:  Triage to critical care immediately      PRE HOSPITAL PRIOR LIVING SITUATION Significant Other  "

## 2020-03-24 LAB — INTERPRETATION ECG - MUSE: NORMAL

## 2020-03-24 NOTE — DISCHARGE INSTRUCTIONS
Continue the Tylenol and naproxen as you have been doing.  You can add oxycodone 5 mg 1 tablet every 4 hours as needed.  For your cough try some Robitussin DM.  Follow-up in clinic in 1 week as planned.

## 2020-10-15 ENCOUNTER — APPOINTMENT (OUTPATIENT)
Dept: GENERAL RADIOLOGY | Facility: OTHER | Age: 50
End: 2020-10-15
Attending: PHYSICIAN ASSISTANT
Payer: COMMERCIAL

## 2020-10-15 ENCOUNTER — HOSPITAL ENCOUNTER (EMERGENCY)
Facility: OTHER | Age: 50
Discharge: HOME | End: 2020-10-15
Attending: PHYSICIAN ASSISTANT | Admitting: PHYSICIAN ASSISTANT
Payer: COMMERCIAL

## 2020-10-15 VITALS
HEIGHT: 71 IN | SYSTOLIC BLOOD PRESSURE: 155 MMHG | OXYGEN SATURATION: 97 % | BODY MASS INDEX: 36.82 KG/M2 | DIASTOLIC BLOOD PRESSURE: 98 MMHG | RESPIRATION RATE: 17 BRPM | WEIGHT: 263 LBS | TEMPERATURE: 98.1 F | HEART RATE: 100 BPM

## 2020-10-15 DIAGNOSIS — R10.11 ABDOMINAL WALL PAIN IN RIGHT UPPER QUADRANT: ICD-10-CM

## 2020-10-15 DIAGNOSIS — S39.011A ABDOMINAL WALL STRAIN, INITIAL ENCOUNTER: ICD-10-CM

## 2020-10-15 PROCEDURE — 99283 EMERGENCY DEPT VISIT LOW MDM: CPT | Performed by: PHYSICIAN ASSISTANT

## 2020-10-15 PROCEDURE — 250N000011 HC RX IP 250 OP 636: Performed by: PHYSICIAN ASSISTANT

## 2020-10-15 PROCEDURE — 250N000013 HC RX MED GY IP 250 OP 250 PS 637: Performed by: PHYSICIAN ASSISTANT

## 2020-10-15 PROCEDURE — 99284 EMERGENCY DEPT VISIT MOD MDM: CPT | Performed by: PHYSICIAN ASSISTANT

## 2020-10-15 PROCEDURE — 96372 THER/PROPH/DIAG INJ SC/IM: CPT | Performed by: PHYSICIAN ASSISTANT

## 2020-10-15 PROCEDURE — 71101 X-RAY EXAM UNILAT RIBS/CHEST: CPT | Mod: RT

## 2020-10-15 RX ORDER — LIDOCAINE 50 MG/G
1 PATCH TOPICAL EVERY 24 HOURS
Qty: 10 PATCH | Refills: 0 | Status: SHIPPED | OUTPATIENT
Start: 2020-10-15 | End: 2020-10-25

## 2020-10-15 RX ORDER — OXYCODONE AND ACETAMINOPHEN 5; 325 MG/1; MG/1
1 TABLET ORAL EVERY 4 HOURS PRN
Qty: 20 TABLET | Refills: 0 | Status: SHIPPED | OUTPATIENT
Start: 2020-10-15 | End: 2022-02-09

## 2020-10-15 RX ORDER — KETOROLAC TROMETHAMINE 30 MG/ML
60 INJECTION, SOLUTION INTRAMUSCULAR; INTRAVENOUS ONCE
Status: COMPLETED | OUTPATIENT
Start: 2020-10-15 | End: 2020-10-15

## 2020-10-15 RX ORDER — LIDOCAINE 50 MG/G
1 PATCH TOPICAL ONCE
Status: DISCONTINUED | OUTPATIENT
Start: 2020-10-15 | End: 2020-10-15 | Stop reason: HOSPADM

## 2020-10-15 RX ORDER — FENTANYL CITRATE 50 UG/ML
100 INJECTION, SOLUTION INTRAMUSCULAR; INTRAVENOUS ONCE
Status: COMPLETED | OUTPATIENT
Start: 2020-10-15 | End: 2020-10-15

## 2020-10-15 RX ADMIN — LIDOCAINE 5% 1 PATCH: 700 PATCH TOPICAL at 17:39

## 2020-10-15 RX ADMIN — FENTANYL CITRATE 100 MCG: 50 INJECTION, SOLUTION INTRAMUSCULAR; INTRAVENOUS at 17:18

## 2020-10-15 RX ADMIN — KETOROLAC TROMETHAMINE 60 MG: 30 INJECTION, SOLUTION INTRAMUSCULAR at 17:18

## 2020-10-15 ASSESSMENT — ENCOUNTER SYMPTOMS
NECK PAIN: 0
DIZZINESS: 0
APPETITE CHANGE: 0
CHEST TIGHTNESS: 0
TROUBLE SWALLOWING: 0
NAUSEA: 0
SINUS PRESSURE: 0
DIARRHEA: 0
FACIAL SWELLING: 0
ABDOMINAL PAIN: 0
COUGH: 0
LIGHT-HEADEDNESS: 0
VOICE CHANGE: 0
BRUISES/BLEEDS EASILY: 0
ACTIVITY CHANGE: 0
ADENOPATHY: 0
SORE THROAT: 0
WOUND: 0
DYSURIA: 0
BACK PAIN: 0
CONFUSION: 0
WEAKNESS: 0
SEIZURES: 0
HEADACHES: 0
HEMATURIA: 0
FEVER: 0
EYE PAIN: 0
FATIGUE: 0
FREQUENCY: 0
CHILLS: 0
SHORTNESS OF BREATH: 0
VOMITING: 0

## 2020-10-15 ASSESSMENT — MIFFLIN-ST. JEOR
SCORE: 2070.09
SCORE: 1943.08

## 2020-10-15 NOTE — ED TRIAGE NOTES
"Pt presents to ED with c/o of right lower rib/chest pain. Pt states he coughed hard this AM and felt a \"pop\" in his lower right rib, pain has progressively gotten worse. Pt non-distressed appearance, took tylenol and naproxen prior to arrival. Denies SOB, states he has hx of asthma.  Shawanda Esqueda RN    "

## 2020-10-15 NOTE — ED PROVIDER NOTES
History     Chief Complaint   Patient presents with     Rib Pain     HPI  Jose Elias Reaves is a 50 year old male who was holding his husky with a leash when the patient had a large coughing spell.  He felt a pop in his right lower rib area.  He took some Tylenol and naproxen with no relief.  He feels the pain has progressed throughout the day and he is here for further evaluation.  Denies any difficulty breathing.  No SOB.   Denies any sore throat or flulike symptoms.  No fever or chills.  No nausea or vomiting.  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     Allergies:  Allergies   Allergen Reactions     Penicillins Anaphylaxis     Tolerated cefazolin on 09/16/2015.     Clindamycin Hives       Problem List:    Patient Active Problem List    Diagnosis Date Noted     Other chronic rhinitis 02/20/2018     Priority: Medium     Chronic sinusitis, unspecified location 02/20/2018     Priority: Medium     Dysfunction of both eustachian tubes 02/20/2018     Priority: Medium     Mild intermittent asthma 02/20/2018     Priority: Medium     Deviated nasal septum 02/20/2018     Priority: Medium     Tobacco use 02/20/2018     Priority: Medium     Hyperlipidemia LDL goal <100 04/18/2017     Priority: Medium     Chronic pain syndrome - Neck 04/18/2017     Priority: Medium     Essential hypertension 03/21/2017     Priority: Medium     Family history of ischemic heart disease 03/21/2017     Priority: Medium     Family history of diabetes mellitus 03/21/2017     Priority: Medium     Alcohol abuse 05/05/2012     Priority: Medium     Major depressive disorder, recurrent episode, moderate (H) 05/05/2012     Priority: Medium     Personality disorder (H) 05/05/2012     Priority: Medium     Suicidal ideation 05/05/2012     Priority: Medium        Past Medical History:    Past Medical History:   Diagnosis Date     Hypertension        Past Surgical History:    Past Surgical History:   Procedure Laterality Date     HERNIA REPAIR Left       KNEE SURGERY Left     ACL repair, patella graft     SEPTOPLASTY N/A 3/13/2018    Procedure: SEPTOPLASTY;  septoplasty, submucosal resection of the turbinates;  Surgeon: Piotr Quiroz MD;  Location: [facility] OR       Family History:    Family History   Problem Relation Age of Onset     Diabetes Mother      Myocardial Infarction Father        Social History:  Marital Status:   [4]  Social History     Tobacco Use     Smoking status: Light Tobacco Smoker     Packs/day: 1.00     Smokeless tobacco: Never Used     Tobacco comment: cutting back   Substance Use Topics     Alcohol use: Yes     Alcohol/week: 2.0 standard drinks     Types: 2 Cans of beer per week     Comment: moderately     Drug use: No        Medications:         Acetaminophen (TYLENOL PO)       diclofenac (CATAFLAM) 50 MG tablet       lisinopril (PRINIVIL/ZESTRIL) 20 MG tablet       MEDS UNK - RECORDS REQUESTED       MELOXICAM PO       NAPROXEN PO       oxyCODONE (ROXICODONE) 5 MG tablet          Review of Systems   Constitutional: Negative for activity change, appetite change, chills, fatigue and fever.   HENT: Negative for congestion, facial swelling, sinus pressure, sore throat, trouble swallowing and voice change.    Eyes: Negative for pain and visual disturbance.   Respiratory: Negative for cough, chest tightness and shortness of breath.    Cardiovascular: Positive for chest pain.        Palpable right lower lateral chest wall tenderness.   Gastrointestinal: Negative for abdominal pain, diarrhea, nausea and vomiting.   Genitourinary: Negative for dysuria, frequency, hematuria and urgency.   Musculoskeletal: Negative for back pain and neck pain.   Skin: Negative for rash and wound.   Neurological: Negative for dizziness, seizures, syncope, weakness, light-headedness and headaches.   Hematological: Negative for adenopathy. Does not bruise/bleed easily.   Psychiatric/Behavioral: Negative for confusion.       Physical Exam   BP: (!) 163/106  Pulse:  "100  Temp: 98.4  F (36.9  C)  Resp: 18  Height: 180.3 cm (5' 11\")  Weight: 106.6 kg (235 lb)  SpO2: 98 %      Physical Exam  Constitutional:       General: He is not in acute distress.     Appearance: He is not ill-appearing, toxic-appearing or diaphoretic.   HENT:      Head: Atraumatic.      Right Ear: Tympanic membrane normal. No drainage or tenderness.      Left Ear: Tympanic membrane normal. No drainage or tenderness.      Nose: Nose normal. No rhinorrhea.   Eyes:      General: No scleral icterus.     Extraocular Movements: Extraocular movements intact.      Right eye: Normal extraocular motion and no nystagmus.      Left eye: Normal extraocular motion and no nystagmus.      Pupils: Pupils are equal, round, and reactive to light. Pupils are equal.      Funduscopic exam:     Right eye: No AV nicking or papilledema. Red reflex present.         Left eye: No AV nicking or papilledema. Red reflex present.  Neck:      Musculoskeletal: Normal range of motion. No neck rigidity, pain with movement or muscular tenderness.      Vascular: No JVD.      Trachea: No tracheal deviation.   Cardiovascular:      Rate and Rhythm: Normal rate and regular rhythm.      Heart sounds: Normal heart sounds. No friction rub.   Pulmonary:      Effort: No respiratory distress.      Breath sounds: Normal breath sounds. No stridor.      Comments: Palpable right lower lateral chest wall tenderness.  No SQ E.  No crepitus.  Lung sounds are clear.  He does not appear to be in respiratory distress.  No tachypnea  Abdominal:      General: Bowel sounds are normal.      Palpations: Abdomen is soft.      Tenderness: There is no abdominal tenderness. There is no guarding or rebound.          Comments: Area of discomfort is circled.  No crepitus or SQ E.   Musculoskeletal: Normal range of motion.         General: No tenderness.   Lymphadenopathy:      Cervical: No cervical adenopathy.      Right cervical: No superficial cervical adenopathy.     Left " cervical: No superficial cervical adenopathy.   Skin:     General: Skin is warm.      Capillary Refill: Capillary refill takes less than 2 seconds.      Findings: No rash.   Neurological:      General: No focal deficit present.      Mental Status: He is alert and oriented to person, place, and time.         ED Course     No results found for this or any previous visit (from the past 24 hour(s)).  Chest x-ray was ordered but was not read by radiology.  X-ray shows no acute findings.  Awaiting radiology overread.  Medications   lidocaine (LIDODERM) 5 % Patch 1 patch (1 patch Transdermal Patch/Med Applied 10/15/20 1739)   lidocaine patch in PLACE (has no administration in time range)   ketorolac (TORADOL) injection 60 mg (60 mg Intramuscular Given 10/15/20 1718)   fentaNYL (PF) (SUBLIMAZE) injection 100 mcg (100 mcg Intramuscular Given 10/15/20 1718)       Assessments & Plan (with Medical Decision Making)     I have reviewed the nursing notes.    I have reviewed the findings, diagnosis, plan and need for follow up with the patient.      Discharge Medication List as of 10/15/2020  5:59 PM      START taking these medications    Details   lidocaine (LIDODERM) 5 % patch Place 1 patch onto the skin every 24 hours for 10 daysDisp-10 patch, R-0Local Print      oxyCODONE-acetaminophen (PERCOCET) 5-325 MG tablet Take 1 tablet by mouth every 4 hours as needed for moderate to severe pain or severe pain, Disp-20 tablet, R-0, Local Print             Final diagnoses:   Abdominal wall strain, initial encounter - right side near lower ribs   Abdominal wall pain in right upper quadrant     Afebrile.  Vital signs stable.  Presentation of an abdominal wall strain with pain.  Most likely at the junction of where this meets his lower right rib cage area.  Chest x-ray showed no acute findings awaiting radiology overread.  The patient was given Toradol and fentanyl IM as well as a Lidoderm patch was applied to the affected site.  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  A work note was written.  Rx for short course of Percocet and Lidoderm patches.  He has naproxen at home as well.  Follow-up with his any concerns for further evaluation as needed.      10/15/2020   [facility]     Nagi Martinez PA-C  10/15/20 8161

## 2020-10-15 NOTE — LETTER
October 15, 2020      To Whom It May Concern:        Jose Elias Reaves was seen in our Emergency Department today, 10/15/20.  He will need to be off work to recover from his injuries over the next few days but can return to work on 10/20/2020.  Please note that due to his injuries and pain he was given fentanyl IM injections while in the emergency room, lidoderm patches and a prescription for short course of Percocet, and lidoderm patches.  Sincerely,                Nagi Mcneal PA-C

## 2020-10-15 NOTE — ED AVS SNAPSHOT
[facility]  [address]  [place] [address]  Phone: [phone]  Fax: [phone]                                    Jose Elias Reaves   MRN: [id number]    Department: [facility]   Date of Visit: 10/15/2020           After Visit Summary Signature Page    I have received my discharge instructions, and my questions have been answered. I have discussed any challenges I see with this plan with the nurse or doctor.    ..........................................................................................................................................  Patient/Patient Representative Signature      ..........................................................................................................................................  Patient Representative Print Name and Relationship to Patient    ..................................................               ................................................  Date                                   Time    ..........................................................................................................................................  Reviewed by Signature/Title    ...................................................              ..............................................  Date                                               Time          22EPIC Rev 08/18

## 2020-10-15 NOTE — LETTER
October 15, 2020      To Whom It May Concern:      Jose Elias Reaves was seen in our Emergency Department today, 10/15/20.  He will need to be off work to recover from his injuries over the next few days but can return to work on 10/20/2020.  Please note that due to his injuries and pain he was given fentanyl IM injections while in the emergency room and a prescription for short course of Percocet.          Sincerely,                    Nagi Mcneal PA-C

## 2020-10-27 ENCOUNTER — TELEPHONE (OUTPATIENT)
Dept: FAMILY MEDICINE | Facility: OTHER | Age: 50
End: 2020-10-27

## 2020-10-27 NOTE — TELEPHONE ENCOUNTER
We received a fax from [facility] denying the RX PA Request that was submitted for Lidoderm (lidocaine patch 5%) stating that Lidocaine patches are only FDA approved for treatment of post-herpetic neuralgia.  This was prescribed by Nagi Mcneal, but it looks like the last provider this patient saw in clinic is Vera Solano so I'm sending this note to her and her Nurse Pool.      For an Appeal, Call [phone]    OR Fax to:  [phone]    OR Mail to   [employer] [facility]  Appeals Department  [address]  [place] [address]    Hillary Navarrete on 10/27/2020 at 10:02 AM

## 2020-10-27 NOTE — TELEPHONE ENCOUNTER
Date of prescription was October 15, 2012 days ago.  Please call patient to let him know that this is not covered by his insurance and the prior authorization was denied.  He can get something similar over-the-counter if he still needs this.  No other prescriptions will be prescribed at this time.ANATOLY Kaur CNP on 10/27/2020 at 10:08 AM

## 2020-10-27 NOTE — TELEPHONE ENCOUNTER
After verifying patient's name and date of birth, patient was given the below information.  [place]....10/27/2020 10:16 AM

## 2021-12-27 ENCOUNTER — HOSPITAL ENCOUNTER (OUTPATIENT)
Dept: MRI IMAGING | Facility: OTHER | Age: 51
Discharge: HOME | End: 2021-12-27
Attending: FAMILY MEDICINE | Admitting: FAMILY MEDICINE
Payer: COMMERCIAL

## 2021-12-27 DIAGNOSIS — M47.22 CERVICAL SPONDYLOSIS WITH RADICULOPATHY: ICD-10-CM

## 2021-12-27 PROCEDURE — 72141 MRI NECK SPINE W/O DYE: CPT

## 2022-01-29 ENCOUNTER — HEALTH MAINTENANCE LETTER (OUTPATIENT)
Age: 52
End: 2022-01-29

## 2022-02-07 ENCOUNTER — APPOINTMENT (OUTPATIENT)
Dept: LAB | Facility: OTHER | Age: 52
End: 2022-02-07
Payer: COMMERCIAL

## 2022-02-07 PROCEDURE — 36415 COLL VENOUS BLD VENIPUNCTURE: CPT | Mod: ZL | Performed by: PHYSICIAN ASSISTANT

## 2022-02-07 PROCEDURE — 80048 BASIC METABOLIC PNL TOTAL CA: CPT | Mod: ZL | Performed by: PHYSICIAN ASSISTANT

## 2022-02-07 PROCEDURE — 85025 COMPLETE CBC W/AUTO DIFF WBC: CPT | Mod: ZL | Performed by: PHYSICIAN ASSISTANT

## 2022-02-07 RX ORDER — NAPROXEN 500 MG/1
500 TABLET ORAL 2 TIMES DAILY PRN
COMMUNITY
Start: 2021-12-07 | End: 2022-03-07

## 2022-02-08 DIAGNOSIS — M48.02 CERVICAL STENOSIS OF SPINAL CANAL: Primary | ICD-10-CM

## 2022-02-08 DIAGNOSIS — I83.813 VARICOSE VEINS OF BILATERAL LOWER EXTREMITIES WITH PAIN: ICD-10-CM

## 2022-02-08 LAB
ANION GAP SERPL CALCULATED.3IONS-SCNC: 5 MMOL/L (ref 3–14)
BASOPHILS # BLD AUTO: 0.1 10E3/UL (ref 0–0.2)
BASOPHILS NFR BLD AUTO: 1 %
BUN SERPL-MCNC: 15 MG/DL (ref 7–25)
CALCIUM SERPL-MCNC: 9.2 MG/DL (ref 8.6–10.3)
CHLORIDE BLD-SCNC: 105 MMOL/L (ref 98–107)
CO2 SERPL-SCNC: 29 MMOL/L (ref 21–31)
CREAT SERPL-MCNC: 0.95 MG/DL (ref 0.7–1.3)
EOSINOPHIL # BLD AUTO: 0.3 10E3/UL (ref 0–0.7)
EOSINOPHIL NFR BLD AUTO: 3 %
ERYTHROCYTE [DISTWIDTH] IN BLOOD BY AUTOMATED COUNT: 14.4 % (ref 10–15)
GFR SERPL CREATININE-BSD FRML MDRD: >90 ML/MIN/1.73M2
GLUCOSE BLD-MCNC: 99 MG/DL (ref 70–105)
HCT VFR BLD AUTO: 43.7 % (ref 40–53)
HGB BLD-MCNC: 14.5 G/DL (ref 13.3–17.7)
IMM GRANULOCYTES # BLD: 0 10E3/UL
IMM GRANULOCYTES NFR BLD: 0 %
LYMPHOCYTES # BLD AUTO: 2.6 10E3/UL (ref 0.8–5.3)
LYMPHOCYTES NFR BLD AUTO: 30 %
MCH RBC QN AUTO: 29.5 PG (ref 26.5–33)
MCHC RBC AUTO-ENTMCNC: 33.2 G/DL (ref 31.5–36.5)
MCV RBC AUTO: 89 FL (ref 78–100)
MONOCYTES # BLD AUTO: 0.9 10E3/UL (ref 0–1.3)
MONOCYTES NFR BLD AUTO: 11 %
NEUTROPHILS # BLD AUTO: 4.8 10E3/UL (ref 1.6–8.3)
NEUTROPHILS NFR BLD AUTO: 55 %
NRBC # BLD AUTO: 0 10E3/UL
NRBC BLD AUTO-RTO: 0 /100
PLATELET # BLD AUTO: 446 10E3/UL (ref 150–450)
POTASSIUM BLD-SCNC: 4.3 MMOL/L (ref 3.5–5.1)
RBC # BLD AUTO: 4.91 10E6/UL (ref 4.4–5.9)
SODIUM SERPL-SCNC: 139 MMOL/L (ref 134–144)
WBC # BLD AUTO: 8.6 10E3/UL (ref 4–11)

## 2022-02-09 ENCOUNTER — OFFICE VISIT (OUTPATIENT)
Dept: FAMILY MEDICINE | Facility: OTHER | Age: 52
End: 2022-02-09
Attending: FAMILY MEDICINE
Payer: COMMERCIAL

## 2022-02-09 VITALS
WEIGHT: 263 LBS | HEART RATE: 84 BPM | DIASTOLIC BLOOD PRESSURE: 104 MMHG | TEMPERATURE: 97.7 F | SYSTOLIC BLOOD PRESSURE: 160 MMHG | BODY MASS INDEX: 36.68 KG/M2 | RESPIRATION RATE: 20 BRPM

## 2022-02-09 DIAGNOSIS — R53.83 OTHER FATIGUE: ICD-10-CM

## 2022-02-09 DIAGNOSIS — M54.12 CERVICAL RADICULOPATHY: ICD-10-CM

## 2022-02-09 DIAGNOSIS — E66.01 MORBID OBESITY (H): ICD-10-CM

## 2022-02-09 DIAGNOSIS — M50.30 BULGING OF CERVICAL INTERVERTEBRAL DISC: ICD-10-CM

## 2022-02-09 DIAGNOSIS — J45.909 UNCOMPLICATED ASTHMA, UNSPECIFIED ASTHMA SEVERITY, UNSPECIFIED WHETHER PERSISTENT: ICD-10-CM

## 2022-02-09 DIAGNOSIS — R73.09 ELEVATED GLUCOSE: ICD-10-CM

## 2022-02-09 DIAGNOSIS — I10 ESSENTIAL HYPERTENSION: Primary | ICD-10-CM

## 2022-02-09 DIAGNOSIS — E78.5 HYPERLIPIDEMIA LDL GOAL <100: ICD-10-CM

## 2022-02-09 DIAGNOSIS — G89.4 CHRONIC PAIN SYNDROME: ICD-10-CM

## 2022-02-09 LAB
CHOLEST SERPL-MCNC: 205 MG/DL
FASTING STATUS PATIENT QL REPORTED: ABNORMAL
HBA1C MFR BLD: 6.5 % (ref 4–6.2)
HDLC SERPL-MCNC: 38 MG/DL (ref 23–92)
LDLC SERPL CALC-MCNC: 126 MG/DL
NONHDLC SERPL-MCNC: 167 MG/DL
TRIGL SERPL-MCNC: 207 MG/DL
TSH SERPL DL<=0.005 MIU/L-ACNC: 1.7 MU/L (ref 0.4–4)

## 2022-02-09 PROCEDURE — 83036 HEMOGLOBIN GLYCOSYLATED A1C: CPT | Mod: ZL | Performed by: FAMILY MEDICINE

## 2022-02-09 PROCEDURE — 80061 LIPID PANEL: CPT | Mod: ZL | Performed by: FAMILY MEDICINE

## 2022-02-09 PROCEDURE — 36415 COLL VENOUS BLD VENIPUNCTURE: CPT | Mod: ZL | Performed by: FAMILY MEDICINE

## 2022-02-09 PROCEDURE — 99203 OFFICE O/P NEW LOW 30 MIN: CPT | Performed by: FAMILY MEDICINE

## 2022-02-09 PROCEDURE — 84443 ASSAY THYROID STIM HORMONE: CPT | Mod: ZL | Performed by: FAMILY MEDICINE

## 2022-02-09 RX ORDER — GABAPENTIN 300 MG/1
300 CAPSULE ORAL 3 TIMES DAILY
Qty: 90 CAPSULE | Refills: 0 | Status: SHIPPED | OUTPATIENT
Start: 2022-02-09 | End: 2023-03-20

## 2022-02-09 RX ORDER — LOSARTAN POTASSIUM 50 MG/1
50 TABLET ORAL DAILY
COMMUNITY
Start: 2022-02-06 | End: 2022-02-09

## 2022-02-09 RX ORDER — ALBUTEROL SULFATE 90 UG/1
2 AEROSOL, METERED RESPIRATORY (INHALATION)
COMMUNITY
Start: 2020-10-19 | End: 2022-02-09

## 2022-02-09 RX ORDER — ALBUTEROL SULFATE 90 UG/1
2 AEROSOL, METERED RESPIRATORY (INHALATION) EVERY 4 HOURS PRN
Qty: 18 G | Refills: 3 | Status: SHIPPED | OUTPATIENT
Start: 2022-02-09 | End: 2023-03-27

## 2022-02-09 RX ORDER — FLUTICASONE PROPIONATE AND SALMETEROL XINAFOATE 115; 21 UG/1; UG/1
2 AEROSOL, METERED RESPIRATORY (INHALATION)
COMMUNITY
Start: 2021-12-07 | End: 2022-02-09

## 2022-02-09 RX ORDER — LOSARTAN POTASSIUM 50 MG/1
75 TABLET ORAL DAILY
Qty: 135 TABLET | Refills: 0 | Status: SHIPPED | OUTPATIENT
Start: 2022-02-09 | End: 2023-03-20

## 2022-02-09 RX ORDER — ATORVASTATIN CALCIUM 20 MG/1
20 TABLET, FILM COATED ORAL AT BEDTIME
COMMUNITY
Start: 2021-11-09 | End: 2023-11-15

## 2022-02-09 RX ORDER — FLUTICASONE PROPIONATE AND SALMETEROL XINAFOATE 115; 21 UG/1; UG/1
2 AEROSOL, METERED RESPIRATORY (INHALATION) 2 TIMES DAILY
Qty: 12 G | Refills: 3 | Status: SHIPPED | OUTPATIENT
Start: 2022-02-09 | End: 2023-03-27

## 2022-02-09 ASSESSMENT — ANXIETY QUESTIONNAIRES
7. FEELING AFRAID AS IF SOMETHING AWFUL MIGHT HAPPEN: NOT AT ALL
2. NOT BEING ABLE TO STOP OR CONTROL WORRYING: NOT AT ALL
6. BECOMING EASILY ANNOYED OR IRRITABLE: NOT AT ALL
7. FEELING AFRAID AS IF SOMETHING AWFUL MIGHT HAPPEN: NOT AT ALL
5. BEING SO RESTLESS THAT IT IS HARD TO SIT STILL: NOT AT ALL
4. TROUBLE RELAXING: NOT AT ALL
3. WORRYING TOO MUCH ABOUT DIFFERENT THINGS: NOT AT ALL
GAD7 TOTAL SCORE: 1
1. FEELING NERVOUS, ANXIOUS, OR ON EDGE: SEVERAL DAYS

## 2022-02-09 ASSESSMENT — PATIENT HEALTH QUESTIONNAIRE - PHQ9
SUM OF ALL RESPONSES TO PHQ QUESTIONS 1-9: 0
SUM OF ALL RESPONSES TO PHQ QUESTIONS 1-9: 0
10. IF YOU CHECKED OFF ANY PROBLEMS, HOW DIFFICULT HAVE THESE PROBLEMS MADE IT FOR YOU TO DO YOUR WORK, TAKE CARE OF THINGS AT HOME, OR GET ALONG WITH OTHER PEOPLE: NOT DIFFICULT AT ALL

## 2022-02-09 ASSESSMENT — PAIN SCALES - GENERAL: PAINLEVEL: EXTREME PAIN (8)

## 2022-02-09 NOTE — PROGRESS NOTES
Assessment & Plan     Essential hypertension  BP elevated beyond goal < 130/80.  Increase Losartan to 75 mg daily.  Follow-up in four weeks for reassessment.  - losartan (COZAAR) 50 MG tablet; Take 1.5 tablets (75 mg) by mouth daily    Uncomplicated asthma, unspecified asthma severity, unspecified whether persistent  Symptoms controlled on current medication regimen.  Continue without adjustments.  Refills provided.  - albuterol (PROAIR HFA/PROVENTIL HFA/VENTOLIN HFA) 108 (90 Base) MCG/ACT inhaler; Inhale 2 puffs into the lungs every 4 hours as needed for shortness of breath / dyspnea or wheezing  - fluticasone-salmeterol (ADVAIR HFA) 115-21 MCG/ACT inhaler; Inhale 2 puffs into the lungs 2 times daily    Elevated glucose  History of elevated blood sugars.  Check Hgb A1c to assess for prediabetes/diabetes risk.  - Hemoglobin A1c    Hyperlipidemia LDL goal <100  History of elevated cholesterol levels.  He is prescribed a statin medication but has not been taking it.  Check lipid panels to reassess ASCVD risk and further discuss his statin therapy.  - Lipid Panel    Other fatigue  Recent CBC without significant abnormalities.  Will further evaluate with TSH.  - TSH Reflex GH    Chronic pain syndrome - Neck  Bulging of cervical intervertebral disc  Cervical radiculopathy  Orthopedics records and MRI report reviewed.  Recommend participation in physical therapy as previously advised.  Referral placed.  Will trial Gabapentin for his neuropathic pain and Tizanidine for muscular spasms.  Recommend against any opioid therapy.  He would like a second opinion on his treatment options.  Spine referral placed.  - Spine Referral; Future  - Physical Therapy Referral; Future  - tiZANidine (ZANAFLEX) 4 MG tablet; Take 1 tablet (4 mg) by mouth 3 times daily as needed for muscle spasms  - gabapentin (NEURONTIN) 300 MG capsule; Take 1 capsule (300 mg) by mouth 3 times daily    Morbid obesity (H)  Encourage healthy diet and  exercise.    40 minutes spent on the date of the encounter doing chart review, history and exam, documentation and further activities per the note    Birdie Coombs,   [facility]    Raúl Moctezuma is a 51 year old who presents for the following health issues:    HPI     Hypertension:  Currently managed on Losartan 50 mg daily.  He has been taking this medication for the past 6-7 months.  He reports good adherence with his regimen.  He is not able to check his blood pressure at home but reports that his systolic pressure is typically 160-180 mmHg when at appointments.    Hyperlipidemia:  Currently managed on Atorvastatin 20 mg daily.  He has been taking this medication for the past 6-7 months.  He reports that this medication makes him tired, but he has no other complications.  He has not taken the medicine for the past two weeks and has noticed an improvement in his fatigue.    Asthma:  Currently managed on Advair daily and Albuterol inhaler as needed.  He estimates using the Albuterol inhaler every couple of days.  He is a current every day smoker of 5 cigarettes per day.  He reports that he has been trying to quit by slowly decreasing the amount of cigarettes he is using.      He has a history of cervical neck pain with radiation down his arms.  He has been established with orthopedics through [facility] with recent MRI.  He takes Naproxen 500 mg TID and Tylenol 3000 mg daily for his neck pain.  He has tried Methocarbamol and Flexeril in the past and reports that these did not seem to help.  Flexeril made him tired.  He has never been on any medications for his neuropathic pain.  It was recommended he participate in physical therapy, and he was referred for evaluation for epidural steroid injections.  He states that he would like to transfer his care from [facility] to [facility].    Review of Systems   Constitutional: Negative for chills and fever.          Objective    BP (!) 160/104  (BP Location: Right arm, Patient Position: Sitting, Cuff Size: Adult Large)   Pulse 84   Temp 97.7  F (36.5  C) (Temporal)   Resp 20   Wt 119.3 kg (263 lb)   BMI 36.68 kg/m    Body mass index is 36.68 kg/m .  Physical Exam  Constitutional:       General: He is not in acute distress.     Appearance: Normal appearance. He is not ill-appearing.   Cardiovascular:      Rate and Rhythm: Normal rate and regular rhythm.      Heart sounds: No murmur heard.  No friction rub. No gallop.    Pulmonary:      Effort: Pulmonary effort is normal.      Breath sounds: Normal breath sounds. No wheezing, rhonchi or rales.   Neurological:      Mental Status: He is alert.      Comments: Lower extremity strength 5/5 bilaterally.   Psychiatric:         Mood and Affect: Mood normal.

## 2022-02-09 NOTE — NURSING NOTE
"Chief Complaint   Patient presents with     Recheck Medication       Initial BP (!) 160/104 (BP Location: Right arm, Patient Position: Sitting, Cuff Size: Adult Large)   Pulse 84   Temp 97.7  F (36.5  C) (Temporal)   Resp 20   Wt 119.3 kg (263 lb)   BMI 36.68 kg/m   Estimated body mass index is 36.68 kg/m  as calculated from the following:    Height as of 10/15/20: 1.803 m (5' 11\").    Weight as of this encounter: 119.3 kg (263 lb).  Medication Reconciliation: complete    FOOD SECURITY SCREENING QUESTIONS:    The next two questions are to help us understand your food security.  If you are feeling you need any assistance in this area, we have resources available to support you today.    Hunger Vital Signs:  Within the past 12 months we worried whether our food would run out before we got money to buy more. Never  Within the past 12 months the food we bought just didn't last and we didn't have money to get more. Never      Renee Erazo RN      "

## 2022-02-10 ASSESSMENT — PATIENT HEALTH QUESTIONNAIRE - PHQ9: SUM OF ALL RESPONSES TO PHQ QUESTIONS 1-9: 0

## 2022-02-10 ASSESSMENT — ANXIETY QUESTIONNAIRES: GAD7 TOTAL SCORE: 1

## 2022-02-11 ASSESSMENT — ENCOUNTER SYMPTOMS
CHILLS: 0
FEVER: 0

## 2022-02-14 ENCOUNTER — HOSPITAL ENCOUNTER (OUTPATIENT)
Dept: ULTRASOUND IMAGING | Facility: OTHER | Age: 52
Discharge: HOME | End: 2022-02-14
Attending: SURGERY | Admitting: SURGERY
Payer: COMMERCIAL

## 2022-02-14 DIAGNOSIS — Z98.890 STATUS POST ENDOVENOUS RADIOFREQUENCY ABLATION (RFA) OF SAPHENOUS VEIN: ICD-10-CM

## 2022-02-14 PROCEDURE — 93971 EXTREMITY STUDY: CPT | Mod: RT

## 2022-02-25 DIAGNOSIS — G89.4 CHRONIC PAIN SYNDROME: ICD-10-CM

## 2022-02-25 NOTE — TELEPHONE ENCOUNTER
[facility] sent Rx request for the following:      Requested Prescriptions   Pending Prescriptions Disp Refills     tiZANidine (ZANAFLEX) 4 MG tablet [Pharmacy Med Name: TIZANIDINE 4MG TABLETS] 30 tablet 0     Sig: TAKE 1 TABLET(4 MG) BY MOUTH THREE TIMES DAILY AS NEEDED FOR MUSCLE SPASMS   Last Prescription Date:   2/9/22  Last Fill Qty/Refills:         30, R-0    Last Office Visit:              2/9/22  Future Office visit:             Next 5 appointments (look out 90 days)    Mar 07, 2022  8:40 AM  Office Visit with Birdie Coombs,   [facility] ([facility] ) [address]  [place] [id number] [phone]        In clinical absence of Dr. Coombs, patient is requesting that this message be sent to the covering provider for consideration please.    Unable to complete prescription refill per RN Medication Refill Policy. Theresa Ireland RN .............. 2/25/2022  1:58 PM

## 2022-03-07 DIAGNOSIS — G89.4 CHRONIC PAIN SYNDROME: Primary | ICD-10-CM

## 2022-03-07 RX ORDER — NAPROXEN 500 MG/1
500 TABLET ORAL 2 TIMES DAILY PRN
Qty: 240 TABLET | Refills: 0 | Status: SHIPPED | OUTPATIENT
Start: 2022-03-07 | End: 2022-03-15

## 2022-03-07 NOTE — TELEPHONE ENCOUNTER
Reason for call: Medication or medication refill    Name of medication requested: Naproxin     Are you out of the medication? yes    What pharmacy [facility] you use? [facility]    Preferred method for responding to this message: Telephone Call    Phone number patient can be reached at: Cell number on file:    Telephone Information:   Mobile [phone]       If we cannot reach you directly, may we leave a detailed response at the number you provided? Yes   Malvin canceled out today and he is out

## 2022-03-07 NOTE — TELEPHONE ENCOUNTER
[facility] sent Rx request for the following:      Requested Prescriptions   Pending Prescriptions Disp Refills     naproxen (NAPROSYN) 500 MG tablet 240 tablet 0     Sig: Take 1 tablet (500 mg) by mouth 2 times daily as needed (For pain as needed)       There is no refill protocol information for this order          Last Prescription Date:   2/7/2022  Last Fill Qty/Refills:        Historical  Last Office Visit:               2/9/2022  Future Office visit:             Next 5 appointments (look out 90 days)    Mar 14, 2022  9:00 AM  Office Visit with Birdie Coombs,   [facility] ([facility] ) [address]  [place] [id number] [phone]        Routing refill request to provider for review/approval because:  Drug not on the [facility], P or [facility] refill protocol or controlled substance, Medication is reported/historical. Unable to complete prescription refill per RN Medication Refill Policy.

## 2022-03-15 ENCOUNTER — MYC REFILL (OUTPATIENT)
Dept: FAMILY MEDICINE | Facility: OTHER | Age: 52
End: 2022-03-15
Payer: COMMERCIAL

## 2022-03-15 DIAGNOSIS — G89.4 CHRONIC PAIN SYNDROME: ICD-10-CM

## 2022-03-15 DIAGNOSIS — G89.4 CHRONIC PAIN SYNDROME: Primary | ICD-10-CM

## 2022-03-18 RX ORDER — NAPROXEN 500 MG/1
500 TABLET ORAL 2 TIMES DAILY PRN
Qty: 60 TABLET | Refills: 3 | Status: SHIPPED | OUTPATIENT
Start: 2022-03-18 | End: 2023-03-27

## 2022-03-18 NOTE — TELEPHONE ENCOUNTER
Patient request via [employer] for [id number] - [place]  Rx request for the following:      Requested Prescriptions   Pending Prescriptions Disp Refills     tiZANidine (ZANAFLEX) 4 MG tablet 30 tablet 0       There is no refill protocol information for this order   Message be sent to the covering provider for consideration please  Patient comment: I am out and it seems to work   Last Prescription Date:   2/25/22  Last Fill Qty/Refills:         30, R-0  Last Office Visit:               2/9/22  Future Office visit:           None  Isabel Johnson RN on 3/18/2022 at 10:50 AM

## 2022-03-18 NOTE — TELEPHONE ENCOUNTER
Patient sent a [employer] RX request for [id number]  Rx request for the following:      Requested Prescriptions   Pending Prescriptions Disp Refills     naproxen (NAPROSYN) 500 MG tablet 240 tablet 0     Sig: Take 1 tablet (500 mg) by mouth 2 times daily as needed (For pain as needed)       NSAID Medications Failed - 3/18/2022 10:08 AM        Failed - Blood pressure under 140/90 in past 12 months     BP Readings from Last 3 Encounters:   22 (!) 160/104   10/15/20 (!) 155/98   20 (!) 144/93                 Failed - Normal ALT on file in past 12 months     Recent Labs   Lab Test 20  1750   ALT 26             Failed - Normal AST on file in past 12 months     Recent Labs   Lab Test 20  1750   AST 19        Called [id number], talked to pharmacy staff. Confirmed pt's name and . Pharmacy staff confirmed that patient did not get the 240 tablets R-0, patient picked up a prescription of 60, R-0 on 3/7/22 due to insurance.   Chao'd up labs. Rerouting to covering provider due to PCP being absent.     Last Prescription Date:   3/7/22  Last Fill Qty/Refills:         60, R-0  Last Office Visit:              22  Future Office visit:           None  Isabel Johnson RN on 3/18/2022 at 10:17 AM

## 2022-04-08 DIAGNOSIS — G89.4 CHRONIC PAIN SYNDROME: ICD-10-CM

## 2022-04-08 NOTE — TELEPHONE ENCOUNTER
[facility] sent Rx request for the following:      Requested Prescriptions   Pending Prescriptions Disp Refills     tiZANidine (ZANAFLEX) 4 MG tablet [Pharmacy Med Name: TIZANIDINE 4MG TABLETS] 30 tablet 0     Sig: TAKE 1 TABLET(4 MG) BY MOUTH THREE TIMES DAILY AS NEEDED FOR MUSCLE SPASMS   Last Prescription Date:   3/18/22  Last Fill Qty/Refills:         30, R-0    Last Office Visit:              2/9/22   Future Office visit:           None    In clinical absence of patient's primary, Birdie Coombs, patient is requesting that this message be sent to the covering provider for consideration please.    Theresa Ireland RN .............. 4/8/2022  3:13 PM

## 2022-07-19 ENCOUNTER — NURSE TRIAGE (OUTPATIENT)
Dept: NURSING | Facility: CLINIC | Age: 52
End: 2022-07-19

## 2022-07-20 NOTE — TELEPHONE ENCOUNTER
Pt calling about Symptoms started yesterday, feelings of Hot and cold,   Decreased appetite, nagging headache, congestion and running nose  [place], sharing drinks,   Not vaccinated.  History of Asthma-  Not worse, still the same   little heavy on upper chest , sore neck and shoulders  Breathing is ok  3 friends tested positive for Covid    Wondering if he should go to work tomorrow.    Advise over the counter test, Let employer know if positive. Call back to schedule Virtual appointment if positive for covid treatment. Pt can also call his clinic in the morning to schedule Covid test. Pt verbalized understanding and will get home test. Call back with questions or if symptoms worsen      Svetlana Whitmore RN, BSN  7/19/2022 at 8:10 PM  [facility]      COVID 19 Nurse Triage Plan/Patient Instructions    Please be aware that novel coronavirus (COVID-19) may be circulating in the community. If you develop symptoms such as fever, cough, or SOB or if you have concerns about the presence of another infection including coronavirus (COVID-19), please contact your health care provider or visit [email or web address].     Disposition/Instructions    Virtual Visit with provider recommended. Reference Visit Selection Guide.    Thank you for taking steps to prevent the spread of this virus.  o Limit your contact with others.  o Wear a simple mask to cover your cough.  o Wash your hands well and often.    Resources    [facility]: About COVID-19: [email or web address]    CDC: What to Do If You're Sick: www.cdc.gov/coronavirus/2019-ncov/about/steps-when-sick.html    CDC: Ending Home Isolation: www.cdc.gov/coronavirus/2019-ncov/hcp/disposition-in-home-patients.html     CDC: Caring for Someone: www.cdc.gov/coronavirus/2019-ncov/if-you-are-sick/care-for-someone.html     [facility]: Interim Guidance for Hospital Discharge to Home:  www.health.[facility].[place]/diseases/coronavirus/hcp/hospdischarge.pdf    [facility] clinical trials (COVID-19 research studies): clinicalaffairs.[facility]/n-clinical-trials     Below are the COVID-19 hotlines at the [facility] of Health ([facility]). Interpreters are available.   o For health questions: Call 869-883-7512 or 1-884.386.6046 (7 a.m. to 7 p.m.)  o For questions about schools and childcare: Call [phone] or 1-592.417.7730 (7 a.m. to 7 p.m.)                                   Reason for Disposition    [1] Symptoms of COVID-19 (e.g., cough, fever, SOB, or others) AND [2] within 14 days of EXPOSURE (close contact) with diagnosed or suspected COVID-19 patient    HIGH RISK for severe COVID complications (e.g., weak immune system, age > 64 years, obesity with BMI > 25, pregnant, chronic lung disease or other chronic medical condition)  (Exception: Already seen by PCP and no new or worsening symptoms.)    Additional Information    Negative: SEVERE difficulty breathing (e.g., struggling for each breath, speaks in single words)    Negative: Difficult to awaken or acting confused (e.g., disoriented, slurred speech)    Negative: Bluish (or gray) lips or face now    Negative: Shock suspected (e.g., cold/pale/clammy skin, too weak to stand, low BP, rapid pulse)    Negative: Sounds like a life-threatening emergency to the triager    Negative: [1] Diagnosed or suspected COVID-19 AND [2] symptoms lasting 3 or more weeks    Negative: [1] COVID-19 exposure AND [2] no symptoms    Negative: COVID-19 vaccine reaction suspected (e.g., fever, headache, muscle aches) occurring 1 to 3 days after getting vaccine    Negative: COVID-19 vaccine, questions about    Negative: [1] Lives with someone known to have influenza (flu test positive) AND [2] flu-like symptoms (e.g., cough, runny nose, sore throat, SOB; with or without fever)    Negative: [1] Adult with possible COVID-19 symptoms AND [2] triager concerned about  severity of symptoms or other causes    Negative: COVID-19 and breastfeeding, questions about    Negative: SEVERE or constant chest pain or pressure  (Exception: Mild central chest pain, present only when coughing.)    Negative: MODERATE difficulty breathing (e.g., speaks in phrases, SOB even at rest, pulse 100-120)    Negative: [1] Headache AND [2] stiff neck (can't touch chin to chest)    Negative: Oxygen level (e.g., pulse oximetry) 90 percent or lower    Negative: Chest pain or pressure    Negative: Patient sounds very sick or weak to the triager    Negative: MILD difficulty breathing (e.g., minimal/no SOB at rest, SOB with walking, pulse <100)    Negative: Fever > 103 F (39.4 C)    Negative: [1] Fever > 101 F (38.3 C) AND [2] age > 60 years    Negative: [1] Fever > 100.0 F (37.8 C) AND [2] bedridden (e.g., nursing home patient, CVA, chronic illness, recovering from surgery)    Protocols used: CORONAVIRUS (COVID-19) EXPOSURE-- 1.18.2022, CORONAVIRUS (COVID-19) DIAGNOSED OR [id number] 1.18.2022

## 2022-09-11 ENCOUNTER — HEALTH MAINTENANCE LETTER (OUTPATIENT)
Age: 52
End: 2022-09-11

## 2023-03-20 ENCOUNTER — OFFICE VISIT (OUTPATIENT)
Dept: FAMILY MEDICINE | Facility: OTHER | Age: 53
End: 2023-03-20
Attending: NURSE PRACTITIONER
Payer: COMMERCIAL

## 2023-03-20 VITALS
OXYGEN SATURATION: 97 % | SYSTOLIC BLOOD PRESSURE: 150 MMHG | DIASTOLIC BLOOD PRESSURE: 90 MMHG | WEIGHT: 242.5 LBS | BODY MASS INDEX: 33.82 KG/M2 | RESPIRATION RATE: 18 BRPM | TEMPERATURE: 96.5 F | HEART RATE: 82 BPM

## 2023-03-20 DIAGNOSIS — I10 ESSENTIAL HYPERTENSION: ICD-10-CM

## 2023-03-20 DIAGNOSIS — J45.20 MILD INTERMITTENT ASTHMA WITHOUT COMPLICATION: ICD-10-CM

## 2023-03-20 DIAGNOSIS — R05.1 ACUTE COUGH: ICD-10-CM

## 2023-03-20 DIAGNOSIS — J22 LOWER RESPIRATORY INFECTION (E.G., BRONCHITIS, PNEUMONIA, PNEUMONITIS, PULMONITIS): Primary | ICD-10-CM

## 2023-03-20 DIAGNOSIS — F33.1 MAJOR DEPRESSIVE DISORDER, RECURRENT EPISODE, MODERATE (H): ICD-10-CM

## 2023-03-20 LAB — SARS-COV-2 RNA RESP QL NAA+PROBE: NEGATIVE

## 2023-03-20 PROCEDURE — 99214 OFFICE O/P EST MOD 30 MIN: CPT | Mod: CS | Performed by: NURSE PRACTITIONER

## 2023-03-20 PROCEDURE — U0003 INFECTIOUS AGENT DETECTION BY NUCLEIC ACID (DNA OR RNA); SEVERE ACUTE RESPIRATORY SYNDROME CORONAVIRUS 2 (SARS-COV-2) (CORONAVIRUS DISEASE [COVID-19]), AMPLIFIED PROBE TECHNIQUE, MAKING USE OF HIGH THROUGHPUT TECHNOLOGIES AS DESCRIBED BY CMS-2020-01-R: HCPCS | Mod: ZL | Performed by: NURSE PRACTITIONER

## 2023-03-20 PROCEDURE — C9803 HOPD COVID-19 SPEC COLLECT: HCPCS | Performed by: NURSE PRACTITIONER

## 2023-03-20 RX ORDER — AZITHROMYCIN 250 MG/1
TABLET, FILM COATED ORAL
Qty: 6 TABLET | Refills: 0 | Status: SHIPPED | OUTPATIENT
Start: 2023-03-20 | End: 2023-03-25

## 2023-03-20 RX ORDER — LOSARTAN POTASSIUM 50 MG/1
75 TABLET ORAL DAILY
Qty: 45 TABLET | Refills: 0 | Status: SHIPPED | OUTPATIENT
Start: 2023-03-20 | End: 2023-04-25

## 2023-03-20 RX ORDER — PREDNISONE 20 MG/1
20 TABLET ORAL DAILY
Qty: 5 TABLET | Refills: 0 | Status: SHIPPED | OUTPATIENT
Start: 2023-03-20 | End: 2023-03-25

## 2023-03-20 ASSESSMENT — PAIN SCALES - GENERAL: PAINLEVEL: EXTREME PAIN (8)

## 2023-03-20 NOTE — PROGRESS NOTES
ASSESSMENT/PLAN:    I have reviewed the nursing notes.  I have reviewed the findings, diagnosis, plan and need for follow up with the patient.    1. Lower respiratory infection (e.g., bronchitis, pneumonia, pneumonitis, pulmonitis)  - azithromycin (ZITHROMAX) 250 MG tablet; Take 2 tablets (500 mg) by mouth daily for 1 day, THEN 1 tablet (250 mg) daily for 4 days.  Dispense: 6 tablet; Refill: 0  - predniSONE (DELTASONE) 20 MG tablet; Take 1 tablet (20 mg) by mouth daily for 5 days  Dispense: 5 tablet; Refill: 0  - Symptomatic COVID-19 Virus (Coronavirus) by PCR Nose    2. Acute cough  - Symptomatic COVID-19 Virus (Coronavirus) by PCR Nose  Negative covid test    3. Mild intermittent asthma without complication  Previous diagnosis of asthma. However, suspect possible COPD that has not been diagnosed. Differential today includes COPD exacerbation. Opted to treat with azithromycin. covid test was negative. Steroid has been helpful in the past; is diabetic, giving 5 day supply. Discussed side effect profile with patient.   - predniSONE (DELTASONE) 20 MG tablet; Take 1 tablet (20 mg) by mouth daily for 5 days  Dispense: 5 tablet; Refill: 0    4. Essential hypertension  losartan (COZAAR) 50 MG tablet; Take 1.5 tablets (75 mg) by mouth daily for 30 days  Dispense: 45 tablet; Refill: 0  Started blood pressure med.  Previously tolerated this medication.  Recommend starting at 50 mg dose and recommend increasing as long as he is tolerating without any signs of concern.  I explained to him that he will have labs drawn at his upcoming appointment and PCP can consider which medications need to be resumed.    5. Primary care referral   An appointment has been made for patient to establish care with internal medicine practitioner, Dinah Michel.  Patient is aware and agreeable.     Follow up if symptoms persist or worsen or concerns including chest pain, palpitations, nausea with vomiting, shortness of breath, dizziness, or early  "worsening symptoms        I explained my diagnostic considerations and recommendations to the patient, who voiced understanding and agreement with the treatment plan. All questions were answered. We discussed potential side effects of any prescribed or recommended therapies, as well as expectations for response to treatments.    Nathaly Rosales NP  3/20/2023  10:43 AM    HPI:  Jose Elias Reaves is a 52 year old male who presents to [facility] today for concerns of 2 days of productive cough, occasional dizziness, difficulty focusing and some chest heaviness mainly with coughing. Feels a bit fatigued. Quite often lightheaded in past 2 days. Took Saturday off of work because of it. He thinks he has \"bronchitis\" possibly, has green phlegm and has had bronchitis before that feels like this.     Trying hard to quit smoking; he has been smoking for over 30 years. Smoking every other day 4-5 cigarettes, down from 2-3 packs per day now.     He has unfortunately been off of his blood pressure medicine due to no PCP as both previous have left their practices. He needs to get established with someone new. He is agreeable to getting scheduled to see someone here at [facility] in the next week or two.     He denies any chest pain or palpitations.     No fevers since symptoms started.     He is ok with covid test, does not think he has covid.     Drives truck for work.     ROS otherwise unremarkable.     Past Medical History:   Diagnosis Date     Hypertension      Past Surgical History:   Procedure Laterality Date     HERNIA REPAIR Left      KNEE SURGERY Left     ACL repair, patella graft     SEPTOPLASTY N/A 3/13/2018    Procedure: SEPTOPLASTY;  septoplasty, submucosal resection of the turbinates;  Surgeon: Piotr Quiroz MD;  Location:  OR     Social History     Tobacco Use     Smoking status: Every Day     Packs/day: 0.50     Types: Cigarettes     Smokeless tobacco: Never   Substance Use Topics     Alcohol use: Not Currently    "  Alcohol/week: 2.0 standard drinks     Types: 2 Cans of beer per week     Comment: moderately     Current Outpatient Medications   Medication Sig Dispense Refill     azithromycin (ZITHROMAX) 250 MG tablet Take 2 tablets (500 mg) by mouth daily for 1 day, THEN 1 tablet (250 mg) daily for 4 days. 6 tablet 0     losartan (COZAAR) 50 MG tablet Take 1.5 tablets (75 mg) by mouth daily for 30 days 45 tablet 0     predniSONE (DELTASONE) 20 MG tablet Take 1 tablet (20 mg) by mouth daily for 5 days 5 tablet 0     Acetaminophen (TYLENOL PO) Take 1,000 mg by mouth daily        albuterol (PROAIR HFA/PROVENTIL HFA/VENTOLIN HFA) 108 (90 Base) MCG/ACT inhaler Inhale 2 puffs into the lungs every 4 hours as needed for shortness of breath / dyspnea or wheezing (Patient not taking: Reported on 3/20/2023) 18 g 3     atorvastatin (LIPITOR) 20 MG tablet Take 20 mg by mouth At Bedtime (Patient not taking: Reported on 3/20/2023)       fluticasone-salmeterol (ADVAIR HFA) 115-21 MCG/ACT inhaler Inhale 2 puffs into the lungs 2 times daily (Patient not taking: Reported on 3/20/2023) 12 g 3     MEDS UNK - RECORDS REQUESTED For cholesterol (Patient not taking: Reported on 3/20/2023)       naproxen (NAPROSYN) 500 MG tablet Take 1 tablet (500 mg) by mouth 2 times daily as needed (For pain as needed) (Patient not taking: Reported on 3/20/2023) 60 tablet 3     tiZANidine (ZANAFLEX) 4 MG tablet TAKE 1 TABLET(4 MG) BY MOUTH THREE TIMES DAILY AS NEEDED FOR MUSCLE SPASMS (Patient not taking: Reported on 3/20/2023) 30 tablet 0     Allergies   Allergen Reactions     Penicillins Anaphylaxis     Tolerated cefazolin on 09/16/2015.     Clindamycin Hives     Past medical history, past surgical history, current medications and allergies reviewed and accurate to the best of my knowledge.      ROS:  Refer to HPI    BP (!) 150/90 (BP Location: Left arm, Patient Position: Sitting, Cuff Size: Adult Large)   Pulse 82   Temp (!) 96.5  F (35.8  C) (Tympanic)   Resp  18   Wt 110 kg (242 lb 8 oz)   SpO2 97%   BMI 33.82 kg/m      EXAM:  General Appearance: Well appearing 52 year old male, appropriate appearance for age. No acute distress   Ears: Left TM intact, translucent with bony landmarks appreciated, no erythema, no effusion, no bulging, no purulence.  Right TM intact, translucent with bony landmarks appreciated, no erythema, no effusion, no bulging, no purulence.  Left auditory canal clear.  Right auditory canal clear.  Normal external ears, non tender.  Eyes: conjunctivae normal without erythema or irritation, corneas clear, no drainage or crusting, no eyelid swelling, pupils equal   Oropharynx: moist mucous membranes, posterior pharynx without erythema, tonsils symmetric, no erythema, no exudates, voice clear.    Nose:  Bilateral nares: no erythema, no edema, no drainage or congestion   Neck: supple without adenopathy  Respiratory: normal chest wall and respirations.  Normal effort.  + expiratory wheezes to posterior lung fields bilaterally. No crackles or rhonchi.  No increased work of breathing.  + productive cough appreciated.  Cardiac: RRR with no murmurs  Musculoskeletal:  Equal movement of bilateral upper extremities.  Equal movement of bilateral lower extremities.  Normal gait.    Dermatological: no rashes noted of exposed skin  Neuro: Alert and oriented to person, place, and time.  Cranial nerves II-XII grossly intact with no focal or lateralizing deficits.  Muscle tone normal.  Gait normal. No tremor.   Psychological: normal affect, alert, oriented, and pleasant.     Results for orders placed or performed in visit on 03/20/23   Symptomatic COVID-19 Virus (Coronavirus) by PCR Nose     Status: Normal    Specimen: Nose; Swab   Result Value Ref Range    SARS CoV2 PCR Negative Negative    Narrative    Testing was performed using the Xpert Xpress SARS-CoV-2 Assay on the Cepheid Gene-Xpert Instrument Systems. Additional information about this Emergency Use  Authorization (EUA) assay can be found via the Lab Guide. This test should be ordered for the detection of SARS-CoV-2 in individuals who meet SARS-CoV-2 clinical and/or epidemiological criteria as well as from individuals without symptoms or other reasons to suspect COVID-19. Test performance for asymptomatic patients has only been established in anterior nasal swab specimens. This test is for in vitro diagnostic use under the FDA EUA for laboratories certified under CLIA to perform high complexity testing. This test has not been FDA cleared or approved. A negative result does not rule out the presence of PCR inhibitors in the specimen or target RNA concentration below the limit of detection for the assay. The possibility of a false negative should be considered if the patient's recent exposure or clinical presentation suggests COVID-19. This test was validated by [facility]. This laboratory is certified under the Clinical Laboratory Improvement Amendments (CLIA) as qualified to perform high complexity clinical laboratory testing.

## 2023-03-20 NOTE — LETTER
March 20, 2023                                                                     To Whom it May Concern:    Jose Elias Reaves attended clinic here on Mar 20, 2023. He may return to work as long as covid test is negative and when he is feeling improved.       Sincerely,    Nathaly Rosales NP

## 2023-03-20 NOTE — LETTER
March 20, 2023                                                                     To Whom it May Concern:    Jose Elias Reaves attended clinic here on Mar 20, 2023.       Sincerely,    Nathaly Rosales NP

## 2023-03-20 NOTE — PATIENT INSTRUCTIONS
Prednisone (steroid) once daily x5 days.   Azithromycin (antibiotic) x5 days for acute bronchitis (long time smoking history this may even be COPD exacerbation given history and symptoms).     Covid test is pending. Covid is a virus. Will call with results and any potential changes in plan if positive.     Resume losartan for BP. Start with 1 tablet and if tolerating, may go back up to 75 mg which you were tolerating in the past. Take this once daily until your appointment to establish care so we can see if your BP is improved.     Labs will be drawn at your primary care appointment.     If you develop chest pains, worsening symptoms of dizziness, fatigue, or any high fevers or new concerns recommend ED.

## 2023-03-23 RX ORDER — LOSARTAN POTASSIUM 50 MG/1
TABLET ORAL
Qty: 135 TABLET | OUTPATIENT
Start: 2023-03-23

## 2023-03-27 ENCOUNTER — OFFICE VISIT (OUTPATIENT)
Dept: INTERNAL MEDICINE | Facility: OTHER | Age: 53
End: 2023-03-27
Payer: COMMERCIAL

## 2023-03-27 VITALS
RESPIRATION RATE: 24 BRPM | DIASTOLIC BLOOD PRESSURE: 90 MMHG | BODY MASS INDEX: 34.3 KG/M2 | TEMPERATURE: 97.7 F | OXYGEN SATURATION: 96 % | HEIGHT: 71 IN | HEART RATE: 84 BPM | WEIGHT: 245 LBS | SYSTOLIC BLOOD PRESSURE: 148 MMHG

## 2023-03-27 DIAGNOSIS — Z72.0 TOBACCO USE: ICD-10-CM

## 2023-03-27 DIAGNOSIS — G54.2 CERVICAL NERVE ROOT COMPRESSION: ICD-10-CM

## 2023-03-27 DIAGNOSIS — Z00.00 ROUTINE GENERAL MEDICAL EXAMINATION AT A HEALTH CARE FACILITY: Primary | ICD-10-CM

## 2023-03-27 DIAGNOSIS — G89.4 CHRONIC PAIN SYNDROME: ICD-10-CM

## 2023-03-27 DIAGNOSIS — Z87.891 PERSONAL HISTORY OF TOBACCO USE: ICD-10-CM

## 2023-03-27 DIAGNOSIS — J45.909 UNCOMPLICATED ASTHMA, UNSPECIFIED ASTHMA SEVERITY, UNSPECIFIED WHETHER PERSISTENT: ICD-10-CM

## 2023-03-27 DIAGNOSIS — R73.03 PREDIABETES: ICD-10-CM

## 2023-03-27 DIAGNOSIS — Z00.00 ANNUAL PHYSICAL EXAM: ICD-10-CM

## 2023-03-27 DIAGNOSIS — J45.20 MILD INTERMITTENT ASTHMA WITHOUT COMPLICATION: ICD-10-CM

## 2023-03-27 DIAGNOSIS — M50.30 DDD (DEGENERATIVE DISC DISEASE), CERVICAL: ICD-10-CM

## 2023-03-27 DIAGNOSIS — Z11.59 ENCOUNTER FOR HEPATITIS C SCREENING TEST FOR LOW RISK PATIENT: ICD-10-CM

## 2023-03-27 DIAGNOSIS — I10 ESSENTIAL HYPERTENSION: ICD-10-CM

## 2023-03-27 LAB
ALBUMIN SERPL BCG-MCNC: 4.3 G/DL (ref 3.5–5.2)
ALP SERPL-CCNC: 75 U/L (ref 40–129)
ALT SERPL W P-5'-P-CCNC: 20 U/L (ref 10–50)
ANION GAP SERPL CALCULATED.3IONS-SCNC: 7 MMOL/L (ref 7–15)
AST SERPL W P-5'-P-CCNC: 17 U/L (ref 10–50)
BILIRUB SERPL-MCNC: 0.2 MG/DL
BUN SERPL-MCNC: 17.4 MG/DL (ref 6–20)
CALCIUM SERPL-MCNC: 9.1 MG/DL (ref 8.6–10)
CHLORIDE SERPL-SCNC: 104 MMOL/L (ref 98–107)
CHOLEST SERPL-MCNC: 192 MG/DL
CREAT SERPL-MCNC: 0.89 MG/DL (ref 0.67–1.17)
DEPRECATED HCO3 PLAS-SCNC: 28 MMOL/L (ref 22–29)
ERYTHROCYTE [DISTWIDTH] IN BLOOD BY AUTOMATED COUNT: 14.4 % (ref 10–15)
GFR SERPL CREATININE-BSD FRML MDRD: >90 ML/MIN/1.73M2
GLUCOSE SERPL-MCNC: 112 MG/DL (ref 70–99)
HBA1C MFR BLD: 6.2 % (ref 4–6.2)
HCT VFR BLD AUTO: 45.2 % (ref 40–53)
HDLC SERPL-MCNC: 40 MG/DL
HGB BLD-MCNC: 15.2 G/DL (ref 13.3–17.7)
LDLC SERPL CALC-MCNC: 117 MG/DL
MCH RBC QN AUTO: 28.8 PG (ref 26.5–33)
MCHC RBC AUTO-ENTMCNC: 33.6 G/DL (ref 31.5–36.5)
MCV RBC AUTO: 86 FL (ref 78–100)
NONHDLC SERPL-MCNC: 152 MG/DL
PLATELET # BLD AUTO: 392 10E3/UL (ref 150–450)
POTASSIUM SERPL-SCNC: 4.7 MMOL/L (ref 3.4–5.3)
PROT SERPL-MCNC: 7.1 G/DL (ref 6.4–8.3)
PSA SERPL DL<=0.01 NG/ML-MCNC: 1.22 NG/ML (ref 0–3.5)
RBC # BLD AUTO: 5.27 10E6/UL (ref 4.4–5.9)
SODIUM SERPL-SCNC: 139 MMOL/L (ref 136–145)
TRIGL SERPL-MCNC: 177 MG/DL
TSH SERPL DL<=0.005 MIU/L-ACNC: 1.46 UIU/ML (ref 0.3–4.2)
WBC # BLD AUTO: 10.6 10E3/UL (ref 4–11)

## 2023-03-27 PROCEDURE — 36415 COLL VENOUS BLD VENIPUNCTURE: CPT | Mod: ZL

## 2023-03-27 PROCEDURE — 99396 PREV VISIT EST AGE 40-64: CPT

## 2023-03-27 PROCEDURE — 83036 HEMOGLOBIN GLYCOSYLATED A1C: CPT | Mod: ZL

## 2023-03-27 PROCEDURE — 80053 COMPREHEN METABOLIC PANEL: CPT | Mod: ZL

## 2023-03-27 PROCEDURE — 85027 COMPLETE CBC AUTOMATED: CPT | Mod: ZL

## 2023-03-27 PROCEDURE — 86803 HEPATITIS C AB TEST: CPT | Mod: ZL

## 2023-03-27 PROCEDURE — 80061 LIPID PANEL: CPT | Mod: ZL

## 2023-03-27 PROCEDURE — 99214 OFFICE O/P EST MOD 30 MIN: CPT | Mod: 25

## 2023-03-27 PROCEDURE — 87389 HIV-1 AG W/HIV-1&-2 AB AG IA: CPT | Mod: ZL

## 2023-03-27 PROCEDURE — 84443 ASSAY THYROID STIM HORMONE: CPT | Mod: ZL

## 2023-03-27 PROCEDURE — 84153 ASSAY OF PSA TOTAL: CPT | Mod: SL

## 2023-03-27 PROCEDURE — G0103 PSA SCREENING: HCPCS | Mod: ZL

## 2023-03-27 RX ORDER — PROPRANOLOL HYDROCHLORIDE 80 MG/1
40 TABLET ORAL DAILY
Qty: 90 TABLET | Refills: 4 | Status: SHIPPED | OUTPATIENT
Start: 2023-03-27 | End: 2023-08-09

## 2023-03-27 RX ORDER — FLUTICASONE PROPIONATE AND SALMETEROL XINAFOATE 115; 21 UG/1; UG/1
2 AEROSOL, METERED RESPIRATORY (INHALATION) 2 TIMES DAILY
Qty: 12 G | Refills: 3 | Status: SHIPPED | OUTPATIENT
Start: 2023-03-27 | End: 2023-09-11 | Stop reason: ALTCHOICE

## 2023-03-27 RX ORDER — METAXALONE 800 MG/1
800 TABLET ORAL 3 TIMES DAILY
Qty: 90 TABLET | Refills: 4 | Status: SHIPPED | OUTPATIENT
Start: 2023-03-27 | End: 2024-09-23

## 2023-03-27 RX ORDER — ALBUTEROL SULFATE 90 UG/1
2 AEROSOL, METERED RESPIRATORY (INHALATION) EVERY 4 HOURS PRN
Qty: 18 G | Refills: 3 | Status: SHIPPED | OUTPATIENT
Start: 2023-03-27 | End: 2023-09-11

## 2023-03-27 RX ORDER — NAPROXEN 500 MG/1
500 TABLET ORAL 2 TIMES DAILY PRN
Qty: 90 TABLET | Refills: 4 | Status: SHIPPED | OUTPATIENT
Start: 2023-03-27 | End: 2023-07-11

## 2023-03-27 ASSESSMENT — ANXIETY QUESTIONNAIRES
GAD7 TOTAL SCORE: 1
2. NOT BEING ABLE TO STOP OR CONTROL WORRYING: NOT AT ALL
5. BEING SO RESTLESS THAT IT IS HARD TO SIT STILL: NOT AT ALL
8. IF YOU CHECKED OFF ANY PROBLEMS, HOW DIFFICULT HAVE THESE MADE IT FOR YOU TO DO YOUR WORK, TAKE CARE OF THINGS AT HOME, OR GET ALONG WITH OTHER PEOPLE?: SOMEWHAT DIFFICULT
1. FEELING NERVOUS, ANXIOUS, OR ON EDGE: NOT AT ALL
GAD7 TOTAL SCORE: 1
7. FEELING AFRAID AS IF SOMETHING AWFUL MIGHT HAPPEN: NOT AT ALL
7. FEELING AFRAID AS IF SOMETHING AWFUL MIGHT HAPPEN: NOT AT ALL
IF YOU CHECKED OFF ANY PROBLEMS ON THIS QUESTIONNAIRE, HOW DIFFICULT HAVE THESE PROBLEMS MADE IT FOR YOU TO DO YOUR WORK, TAKE CARE OF THINGS AT HOME, OR GET ALONG WITH OTHER PEOPLE: SOMEWHAT DIFFICULT
3. WORRYING TOO MUCH ABOUT DIFFERENT THINGS: SEVERAL DAYS
GAD7 TOTAL SCORE: 1
6. BECOMING EASILY ANNOYED OR IRRITABLE: NOT AT ALL
4. TROUBLE RELAXING: NOT AT ALL

## 2023-03-27 ASSESSMENT — PAIN SCALES - GENERAL: PAINLEVEL: SEVERE PAIN (7)

## 2023-03-27 ASSESSMENT — ASTHMA QUESTIONNAIRES
QUESTION_5 LAST FOUR WEEKS HOW WOULD YOU RATE YOUR ASTHMA CONTROL: SOMEWHAT CONTROLLED
QUESTION_2 LAST FOUR WEEKS HOW OFTEN HAVE YOU HAD SHORTNESS OF BREATH: ONCE A DAY
QUESTION_3 LAST FOUR WEEKS HOW OFTEN DID YOUR ASTHMA SYMPTOMS (WHEEZING, COUGHING, SHORTNESS OF BREATH, CHEST TIGHTNESS OR PAIN) WAKE YOU UP AT NIGHT OR EARLIER THAN USUAL IN THE MORNING: TWO OR THREE NIGHTS A WEEK
QUESTION_4 LAST FOUR WEEKS HOW OFTEN HAVE YOU USED YOUR RESCUE INHALER OR NEBULIZER MEDICATION (SUCH AS ALBUTEROL): ONE OR TWO TIMES PER DAY
ACT_TOTALSCORE: 13
QUESTION_1 LAST FOUR WEEKS HOW MUCH OF THE TIME DID YOUR ASTHMA KEEP YOU FROM GETTING AS MUCH DONE AT WORK, SCHOOL OR AT HOME: A LITTLE OF THE TIME
ACT_TOTALSCORE: 13

## 2023-03-27 ASSESSMENT — PATIENT HEALTH QUESTIONNAIRE - PHQ9
10. IF YOU CHECKED OFF ANY PROBLEMS, HOW DIFFICULT HAVE THESE PROBLEMS MADE IT FOR YOU TO DO YOUR WORK, TAKE CARE OF THINGS AT HOME, OR GET ALONG WITH OTHER PEOPLE: NOT DIFFICULT AT ALL
SUM OF ALL RESPONSES TO PHQ QUESTIONS 1-9: 5
SUM OF ALL RESPONSES TO PHQ QUESTIONS 1-9: 5

## 2023-03-27 NOTE — PROGRESS NOTES
SUBJECTIVE:   CC: Jose Elias is an 52 year old who presents for preventative health visit.     Jose Elias presents for preventative healthcare visit, discuss elevated blood pressure.  He was recently started on 75 mg of losartan while he was in the [facility] on 3-.  He has been taking it in the morning, tolerating it without difficulty.  He does state that he was on propanolol 80 mg for years and would like to restart on this as this has been very effective for him.  It was stopped in the past due to cost, he would like to still proceed with this.  He has not been monitoring blood pressures at home, requesting to have a blood pressure cuff monitor prescription to monitor at home.    He has had elevated A1c's in the past, would like to recheck A1c today along with lipids.    He also has been struggling with chronic neck pain.  Unfortunately he is needed to take large doses of naproxen approximately 500 mg twice daily along with Tylenol maxing out at 4,000 mg daily for this.  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      No flowsheet data found.  Patient has been advised of split billing requirements and indicates understanding: Yes  Healthy Habits:     Getting at least 3 servings of Calcium per day:  Yes    Bi-annual eye exam:  NO    Dental care twice a year:  NO    Sleep apnea or symptoms of sleep apnea:  None    Diet:  Regular (no restrictions)    Frequency of exercise:  None    Duration of exercise:  N/A    Taking medications regularly:  Yes    Barriers to taking medications:  None    Medication side effects:  None    PHQ-2 Total Score: 0    Additional concerns today:  No  History of Present Illness     Asthma:  He presents for follow up of asthma.  He has some cough, some wheezing, and some shortness of breath. He is using a relief medication a few times a week. He does not have a controller medication. Patient is aware of the  following triggers: cold air, humidity, mold and upper respiratory infections. The patient has not had a visit to the Emergency Room, Urgent Care or Hospital due to asthma since the last clinic visit.     Diabetes:   He presents for follow up of diabetes.  He is not checking blood glucose. He is concerned about other.  He is having numbness in feet, blurry vision, weight loss and weight gain.         Hypertension: He presents for follow up of hypertension.  He does not check blood pressure  regularly outside of the clinic. Outside blood pressures have been over 140/90. He does not follow a low salt diet.     Reason for visit:  Qustions    He eats 0-1 servings of fruits and vegetables daily.He consumes 6 sweetened beverage(s) daily.He exercises with enough effort to increase his heart rate 20 to 29 minutes per day.  He exercises with enough effort to increase his heart rate 5 days per week.   He is not taking prescribed medications regularly due to None.    Today's PHQ-9         PHQ-9 Total Score: 5    PHQ-9 Q9 Thoughts of better off dead/self-harm past 2 weeks :   Not at all    How difficult have these problems made it for you to do your work, take care of things at home, or get along with other people: Not difficult at all  Today's JOSEF-7 Score: 1        Hypertension Follow-up      Do you check your blood pressure regularly outside of the clinic? No     Are you following a low salt diet? Yes    Are your blood pressures ever more than 140 on the top number (systolic) OR more   than 90 on the bottom number (diastolic), for example 140/90? Yes      Today's PHQ-2 Score:   PHQ-2 ( 1999 [employer]) 3/20/2023   Q1: Little interest or pleasure in doing things 0   Q2: Feeling down, depressed or hopeless 0   PHQ-2 Score 0   PHQ-2 Total Score (12-17 Years)- Positive if 3 or more points; Administer PHQ-A if positive -   Q1: Little interest or pleasure in doing things -   Q2: Feeling down, depressed or hopeless -   PHQ-2 Score -  "      Have you ever done Advance Care Planning? (For example, a Health Directive, POLST, or a discussion with a medical provider or your loved ones about your wishes): No, advance care planning information given to patient to review.  Patient declined advance care planning discussion at this time.    Social History     Tobacco Use     Smoking status: Every Day     Packs/day: 0.50     Types: Cigarettes     Smokeless tobacco: Never   Substance Use Topics     Alcohol use: Not Currently     Alcohol/week: 2.0 standard drinks     Types: 2 Cans of beer per week     Comment: moderately         No flowsheet data found.       Last PSA:   Prostate Specific Antigen Screen   Date Value Ref Range Status   03/27/2023 1.22 0.00 - 3.50 ng/mL Final       Reviewed orders with patient. Reviewed health maintenance and updated orders accordingly - Yes      Reviewed and updated as needed this visit by clinical staff   Tobacco  Allergies  Meds              Reviewed and updated as needed this visit by Provider                 Past Medical History:   Diagnosis Date     Hypertension         Review of Systems  CONSTITUTIONAL: NEGATIVE for fever, chills, change in weight  INTEGUMENTARY/SKIN: NEGATIVE for worrisome rashes, moles or lesions  EYES: NEGATIVE for vision changes or irritation  ENT: NEGATIVE for ear, mouth and throat problems  RESP:POSITIVE for cough-non productive and SOB/dyspnea, cough-non productive, Hx asthma and smoking  CV: NEGATIVE for chest pain, palpitations or peripheral edema  GI: POSITIVE for nausea after eating large amounts of sugar   MUSCULOSKELETAL:POSITIVE  For neck pain and radicular pain both arms   PSYCHIATRIC: NEGATIVE for changes in mood or affect    OBJECTIVE:   BP (!) 148/90 (BP Location: Right arm, Patient Position: Sitting, Cuff Size: Adult Regular)   Pulse 84   Temp 97.7  F (36.5  C)   Resp 24   Ht 1.803 m (5' 11\")   Wt 111.1 kg (245 lb)   SpO2 96%   BMI 34.17 kg/m      Physical Exam  Vitals " reviewed.   Constitutional:       General: He is not in acute distress.     Appearance: He is well-developed.   HENT:      Head: Normocephalic and atraumatic.   Eyes:      General: No scleral icterus.     Conjunctiva/sclera: Conjunctivae normal.      Pupils: Pupils are equal, round, and reactive to light.   Neck:      Thyroid: No thyromegaly.   Cardiovascular:      Rate and Rhythm: Normal rate and regular rhythm.      Heart sounds: No murmur heard.  Pulmonary:      Effort: Pulmonary effort is normal. No respiratory distress.      Breath sounds: Normal breath sounds. No wheezing.   Musculoskeletal:      Cervical back: Normal range of motion and neck supple. Tenderness present.   Skin:     General: Skin is warm and dry.      Findings: No rash.   Neurological:      Mental Status: He is alert and oriented to person, place, and time.      Cranial Nerves: No cranial nerve deficit.      Coordination: Coordination normal.   Psychiatric:         Behavior: Behavior normal.         Thought Content: Thought content normal.         Judgment: Judgment normal.         Diagnostic Test Results:  Labs reviewed in Epic  Results for orders placed or performed in visit on 03/27/23 (from the past 24 hour(s))   TSH with free T4 reflex   Result Value Ref Range    TSH 1.46 0.30 - 4.20 uIU/mL   Hemoglobin A1c   Result Value Ref Range    Hemoglobin A1C 6.2 4.0 - 6.2 %   CBC with platelets   Result Value Ref Range    WBC Count 10.6 4.0 - 11.0 10e3/uL    RBC Count 5.27 4.40 - 5.90 10e6/uL    Hemoglobin 15.2 13.3 - 17.7 g/dL    Hematocrit 45.2 40.0 - 53.0 %    MCV 86 78 - 100 fL    MCH 28.8 26.5 - 33.0 pg    MCHC 33.6 31.5 - 36.5 g/dL    RDW 14.4 10.0 - 15.0 %    Platelet Count 392 150 - 450 10e3/uL   Lipid Profile   Result Value Ref Range    Cholesterol 192 <200 mg/dL    Triglycerides 177 (H) <150 mg/dL    Direct Measure HDL 40 >=40 mg/dL    LDL Cholesterol Calculated 117 (H) <=100 mg/dL    Non HDL Cholesterol 152 (H) <130 mg/dL    Narrative     Cholesterol  Desirable:  <200 mg/dL    Triglycerides  Normal:  Less than 150 mg/dL  Borderline High:  150-199 mg/dL  High:  200-499 mg/dL  Very High:  Greater than or equal to 500 mg/dL    Direct Measure HDL  Female:  Greater than or equal to 50 mg/dL   Male:  Greater than or equal to 40 mg/dL    LDL Cholesterol  Desirable:  <100mg/dL  Above Desirable:  100-129 mg/dL   Borderline High:  130-159 mg/dL   High:  160-189 mg/dL   Very High:  >= 190 mg/dL    Non HDL Cholesterol  Desirable:  130 mg/dL  Above Desirable:  130-159 mg/dL  Borderline High:  160-189 mg/dL  High:  190-219 mg/dL  Very High:  Greater than or equal to 220 mg/dL   Comprehensive metabolic panel   Result Value Ref Range    Sodium 139 136 - 145 mmol/L    Potassium 4.7 3.4 - 5.3 mmol/L    Chloride 104 98 - 107 mmol/L    Carbon Dioxide (CO2) 28 22 - 29 mmol/L    Anion Gap 7 7 - 15 mmol/L    Urea Nitrogen 17.4 6.0 - 20.0 mg/dL    Creatinine 0.89 0.67 - 1.17 mg/dL    Calcium 9.1 8.6 - 10.0 mg/dL    Glucose 112 (H) 70 - 99 mg/dL    Alkaline Phosphatase 75 40 - 129 U/L    AST 17 10 - 50 U/L    ALT 20 10 - 50 U/L    Protein Total 7.1 6.4 - 8.3 g/dL    Albumin 4.3 3.5 - 5.2 g/dL    Bilirubin Total 0.2 <=1.2 mg/dL    GFR Estimate >90 >60 mL/min/1.73m2   PSA Screen GH   Result Value Ref Range    Prostate Specific Antigen Screen 1.22 0.00 - 3.50 ng/mL    Narrative    This result is obtained using the Roche Elecsys total PSA method on the narinder [id number] immunoassay analyzer. Results obtained with different assay methods or kits cannot be used interchangeably.       ASSESSMENT/PLAN:     Assessment & Plan   Jose Elias Reaves is a 52 year old presenting for the following health issues:      ICD-10-CM    1. Routine general medical examination at a health care facility  Z00.00       2. Mild intermittent asthma without complication  J45.20 Primary Care Referral      3. Essential hypertension  I10 Primary Care Referral     propranolol (INDERAL) 80 MG tablet     Home Blood  Pressure Monitor Order for DME - ONLY FOR DME      4. Uncomplicated asthma, unspecified asthma severity, unspecified whether persistent  J45.909 albuterol (PROAIR HFA/PROVENTIL HFA/VENTOLIN HFA) 108 (90 Base) MCG/ACT inhaler     fluticasone-salmeterol (ADVAIR HFA) 115-21 MCG/ACT inhaler      5. Chronic pain syndrome  G89.4 metaxalone (SKELAXIN) 800 MG tablet     naproxen (NAPROSYN) 500 MG tablet     XR Cervical Thoracic Transforaminal Inj Bilateral      6. Prediabetes  R73.03 UA reflex to Microscopic and Culture     Albumin Random Urine Quantitative with Creat Ratio      7. Annual physical exam  Z00.00 UA reflex to Microscopic     TSH with free T4 reflex     Hemoglobin A1c     CBC with platelets     Albumin Random Urine Quantitative with Creat Ratio     Lipid Profile     Comprehensive metabolic panel     Lipid Profile     PSA Screen GH     HIV Antigen Antibody Combo     TSH with free T4 reflex     Hemoglobin A1c     CBC with platelets     Lipid Profile     Comprehensive metabolic panel     PSA Screen GH     PREVENTIVE VISIT,EST,40-64     CANCELED: UA reflex to Microscopic     CANCELED: Albumin Random Urine Quantitative with Creat Ratio     CANCELED: Lipid Profile      8. Encounter for hepatitis C screening test for low risk patient  Z11.59 Hepatitis C Screen Reflex to HCV RNA Quant and Genotype     Hepatitis C Screen Reflex to HCV RNA Quant and Genotype      9. Tobacco use  Z72.0 CT Chest Lung Cancer Scrn Low Dose wo     Prof fee: Shared Decision Making for Lung Cancer Screening      10. Personal history of tobacco use  Z87.891       11. Cervical nerve root compression  G54.2 XR Cervical Thoracic Transforaminal Inj Bilateral      12. DDD (degenerative disc disease), cervical  M50.30 XR Cervical Thoracic Transforaminal Inj Bilateral        Patient continues to take albuterol inhaler for asthma, ran out of Advair.  Refills were given to him for both of these.  Asthma otherwise has been well controlled, no new  "changes.    Patient will continue taking 75 mg of losartan daily.  Due to him responding well to propanolol, will restart him at a lower dose 40 mg daily, if pressures continue to be elevated above 140 systolically he will double his dose to his previous 80 mg daily that he used to be on.    A1c today has improved from 1 year ago.  6.2 today down from 6.5.  Discussed lifestyle modifications, we we will recheck his A1c in 3 months.    Lipids are above desired range, only slightly elevated, patient would like to continue with lifestyle modifications versus statin therapy for this.  We will also recheck this in 3 months.    Low-dose CT scan ordered due to patient's smoking history, patient does not want to quit smoking at this time.    Additionally, patient has struggled with chronic neck pain, bilateral radiculopathy.  He does take approximately 500 mg twice daily as needed along with Tylenol throughout the day.  Instructed him of risks of taking naproxen on a daily basis such as bleeding, kidney damage.  A referral has been placed for interventional radiology for cervical transforaminal injection.    Patient instructed to follow-up in 3 months or sooner if needed.      COUNSELING:   Reviewed preventive health counseling, as reflected in patient instructions  Special attention given to:        Regular exercise       Healthy diet/nutrition       Consider Hep C screening for all patients one time for ages 18-79 years       HIV screeninx in teen years, 1x in adult years, and at intervals if high risk       Prostate cancer screening       Consider lung cancer screening for ages 55-80 years (77 for Medicare) and 20 pack-year smoking history       BMI:   Estimated body mass index is 34.17 kg/m  as calculated from the following:    Height as of this encounter: 1.803 m (5' 11\").    Weight as of this encounter: 111.1 kg (245 lb).   Weight management plan: Discussed healthy diet and exercise guidelines      He reports that " "he has been smoking. He has been smoking an average of .5 packs per day. He has never used smokeless tobacco.  Nicotine/Tobacco Cessation Plan:   Information offered: Patient not interested at this time    Chief Complaint   Patient presents with     Physical     Concerns: blood pressure, A1c, chronic neck pain from injury. Family history of diabetes and concerned about sugar levels     Bre No LPN on 3/27/2023 at 8:06 AM         Initial BP (!) 148/90 (BP Location: Right arm, Patient Position: Sitting, Cuff Size: Adult Regular)   Pulse 84   Temp 97.7  F (36.5  C)   Resp 24   Ht 1.803 m (5' 11\")   Wt 111.1 kg (245 lb)   SpO2 96%   BMI 34.17 kg/m   Estimated body mass index is 34.17 kg/m  as calculated from the following:    Height as of this encounter: 1.803 m (5' 11\").    Weight as of this encounter: 111.1 kg (245 lb).  Medication Reconciliation: complete    FOOD SECURITY SCREENING QUESTIONS  Hunger Vital Signs:  Within the past 12 months we worried whether our food would run out before we got money to buy more. Never  Within the past 12 months the food we bought just didn't last and we didn't have money to get more. Never  Bre No LPN 3/27/2023 8:26 AM       Advance care directive on file? no  Advance care directive provided to patient? no       ANATOLY Chow CNP, APRN CNP  [facility]  Lung Cancer Screening Shared Decision Making Visit     Jose Elias Reaves, a 52 year old male, is eligible for lung cancer screening    History   Smoking Status     Every Day     Packs/day: 0.50   Smokeless Tobacco     Never       I have discussed with patient the risks and benefits of screening for lung cancer with low-dose CT.     The risks include:    radiation exposure: one low dose chest CT has as much ionizing radiation as about 15 chest x-rays, or 6 months of background radiation living in [place]      false positives: most findings/nodules are NOT " cancer, but some might still require additional diagnostic evaluation, including biopsy    over-diagnosis: some slow growing cancers that might never have been clinically significant will be detected and treated unnecessarily     The benefit of early detection of lung cancer is contingent upon adherence to annual screening or more frequent follow up if indicated.     Furthermore, to benefit from screening, Jose Elias must be willing and able to undergo diagnostic procedures, if indicated. Although no specific guide is available for determining severity of comorbidities, it is reasonable to withhold screening in patients who have greater mortality risk from other diseases.     We did discuss that the best way to prevent lung cancer is to not smoke.    Some patients may value a numeric estimation of lung cancer risk when evaluating if lung cancer screening is right for them, here is one calculator:    ShouldIScreen

## 2023-03-27 NOTE — PATIENT INSTRUCTIONS
Start propanolol 40 mg daily along with losartan   Will call with lab results     Refilled prescriptions         Preventive Health Recommendations  Male Ages 50 - 64    Yearly exam:             See your health care provider every year in order to  o   Review health changes.   o   Discuss preventive care.    o   Review your medicines if your doctor has prescribed any.     Have a cholesterol test every 5 years, or more frequently if you are at risk for high cholesterol/heart disease.     Have a diabetes test (fasting glucose) every three years. If you are at risk for diabetes, you should have this test more often.     Have a colonoscopy at age 50, or have a yearly FIT test (stool test). These exams will check for colon cancer.      Talk with your health care provider about whether or not a prostate cancer screening test (PSA) is right for you.    You should be tested each year for STDs (sexually transmitted diseases), if you re at risk.     Shots: Get a flu shot each year. Get a tetanus shot every 10 years.     Nutrition:    Eat at least 5 servings of fruits and vegetables daily.     Eat whole-grain bread, whole-wheat pasta and brown rice instead of white grains and rice.     Get adequate Calcium and Vitamin D.     Lifestyle    Exercise for at least 150 minutes a week (30 minutes a day, 5 days a week). This will help you control your weight and prevent disease.     Limit alcohol to one drink per day.     No smoking.     Wear sunscreen to prevent skin cancer.     See your dentist every six months for an exam and cleaning.     See your eye doctor every 1 to 2 years.    Lung Cancer Screening   Frequently Asked Questions  If you are at high-risk for lung cancer, getting screened with low-dose computed tomography (LDCT) every year can help save your life. This handout offers answers to some of the most common questions about lung cancer screening. If you have other questions, please call [phone] ([phone]).      What is it?  Lung cancer screening uses special X-ray technology to create an image of your lung tissue. The exam is quick and easy and takes less than 10 seconds. We don t give you any medicine or use any needles. You can eat before and after the exam. You don t need to change your clothes as long as the clothing on your chest doesn t contain metal. But, you do need to be able to hold your breath for at least 6 seconds during the exam.    What is the goal of lung cancer screening?  The goal of lung cancer screening is to save lives. Many times, lung cancer is not found until a person starts having physical symptoms. Lung cancer screening can help detect lung cancer in the earliest stages when it may be easier to treat.    Who should be screened for lung cancer?  We suggest lung cancer screening for anyone who is at high-risk for lung cancer. You are in the high-risk group if you:      are between the ages of 55 and 79, and    have smoked at least 1 pack of cigarettes a day for 20 or more years, and    still smoke or have quit within the past 15 years.    However, if you have a new cough or shortness of breath, you should talk to your doctor before being screened.    Why does it matter if I have symptoms?  Certain symptoms can be a sign that you have a condition in your lungs that should be checked and treated by your doctor. These symptoms include fever, chest pain, a new or changing cough, shortness of breath that you have never felt before, coughing up blood or unexplained weight loss. Having any of these symptoms can greatly affect the results of lung cancer screening.       Should all smokers get an LDCT lung cancer screening exam?  It depends. Lung cancer screening is for a very specific group of men and women who have a history of heavy smoking over a long period of time (see  Who should be screened for lung cancer  above).  I am in the high-risk group, but have been diagnosed with cancer in the past. Is  LDCT lung cancer screening right for me?  In some cases, you should not have LDCT lung screening, such as when your doctor is already following your cancer with CT scan studies. Your doctor will help you decide if LDCT lung screening is right for you.  Do I need to have a screening exam every year?  Yes. If you are in the high-risk group described earlier, you should get an LDCT lung cancer screening exam every year until you are 79, or are no longer willing or able to undergo screening and possible procedures to diagnose and treat lung cancer.  How effective is LDCT at preventing death from lung cancer?  Studies have shown that LDCT lung cancer screening can lower the risk of death from lung cancer by 20 percent in people who are at high-risk.  What are the risks?  There are some risks and limitations of LDCT lung cancer screening. We want to make sure you understand the risks and benefits, so please let us know if you have any questions. Your doctor may want to talk with you more about these risks.    Radiation exposure: As with any exam that uses radiation, there is a very small increased risk of cancer. The amount of radiation in LDCT is small--about the same amount a person would get from a mammogram. Your doctor orders the exam when he or she feels the potential benefits outweigh the risks.    False negatives: No test is perfect, including LDCT. It is possible that you may have a medical condition, including lung cancer, that is not found during your exam. This is called a false negative result.    False positives and more testing: LDCT very often finds something in the lung that could be cancer, but in fact is not. This is called a false positive result. False positive tests often cause anxiety. To make sure these findings are not cancer, you may need to have more tests. These tests will be done only if you give us permission. Sometimes patients need a treatment that can have side effects, such as a biopsy.  For more information on false positives, see  What can I expect from the results?     Findings not related to lung cancer: Your LDCT exam also takes pictures of areas of your body next to your lungs. In a very small number of cases, the CT scan will show an abnormal finding in one of these areas, such as your kidneys, adrenal glands, liver or thyroid. This finding may not be serious, but you may need more tests. Your doctor can help you decide what other tests you may need, if any.  What can I expect from the results?  About 1 out of 4 LDCT exams will find something that may need more tests. Most of the time, these findings are lung nodules. Lung nodules are very small collections of tissue in the lung. These nodules are very common, and the vast majority--more than 97 percent--are not cancer (benign). Most are normal lymph nodes or small areas of scarring from past infections.  But, if a small lung nodule is found to be cancer, the cancer can be cured more than 90 percent of the time. To know if the nodule is cancer, we may need to get more images before your next yearly screening exam. If the nodule has suspicious features (for example, it is large, has an odd shape or grows over time), we will refer you to a specialist for further testing.  Will my doctor also get the results?  Yes. Your doctor will get a copy of your results.  Is it okay to keep smoking now that there s a cancer screening exam?  No. Tobacco is one of the strongest cancer-causing agents. It causes not only lung cancer, but other cancers and cardiovascular (heart) diseases as well. The damage caused by smoking builds over time. This means that the longer you smoke, the higher your risk of disease. While it is never too late to quit, the sooner you quit, the better.  Where can I find help to quit smoking?  The best way to prevent lung cancer is to stop smoking. If you have already quit smoking, congratulations and keep it up! For help on quitting  smoking, please call [facility] at 0-512-QUIT-NOW (1-609.117.3160) or the [employer] at 1-357.308.1036 to find local resources near you.  One-on-one health coaching:  If you d prefer to work individually with a health care provider on tobacco cessation, we offer:      Medication Therapy Management:  Our specially trained pharmacists work closely with you and your doctor to help you quit smoking.  Call [phone] or [phone] (toll free).

## 2023-03-28 LAB
HCV AB SERPL QL IA: NONREACTIVE
HIV 1+2 AB+HIV1 P24 AG SERPL QL IA: NONREACTIVE

## 2023-04-21 DIAGNOSIS — G89.4 CHRONIC PAIN SYNDROME: ICD-10-CM

## 2023-04-24 DIAGNOSIS — I10 ESSENTIAL HYPERTENSION: ICD-10-CM

## 2023-04-25 RX ORDER — LOSARTAN POTASSIUM 50 MG/1
75 TABLET ORAL DAILY
Qty: 135 TABLET | Refills: 3 | Status: SHIPPED | OUTPATIENT
Start: 2023-04-25 | End: 2023-07-12

## 2023-06-16 ENCOUNTER — HOSPITAL ENCOUNTER (EMERGENCY)
Facility: OTHER | Age: 53
Discharge: HOME | End: 2023-06-16
Attending: EMERGENCY MEDICINE | Admitting: EMERGENCY MEDICINE

## 2023-06-16 ENCOUNTER — NURSE TRIAGE (OUTPATIENT)
Dept: NURSING | Facility: CLINIC | Age: 53
End: 2023-06-16

## 2023-06-16 VITALS
SYSTOLIC BLOOD PRESSURE: 173 MMHG | DIASTOLIC BLOOD PRESSURE: 106 MMHG | OXYGEN SATURATION: 98 % | TEMPERATURE: 98.1 F | RESPIRATION RATE: 20 BRPM | WEIGHT: 225 LBS | HEIGHT: 71 IN | BODY MASS INDEX: 31.5 KG/M2 | HEART RATE: 81 BPM

## 2023-06-16 DIAGNOSIS — L30.1 DYSHIDROTIC ECZEMA: ICD-10-CM

## 2023-06-16 PROBLEM — F07.81 POST CONCUSSION SYNDROME: Status: ACTIVE | Noted: 2019-03-26

## 2023-06-16 PROBLEM — F17.210 CIGARETTE SMOKER: Status: ACTIVE | Noted: 2019-03-20

## 2023-06-16 PROCEDURE — 99284 EMERGENCY DEPT VISIT MOD MDM: CPT | Performed by: EMERGENCY MEDICINE

## 2023-06-16 PROCEDURE — 99283 EMERGENCY DEPT VISIT LOW MDM: CPT | Performed by: EMERGENCY MEDICINE

## 2023-06-16 RX ORDER — TRIAMCINOLONE ACETONIDE 5 MG/G
1 OINTMENT TOPICAL 2 TIMES DAILY
Qty: 15 G | Refills: 0 | Status: SHIPPED | OUTPATIENT
Start: 2023-06-16 | End: 2024-07-10

## 2023-06-16 RX ORDER — SULFAMETHOXAZOLE/TRIMETHOPRIM 800-160 MG
1 TABLET ORAL 2 TIMES DAILY
Qty: 20 TABLET | Refills: 0 | Status: SHIPPED | OUTPATIENT
Start: 2023-06-16 | End: 2023-06-26

## 2023-06-16 ASSESSMENT — ENCOUNTER SYMPTOMS
FEVER: 0
VOMITING: 0
ARTHRALGIAS: 0
SHORTNESS OF BREATH: 0
AGITATION: 0
LIGHT-HEADEDNESS: 0
DYSURIA: 0
CHILLS: 0
NAUSEA: 0

## 2023-06-16 NOTE — ED TRIAGE NOTES
"Patient being evaluated today for C/O painful blisters to the bottom of his right foot. He noticed these approx 1 week ago. He has treated them at home with alcohol and peroxide. Patient notes that most of the blisters have popped and is concerned that some have had purulent drainage. No systemic symptoms. BP (!) 173/106   Pulse 81   Temp 98.1  F (36.7  C) (Tympanic)   Resp 20   Ht 1.803 m (5' 11\")   Wt 102.1 kg (225 lb)   SpO2 98%   BMI 31.38 kg/m         Triage Assessment     Row Name 06/16/23 0644       Triage Assessment (Adult)    Airway WDL WDL       Respiratory WDL    Respiratory WDL WDL       Skin Circulation/Temperature WDL    Skin Circulation/Temperature WDL WDL       Cardiac WDL    Cardiac WDL WDL       Peripheral/Neurovascular WDL    Peripheral Neurovascular WDL WDL       Cognitive/Neuro/Behavioral WDL    Cognitive/Neuro/Behavioral WDL WDL              "

## 2023-06-16 NOTE — ED PROVIDER NOTES
History     Chief Complaint   Patient presents with     Foot Pain     Right foot with painful blisters     HPI  Jose Elias Reaves is a 53 year old male who is here complaining of some blisters on the bottom of his right foot.  It began about a week ago, he did not have any contact to irritants that he can think of.  He never was outside in his bare feet.  They do not have appearance of a friction type blister.  There are multiple small blisters noted together on the bottom of his right foot that are quite painful.  Most of them have popped and drained some clear fluid, he did have 1 with somewhat purulent drainage today so came in because he was concerned about infection    Allergies:  Allergies   Allergen Reactions     Penicillins Anaphylaxis     Tolerated cefazolin on 09/16/2015.     Clindamycin Hives       Problem List:    Patient Active Problem List    Diagnosis Date Noted     Morbid obesity (H) 02/09/2022     Priority: Medium     Post concussion syndrome 03/26/2019     Priority: Medium     BMI 33.0-33.9,adult 03/20/2019     Priority: Medium     Cigarette smoker 03/20/2019     Priority: Medium     Other chronic rhinitis 02/20/2018     Priority: Medium     Chronic sinusitis, unspecified location 02/20/2018     Priority: Medium     Dysfunction of both eustachian tubes 02/20/2018     Priority: Medium     Mild intermittent asthma 02/20/2018     Priority: Medium     Deviated nasal septum 02/20/2018     Priority: Medium     Tobacco use 02/20/2018     Priority: Medium     Hyperlipidemia LDL goal <100 04/18/2017     Priority: Medium     Chronic pain syndrome - Neck 04/18/2017     Priority: Medium     Essential hypertension 03/21/2017     Priority: Medium     Family history of ischemic heart disease 03/21/2017     Priority: Medium     Family history of diabetes mellitus 03/21/2017     Priority: Medium     Alcohol abuse 05/05/2012     Priority: Medium     Major depressive disorder, recurrent episode, moderate (H)  05/05/2012     Priority: Medium     Personality disorder (H) 05/05/2012     Priority: Medium     Suicidal ideation 05/05/2012     Priority: Medium        Past Medical History:    Past Medical History:   Diagnosis Date     Hypertension        Past Surgical History:    Past Surgical History:   Procedure Laterality Date     HERNIA REPAIR Left      KNEE SURGERY Left     ACL repair, patella graft     SEPTOPLASTY N/A 3/13/2018    Procedure: SEPTOPLASTY;  septoplasty, submucosal resection of the turbinates;  Surgeon: Piotr Quiroz MD;  Location: [facility] OR       Family History:    Family History   Problem Relation Age of Onset     Diabetes Mother      Myocardial Infarction Father        Social History:  Marital Status:   [4]  Social History     Tobacco Use     Smoking status: Every Day     Packs/day: 0.50     Types: Cigarettes     Smokeless tobacco: Never   Substance Use Topics     Alcohol use: Not Currently     Alcohol/week: 2.0 standard drinks of alcohol     Types: 2 Cans of beer per week     Comment: moderately     Drug use: No        Medications:    sulfamethoxazole-trimethoprim (BACTRIM DS) 800-160 MG tablet  triamcinolone (KENALOG) 0.5 % external ointment  Acetaminophen (TYLENOL PO)  albuterol (PROAIR HFA/PROVENTIL HFA/VENTOLIN HFA) 108 (90 Base) MCG/ACT inhaler  atorvastatin (LIPITOR) 20 MG tablet  fluticasone-salmeterol (ADVAIR HFA) 115-21 MCG/ACT inhaler  losartan (COZAAR) 50 MG tablet  MEDS UNK - RECORDS REQUESTED  metaxalone (SKELAXIN) 800 MG tablet  naproxen (NAPROSYN) 500 MG tablet  propranolol (INDERAL) 80 MG tablet  tiZANidine (ZANAFLEX) 4 MG tablet          Review of Systems   Constitutional: Negative for chills and fever.   HENT: Negative for congestion.    Eyes: Negative for visual disturbance.   Respiratory: Negative for shortness of breath.    Cardiovascular: Negative for chest pain.   Gastrointestinal: Negative for nausea and vomiting.   Genitourinary: Negative for dysuria.   Musculoskeletal:  "Negative for arthralgias.   Skin: Positive for rash.   Neurological: Negative for light-headedness.   Psychiatric/Behavioral: Negative for agitation.       Physical Exam   BP: (!) 173/106  Pulse: 81  Temp: 98.1  F (36.7  C)  Resp: 20  Height: 180.3 cm (5' 11\")  Weight: 102.1 kg (225 lb)  SpO2: 98 %      Physical Exam  Vitals and nursing note reviewed.   Constitutional:       Appearance: Normal appearance.   HENT:      Head: Normocephalic and atraumatic.      Mouth/Throat:      Mouth: Mucous membranes are moist.   Eyes:      Conjunctiva/sclera: Conjunctivae normal.   Cardiovascular:      Rate and Rhythm: Normal rate.   Pulmonary:      Effort: Pulmonary effort is normal.   Skin:     General: Skin is warm and dry.      Comments: Bottom of his right foot he does have an area  under the metatarsal heads where there is a group of 6 or 7 small vesicular type lesions.  No surrounding erythema.  There is some drainage on the Band-Aid that he is wearing   Neurological:      Mental Status: He is alert and oriented to person, place, and time.   Psychiatric:         Behavior: Behavior normal.         ED Course                 Procedures                No results found for this or any previous visit (from the past 24 hour(s)).    Medications - No data to display    Assessments & Plan (with Medical Decision Making)     I have reviewed the nursing notes.    I have reviewed the findings, diagnosis, plan and need for follow up with the patient.  I believe that this is dyshidrotic eczema.  Given prescription for some type of the steroid.  Discussed signs and symptoms of cellulitis, and if he notices that it is continuously getting better, I will give a prescription for some Bactrim that he could fill at that time.  Follow-up in clinic if worse or not improving        New Prescriptions    SULFAMETHOXAZOLE-TRIMETHOPRIM (BACTRIM DS) 800-160 MG TABLET    Take 1 tablet by mouth 2 times daily for 10 days    TRIAMCINOLONE (KENALOG) 0.5 % " EXTERNAL OINTMENT    Apply 1 g topically 2 times daily       Final diagnoses:   Dyshidrotic eczema       6/16/2023   [facility]     Raymundo Vargas MD  06/16/23 0893

## 2023-06-16 NOTE — TELEPHONE ENCOUNTER
Nurse Triage SBAR    Is this a 2nd Level Triage? NO    Situation:   Foot problem    Background:   Pt states he's borderline diabetic.   No known injury    Assessment:   One week ago, pt noticed severe foot pain on the arch of his foot. He noticed a V-shape red area with 4 blisters and 2 puncture wounds.     Today, pt describes worsening symptoms with spreading redness, more blisters, and white-colored drainage from the blisters.     He denies chills/fever.     Protocol Recommended Disposition:   See a provider within 24 hours, appt advised. Patient verbalized understanding and had no further questions. Pt unable to miss work today so he plans on going to the ED.     Shelia Acevedo RN  [place] Nurse Advisor  6/16/2023 6:25 AM       Reason for Disposition    [1] Small red streak or spreading redness (< 2 inches or 5 cm) AND [2] no fever    Additional Information    Negative: Sounds like a life-threatening emergency to the triager    Negative: Patient sounds very sick or weak to the triager    Negative: [1] Red area or streak AND [2] fever    Negative: [1] Looks infected (spreading redness, pus) AND [2] large red area (> 2 in. or 5 cm)    Negative: [1] Looks infected (spreading redness, pus) AND [2] diabetes mellitus or weak immune system (e.g., HIV positive, cancer chemo, splenectomy, organ transplant, chronic steroids)    Negative: [1] Red streak runs from sore AND [2] longer than 1 inch (2.5 cm)    Negative: [1] Ulcer with black scab AND [2] spreading AND [3] painless AND [4] cause unknown    Protocols used: SORES-A-

## 2023-06-16 NOTE — DISCHARGE INSTRUCTIONS
This test was infected at this time, however he started noticing that the skin around this discharge turned bright red, becomes more painful, get more drainage from it, instructed antibiotics.  In the meantime I would use the steroid ointment twice daily until it is better

## 2023-06-16 NOTE — Clinical Note
Jose Elias Reaves was seen and treated in our emergency department on 6/16/2023.  He may return to work on 06/18/2023.  He may need a couple days off from work     If you have any questions or concerns, please don't hesitate to call.      Raymundo Vargas MD

## 2023-07-11 ENCOUNTER — HOSPITAL ENCOUNTER (EMERGENCY)
Facility: OTHER | Age: 53
Discharge: HOME | End: 2023-07-11
Attending: FAMILY MEDICINE | Admitting: FAMILY MEDICINE
Payer: OTHER MISCELLANEOUS

## 2023-07-11 ENCOUNTER — APPOINTMENT (OUTPATIENT)
Dept: CT IMAGING | Facility: OTHER | Age: 53
End: 2023-07-11
Attending: FAMILY MEDICINE
Payer: OTHER MISCELLANEOUS

## 2023-07-11 VITALS
RESPIRATION RATE: 18 BRPM | DIASTOLIC BLOOD PRESSURE: 98 MMHG | BODY MASS INDEX: 31.5 KG/M2 | HEART RATE: 89 BPM | OXYGEN SATURATION: 97 % | HEIGHT: 71 IN | TEMPERATURE: 97.6 F | WEIGHT: 225 LBS | SYSTOLIC BLOOD PRESSURE: 133 MMHG

## 2023-07-11 DIAGNOSIS — M62.830 BACK MUSCLE SPASM: ICD-10-CM

## 2023-07-11 DIAGNOSIS — M54.6 ACUTE RIGHT-SIDED THORACIC BACK PAIN: ICD-10-CM

## 2023-07-11 LAB
HOLD SPECIMEN: NORMAL

## 2023-07-11 PROCEDURE — 72131 CT LUMBAR SPINE W/O DYE: CPT | Mod: TC

## 2023-07-11 PROCEDURE — 250N000013 HC RX MED GY IP 250 OP 250 PS 637: Performed by: FAMILY MEDICINE

## 2023-07-11 PROCEDURE — 96375 TX/PRO/DX INJ NEW DRUG ADDON: CPT | Performed by: FAMILY MEDICINE

## 2023-07-11 PROCEDURE — 72128 CT CHEST SPINE W/O DYE: CPT | Mod: TC

## 2023-07-11 PROCEDURE — 99283 EMERGENCY DEPT VISIT LOW MDM: CPT | Performed by: FAMILY MEDICINE

## 2023-07-11 PROCEDURE — 96374 THER/PROPH/DIAG INJ IV PUSH: CPT | Performed by: FAMILY MEDICINE

## 2023-07-11 PROCEDURE — 250N000011 HC RX IP 250 OP 636: Performed by: FAMILY MEDICINE

## 2023-07-11 PROCEDURE — 99285 EMERGENCY DEPT VISIT HI MDM: CPT | Mod: 25 | Performed by: FAMILY MEDICINE

## 2023-07-11 RX ORDER — GABAPENTIN 100 MG/1
100 CAPSULE ORAL 4 TIMES DAILY PRN
Qty: 30 CAPSULE | Refills: 0 | Status: SHIPPED | OUTPATIENT
Start: 2023-07-11 | End: 2023-07-11

## 2023-07-11 RX ORDER — OXYCODONE HYDROCHLORIDE 5 MG/1
5 TABLET ORAL EVERY 6 HOURS PRN
Qty: 20 TABLET | Refills: 0 | Status: SHIPPED | OUTPATIENT
Start: 2023-07-11 | End: 2023-07-11

## 2023-07-11 RX ORDER — KETOROLAC TROMETHAMINE 15 MG/ML
10 INJECTION, SOLUTION INTRAMUSCULAR; INTRAVENOUS ONCE
Status: COMPLETED | OUTPATIENT
Start: 2023-07-11 | End: 2023-07-11

## 2023-07-11 RX ORDER — OXYCODONE HYDROCHLORIDE 5 MG/1
5 TABLET ORAL EVERY 6 HOURS PRN
Qty: 20 TABLET | Refills: 0 | Status: SHIPPED | OUTPATIENT
Start: 2023-07-11 | End: 2023-08-09

## 2023-07-11 RX ORDER — CYCLOBENZAPRINE HCL 10 MG
10 TABLET ORAL ONCE
Status: COMPLETED | OUTPATIENT
Start: 2023-07-11 | End: 2023-07-11

## 2023-07-11 RX ORDER — MORPHINE SULFATE 4 MG/ML
4 INJECTION, SOLUTION INTRAMUSCULAR; INTRAVENOUS
Status: DISCONTINUED | OUTPATIENT
Start: 2023-07-11 | End: 2023-07-12 | Stop reason: HOSPADM

## 2023-07-11 RX ORDER — METHYLPREDNISOLONE 4 MG
TABLET, DOSE PACK ORAL
Qty: 21 TABLET | Refills: 0 | Status: SHIPPED | OUTPATIENT
Start: 2023-07-11 | End: 2023-07-11

## 2023-07-11 RX ORDER — GABAPENTIN 100 MG/1
100 CAPSULE ORAL 4 TIMES DAILY PRN
Qty: 30 CAPSULE | Refills: 0 | Status: SHIPPED | OUTPATIENT
Start: 2023-07-11 | End: 2023-08-09

## 2023-07-11 RX ORDER — METHYLPREDNISOLONE 4 MG
TABLET, DOSE PACK ORAL
Qty: 21 TABLET | Refills: 0 | Status: SHIPPED | OUTPATIENT
Start: 2023-07-11 | End: 2024-09-27

## 2023-07-11 RX ADMIN — MORPHINE SULFATE 4 MG: 4 INJECTION, SOLUTION INTRAMUSCULAR; INTRAVENOUS at 20:01

## 2023-07-11 RX ADMIN — KETOROLAC TROMETHAMINE 10 MG: 15 INJECTION, SOLUTION INTRAMUSCULAR; INTRAVENOUS at 20:00

## 2023-07-11 RX ADMIN — CYCLOBENZAPRINE 10 MG: 10 TABLET, FILM COATED ORAL at 20:00

## 2023-07-11 ASSESSMENT — ENCOUNTER SYMPTOMS: BACK PAIN: 1

## 2023-07-11 ASSESSMENT — ACTIVITIES OF DAILY LIVING (ADL)
ADLS_ACUITY_SCORE: 36
ADLS_ACUITY_SCORE: 33

## 2023-07-11 NOTE — ED TRIAGE NOTES
"Pt is here today for for upper right sided back pain.  He was pulling 40 ft 2in hose full of fuel.  He was on his knees under a trailer pulling the hose over his right shoulder.   He heard a \"snap pop\" and had immediate pain.   Rates his pain 10/10.   Was not able to take any OTC pain medications as he was at work. Denies numbness, tingling or decreased sensation in his right arm. Injury occurred at approximately 1500 today.     BP (!) 163/106   Pulse 81   Temp 97.6  F (36.4  C) (Temporal)   Resp 16   Ht 1.803 m (5' 11\")   Wt 102.1 kg (225 lb)   SpO2 97%   BMI 31.38 kg/m    Dulce Mendez RN on 7/11/2023 at 6:58 PM         Triage Assessment     Row Name 07/11/23 4805       Triage Assessment (Adult)    Airway WDL WDL       Respiratory WDL    Respiratory WDL WDL       Skin Circulation/Temperature WDL    Skin Circulation/Temperature WDL WDL       Cardiac WDL    Cardiac WDL X  HTN did take his BP medications today       Peripheral/Neurovascular WDL    Peripheral Neurovascular WDL WDL       Cognitive/Neuro/Behavioral WDL    Cognitive/Neuro/Behavioral WDL WDL              "

## 2023-07-12 ENCOUNTER — TELEPHONE (OUTPATIENT)
Dept: INTERNAL MEDICINE | Facility: OTHER | Age: 53
End: 2023-07-12
Payer: COMMERCIAL

## 2023-07-12 DIAGNOSIS — I10 ESSENTIAL HYPERTENSION: ICD-10-CM

## 2023-07-12 DIAGNOSIS — G89.4 CHRONIC PAIN SYNDROME: ICD-10-CM

## 2023-07-12 RX ORDER — NAPROXEN 500 MG/1
500 TABLET ORAL 2 TIMES DAILY PRN
Qty: 60 TABLET | Refills: 0 | Status: SHIPPED | OUTPATIENT
Start: 2023-07-12 | End: 2023-08-30

## 2023-07-12 RX ORDER — LOSARTAN POTASSIUM 50 MG/1
75 TABLET ORAL DAILY
Qty: 45 TABLET | Refills: 0 | Status: SHIPPED | OUTPATIENT
Start: 2023-07-12 | End: 2023-07-17

## 2023-07-12 RX ORDER — LOSARTAN POTASSIUM 50 MG/1
75 TABLET ORAL DAILY
Qty: 135 TABLET | Refills: 0 | Status: SHIPPED | OUTPATIENT
Start: 2023-07-12 | End: 2023-07-12

## 2023-07-12 RX ORDER — NAPROXEN 500 MG/1
500 TABLET ORAL 2 TIMES DAILY PRN
Qty: 90 TABLET | Refills: 0 | Status: SHIPPED | OUTPATIENT
Start: 2023-07-12 | End: 2023-07-12

## 2023-07-12 NOTE — TELEPHONE ENCOUNTER
Called and spoke to Patient after verifying last name and date of birth. Patient states he has never used [facility] and needs prescriptions sent to [facility].     propranolol (INDERAL) 80 MG tablet 90 tablet 4 3/27/2023  --   Sig - Route: Take 0.5 tablets (40 mg) by mouth daily - Oral   Sent to pharmacy as: Propranolol HCl 80 MG Oral Tablet (INDERAL)   Class: E-Prescribe   Order: [id number]   E-Prescribing Status: Receipt confirmed by pharmacy (3/27/2023  9:11 AM CDT)     [id number] - [place], [place] - [address]     Called [facility] and spoke with staff, after verifying Pt's last name and . They confirmed there are active refills on file and verbalized plan to get this ready for .    Requested Prescriptions   Pending Prescriptions Disp Refills     losartan (COZAAR) 50 MG tablet 135 tablet 3     Sig: Take 1.5 tablets (75 mg) by mouth daily   Last Prescription Date:   23  Last Fill Qty/Refills:         135, R-3 (end: 23)         naproxen (NAPROSYN) 500 MG tablet (Discontinued) 90 tablet 4 3/27/2023 2023 No   Sig - Route: Take 1 tablet (500 mg) by mouth 2 times daily as needed (For pain as needed) - Oral     Last Office Visit:              3/27/23   Future Office visit:           None    Per LOV note:  Return in about 6 weeks (around 2023).  Start propanolol 40 mg daily along with losartan   Will call with lab results         In clinical absence of patient's primary, Dinah Michel, patient is requesting that this message be sent to the covering provider for consideration please.    Theresa Ireland RN .............. 2023  2:28 PM

## 2023-07-12 NOTE — ED PROVIDER NOTES
"  History     Chief Complaint   Patient presents with     Back Injury     Right sided       HPI  Jose Elias Reaves is a 53 year old male who presents from work for injury.  He was lifting a fuel hose that was full of diesel fuel.  He was not aware that it was full of this.  It weighed well over 40 pounds.  He was in a bending position and he went to stand upright to throw the hose over his right shoulder.  He is right-handed.  He felt a \"pop\".  He had immediate onset of midthoracic back pain.  Denies numbness weakness tingling of the upper extremity or lower extremity.  Came in immediately for evaluation.  Has had other chronic back pain issues but nothing quite like this.  Did not have the opportunity to take any medication at home prior to arrival to the emergency room.    Allergies:  Allergies   Allergen Reactions     Penicillins Anaphylaxis     Tolerated cefazolin on 09/16/2015.     Clindamycin Hives       Problem List:    Patient Active Problem List    Diagnosis Date Noted     Morbid obesity (H) 02/09/2022     Priority: Medium     Post concussion syndrome 03/26/2019     Priority: Medium     BMI 33.0-33.9,adult 03/20/2019     Priority: Medium     Cigarette smoker 03/20/2019     Priority: Medium     Other chronic rhinitis 02/20/2018     Priority: Medium     Chronic sinusitis, unspecified location 02/20/2018     Priority: Medium     Dysfunction of both eustachian tubes 02/20/2018     Priority: Medium     Mild intermittent asthma 02/20/2018     Priority: Medium     Deviated nasal septum 02/20/2018     Priority: Medium     Tobacco use 02/20/2018     Priority: Medium     Hyperlipidemia LDL goal <100 04/18/2017     Priority: Medium     Chronic pain syndrome - Neck 04/18/2017     Priority: Medium     Essential hypertension 03/21/2017     Priority: Medium     Family history of ischemic heart disease 03/21/2017     Priority: Medium     Family history of diabetes mellitus 03/21/2017     Priority: Medium     Alcohol abuse " "05/05/2012     Priority: Medium     Major depressive disorder, recurrent episode, moderate (H) 05/05/2012     Priority: Medium     Personality disorder (H) 05/05/2012     Priority: Medium     Suicidal ideation 05/05/2012     Priority: Medium        Past Medical History:    Past Medical History:   Diagnosis Date     Hypertension        Past Surgical History:    Past Surgical History:   Procedure Laterality Date     HERNIA REPAIR Left      KNEE SURGERY Left     ACL repair, patella graft     SEPTOPLASTY N/A 3/13/2018    Procedure: SEPTOPLASTY;  septoplasty, submucosal resection of the turbinates;  Surgeon: Piotr Quiroz MD;  Location:  OR       Family History:    Family History   Problem Relation Age of Onset     Diabetes Mother      Myocardial Infarction Father        Social History:  Marital Status:   [4]  Social History     Tobacco Use     Smoking status: Every Day     Packs/day: 0.50     Types: Cigarettes     Smokeless tobacco: Never   Substance Use Topics     Alcohol use: Not Currently     Alcohol/week: 2.0 standard drinks of alcohol     Types: 2 Cans of beer per week     Comment: moderately     Drug use: No        Medications:    atorvastatin (LIPITOR) 20 MG tablet  gabapentin (NEURONTIN) 100 MG capsule  losartan (COZAAR) 50 MG tablet  metaxalone (SKELAXIN) 800 MG tablet  methylPREDNISolone (MEDROL DOSEPAK) 4 MG tablet therapy pack  oxyCODONE (ROXICODONE) 5 MG tablet  propranolol (INDERAL) 80 MG tablet  Acetaminophen (TYLENOL PO)  albuterol (PROAIR HFA/PROVENTIL HFA/VENTOLIN HFA) 108 (90 Base) MCG/ACT inhaler  fluticasone-salmeterol (ADVAIR HFA) 115-21 MCG/ACT inhaler  MEDS UNK - RECORDS REQUESTED  triamcinolone (KENALOG) 0.5 % external ointment          Review of Systems   Musculoskeletal: Positive for back pain.       Physical Exam   BP: (!) 163/106  Pulse: 81  Temp: 97.6  F (36.4  C)  Resp: 16  Height: 180.3 cm (5' 11\")  Weight: 102.1 kg (225 lb)  SpO2: 97 %      Physical Exam  Vitals and nursing " note reviewed.   Constitutional:       General: He is not in acute distress.     Appearance: Normal appearance. He is obese. He is not toxic-appearing.   HENT:      Head: Normocephalic and atraumatic.   Eyes:      General: No scleral icterus.     Conjunctiva/sclera: Conjunctivae normal.   Cardiovascular:      Rate and Rhythm: Normal rate and regular rhythm.      Pulses: Normal pulses.      Heart sounds: Normal heart sounds.   Pulmonary:      Effort: Pulmonary effort is normal. No respiratory distress.      Breath sounds: Normal breath sounds.   Abdominal:      General: Abdomen is flat.      Palpations: Abdomen is soft.      Tenderness: There is no abdominal tenderness.   Musculoskeletal:         General: No deformity.      Cervical back: Neck supple.      Comments: Tenderness to palpation of the periscapular muscles on the right and the paraspinal muscles on the right side.  There is tenderness to palpation of the thoracic spinous process at the level of about T8.  Upper extremity strength is 5 out of 5 in , wrist extension and flexion, abduction.  He does have pain with internal rotation but strength is about 4-5 on the right compared to the left.  Normal passive range of motion in all directions.  Sensation intact bilaterally of the upper extremities.  Normal range of motion and strength of the bilateral lower extremities.   Skin:     General: Skin is warm.   Neurological:      Mental Status: He is alert.         ED Course              ED Course as of 07/11/23 2155 Tue Jul 11, 2023 2115 CT imaging negative.  Patient improving with Flexeril Toradol and morphine.     Procedures              Critical Care time:  none               Results for orders placed or performed during the hospital encounter of 07/11/23 (from the past 24 hour(s))   [facility]    Narrative    The following orders were created for panel order [facility].  Procedure                               Abnormality         Status                      ---------                               -----------         ------                     Extra Blue Top Tube[[id number]]                              Final result               Extra Red Top Tube[[id number]]                               Final result               Extra Green Top (Lithium...[[id number]]                      Final result               Extra Purple Top Tube[[id number]]                            Final result               Extra Green Top (Lithium...[[id number]]                      Final result                 Please view results for these tests on the individual orders.   Extra Blue Top Tube   Result Value Ref Range    Hold Specimen JIC    Extra Red Top Tube   Result Value Ref Range    Hold Specimen JIC    Extra Green Top (Lithium Heparin) Tube   Result Value Ref Range    Hold Specimen JIC    Extra Purple Top Tube   Result Value Ref Range    Hold Specimen JIC    Extra Green Top (Lithium Heparin) ON ICE   Result Value Ref Range    Hold Specimen JIC    CT Thoracic Spine w/o Contrast    Narrative    PROCEDURE INFORMATION:   Exam: CT Thoracic Spine Without Contrast   Exam date and time: 7/11/2023 8:10 PM   Age: 53 years old   Clinical indication: Pain and injury or trauma; Other: Back injury; Work   related; Sprain or strain; Pain in thoracic spine; With myelopathy; Additional   info: Work injury. Rotational. Pain t7-8. No neurologic deficits     TECHNIQUE:   Imaging protocol: Computed tomography of the thoracic spine without contrast.   Radiation optimization: All CT scans at this facility use at least one of these   dose optimization techniques: automated exposure control; mA and/or kV   adjustment per patient size (includes targeted exams where dose is matched to   clinical indication); or iterative reconstruction.     REPORTING DATA:   Count of CT and Cardiac NM exams in prior 12 months: This patient has received   0 known CTs and 0 known cardiac nuclear medicine studies in the 12 months prior   to  the current study.     COMPARISON:   No relevant prior studies available.     FINDINGS:   Bones/joints: 11 rib-bearing thoracic vertebrae. No subluxation seen. No   vertebral body fracture seen. Schmorl's node at the TL junction. Degenerative   changes lower cervical spine.     Soft tissues: Unremarkable.   Esophagus: Transitional T L-spine. Mild diffuse esophagitis.       Impression    IMPRESSION:   1.   11 rib-bearing thoracic vertebrae. Schmorl's node at the TL junction.   2.   Degenerative changes lower cervical spine.   3.   Mild esophagitis.   4.   If symptoms persist consider follow-up MRI T and L-spine with without   contrast.     THIS DOCUMENT HAS BEEN ELECTRONICALLY SIGNED BY YEN PRUITT MD   CT Lumbar Spine w/o Contrast    Narrative    PROCEDURE INFORMATION:   Exam: CT Lumbar Spine Without Contrast   Exam date and time: 7/11/2023 8:10 PM   Age: 53 years old   Clinical indication: Pain and injury or trauma; Other: Back injury; Work   related; Sprain or strain, lumbar ligaments; Low back pain; Additional info:   Work injury. Pain. ; Low back pain; Trauma and/or suspected fracture;   Mild/moderate trauma; None the following: Spondyloarthropathy, or lumbar x-ray   with questionable finding or inadequate anatomic coverage     TECHNIQUE:   Imaging protocol: Computed tomography of the lumbar spine without contrast.   Radiation optimization: All CT scans at this facility use at least one of these   dose optimization techniques: automated exposure control; mA and/or kV   adjustment per patient size (includes targeted exams where dose is matched to   clinical indication); or iterative reconstruction.     REPORTING DATA:   Count of CT and Cardiac NM exams in prior 12 months: This patient has received   0 known CTs and 0 known cardiac nuclear medicine studies in the 12 months prior   to the current study.     COMPARISON:   No relevant prior studies available.     FINDINGS:   Bones/joints: Ununited right L1  transverse process. Small bridging nearly   bridging osteophyte right SI joint. Degenerative osteoarthritic changes cross   the SI joint bilaterally. Minimal retrolisthesis at L5-S1.   T12-L1: Schmorl's node T12-L1.   L1-L2: No significant disc bulge or herniation. No severe spinal canal   stenosis. No significant neural foraminal narrowing.   L2-L3: No significant disc bulge or herniation. No severe spinal canal   stenosis. No significant neural foraminal narrowing.   L3-L4: No significant disc bulge or herniation. No severe spinal canal   stenosis. No significant neural foraminal narrowing.   L4-L5: Prominent disc bulge L4-L5 with inferolateral extension and   inferolateral stenosis.   L5-S1: Vacuum disc phenomena changes at L5-S1 with combined calcified endplate   osteophytes and disc protrusion at L5-S1 with moderate inferolateral stenotic   changes L5-S1.     Urinary bladder: Moderate urinary distension and thickened urinary bladder   wall.   Vasculature: The aorta demonstrates mild atherosclerotic calcification.   Soft tissues: Unremarkable.       Impression    IMPRESSION:   1.   Mild acquired spinal stenosis L4-L5 L5-S1 with disc protrusions associated   inferolateral recess and foraminal stenotic changes bilaterally most evident   L5-S1 with mild retrolisthesis.   2.   Schmorl's node T12-L1.       THIS DOCUMENT HAS BEEN ELECTRONICALLY SIGNED BY YEN PRUITT MD       Medications   morphine (PF) injection 4 mg (4 mg Intravenous $Given 7/11/23 2001)   ketorolac (TORADOL) injection 10 mg (10 mg Intravenous $Given 7/11/23 2000)   cyclobenzaprine (FLEXERIL) tablet 10 mg (10 mg Oral $Given 7/11/23 2000)       Assessments & Plan (with Medical Decision Making)     I have reviewed the nursing notes.    I have reviewed the findings, diagnosis, plan and need for follow up with the patient.           Medical Decision Making  The patient's presentation was of low complexity (an acute and uncomplicated illness or  injury).    The patient's evaluation involved:  ordering and/or review of 3+ test(s) in this encounter (see separate area of note for details)    The patient's management necessitated moderate risk (prescription drug management including medications given in the ED).        Current Discharge Medication List      START taking these medications    Details   gabapentin (NEURONTIN) 100 MG capsule Take 1 capsule (100 mg) by mouth 4 times daily as needed for neuropathic pain  Qty: 30 capsule, Refills: 0    Comments: Workers comp      methylPREDNISolone (MEDROL DOSEPAK) 4 MG tablet therapy pack Follow Package Directions  Qty: 21 tablet, Refills: 0    Comments: Workers comp      oxyCODONE (ROXICODONE) 5 MG tablet Take 1 tablet (5 mg) by mouth every 6 hours as needed for severe pain  Qty: 20 tablet, Refills: 0    Comments: Workers comp             Final diagnoses:   Acute right-sided thoracic back pain   Back muscle spasm   Etiology not fully clear.  He certainly does not have a thoracic or lumbar spinous process injury at this time.  He does have chronic appearing foraminal stenosis.  We do not have MRI available in the evening here.  He has no new neurologic deficits.  She does have some decreased strength on the right compared to left in part due to pain.  He was given Toradol and Lexapro with some relief of his symptoms.  Discussed CT imaging results with him.  Recommend he follow-up with Dr. JOSE EDUARDO Arguello.  Medications were given including Medrol Dosepak, oxycodone, gabapentin.  He already has a muscle relaxer medication at home.  Discussed how when to use these medications specifically not to mix muscle relaxers gabapentin and oxycodone at the same time and not to use any other drugs or alcohol when on these medications.  Work note was given.    Patient will fill oxycodone in semen.  Other medications sent to [facility].  Changed from "[employer]  We will need to cancel [facility] prescriptions      7/11/2023   GRAND ITASCA  [facility]     Berta Carbone,   07/11/23 2142       Berta Carbone,   07/11/23 [id number]

## 2023-07-12 NOTE — TELEPHONE ENCOUNTER
I see that he was in the emergency department for pain.  Pain can cause blood pressure elevation.  Continue to monitor and take medications as prescribed.

## 2023-07-12 NOTE — DISCHARGE INSTRUCTIONS
Continue your metaxalone which is for muscle spasms.  I have prescribed you a prednisone taper.  This is a steroid.  Do not take ibuprofen, naproxen, Motrin or other anti-inflammatories while you are on this medication.  I have referred you to Dr. JOSE EDUARDO Savage who will help continue with the work-up treatment and follow-up that is necessary from your work injury.  For now avoid lifting more than 10 pounds until you can be reevaluated.  CT imaging did not show any fractures of the bones.  However, if there is any disc or nerve injury this cannot be seen on CT.  This would require an MRI.  Generally speaking back pain will improve on its own in several days up to 4 to 6 weeks depending on the mechanism of injury.  The steroid will help decrease the inflammation in your body.  The muscle relaxer you take at home will help if there is any muscle pain.  I have given you a short-term prescription for an opiate.  Gabapentin is also good nerve pain medication.  Do not use any alcohol or other substances including marijuana or drugs when you are taking these medications as they can interact and be life-threatening.  Do not drive or operate heavy machinery if you are on opiate medication.

## 2023-07-12 NOTE — TELEPHONE ENCOUNTER
Called and spoke with the patient and gave him the below information. He is requesting a 30 day prescription of his medication be sent to [facility]. <edication and pharmacy teed up.   Sarah Strong LPN on 7/12/2023 at 4:26 PM

## 2023-07-12 NOTE — TELEPHONE ENCOUNTER
Called and spoke with the patient. He would like to be worked into see Dinah Michel for a ER follow up and okay with waiting for her return. He was seen in the ER 7/11/2023 and his blood pressure was 163/106 and they were able to get it down before he left. He checked his blood pressure today and it was 153/104.  Sarah Strong LPN on 7/12/2023 at 4:04 PM

## 2023-07-12 NOTE — TELEPHONE ENCOUNTER
Reason for call: Medication or medication refill    Name of medication requested: Lasartin Propranolol Norproxin     Are you out of the medication? yes    What pharmacy do you use? [facility]    Preferred method for responding to this message: Telephone Call    Phone number patient can be reached at: Home number on file [phone] (home)    If we cannot reach you directly, may we leave a detailed response at the number you provided? Yes   pcp gone- saw her in March- needs refills

## 2023-07-17 ENCOUNTER — OFFICE VISIT (OUTPATIENT)
Dept: FAMILY MEDICINE | Facility: OTHER | Age: 53
End: 2023-07-17
Payer: OTHER MISCELLANEOUS

## 2023-07-17 VITALS
RESPIRATION RATE: 17 BRPM | OXYGEN SATURATION: 96 % | WEIGHT: 233 LBS | DIASTOLIC BLOOD PRESSURE: 104 MMHG | BODY MASS INDEX: 32.62 KG/M2 | SYSTOLIC BLOOD PRESSURE: 153 MMHG | HEIGHT: 71 IN | TEMPERATURE: 97.8 F | HEART RATE: 80 BPM

## 2023-07-17 DIAGNOSIS — M99.08 SEGMENTAL DYSFUNCTION OF RIB CAGE: ICD-10-CM

## 2023-07-17 DIAGNOSIS — M99.02 SEGMENTAL DYSFUNCTION OF THORACIC REGION: ICD-10-CM

## 2023-07-17 DIAGNOSIS — M54.6 DORSALGIA OF THORACIC REGION: ICD-10-CM
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
## 2023-07-17 PROCEDURE — 99213 OFFICE O/P EST LOW 20 MIN: CPT | Performed by: CHIROPRACTOR

## 2023-07-17 RX ORDER — LOSARTAN POTASSIUM 50 MG/1
TABLET ORAL
Qty: 135 TABLET | Refills: 0 | Status: SHIPPED | OUTPATIENT
Start: 2023-07-17 | End: 2023-08-09

## 2023-07-17 ASSESSMENT — ASTHMA QUESTIONNAIRES: ACT_TOTALSCORE: 15

## 2023-07-17 ASSESSMENT — PAIN SCALES - GENERAL: PAINLEVEL: EXTREME PAIN (9)

## 2023-07-17 NOTE — TELEPHONE ENCOUNTER
"Patient requesting a 90 day supply.     07/12/23 1631 Ebonie Sullivan, NP Reorder from Order:[id number]       losartan (COZAAR) 50 MG tablet 45 tablet 0 7/12/2023  No   Sig - Route: Take 1.5 tablets (75 mg) by mouth daily - Oral   Sent to pharmacy as: Losartan Potassium 50 MG Oral Tablet (COZAAR)   Class: E-Prescribe       Last Office Visit: 03/27/2023  Future Office visit:         Requested Prescriptions   Pending Prescriptions Disp Refills     losartan (COZAAR) 50 MG tablet [Pharmacy Med Name: LOSARTAN 50MG TABLETS] 135 tablet 0     Sig: TAKE 1 AND 1/2 TABLETS(75 MG) BY MOUTH DAILY       Angiotensin-II Receptors Failed - 7/12/2023  4:34 PM        Failed - Last blood pressure under 140/90 in past 12 months     BP Readings from Last 3 Encounters:   07/17/23 (!) 153/104   07/11/23 (!) 133/98   06/16/23 (!) 173/106                 Passed - Recent (12 mo) or future (30 days) visit within the authorizing provider's specialty     Patient has had an office visit with the authorizing provider or a provider within the authorizing providers department within the previous 12 mos or has a future within next 30 days. See \"Patient Info\" tab in inbasket, or \"Choose Columns\" in Meds & Orders section of the refill encounter.              Passed - Medication is active on med list        Passed - Patient is age 18 or older        Passed - Normal serum creatinine on file in past 12 months     Recent Labs   Lab Test 03/27/23  0929   CR 0.89       Ok to refill medication if creatinine is low          Passed - Normal serum potassium on file in past 12 months     Recent Labs   Lab Test 03/27/23  0929   POTASSIUM 4.7                         Routing refill request to provider for review/approval.    Unable to complete prescription refill per RNMedication Refill Policy.................... Alise Villarreal RN ....................  7/17/2023   4:22 PM          "

## 2023-07-17 NOTE — PROGRESS NOTES
CHIEF COMPLAINT:   Chief Complaint   Patient presents with     Work Comp       HISTORY OF PRESENTING INJURY     Jose Elias is a new patient to me presenting for right-sided mid to upper thoracic pain starting on July 11, 2023 after lifting a heavy hose while on his knees under a trailer.  He used his right arm to hoist the heavy hose upward and felt a pop in his mid to upper thoracic spine.  He is unable to isolate the location of the injury.  He went immediately to the ER where imaging was unremarkable.  He was prescribed oxycodone, naproxen and gabapentin.  He indicates to me that he is not taking this medication today so as I am able to isolate the area of injury.  He does indicate he plans to take the medication later today and mentions he is running low and wants refilled.  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    Jose Elias also has some mild numbness occurring on the tips of the second third and fourth digits of the right hand.  He notes this is intermittent and not concerning to him.  He denies any strength loss but notes significant pain when elevating his right arm, again in the mid to upper right dorsal region.    Oswestry (DARON) Questionnaire        7/17/2023     3:39 PM   OSWESTRY DISABILITY INDEX   Count 10   Sum 21   Oswestry Score (%) 42 %        PAST MEDICAL HISTORY:  Past Medical History:   Diagnosis Date     Hypertension        PAST SURGICAL HISTORY:  Past Surgical History:   Procedure Laterality Date     HERNIA REPAIR Left      KNEE SURGERY Left     ACL repair, patella graft     SEPTOPLASTY N/A 3/13/2018    Procedure: SEPTOPLASTY;  septoplasty, submucosal resection of the turbinates;  Surgeon: Piotr Quiroz MD;  Location: [facility]       ALLERGIES:  Allergies   Allergen Reactions     Penicillins Anaphylaxis     Tolerated cefazolin on 09/16/2015.     Clindamycin Hives       CURRENT MEDICATIONS:  Current  Outpatient Medications   Medication Sig Dispense Refill     Acetaminophen (TYLENOL PO) Take 1,000 mg by mouth daily        albuterol (PROAIR HFA/PROVENTIL HFA/VENTOLIN HFA) 108 (90 Base) MCG/ACT inhaler Inhale 2 puffs into the lungs every 4 hours as needed for shortness of breath or wheezing 18 g 3     atorvastatin (LIPITOR) 20 MG tablet Take 20 mg by mouth At Bedtime       fluticasone-salmeterol (ADVAIR HFA) 115-21 MCG/ACT inhaler Inhale 2 puffs into the lungs 2 times daily 12 g 3     gabapentin (NEURONTIN) 100 MG capsule Take 1 capsule (100 mg) by mouth 4 times daily as needed for neuropathic pain 30 capsule 0     losartan (COZAAR) 50 MG tablet Take 1.5 tablets (75 mg) by mouth daily 45 tablet 0     MEDS UNK - RECORDS REQUESTED For cholesterol       metaxalone (SKELAXIN) 800 MG tablet Take 1 tablet (800 mg) by mouth 3 times daily 90 tablet 4     methylPREDNISolone (MEDROL DOSEPAK) 4 MG tablet therapy pack Follow Package Directions 21 tablet 0     naproxen (NAPROSYN) 500 MG tablet Take 1 tablet (500 mg) by mouth 2 times daily as needed (For pain as needed) 60 tablet 0     oxyCODONE (ROXICODONE) 5 MG tablet Take 1 tablet (5 mg) by mouth every 6 hours as needed for severe pain 20 tablet 0     propranolol (INDERAL) 80 MG tablet Take 0.5 tablets (40 mg) by mouth daily 90 tablet 4     triamcinolone (KENALOG) 0.5 % external ointment Apply 1 g topically 2 times daily 15 g 0       SOCIAL HISTORY:  Social History     Socioeconomic History     Marital status:      Spouse name: Not on file     Number of children: Not on file     Years of education: Not on file     Highest education level: Not on file   Occupational History     Not on file   Tobacco Use     Smoking status: Every Day     Packs/day: 0.50     Types: Cigarettes     Smokeless tobacco: Never   Vaping Use     Vaping Use: Never used   Substance and Sexual Activity     Alcohol use: Not Currently     Alcohol/week: 2.0 standard drinks of alcohol     Types: 2 Cans  "of beer per week     Comment: moderately     Drug use: No     Sexual activity: Yes     Partners: Female   Other Topics Concern     Parent/sibling w/ CABG, MI or angioplasty before 65F 55M? Not Asked   Social History Narrative     Not on file     Social Determinants of Health     Financial Resource Strain: Not on file   Food Insecurity: Not on file   Transportation Needs: Not on file   Physical Activity: Not on file   Stress: Not on file   Social Connections: Not on file   Intimate Partner Violence: Not on file   Housing Stability: Not on file       FAMILY HISTORY:  Family History   Problem Relation Age of Onset     Diabetes Mother      Myocardial Infarction Father        REVIEW OF SYSTEMS:    ROS is unremarkable      PHYSICAL EXAM:   BP (!) 153/104   Pulse 80   Temp 97.8  F (36.6  C) (Tympanic)   Resp 17   Ht 1.803 m (5' 11\")   Wt 105.7 kg (233 lb)   SpO2 96%   BMI 32.50 kg/m   Body mass index is 32.5 kg/m . General Appearance: Appears to be in pain, not well kept.  He is able to AB duct his arms to full motion along with flexion of both arms.  He has adequate strength with his arms at waist level, affected strength with arms elevated.  Prone palpation of the thoracic spine reveals moderate jump sign and spasm at the right thoracic spine, in particular at the levels of T2, T6, and T8.  There is aberrant motion of the adjacent ribs to these levels.  He keeps his neck flexed during this palpation for comfort reasons.          IMPRESSION/PLAN:    Patient sustained segmental joint dysfunction of the thoracic spine and rib cage.  Chiropractic treatment is appropriate for this case and I will consider physical therapy should he not have desirable results.  There is some delay with him getting the claim information to us and therefore I advised him that he may be a delay in getting authorization for our chiropractic team.  I did offer him outside referral but he declined and wanted to stay within our organization " for treatment.  Referral placed.    He did ask about refill of narcotics which I informed him I am unable to prescribe.  He will look into this as I advised him that he will likely need to be seen by provider for this refill.  And, I offered our scheduling team to find him appropriate provider for this evaluation for refills.    Additionally, I issued 2 weeks of no working for 2 reasons: I do not believe he has a mobility of his arm in order to safely drive a semitruck.  And, with his current medication he is a risk with altered mental state.  Therefore I do believe that 2 weeks is appropriate in this case and allows for necessary treatment.  Plan to follow up with him on 7/31/2023.    Total time spent today in chart review/preparation: 7 minutes  Total time spent today in face to face evaluation: 22 minutes  Total time spent today in documentation: 9 minutes.        Franc Newell DC, SONAL, CORNELIUS  Director - Occupational Medicine Department  Diplomate of the American Board of Forensic Professionals  Board Certified - American Board of Independent Medical Examiners  [address] [address]  Phone [phone]  Fax [phone]      4:34 PM 7/17/2023

## 2023-07-19 ENCOUNTER — MYC MEDICAL ADVICE (OUTPATIENT)
Dept: INTERNAL MEDICINE | Facility: OTHER | Age: 53
End: 2023-07-19
Payer: COMMERCIAL

## 2023-07-20 RX ORDER — OXYCODONE HYDROCHLORIDE 5 MG/1
5 TABLET ORAL EVERY 6 HOURS PRN
Qty: 20 TABLET | Refills: 0 | OUTPATIENT
Start: 2023-07-20

## 2023-07-20 NOTE — TELEPHONE ENCOUNTER
Patient suspects the gabapentin is causing disturbing thoughts and he would like to know if he can stop taking it altogether. He would like a work-in appointment with you next week; any day or time will work. Please advise. Ayah May RN on 7/20/2023 at 10:58 AM

## 2023-07-20 NOTE — TELEPHONE ENCOUNTER
Patient notified of below and was transferred to scheduling:    Dinah Michel APRN CNP Jarva, Katie L, RN  Caller: Unspecified (Yesterday, 12:00 AM)  Phone Number: [phone]     Yes he can stop it- ok to work in appointment again as long as it is clear I will not be prescribing narcotics     Ayah May, RN on 7/20/2023 at 2:18 PM

## 2023-08-06 ENCOUNTER — MYC MEDICAL ADVICE (OUTPATIENT)
Dept: INTERNAL MEDICINE | Facility: OTHER | Age: 53
End: 2023-08-06
Payer: COMMERCIAL

## 2023-08-07 ENCOUNTER — HOSPITAL ENCOUNTER (EMERGENCY)
Facility: OTHER | Age: 53
Discharge: HOME | End: 2023-08-07
Attending: FAMILY MEDICINE | Admitting: FAMILY MEDICINE
Payer: COMMERCIAL

## 2023-08-07 ENCOUNTER — APPOINTMENT (OUTPATIENT)
Dept: GENERAL RADIOLOGY | Facility: OTHER | Age: 53
End: 2023-08-07
Attending: FAMILY MEDICINE
Payer: COMMERCIAL

## 2023-08-07 VITALS
BODY MASS INDEX: 32.5 KG/M2 | DIASTOLIC BLOOD PRESSURE: 89 MMHG | TEMPERATURE: 98.2 F | RESPIRATION RATE: 18 BRPM | OXYGEN SATURATION: 97 % | SYSTOLIC BLOOD PRESSURE: 141 MMHG | WEIGHT: 233 LBS | HEART RATE: 71 BPM

## 2023-08-07 DIAGNOSIS — R07.81 RIB PAIN: ICD-10-CM

## 2023-08-07 LAB
ALBUMIN SERPL BCG-MCNC: 4.5 G/DL (ref 3.5–5.2)
ALP SERPL-CCNC: 60 U/L (ref 40–129)
ALT SERPL W P-5'-P-CCNC: 15 U/L (ref 0–70)
ANION GAP SERPL CALCULATED.3IONS-SCNC: 11 MMOL/L (ref 7–15)
AST SERPL W P-5'-P-CCNC: 18 U/L (ref 0–45)
BASOPHILS # BLD AUTO: 0.1 10E3/UL (ref 0–0.2)
BASOPHILS NFR BLD AUTO: 1 %
BILIRUB SERPL-MCNC: 0.5 MG/DL
BUN SERPL-MCNC: 16.5 MG/DL (ref 6–20)
CALCIUM SERPL-MCNC: 9.1 MG/DL (ref 8.6–10)
CHLORIDE SERPL-SCNC: 105 MMOL/L (ref 98–107)
CREAT SERPL-MCNC: 0.86 MG/DL (ref 0.67–1.17)
D DIMER PPP FEU-MCNC: 0.35 UG/ML FEU (ref 0–0.5)
DEPRECATED HCO3 PLAS-SCNC: 21 MMOL/L (ref 22–29)
EOSINOPHIL # BLD AUTO: 0.2 10E3/UL (ref 0–0.7)
EOSINOPHIL NFR BLD AUTO: 2 %
ERYTHROCYTE [DISTWIDTH] IN BLOOD BY AUTOMATED COUNT: 13.7 % (ref 10–15)
GFR SERPL CREATININE-BSD FRML MDRD: >90 ML/MIN/1.73M2
GLUCOSE SERPL-MCNC: 90 MG/DL (ref 70–99)
HCT VFR BLD AUTO: 43.4 % (ref 40–53)
HGB BLD-MCNC: 14.8 G/DL (ref 13.3–17.7)
HOLD SPECIMEN: NORMAL
IMM GRANULOCYTES # BLD: 0 10E3/UL
IMM GRANULOCYTES NFR BLD: 0 %
LYMPHOCYTES # BLD AUTO: 2.4 10E3/UL (ref 0.8–5.3)
LYMPHOCYTES NFR BLD AUTO: 29 %
MCH RBC QN AUTO: 29.8 PG (ref 26.5–33)
MCHC RBC AUTO-ENTMCNC: 34.1 G/DL (ref 31.5–36.5)
MCV RBC AUTO: 88 FL (ref 78–100)
MONOCYTES # BLD AUTO: 0.8 10E3/UL (ref 0–1.3)
MONOCYTES NFR BLD AUTO: 9 %
NEUTROPHILS # BLD AUTO: 5 10E3/UL (ref 1.6–8.3)
NEUTROPHILS NFR BLD AUTO: 59 %
NRBC # BLD AUTO: 0 10E3/UL
NRBC BLD AUTO-RTO: 0 /100
PLATELET # BLD AUTO: 406 10E3/UL (ref 150–450)
POTASSIUM SERPL-SCNC: 3.7 MMOL/L (ref 3.4–5.3)
PROT SERPL-MCNC: 7.3 G/DL (ref 6.4–8.3)
RBC # BLD AUTO: 4.96 10E6/UL (ref 4.4–5.9)
SODIUM SERPL-SCNC: 137 MMOL/L (ref 136–145)
TROPONIN T SERPL HS-MCNC: 11 NG/L
TROPONIN T SERPL HS-MCNC: 11 NG/L
WBC # BLD AUTO: 8.4 10E3/UL (ref 4–11)

## 2023-08-07 PROCEDURE — 85379 FIBRIN DEGRADATION QUANT: CPT | Performed by: INTERNAL MEDICINE

## 2023-08-07 PROCEDURE — 93005 ELECTROCARDIOGRAM TRACING: CPT | Performed by: FAMILY MEDICINE

## 2023-08-07 PROCEDURE — 94640 AIRWAY INHALATION TREATMENT: CPT

## 2023-08-07 PROCEDURE — 96374 THER/PROPH/DIAG INJ IV PUSH: CPT | Mod: XU | Performed by: FAMILY MEDICINE

## 2023-08-07 PROCEDURE — 80053 COMPREHEN METABOLIC PANEL: CPT | Performed by: INTERNAL MEDICINE

## 2023-08-07 PROCEDURE — 999N000157 HC STATISTIC RCP TIME EA 10 MIN

## 2023-08-07 PROCEDURE — 93010 ELECTROCARDIOGRAM REPORT: CPT | Performed by: INTERNAL MEDICINE

## 2023-08-07 PROCEDURE — 99285 EMERGENCY DEPT VISIT HI MDM: CPT | Mod: 25 | Performed by: FAMILY MEDICINE

## 2023-08-07 PROCEDURE — 36415 COLL VENOUS BLD VENIPUNCTURE: CPT | Performed by: INTERNAL MEDICINE

## 2023-08-07 PROCEDURE — 99284 EMERGENCY DEPT VISIT MOD MDM: CPT | Performed by: FAMILY MEDICINE

## 2023-08-07 PROCEDURE — 250N000013 HC RX MED GY IP 250 OP 250 PS 637: Performed by: FAMILY MEDICINE

## 2023-08-07 PROCEDURE — 71046 X-RAY EXAM CHEST 2 VIEWS: CPT

## 2023-08-07 PROCEDURE — 36415 COLL VENOUS BLD VENIPUNCTURE: CPT | Performed by: FAMILY MEDICINE

## 2023-08-07 PROCEDURE — 84484 ASSAY OF TROPONIN QUANT: CPT | Performed by: INTERNAL MEDICINE

## 2023-08-07 PROCEDURE — 85025 COMPLETE CBC W/AUTO DIFF WBC: CPT | Performed by: INTERNAL MEDICINE

## 2023-08-07 PROCEDURE — 250N000011 HC RX IP 250 OP 636: Mod: JZ | Performed by: FAMILY MEDICINE

## 2023-08-07 PROCEDURE — 84484 ASSAY OF TROPONIN QUANT: CPT | Performed by: FAMILY MEDICINE

## 2023-08-07 PROCEDURE — 250N000009 HC RX 250: Performed by: FAMILY MEDICINE

## 2023-08-07 RX ORDER — IPRATROPIUM BROMIDE AND ALBUTEROL SULFATE 2.5; .5 MG/3ML; MG/3ML
3 SOLUTION RESPIRATORY (INHALATION) ONCE
Status: COMPLETED | OUTPATIENT
Start: 2023-08-07 | End: 2023-08-07

## 2023-08-07 RX ORDER — KETOROLAC TROMETHAMINE 15 MG/ML
15 INJECTION, SOLUTION INTRAMUSCULAR; INTRAVENOUS ONCE
Status: COMPLETED | OUTPATIENT
Start: 2023-08-07 | End: 2023-08-07

## 2023-08-07 RX ORDER — PROPRANOLOL HYDROCHLORIDE 40 MG/1
40 TABLET ORAL DAILY
Status: DISCONTINUED | OUTPATIENT
Start: 2023-08-07 | End: 2023-08-07 | Stop reason: HOSPADM

## 2023-08-07 RX ADMIN — KETOROLAC TROMETHAMINE 15 MG: 15 INJECTION, SOLUTION INTRAMUSCULAR; INTRAVENOUS at 19:35

## 2023-08-07 RX ADMIN — LOSARTAN POTASSIUM 75 MG: 50 TABLET, FILM COATED ORAL at 20:31

## 2023-08-07 RX ADMIN — PROPRANOLOL HYDROCHLORIDE 40 MG: 40 TABLET ORAL at 20:31

## 2023-08-07 RX ADMIN — IPRATROPIUM BROMIDE AND ALBUTEROL SULFATE 3 ML: 2.5; .5 SOLUTION RESPIRATORY (INHALATION) at 19:03

## 2023-08-07 ASSESSMENT — ENCOUNTER SYMPTOMS
JOINT SWELLING: 0
ACTIVITY CHANGE: 0
COUGH: 1
FEVER: 0
PALPITATIONS: 0
APPETITE CHANGE: 0
STRIDOR: 0
ABDOMINAL DISTENTION: 0
MYALGIAS: 0
CHILLS: 0
SHORTNESS OF BREATH: 0
BACK PAIN: 1
NECK PAIN: 0
UNEXPECTED WEIGHT CHANGE: 0
WHEEZING: 1
ABDOMINAL PAIN: 0

## 2023-08-07 ASSESSMENT — ACTIVITIES OF DAILY LIVING (ADL)
ADLS_ACUITY_SCORE: 35
ADLS_ACUITY_SCORE: 35

## 2023-08-07 NOTE — TELEPHONE ENCOUNTER
"Spoke with patient and does not know why his lungs hurt, no more or less with inspiration than expiration, BP high 180's systolic with average 150/110, loss of appetite, unable to sleep at night due to intermittent this chest pain.    He is worried that his aorta may be causing his chest pain. Had some old nitroglycerin \"laying around\" from 5 years ago. Stated did not work, educated that it would not work and best thing would have been to go into ED.    No shortness of breath audible during conversation and able to finish entire sentences.     Patient agreeable to go to the ED and will set up an appointment for this week.    Geno Millan RN on 8/7/2023 at 4:47 PM    "

## 2023-08-07 NOTE — ED PROVIDER NOTES
"  History     Chief Complaint   Patient presents with    Chest Pain     HPI  Jose Elias Reaves is a 53 year old male who presents with chest discomfort.  He is a smoker and has a history of asthma.  The air quality has been poor the last several days.  He complains of several different types of chest pain.  Sometimes it is dull and achy it is then sharp and stabbing.  Sometimes he feels like it is hard to find a comfortable position.  It is not associated with nausea, vomiting, shortness of breath.  No leg swelling.    Patient seen by me a few weeks ago for work injury.  He has been back to work.  He does not think this is related to his work injury as it is a different pain.  He had several unrelated questions including skin discoloration of his anterior lower legs after his vein stripping surgery.  He also thought they noted \"buildup\" on his CT that he done which she thought was new.    No fevers or chills.  No leg pain or swelling.    Allergies:  Allergies   Allergen Reactions    Penicillins Anaphylaxis     Tolerated cefazolin on 09/16/2015.    Clindamycin Hives       Problem List:    Patient Active Problem List    Diagnosis Date Noted    Morbid obesity (H) 02/09/2022     Priority: Medium    Post concussion syndrome 03/26/2019     Priority: Medium    BMI 33.0-33.9,adult 03/20/2019     Priority: Medium    Cigarette smoker 03/20/2019     Priority: Medium    Other chronic rhinitis 02/20/2018     Priority: Medium    Chronic sinusitis, unspecified location 02/20/2018     Priority: Medium    Dysfunction of both eustachian tubes 02/20/2018     Priority: Medium    Mild intermittent asthma 02/20/2018     Priority: Medium    Deviated nasal septum 02/20/2018     Priority: Medium    Tobacco use 02/20/2018     Priority: Medium    Hyperlipidemia LDL goal <100 04/18/2017     Priority: Medium    Chronic pain syndrome - Neck 04/18/2017     Priority: Medium    Essential hypertension 03/21/2017     Priority: Medium    Family history " of ischemic heart disease 03/21/2017     Priority: Medium    Family history of diabetes mellitus 03/21/2017     Priority: Medium    Alcohol abuse 05/05/2012     Priority: Medium    Major depressive disorder, recurrent episode, moderate (H) 05/05/2012     Priority: Medium    Personality disorder (H) 05/05/2012     Priority: Medium    Suicidal ideation 05/05/2012     Priority: Medium        Past Medical History:    Past Medical History:   Diagnosis Date    Hypertension        Past Surgical History:    Past Surgical History:   Procedure Laterality Date    HERNIA REPAIR Left     KNEE SURGERY Left     ACL repair, patella graft    SEPTOPLASTY N/A 3/13/2018    Procedure: SEPTOPLASTY;  septoplasty, submucosal resection of the turbinates;  Surgeon: Piotr Quiroz MD;  Location: [facility]       Family History:    Family History   Problem Relation Age of Onset    Diabetes Mother     Myocardial Infarction Father        Social History:  Marital Status:   [4]  Social History     Tobacco Use    Smoking status: Every Day     Packs/day: 0.50     Types: Cigarettes    Smokeless tobacco: Never   Vaping Use    Vaping Use: Never used   Substance Use Topics    Alcohol use: Not Currently     Alcohol/week: 2.0 standard drinks of alcohol     Types: 2 Cans of beer per week     Comment: moderately    Drug use: No        Medications:    Acetaminophen (TYLENOL PO)  albuterol (PROAIR HFA/PROVENTIL HFA/VENTOLIN HFA) 108 (90 Base) MCG/ACT inhaler  atorvastatin (LIPITOR) 20 MG tablet  fluticasone-salmeterol (ADVAIR HFA) 115-21 MCG/ACT inhaler  gabapentin (NEURONTIN) 100 MG capsule  losartan (COZAAR) 50 MG tablet  MEDS UNK - RECORDS REQUESTED  metaxalone (SKELAXIN) 800 MG tablet  methylPREDNISolone (MEDROL DOSEPAK) 4 MG tablet therapy pack  naproxen (NAPROSYN) 500 MG tablet  oxyCODONE (ROXICODONE) 5 MG tablet  propranolol (INDERAL) 80 MG tablet  triamcinolone (KENALOG) 0.5 % external ointment          Review of Systems   Constitutional:   Negative for activity change, appetite change, chills, fever and unexpected weight change.   Respiratory:  Positive for cough and wheezing. Negative for shortness of breath and stridor.    Cardiovascular:  Positive for chest pain. Negative for palpitations and leg swelling.   Gastrointestinal:  Negative for abdominal distention and abdominal pain.   Musculoskeletal:  Positive for back pain. Negative for joint swelling, myalgias and neck pain.   Skin:  Negative for rash.       Physical Exam   BP: (!) 183/99  Pulse: 82  Temp: 98.2  F (36.8  C)  Resp: 18  Weight: 105.7 kg (233 lb)  SpO2: 97 %      Physical Exam  Vitals and nursing note reviewed.   Constitutional:       General: He is not in acute distress.     Appearance: Normal appearance. He is well-developed. He is obese. He is not toxic-appearing.   HENT:      Head: Normocephalic and atraumatic.   Eyes:      General: No scleral icterus.     Conjunctiva/sclera: Conjunctivae normal.   Cardiovascular:      Rate and Rhythm: Normal rate and regular rhythm.      Heart sounds: Normal heart sounds.   Pulmonary:      Effort: Pulmonary effort is normal. No respiratory distress.      Breath sounds: Normal breath sounds. No decreased breath sounds, wheezing, rhonchi or rales.      Comments: There is mild expiratory wheezing.  No increased work of breathing or accessory muscle use.  Chest:      Chest wall: Tenderness present. No mass.   Abdominal:      Palpations: Abdomen is soft.      Tenderness: There is no abdominal tenderness.   Musculoskeletal:         General: No deformity.      Cervical back: Normal range of motion and neck supple.   Skin:     General: Skin is warm.   Neurological:      Mental Status: He is alert.         ED Course              ED Course as of 08/07/23 2047   Mon Aug 07, 2023   1903 Chest pain on and off x4 days. Hx of asthma. smoker   2043 Troponin stable.  Okay to discharge.     Procedures              EKG Interpretation:      Interpreted by Berta [place]  DO Raf  Time reviewed:   Symptoms at time of EKG: chest wall pain   Rhythm: normal sinus   Rate: normal  Axis: normal  Ectopy: none  Conduction: normal  ST Segments/ T Waves: No ST-T wave changes  Q Waves: none  Comparison to prior: Unchanged    Clinical Impression: normal EKG      Critical Care time:  none               Results for orders placed or performed during the hospital encounter of 08/07/23 (from the past 24 hour(s))   CBC with platelets differential    Narrative    The following orders were created for panel order CBC with platelets differential.  Procedure                               Abnormality         Status                     ---------                               -----------         ------                     CBC with platelets and d...[[id number]]                      Final result                 Please view results for these tests on the individual orders.   Troponin T, High Sensitivity   Result Value Ref Range    Troponin T, High Sensitivity 11 <=22 ng/L   Comprehensive metabolic panel   Result Value Ref Range    Sodium 137 136 - 145 mmol/L    Potassium 3.7 3.4 - 5.3 mmol/L    Chloride 105 98 - 107 mmol/L    Carbon Dioxide (CO2) 21 (L) 22 - 29 mmol/L    Anion Gap 11 7 - 15 mmol/L    Urea Nitrogen 16.5 6.0 - 20.0 mg/dL    Creatinine 0.86 0.67 - 1.17 mg/dL    Calcium 9.1 8.6 - 10.0 mg/dL    Glucose 90 70 - 99 mg/dL    Alkaline Phosphatase 60 40 - 129 U/L    AST 18 0 - 45 U/L    ALT 15 0 - 70 U/L    Protein Total 7.3 6.4 - 8.3 g/dL    Albumin 4.5 3.5 - 5.2 g/dL    Bilirubin Total 0.5 <=1.2 mg/dL    GFR Estimate >90 >60 mL/min/1.73m2   D dimer quantitative   Result Value Ref Range    D-Dimer Quantitative 0.35 0.00 - 0.50 ug/mL FEU    Narrative    This D-dimer assay is intended for use in conjunction with a clinical pretest probability assessment model to exclude pulmonary embolism (PE) and deep venous thrombosis (DVT) in outpatients suspected of PE or DVT. The cut-off value is 0.50 ug/mL FEU.    CBC with platelets and differential   Result Value Ref Range    WBC Count 8.4 4.0 - 11.0 10e3/uL    RBC Count 4.96 4.40 - 5.90 10e6/uL    Hemoglobin 14.8 13.3 - 17.7 g/dL    Hematocrit 43.4 40.0 - 53.0 %    MCV 88 78 - 100 fL    MCH 29.8 26.5 - 33.0 pg    MCHC 34.1 31.5 - 36.5 g/dL    RDW 13.7 10.0 - 15.0 %    Platelet Count 406 150 - 450 10e3/uL    % Neutrophils 59 %    % Lymphocytes 29 %    % Monocytes 9 %    % Eosinophils 2 %    % Basophils 1 %    % Immature Granulocytes 0 %    NRBCs per 100 WBC 0 <1 /100    Absolute Neutrophils 5.0 1.6 - 8.3 10e3/uL    Absolute Lymphocytes 2.4 0.8 - 5.3 10e3/uL    Absolute Monocytes 0.8 0.0 - 1.3 10e3/uL    Absolute Eosinophils 0.2 0.0 - 0.7 10e3/uL    Absolute Basophils 0.1 0.0 - 0.2 10e3/uL    Absolute Immature Granulocytes 0.0 <=0.4 10e3/uL    Absolute NRBCs 0.0 10e3/uL   Extra Tube    Narrative    The following orders were created for panel order Extra Tube.  Procedure                               Abnormality         Status                     ---------                               -----------         ------                     Extra Red Top Tube[[id number]]                               Final result                 Please view results for these tests on the individual orders.   Extra Red Top Tube   Result Value Ref Range    Hold Specimen JIC    Troponin T, High Sensitivity   Result Value Ref Range    Troponin T, High Sensitivity 11 <=22 ng/L       Medications   losartan (COZAAR) tablet 75 mg (75 mg Oral $Given 8/7/23 2031)   propranolol (INDERAL) tablet 40 mg (40 mg Oral $Given 8/7/23 2031)   ipratropium - albuterol 0.5 mg/2.5 mg/3 mL (DUONEB) neb solution 3 mL (3 mLs Nebulization $Given 8/7/23 1903)   ketorolac (TORADOL) injection 15 mg (15 mg Intravenous $Given 8/7/23 1935)       Assessments & Plan (with Medical Decision Making)     I have reviewed the nursing notes.    I have reviewed the findings, diagnosis, plan and need for follow up with the patient.            Medical Decision Making  The patient's presentation was of straightforward complexity (a clearly self-limited or minor problem).    The patient's evaluation involved:  ordering and/or review of 3+ test(s) in this encounter (see separate area of note for details)    The patient's management necessitated only low risk treatment.        New Prescriptions    No medications on file       Final diagnoses:   Rib pain   Chest wall pain.  EKG unremarkable.  Troponin negative x2.  Chest x-ray negative.  He is a smoker.  Mild wheezing on exam.  Not significantly improved with a DuoNeb.  He has inhalers available to him at home.  D-dimer negative.  Essentially I do not this represents a cardiac or other acute emergency.  No shortness of breath so with a negative D-dimer no PE suspected.  No trauma or injury.  It could be related to his back pain.  He has had imaging.  He has been able to return to work per follow-up from his Worker's Compensation injury.  He does not think it is related.  Recommend he see his regular doctor.  Narcotics not indicated.  He did not specifically request this but also did not feel like the Tylenol or Toradol was helping.  He has had a history of narcotic use in his early 20s which she state was for a problem he had after falling off a sidewalk.  He did make it sound like he was on Lortab for quite some time now.  I would be concerned about giving narcotics in this situation.  It is not indicated as this is not a new or acute pain.  Recommend he see his regular doctor in the next 1 week for ongoing follow-up.  He does have mild wheezing.  He can continue his inhaler at home.  He recently finished a course of steroid.  There is not appear to be an infiltrate or infection on his chest x-ray imaging.    8/7/2023   [facility] AND \[facility] [place] [facility]\""       Berta Carbone DO  08/07/23 2048

## 2023-08-07 NOTE — LETTER
Jose Elias Reaves was seen and treated in our emergency department on 8/7/2023.  He may return to work on 08/10/2023.       If you have any questions or concerns, please don't hesitate to call.      Berta Carbone, DO

## 2023-08-07 NOTE — ED TRIAGE NOTES
"ED Nursing Triage Note (General)   ________________________________    Jose Elias Reaves is a 53 year old Male that presents to triage via private vehicle with complaints of intermittent chest pain for the past 4 days. Pain is located in patients L) lower side of his ribs.  Patient states over the past couple days he has also been experiencing increased fatigue.  Patient states pain fluctuates from dull pain, to stabbing, to pressure.  Patient states hx of asthma and states, \"my asthma has been really kicking my butt lately\".  Patient denies increase in pain with deep breathing.   Significant symptoms had onset 4 day(s) ago.      PRE HOSPITAL PRIOR LIVING SITUATION Alone      Triage Assessment       Row Name 08/07/23 3709       Triage Assessment (Adult)    Airway WDL WDL       Respiratory WDL    Respiratory WDL WDL       Skin Circulation/Temperature WDL    Skin Circulation/Temperature WDL WDL       Cardiac WDL    Cardiac WDL X;chest pain       Peripheral/Neurovascular WDL    Peripheral Neurovascular WDL WDL       Cognitive/Neuro/Behavioral WDL    Cognitive/Neuro/Behavioral WDL WDL                    "

## 2023-08-08 NOTE — TELEPHONE ENCOUNTER
Spoke with Dinah Michel reviewed ED visit note and results. Ok for patient to have next available appointment for 40 minute slot due to complex needs.     Called scheduling and next available appointment 8/22/23 at 1340.    Patient is agreeable to this however, does not feel that he is well enough with his uncontrolled BP and respiratory issues to go back to work until his appointment on the 22nd with Dinah Michel. Would like an excuse from work letter to apply for short term disability in the meantime, will forward this request to Dinah Michel.    Educated patient on symptoms that he should be seen in the ED such as unrelieved chest pain, heaviness, unable to catch breath after current interventions unsuccessful, etc., verbalized understanding. He will also call each morning to see if there are any cancellations to just address his blood pressure and blood pressure medications.     Geno Millan RN on 8/8/2023 at 9:13 AM

## 2023-08-08 NOTE — DISCHARGE INSTRUCTIONS
I do not know what is causing your pain.  However, your labs are all essentially totally normal.  Your heart enzyme level is totally normal.  Your EKG is normal.  In this situation narcotics are not indicated.  Continue Tylenol and ibuprofen at home to help with symptoms.  I recommend you see your regular doctor for ongoing work-up.  You could consider alternative medication such as Cymbalta, Lyrica or gabapentin to help with pain.  Referral to PT, OT, chiropractic and pain management may also be helpful.

## 2023-08-09 ENCOUNTER — MYC MEDICAL ADVICE (OUTPATIENT)
Dept: INTERNAL MEDICINE | Facility: OTHER | Age: 53
End: 2023-08-09

## 2023-08-09 ENCOUNTER — OFFICE VISIT (OUTPATIENT)
Dept: FAMILY MEDICINE | Facility: OTHER | Age: 53
End: 2023-08-09
Attending: FAMILY MEDICINE
Payer: COMMERCIAL

## 2023-08-09 VITALS
DIASTOLIC BLOOD PRESSURE: 90 MMHG | HEART RATE: 76 BPM | SYSTOLIC BLOOD PRESSURE: 140 MMHG | TEMPERATURE: 96.9 F | WEIGHT: 229.2 LBS | OXYGEN SATURATION: 98 % | BODY MASS INDEX: 31.97 KG/M2 | RESPIRATION RATE: 16 BRPM

## 2023-08-09 DIAGNOSIS — J45.21 MILD INTERMITTENT ASTHMA WITH ACUTE EXACERBATION: ICD-10-CM

## 2023-08-09 DIAGNOSIS — I10 ESSENTIAL HYPERTENSION: Primary | ICD-10-CM

## 2023-08-09 PROBLEM — E66.01 MORBID OBESITY (H): Status: RESOLVED | Noted: 2022-02-09 | Resolved: 2023-08-09

## 2023-08-09 LAB
ATRIAL RATE - MUSE: 68 BPM
DIASTOLIC BLOOD PRESSURE - MUSE: NORMAL MMHG
INTERPRETATION ECG - MUSE: NORMAL
P AXIS - MUSE: 70 DEGREES
PR INTERVAL - MUSE: 152 MS
QRS DURATION - MUSE: 104 MS
QT - MUSE: 430 MS
QTC - MUSE: 457 MS
R AXIS - MUSE: 73 DEGREES
SYSTOLIC BLOOD PRESSURE - MUSE: NORMAL MMHG
T AXIS - MUSE: 50 DEGREES
VENTRICULAR RATE- MUSE: 68 BPM

## 2023-08-09 PROCEDURE — 99214 OFFICE O/P EST MOD 30 MIN: CPT | Performed by: FAMILY MEDICINE

## 2023-08-09 RX ORDER — PROPRANOLOL HYDROCHLORIDE 80 MG/1
80 TABLET ORAL DAILY
Qty: 90 TABLET | Refills: 4 | Status: SHIPPED | OUTPATIENT
Start: 2023-08-09 | End: 2023-08-14

## 2023-08-09 RX ORDER — FLUTICASONE PROPIONATE AND SALMETEROL XINAFOATE 230; 21 UG/1; UG/1
2 AEROSOL, METERED RESPIRATORY (INHALATION) 2 TIMES DAILY
Qty: 12 G | Refills: 11 | Status: SHIPPED | OUTPATIENT
Start: 2023-08-09 | End: 2024-07-10

## 2023-08-09 RX ORDER — LOSARTAN POTASSIUM AND HYDROCHLOROTHIAZIDE 25; 100 MG/1; MG/1
1 TABLET ORAL DAILY
Qty: 90 TABLET | Refills: 4 | Status: SHIPPED | OUTPATIENT
Start: 2023-08-09 | End: 2023-11-15

## 2023-08-09 NOTE — LETTER
August 10, 2023      Jose Elias Reaves  [address] [facility] [address]        To Whom It May Concern:    Jose Elias Reaves  was seen on 8/9/23.  Please excuse him  until 8/22 due to illness.        Sincerely,        Horacio Plasencia MD

## 2023-08-09 NOTE — TELEPHONE ENCOUNTER
Spoke with patient and he is agreeable to see Dr. Plasencia today to address elevated BP and initiate some kind of management for this above his baseline self care and medication.     Geno Millan RN on 8/9/2023 at 8:46 AM

## 2023-08-09 NOTE — PROGRESS NOTES
Assessment & Plan     (I10) Essential hypertension  (primary encounter diagnosis)  Comment: not near goal, so maxed out the losartan, doubled the inderal and added on hydrochlorothiazide. Will need a repeat BMP in 2 weeks or so.   Plan: losartan-hydrochlorothiazide (HYZAAR) 100-25 MG        tablet, propranolol (INDERAL) 80 MG tablet             (J45.21) Mild intermittent asthma with acute exacerbation  Comment: currently wheezing so this might actually be moderate or worse. Will double his adviar and get PFTs.  Plan: fluticasone-salmeterol (ADVAIR-HFA) 230-21         MCG/ACT inhaler, Pulmonary Function Test         ()                          Return in about 2 weeks (around 8/23/2023), or if symptoms worsen or fail to improve.    Horacio Plasencia MD  [facility]    Raúl Moctezuma is a 53 year old, presenting for the following health issues:  RECHECK (Blood pressure)        8/9/2023    11:33 AM   Additional Questions   Roomed by RAUL Akers   Accompanied by Self         8/9/2023    11:33 AM   Patient Reported Additional Medications   Patient reports taking the following new medications N/A         Current Outpatient Medications   Medication    Acetaminophen (TYLENOL PO)    albuterol (PROAIR HFA/PROVENTIL HFA/VENTOLIN HFA) 108 (90 Base) MCG/ACT inhaler    fluticasone-salmeterol (ADVAIR HFA) 115-21 MCG/ACT inhaler    fluticasone-salmeterol (ADVAIR-HFA) 230-21 MCG/ACT inhaler    losartan-hydrochlorothiazide (HYZAAR) 100-25 MG tablet    MEDS UNK - RECORDS REQUESTED    metaxalone (SKELAXIN) 800 MG tablet    naproxen (NAPROSYN) 500 MG tablet    propranolol (INDERAL) 80 MG tablet    triamcinolone (KENALOG) 0.5 % external ointment    atorvastatin (LIPITOR) 20 MG tablet    methylPREDNISolone (MEDROL DOSEPAK) 4 MG tablet therapy pack     No current facility-administered medications for this visit.         HPI   Has had hypertension issues for years.on inderal initially, then off meds for a long  time. 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 Current meds are not effective, was 186/96 this morning.     Has a history asthma, and has tried to stop smoking. Can now only sleep on his right side, not on back, will cough otherwise. Significant rib pain from this. Has oral steroids and albuterol. Is also compliant with his advair.     Current Outpatient Medications   Medication    Acetaminophen (TYLENOL PO)    albuterol (PROAIR HFA/PROVENTIL HFA/VENTOLIN HFA) 108 (90 Base) MCG/ACT inhaler    fluticasone-salmeterol (ADVAIR HFA) 115-21 MCG/ACT inhaler    fluticasone-salmeterol (ADVAIR-HFA) 230-21 MCG/ACT inhaler    losartan-hydrochlorothiazide (HYZAAR) 100-25 MG tablet    MEDS UNK - RECORDS REQUESTED    metaxalone (SKELAXIN) 800 MG tablet    naproxen (NAPROSYN) 500 MG tablet    propranolol (INDERAL) 80 MG tablet    triamcinolone (KENALOG) 0.5 % external ointment    atorvastatin (LIPITOR) 20 MG tablet    methylPREDNISolone (MEDROL DOSEPAK) 4 MG tablet therapy pack     No current facility-administered medications for this visit.               Review of Systems         Objective    BP (!) 156/92   Pulse 76   Temp 96.9  F (36.1  C) (Tympanic)   Resp 16   Wt 104 kg (229 lb 3.2 oz)   SpO2 98%   BMI 31.97 kg/m    Body mass index is 31.97 kg/m .  Physical Exam  Constitutional:       Appearance: Normal appearance.   Cardiovascular:      Rate and Rhythm: Normal rate and regular rhythm.      Pulses: Normal pulses.      Heart sounds: No murmur heard.     No friction rub.   Pulmonary:      Effort: Pulmonary effort is normal. No respiratory distress.      Breath sounds: No stridor. Wheezing present.      Comments: Bilateral late expiratory wheezing  Neurological:      Mental Status: He is alert.   Psychiatric:         Mood and Affect: Mood normal.         Behavior: Behavior normal.

## 2023-08-09 NOTE — NURSING NOTE
"Chief Complaint   Patient presents with    RECHECK     Blood pressure       Initial BP (!) 156/92   Pulse 76   Temp 96.9  F (36.1  C) (Tympanic)   Resp 16   Wt 104 kg (229 lb 3.2 oz)   SpO2 98%   BMI 31.97 kg/m   Estimated body mass index is 31.97 kg/m  as calculated from the following:    Height as of 7/17/23: 1.803 m (5' 11\").    Weight as of this encounter: 104 kg (229 lb 3.2 oz).  Medication Reconciliation: complete    FOOD SECURITY SCREENING QUESTIONS  Hunger Vital Signs:  Within the past 12 months we worried whether our food would run out before we got money to buy more. Never  Within the past 12 months the food we bought just didn't last and we didn't have money to get more. Never  Delphine Burt LPN 8/9/2023 11:38 AM        "

## 2023-08-11 ENCOUNTER — MYC MEDICAL ADVICE (OUTPATIENT)
Dept: INTERNAL MEDICINE | Facility: OTHER | Age: 53
End: 2023-08-11
Payer: COMMERCIAL

## 2023-08-11 DIAGNOSIS — I10 ESSENTIAL HYPERTENSION: Primary | ICD-10-CM

## 2023-08-11 NOTE — TELEPHONE ENCOUNTER
"Called patient to discuss his MyChart message including chest pain, ringing in ears and high blood pressures.      This morning BP was 172/105.  Yesterday best was 163/99.      Ringing in his ears.  The more stress I have at home, the more I have chest pain.      He wasn't impressed by the EKG that they did in ER.  States that they didn't clean his chest off, patches weren't sticking, so they held them down while EKG reading was being performed.      Yes my chest pains do concern me.  But the ringing in my ears concerns me more.  I can't sleep.      I did talk to him for quite some time about ACS symptoms and that if ignored without going into the ER, he could have a heart attack or stroke.      He did say he was listening and he would \"do what he has to do\" this weekend.  He stated that he was just wanting to update us and was wondering if the increase in BP meds that Dr. Plasencia gave him on 8/9 should be taking this long to lower his BP.  I told him that sometimes, there may be more required to lower blood pressure than just pills, and that he should be assessed in the ER.      Patient states understanding of the information that I gave him, although it did not seem that he was going to go into the ER right away.    I did ask him to update us on Monday on whether he went into the ER and what his blood pressures have been.  Patient said he would do this.      Nanette Royal RN on 8/11/2023 at 4:47 PM    "

## 2023-08-14 RX ORDER — PROPRANOLOL HYDROCHLORIDE 120 MG/1
120 CAPSULE, EXTENDED RELEASE ORAL DAILY
Qty: 90 CAPSULE | Refills: 4 | Status: SHIPPED | OUTPATIENT
Start: 2023-08-14 | End: 2024-08-23

## 2023-08-15 ENCOUNTER — HOSPITAL ENCOUNTER (OUTPATIENT)
Dept: RESPIRATORY THERAPY | Facility: OTHER | Age: 53
Discharge: HOME | End: 2023-08-15
Attending: FAMILY MEDICINE | Admitting: FAMILY MEDICINE
Payer: COMMERCIAL

## 2023-08-15 DIAGNOSIS — J45.21 MILD INTERMITTENT ASTHMA WITH ACUTE EXACERBATION: ICD-10-CM

## 2023-08-15 PROCEDURE — 94060 EVALUATION OF WHEEZING: CPT

## 2023-08-15 PROCEDURE — 250N000009 HC RX 250: Performed by: FAMILY MEDICINE

## 2023-08-15 PROCEDURE — 94060 EVALUATION OF WHEEZING: CPT | Mod: 26 | Performed by: INTERNAL MEDICINE

## 2023-08-15 PROCEDURE — 999N000157 HC STATISTIC RCP TIME EA 10 MIN

## 2023-08-15 RX ORDER — ALBUTEROL SULFATE 0.83 MG/ML
2.5 SOLUTION RESPIRATORY (INHALATION) ONCE
Status: COMPLETED | OUTPATIENT
Start: 2023-08-15 | End: 2023-08-15

## 2023-08-15 RX ADMIN — ALBUTEROL SULFATE 2.5 MG: 2.5 SOLUTION RESPIRATORY (INHALATION) at 11:59

## 2023-08-15 NOTE — PROGRESS NOTES
"Pt here for Pre/Post Spirometry PFT test. Pt states that he is always short of breath, chronically hypertensive, and feels dizzy with ringing in ears that is worse than usual. Writer offered to take patient to Emergency Department for evaluation if patient would like to have that. Patient declined intervention and stated \"He will deal with it.\" Post Bronchodilator testing completed and writer offered patient same intervention if patient wanted that. Patient declined once more.     Best Weber, RT    "

## 2023-08-15 NOTE — TELEPHONE ENCOUNTER
Dr. Plasencia Routing Comment:    Have him increase the inderal LA to 120 from 80 milligram daily., I sent in the prescription     Patient update on MyChart.    Nanette Royal RN on 8/15/2023 at 8:56 AM

## 2023-08-21 ASSESSMENT — PATIENT HEALTH QUESTIONNAIRE - PHQ9
SUM OF ALL RESPONSES TO PHQ QUESTIONS 1-9: 2
10. IF YOU CHECKED OFF ANY PROBLEMS, HOW DIFFICULT HAVE THESE PROBLEMS MADE IT FOR YOU TO DO YOUR WORK, TAKE CARE OF THINGS AT HOME, OR GET ALONG WITH OTHER PEOPLE: SOMEWHAT DIFFICULT
SUM OF ALL RESPONSES TO PHQ QUESTIONS 1-9: 2

## 2023-08-22 ENCOUNTER — TELEPHONE (OUTPATIENT)
Dept: INTERNAL MEDICINE | Facility: OTHER | Age: 53
End: 2023-08-22

## 2023-08-22 ENCOUNTER — OFFICE VISIT (OUTPATIENT)
Dept: INTERNAL MEDICINE | Facility: OTHER | Age: 53
End: 2023-08-22
Payer: COMMERCIAL

## 2023-08-22 VITALS
DIASTOLIC BLOOD PRESSURE: 96 MMHG | WEIGHT: 225.38 LBS | HEART RATE: 73 BPM | RESPIRATION RATE: 20 BRPM | BODY MASS INDEX: 31.55 KG/M2 | TEMPERATURE: 98.4 F | OXYGEN SATURATION: 97 % | SYSTOLIC BLOOD PRESSURE: 162 MMHG | HEIGHT: 71 IN

## 2023-08-22 DIAGNOSIS — J40 BRONCHITIS: ICD-10-CM

## 2023-08-22 DIAGNOSIS — R97.20 ELEVATED PROSTATE SPECIFIC ANTIGEN (PSA): ICD-10-CM

## 2023-08-22 DIAGNOSIS — E78.2 MIXED HYPERLIPIDEMIA: ICD-10-CM

## 2023-08-22 DIAGNOSIS — I10 ESSENTIAL HYPERTENSION: Primary | ICD-10-CM

## 2023-08-22 DIAGNOSIS — R73.03 PREDIABETES: ICD-10-CM

## 2023-08-22 DIAGNOSIS — Z12.5 ENCOUNTER FOR SCREENING FOR MALIGNANT NEOPLASM OF PROSTATE: ICD-10-CM

## 2023-08-22 DIAGNOSIS — R07.9 CHEST PAIN, UNSPECIFIED TYPE: ICD-10-CM

## 2023-08-22 DIAGNOSIS — J44.9 CHRONIC OBSTRUCTIVE PULMONARY DISEASE, UNSPECIFIED COPD TYPE (H): ICD-10-CM

## 2023-08-22 PROBLEM — H81.90 VESTIBULAR DYSFUNCTION: Status: RESOLVED | Noted: 2018-12-26 | Resolved: 2023-08-22

## 2023-08-22 PROBLEM — S06.0X1A CONCUSSION WITH BRIEF LOC: Status: RESOLVED | Noted: 2018-12-12 | Resolved: 2023-08-22

## 2023-08-22 PROBLEM — S06.0XAA CLOSED HEAD INJURY WITH CONCUSSION: Status: RESOLVED | Noted: 2018-12-12 | Resolved: 2023-08-22

## 2023-08-22 PROBLEM — G44.309 POST-CONCUSSION HEADACHE: Status: ACTIVE | Noted: 2018-12-26

## 2023-08-22 PROBLEM — J32.9 CHRONIC SINUSITIS, UNSPECIFIED LOCATION: Status: RESOLVED | Noted: 2018-02-20 | Resolved: 2023-08-22

## 2023-08-22 PROBLEM — H69.93 DYSFUNCTION OF BOTH EUSTACHIAN TUBES: Status: RESOLVED | Noted: 2018-02-20 | Resolved: 2023-08-22

## 2023-08-22 PROBLEM — G56.21 IMPINGEMENT OF RIGHT ULNAR NERVE: Status: RESOLVED | Noted: 2020-03-16 | Resolved: 2023-08-22

## 2023-08-22 PROBLEM — F07.81 POST CONCUSSION SYNDROME: Status: RESOLVED | Noted: 2019-03-26 | Resolved: 2023-08-22

## 2023-08-22 PROBLEM — S16.1XXA CERVICAL STRAIN: Status: RESOLVED | Noted: 2019-01-03 | Resolved: 2023-08-22

## 2023-08-22 PROBLEM — H53.9 VISION DISTURBANCE: Status: RESOLVED | Noted: 2018-12-26 | Resolved: 2023-08-22

## 2023-08-22 LAB
ANION GAP SERPL CALCULATED.3IONS-SCNC: 10 MMOL/L (ref 7–15)
BASOPHILS # BLD AUTO: 0.1 10E3/UL (ref 0–0.2)
BASOPHILS NFR BLD AUTO: 1 %
BUN SERPL-MCNC: 16.3 MG/DL (ref 6–20)
CALCIUM SERPL-MCNC: 10 MG/DL (ref 8.6–10)
CHLORIDE SERPL-SCNC: 100 MMOL/L (ref 98–107)
CHOLEST SERPL-MCNC: 219 MG/DL
CREAT SERPL-MCNC: 0.97 MG/DL (ref 0.67–1.17)
DEPRECATED HCO3 PLAS-SCNC: 27 MMOL/L (ref 22–29)
EOSINOPHIL # BLD AUTO: 0.2 10E3/UL (ref 0–0.7)
EOSINOPHIL NFR BLD AUTO: 2 %
ERYTHROCYTE [DISTWIDTH] IN BLOOD BY AUTOMATED COUNT: 13.5 % (ref 10–15)
GFR SERPL CREATININE-BSD FRML MDRD: >90 ML/MIN/1.73M2
GLUCOSE SERPL-MCNC: 112 MG/DL (ref 70–99)
HBA1C MFR BLD: 5.9 % (ref 4–6.2)
HCT VFR BLD AUTO: 46.6 % (ref 40–53)
HDLC SERPL-MCNC: 38 MG/DL
HGB BLD-MCNC: 15.8 G/DL (ref 13.3–17.7)
HOLD SPECIMEN: NORMAL
IMM GRANULOCYTES # BLD: 0 10E3/UL
IMM GRANULOCYTES NFR BLD: 0 %
LDLC SERPL CALC-MCNC: 127 MG/DL
LYMPHOCYTES # BLD AUTO: 2.1 10E3/UL (ref 0.8–5.3)
LYMPHOCYTES NFR BLD AUTO: 21 %
MCH RBC QN AUTO: 29.7 PG (ref 26.5–33)
MCHC RBC AUTO-ENTMCNC: 33.9 G/DL (ref 31.5–36.5)
MCV RBC AUTO: 88 FL (ref 78–100)
MONOCYTES # BLD AUTO: 0.8 10E3/UL (ref 0–1.3)
MONOCYTES NFR BLD AUTO: 8 %
NEUTROPHILS # BLD AUTO: 6.7 10E3/UL (ref 1.6–8.3)
NEUTROPHILS NFR BLD AUTO: 68 %
NONHDLC SERPL-MCNC: 181 MG/DL
NRBC # BLD AUTO: 0 10E3/UL
NRBC BLD AUTO-RTO: 0 /100
PLATELET # BLD AUTO: 432 10E3/UL (ref 150–450)
POTASSIUM SERPL-SCNC: 4.2 MMOL/L (ref 3.4–5.3)
PSA SERPL DL<=0.01 NG/ML-MCNC: 1.26 NG/ML (ref 0–3.5)
RBC # BLD AUTO: 5.32 10E6/UL (ref 4.4–5.9)
SODIUM SERPL-SCNC: 137 MMOL/L (ref 136–145)
TRIGL SERPL-MCNC: 268 MG/DL
WBC # BLD AUTO: 9.7 10E3/UL (ref 4–11)

## 2023-08-22 PROCEDURE — 80061 LIPID PANEL: CPT | Mod: ZL

## 2023-08-22 PROCEDURE — 36415 COLL VENOUS BLD VENIPUNCTURE: CPT | Mod: ZL

## 2023-08-22 PROCEDURE — 83036 HEMOGLOBIN GLYCOSYLATED A1C: CPT | Mod: ZL

## 2023-08-22 PROCEDURE — 84153 ASSAY OF PSA TOTAL: CPT | Mod: ZL

## 2023-08-22 PROCEDURE — 99215 OFFICE O/P EST HI 40 MIN: CPT

## 2023-08-22 PROCEDURE — 85014 HEMATOCRIT: CPT | Mod: ZL

## 2023-08-22 PROCEDURE — G0103 PSA SCREENING: HCPCS | Mod: ZL

## 2023-08-22 PROCEDURE — 80048 BASIC METABOLIC PNL TOTAL CA: CPT | Mod: ZL

## 2023-08-22 RX ORDER — AMLODIPINE BESYLATE 5 MG/1
5 TABLET ORAL DAILY
Qty: 90 TABLET | Refills: 0 | Status: SHIPPED | OUTPATIENT
Start: 2023-08-22 | End: 2023-10-09

## 2023-08-22 RX ORDER — TIOTROPIUM BROMIDE 18 UG/1
18 CAPSULE ORAL; RESPIRATORY (INHALATION) DAILY
Qty: 90 CAPSULE | Refills: 0 | Status: SHIPPED | OUTPATIENT
Start: 2023-08-22 | End: 2023-10-09

## 2023-08-22 RX ORDER — ATORVASTATIN CALCIUM 20 MG/1
20 TABLET, FILM COATED ORAL DAILY
Qty: 90 TABLET | Refills: 0 | Status: SHIPPED | OUTPATIENT
Start: 2023-08-22 | End: 2023-10-09

## 2023-08-22 RX ORDER — AZITHROMYCIN 250 MG/1
TABLET, FILM COATED ORAL
Qty: 6 TABLET | Refills: 0 | Status: SHIPPED | OUTPATIENT
Start: 2023-08-22 | End: 2023-08-27

## 2023-08-22 ASSESSMENT — ENCOUNTER SYMPTOMS
COUGH: 1
NECK PAIN: 1
SHORTNESS OF BREATH: 1
ARTHRALGIAS: 1

## 2023-08-22 ASSESSMENT — PAIN SCALES - GENERAL: PAINLEVEL: EXTREME PAIN (9)

## 2023-08-22 NOTE — NURSING NOTE
"Patient here for follow up on blood pressure. Checking blood pressure daily at home. Remains elevated.    Chief Complaint   Patient presents with    RECHECK     Follow up on blood pressure       Initial BP (!) 178/100   Pulse 73   Temp 98.4  F (36.9  C)   Resp 20   Ht 1.803 m (5' 11\")   Wt 102.2 kg (225 lb 6 oz)   SpO2 97%   BMI 31.43 kg/m   Estimated body mass index is 31.43 kg/m  as calculated from the following:    Height as of this encounter: 1.803 m (5' 11\").    Weight as of this encounter: 102.2 kg (225 lb 6 oz).  Medication Reconciliation: complete    Chief Complaint   Patient presents with    RECHECK     Follow up on blood pressure       Initial BP (!) 178/100   Pulse 73   Temp 98.4  F (36.9  C)   Resp 20   Ht 1.803 m (5' 11\")   Wt 102.2 kg (225 lb 6 oz)   SpO2 97%   BMI 31.43 kg/m   Estimated body mass index is 31.43 kg/m  as calculated from the following:    Height as of this encounter: 1.803 m (5' 11\").    Weight as of this encounter: 102.2 kg (225 lb 6 oz).  Medication Reconciliation: complete    FOOD SECURITY SCREENING QUESTIONS  Hunger Vital Signs:  Within the past 12 months we worried whether our food would run out before we got money to buy more. {food list:887899}  Within the past 12 months the food we bought just didn't last and we didn't have money to get more. {food list:590722}  Bre No LPN 8/22/2023 2:08 PM     Patient declined to answer questions  Bre No LPN on 8/22/2023 at 2:08 PM  Bre No LPN    "

## 2023-08-22 NOTE — PATIENT INSTRUCTIONS
START      - amLODIPine (NORVASC) 5 MG tablet; Take 1 tablet (5 mg) by mouth daily  Dispense: 90 tablet; Refill: 0    2. Chest pain, unspecified type      - CTA Angiogram coronary artery; Future  - Echocardiogram Complete    3. Bronchitis      - Utilize albuterol inhaler- I will sent in     - azithromycin (ZITHROMAX) 250 MG tablet; Take 2 tablets (500 mg) by mouth daily for 1 day, THEN 1 tablet (250 mg) daily for 4 days.  Dispense: 6 tablet; Refill: 0    Start--- Spiriva-- for COPD    4. Mixed hyperlipidemia    - atorvastatin (LIPITOR) 20 MG tablet; Take 1 tablet (20 mg) by mouth daily  Dispense: 90 tablet; Refill: 0

## 2023-08-22 NOTE — PROGRESS NOTES
Assessment & Plan   Jose Elias Reaves is a 53 year old presenting for the following health issues:      ICD-10-CM    1. Essential hypertension  I10 amLODIPine (NORVASC) 5 MG tablet     Basic Metabolic Panel     CBC and Differential     Basic Metabolic Panel     CBC and Differential      2. Chest pain, unspecified type  R07.9 Echocardiogram Exercise Stress     CANCELED: CTA Angiogram coronary artery     CANCELED: Echocardiogram Complete      3. Bronchitis  J40 azithromycin (ZITHROMAX) 250 MG tablet      4. Mixed hyperlipidemia  E78.2 atorvastatin (LIPITOR) 20 MG tablet     Lipid Panel     Lipid Panel     Lipid Profile     Comprehensive metabolic panel      5. Chronic obstructive pulmonary disease, unspecified COPD type (H)  J44.9 tiotropium (SPIRIVA) 18 MCG inhaled capsule      6. Elevated prostate specific antigen (PSA)  R97.20       7. Prediabetes  R73.03 Hemoglobin A1c     Hemoglobin A1c      8. Encounter for screening for malignant neoplasm of prostate  Z12.5 PSA Screen GH     PSA Screen GH          HYPERTENSION - Ongoing. Blood pressures continue to be uncontrolled.  Medication side effects: None. Patient was instructed to take 120 mg ER propranolol instead of 80 mg of propranolol 1 week ago but did not make this change as he did not feel like he was provided with clear instructions. He has currently been taking 80 mg of propranolol and losartan-hydrochlorothiazide combo pill 100/25mg. Blood pressures continue to be 140-170s systolic.   Moving forward- we will start 120 mg of propranolol, continue taking hyzaar, and take 5 mg of amlodipine- follow up in 2 weeks- continue to monitor blood pressures at home.     MIXED HYPERLIPIDEMIA.  Ongoing. LDL is at goal: No. Triglycerides are at goal: No.  Hopefully lifestyle modifications will improve cholesterol levels, otherwise we will need to consider additional medication dose adjustments or medication changes.  Instructed to take statin- follow up in 6 weeks for lipid  recheck- orders have been placed for this.       COPD - Patient has a longstanding history of COPD: Moderate = FeV1 < 79% -50% from recent PFT- he has been started on advair- and has albuterol. He has not been using albuterol. He continues to struggle with coughing and shortness of breath. Has had episodes where he coughs so hard he feels as though he may pass out. Encouraged to comply to inhalers- and to utilize albuterol. Will add spiriva today. Due to ongoing chest pain and cough we will treat for bronchitis with azithromycin.     Unspecified chest pain- He is been in the ER for this- negative troponin, normal EKG- would like further workup on this. He continues to have left sided chest pain- ongoing- We will proceed with stress echocardiogram.     Discussed importance of lowering ASCVD risk score---patient has not been taking his lipitor- Instructed him to start this, quit smoking, A1C has improved to 5.9%    The 10-year ASCVD risk score (Dora FERNANDEZ, et al., 2019) is: 23.4%    Values used to calculate the score:      Age: 53 years      Sex: Male      Is Non- : No      Diabetic: No      Tobacco smoker: Yes      Systolic Blood Pressure: 162 mmHg      Is BP treated: Yes      HDL Cholesterol: 38 mg/dL      Total Cholesterol: 219 mg/dL       Total time: 60 minutes spent on the date of the encounter doing chart review, history and exam, documentation and further activities as noted above.    No follow-ups on file.    ANATOLY Chow CNP  [facility]      Subjective   Manuel is a 53 year old, presenting for the following health issues:      Patient presents to clinic for follow up on hypertension and COPD.     History of Present Illness     Asthma:  He presents for follow up of asthma.  He has some cough, some wheezing, and some shortness of breath.  He is using a relief medication 2-3 times per day. He does not miss any doses of his controller medication throughout the  week. Patient is aware of the following triggers: same as previous visit and exercise or sports. The patient has had a visit to the Emergency Room, Urgent Care or Hospital due to asthma since the last clinic visit. He has been to the Emergency Room or Urgent Care 2 times.He has had a Hospitalization 0 times.    COPD:  He presents for follow up of COPD.  Overall, COPD symptoms are stable since last visit.  He has a lot more than usual fatigue or shortness of breath with exertion and more than usual shortness of breath at rest.  He often coughs and does not have change in sputum. No recent fever. He can walk less than a mile without stopping to rest. He can walk 2 flights of stairs without resting. The patient has had an ED, urgent care, or hospital admission because of COPD since the last visit.     Diabetes:   He presents for follow up of diabetes.    He is not checking blood glucose.        He is concerned about low blood sugar, several less than 70 in the past few weeks.   He is having excessive thirst and weight loss.            Hyperlipidemia:  He presents for follow up of hyperlipidemia.   He is not taking medication to lower cholesterol. He is not having myalgia or other side effects to statin medications.    Hypertension: He presents for follow up of hypertension.  He does check blood pressure  regularly outside of the clinic. Outside blood pressures have been over 140/90. He follows a low salt diet.     Vascular Disease:  He presents for follow up of vascular disease.     He never takes nitroglycerin. He is not taking daily aspirin.    He eats 0-1 servings of fruits and vegetables daily.He consumes 1 sweetened beverage(s) daily.He exercises with enough effort to increase his heart rate 9 or less minutes per day.  He exercises with enough effort to increase his heart rate 3 or less days per week.   He is taking medications regularly.       Hypertension Follow-up      Do you check your blood pressure regularly  "outside of the clinic? Yes     Are you following a low salt diet? Yes    Are your blood pressures ever more than 140 on the top number (systolic) OR more   than 90 on the bottom number (diastolic), for example 140/90? Yes          HENT: Positive for tinnitus.    Respiratory: Positive for cough and shortness of breath.  Review of Systems   Cardiovascular:  Positive for chest pain.   Musculoskeletal: Positive for arthralgias and neck pain.            Objective    BP (!) 162/96   Pulse 73   Temp 98.4  F (36.9  C)   Resp 20   Ht 1.803 m (5' 11\")   Wt 102.2 kg (225 lb 6 oz)   SpO2 97%   BMI 31.43 kg/m    Body mass index is 31.43 kg/m .  Physical Exam  Vitals reviewed.   Constitutional:       General: He is not in acute distress.     Appearance: He is well-developed.   HENT:      Head: Normocephalic and atraumatic.   Eyes:      General: No scleral icterus.     Conjunctiva/sclera: Conjunctivae normal.      Pupils: Pupils are equal, round, and reactive to light.   Neck:      Thyroid: No thyromegaly.   Cardiovascular:      Rate and Rhythm: Normal rate and regular rhythm.      Heart sounds: No murmur heard.  Pulmonary:      Effort: Pulmonary effort is normal. No respiratory distress.      Breath sounds: Normal breath sounds. No wheezing.   Musculoskeletal:      Cervical back: Normal range of motion and neck supple.   Skin:     General: Skin is warm and dry.      Findings: No rash.   Neurological:      Mental Status: He is alert and oriented to person, place, and time.      Cranial Nerves: No cranial nerve deficit.      Coordination: Coordination normal.   Psychiatric:         Mood and Affect: Mood is anxious.         Thought Content: Thought content is paranoid.             Results for orders placed or performed in visit on 08/22/23 (from the past 24 hour(s))   PSA Screen GH   Result Value Ref Range    Prostate Specific Antigen Screen 1.26 0.00 - 3.50 ng/mL    Narrative    This result is obtained using the Roche " Elecsys total PSA method on the narinder [id number] immunoassay analyzer. Results obtained with different assay methods or kits cannot be used interchangeably.   Hemoglobin A1c   Result Value Ref Range    Hemoglobin A1C 5.9 4.0 - 6.2 %   Basic Metabolic Panel   Result Value Ref Range    Sodium 137 136 - 145 mmol/L    Potassium 4.2 3.4 - 5.3 mmol/L    Chloride 100 98 - 107 mmol/L    Carbon Dioxide (CO2) 27 22 - 29 mmol/L    Anion Gap 10 7 - 15 mmol/L    Urea Nitrogen 16.3 6.0 - 20.0 mg/dL    Creatinine 0.97 0.67 - 1.17 mg/dL    Calcium 10.0 8.6 - 10.0 mg/dL    Glucose 112 (H) 70 - 99 mg/dL    GFR Estimate >90 >60 mL/min/1.73m2   CBC and Differential    Narrative    The following orders were created for panel order CBC and Differential.  Procedure                               Abnormality         Status                     ---------                               -----------         ------                     CBC with platelets and d...[[id number]]                      Final result                 Please view results for these tests on the individual orders.   Extra Tube    Narrative    The following orders were created for panel order Extra Tube.  Procedure                               Abnormality         Status                     ---------                               -----------         ------                     Extra Serum Separator Tu...[[id number]]                      Final result                 Please view results for these tests on the individual orders.   CBC with platelets and differential   Result Value Ref Range    WBC Count 9.7 4.0 - 11.0 10e3/uL    RBC Count 5.32 4.40 - 5.90 10e6/uL    Hemoglobin 15.8 13.3 - 17.7 g/dL    Hematocrit 46.6 40.0 - 53.0 %    MCV 88 78 - 100 fL    MCH 29.7 26.5 - 33.0 pg    MCHC 33.9 31.5 - 36.5 g/dL    RDW 13.5 10.0 - 15.0 %    Platelet Count 432 150 - 450 10e3/uL    % Neutrophils 68 %    % Lymphocytes 21 %    % Monocytes 8 %    % Eosinophils 2 %    % Basophils 1 %    % Immature  Granulocytes 0 %    NRBCs per 100 WBC 0 <1 /100    Absolute Neutrophils 6.7 1.6 - 8.3 10e3/uL    Absolute Lymphocytes 2.1 0.8 - 5.3 10e3/uL    Absolute Monocytes 0.8 0.0 - 1.3 10e3/uL    Absolute Eosinophils 0.2 0.0 - 0.7 10e3/uL    Absolute Basophils 0.1 0.0 - 0.2 10e3/uL    Absolute Immature Granulocytes 0.0 <=0.4 10e3/uL    Absolute NRBCs 0.0 10e3/uL   Extra Serum Separator Tube (SST)   Result Value Ref Range    Hold Specimen [facility]    Lipid Panel   Result Value Ref Range    Cholesterol 219 (H) <200 mg/dL    Triglycerides 268 (H) <150 mg/dL    Direct Measure HDL 38 (L) >=40 mg/dL    LDL Cholesterol Calculated 127 (H) <=100 mg/dL    Non HDL Cholesterol 181 (H) <130 mg/dL    Narrative    Cholesterol  Desirable:  <200 mg/dL    Triglycerides  Normal:  Less than 150 mg/dL  Borderline High:  150-199 mg/dL  High:  200-499 mg/dL  Very High:  Greater than or equal to 500 mg/dL    Direct Measure HDL  Female:  Greater than or equal to 50 mg/dL   Male:  Greater than or equal to 40 mg/dL    LDL Cholesterol  Desirable:  <100mg/dL  Above Desirable:  100-129 mg/dL   Borderline High:  130-159 mg/dL   High:  160-189 mg/dL   Very High:  >= 190 mg/dL    Non HDL Cholesterol  Desirable:  130 mg/dL  Above Desirable:  130-159 mg/dL  Borderline High:  160-189 mg/dL  High:  190-219 mg/dL  Very High:  Greater than or equal to 220 mg/dL

## 2023-08-22 NOTE — LETTER
August 22, 2023      Jose Elias Reaves  [address] [address]        To Whom It May Concern:    Jose Elias Reaves was seen in our clinic. He is proceeding with workup testing for medical conditions over the next 2 weeks.         Sincerely,        ANATOLY Chow CNP

## 2023-08-22 NOTE — TELEPHONE ENCOUNTER
Representative with [employer] (short term disability) called and requested a call back regarding when [facility] recommenced the patient to stop working.      Iesha Valentin on 8/22/2023 at 1:53 PM

## 2023-08-23 NOTE — TELEPHONE ENCOUNTER
Contacted [employer] and Jose Elias. Both advised of message.  Bre No LPN on 8/23/2023 at 3:58 PM

## 2023-08-25 DIAGNOSIS — G89.4 CHRONIC PAIN SYNDROME: ICD-10-CM

## 2023-08-30 ENCOUNTER — TELEPHONE (OUTPATIENT)
Dept: FAMILY MEDICINE | Facility: OTHER | Age: 53
End: 2023-08-30
Payer: COMMERCIAL

## 2023-08-30 RX ORDER — NAPROXEN 500 MG/1
TABLET ORAL
Qty: 90 TABLET | Refills: 1 | Status: SHIPPED | OUTPATIENT
Start: 2023-08-30 | End: 2023-12-15

## 2023-08-30 NOTE — TELEPHONE ENCOUNTER
"Spoke with patient regarding his Disability assessment appointment with Dr. Newell on 9/6/2023, I notified the patient that this appointment is not covered by insurance and that a $790 fee will be due at the time of check in for the appointment patient stated \"I can't afford that and you can go ahead and cancel my appointment, I confirmed with patient that the appointment will be canceled at his request.  "

## 2023-09-07 ENCOUNTER — MYC MEDICAL ADVICE (OUTPATIENT)
Dept: INTERNAL MEDICINE | Facility: OTHER | Age: 53
End: 2023-09-07
Payer: COMMERCIAL

## 2023-09-11 ENCOUNTER — OFFICE VISIT (OUTPATIENT)
Dept: INTERNAL MEDICINE | Facility: OTHER | Age: 53
End: 2023-09-11
Payer: COMMERCIAL

## 2023-09-11 VITALS
SYSTOLIC BLOOD PRESSURE: 126 MMHG | BODY MASS INDEX: 30.96 KG/M2 | OXYGEN SATURATION: 97 % | TEMPERATURE: 98.3 F | DIASTOLIC BLOOD PRESSURE: 74 MMHG | WEIGHT: 221.13 LBS | HEART RATE: 67 BPM | HEIGHT: 71 IN

## 2023-09-11 DIAGNOSIS — H93.13 TINNITUS, BILATERAL: ICD-10-CM

## 2023-09-11 DIAGNOSIS — F17.200 NICOTINE DEPENDENCE, UNCOMPLICATED, UNSPECIFIED NICOTINE PRODUCT TYPE: ICD-10-CM

## 2023-09-11 DIAGNOSIS — J44.9 CHRONIC OBSTRUCTIVE PULMONARY DISEASE, UNSPECIFIED COPD TYPE (H): ICD-10-CM

## 2023-09-11 DIAGNOSIS — J45.909 UNCOMPLICATED ASTHMA, UNSPECIFIED ASTHMA SEVERITY, UNSPECIFIED WHETHER PERSISTENT: ICD-10-CM

## 2023-09-11 DIAGNOSIS — F60.9 PERSONALITY DISORDER (H): Primary | ICD-10-CM

## 2023-09-11 DIAGNOSIS — Z23 NEED FOR VACCINATION AGAINST STREPTOCOCCUS PNEUMONIAE USING PNEUMOCOCCAL CONJUGATE VACCINE 13: ICD-10-CM

## 2023-09-11 PROCEDURE — 90471 IMMUNIZATION ADMIN: CPT

## 2023-09-11 PROCEDURE — 99214 OFFICE O/P EST MOD 30 MIN: CPT | Mod: 25

## 2023-09-11 PROCEDURE — 90732 PPSV23 VACC 2 YRS+ SUBQ/IM: CPT

## 2023-09-11 RX ORDER — FLUTICASONE PROPIONATE AND SALMETEROL 250; 50 UG/1; UG/1
1 POWDER RESPIRATORY (INHALATION) EVERY 12 HOURS
Qty: 60 EACH | Refills: 11 | Status: SHIPPED | OUTPATIENT
Start: 2023-09-11 | End: 2023-10-09 | Stop reason: DRUGHIGH

## 2023-09-11 RX ORDER — LAMOTRIGINE 25 MG/1
TABLET ORAL
Qty: 187 TABLET | Refills: 0 | Status: SHIPPED | OUTPATIENT
Start: 2023-09-11 | End: 2023-10-09

## 2023-09-11 RX ORDER — ALBUTEROL SULFATE 90 UG/1
2 AEROSOL, METERED RESPIRATORY (INHALATION) EVERY 4 HOURS PRN
Qty: 18 G | Refills: 11 | Status: SHIPPED | OUTPATIENT
Start: 2023-09-11

## 2023-09-11 RX ORDER — NICOTINE 21 MG/24HR
1 PATCH, TRANSDERMAL 24 HOURS TRANSDERMAL EVERY 24 HOURS
Qty: 14 PATCH | Refills: 0 | Status: SHIPPED | OUTPATIENT
Start: 2023-10-23 | End: 2023-11-06

## 2023-09-11 RX ORDER — NICOTINE 21 MG/24HR
1 PATCH, TRANSDERMAL 24 HOURS TRANSDERMAL EVERY 24 HOURS
Qty: 42 PATCH | Refills: 0 | Status: SHIPPED | OUTPATIENT
Start: 2023-09-11 | End: 2023-10-23

## 2023-09-11 ASSESSMENT — ENCOUNTER SYMPTOMS
NERVOUS/ANXIOUS: 1
COUGH: 1
ARTHRALGIAS: 1
SHORTNESS OF BREATH: 1
NECK PAIN: 1

## 2023-09-11 ASSESSMENT — PAIN SCALES - GENERAL: PAINLEVEL: NO PAIN (0)

## 2023-09-11 NOTE — LETTER
September 11, 2023      Jose Elias Reaves  [address] [address]        To Whom It May Concern:    Jose Elias Reaves  was seen on 9/11/23. He is scheduled for a cardiac stress test on 9/22/23.         Sincerely,        ANATOLY Chow CNP

## 2023-09-11 NOTE — PATIENT INSTRUCTIONS
Start Lamictal 25 mg daily for 7 days, may increase to 50 mg daily after 7 days.       New increased Advair dose inhaler sent to pharmacy      Continue taking blood pressure medications.                Nicotine Transdermal System   Habitrol, Nicoderm C-Q    Uses  For quitting smoking.    Instructions  DO NOT take this medicine by mouth.    Avoid placing the patch near the breast.    Remove the patch after 24 hours.    Keep the medicine at room temperature. Avoid heat and direct light.    This patch should not be cut.    Wash your hands before and after handling this medicine.    Remove old patch before applying new one. Change the location of the new patch.    If you have vivid dreams or trouble sleeping, you may remove the patch before going to sleep.    Ask your doctor or pharmacist about locations on your body where this patch can be used.    Remove the plastic liner that protects the sticky side of the patch before applying to the skin.    Be sure the area of skin is clean and dry before putting on a new patch.    Apply the patch to a clean, dry, hairless area.    Press the patch firmly for a few seconds to make sure it stays in place.    After removing the patch, fold it together and discard it out of reach of children and pets.    Please ask your doctor or pharmacist how you can safely dispose of used patches.    If the skin under the patch becomes irritated, remove the patch. Do not apply a new patch to the area until the skin feels better.    To avoid irritating your skin, use a different location for a new patch.    Apply the patch only to normal looking skin. Avoid areas of the skin that are red, have scrapes, or damaged.    If the patch falls off, apply a new a patch on a different location of the body.    Please tell your doctor and pharmacist about all the medicines you take. Include both prescription and over-the-counter medicines. Also tell them about any vitamins, herbal medicines, or  anything else you take for your health.    If you need to stop this medicine, your doctor may wish to gradually reduce the dosage before stopping.    Do not use more than 1 patch at any one time.    Cautions  Tell your doctor and pharmacist if you ever had an allergic reaction to a medicine. Symptoms of an allergic reaction can include trouble breathing, skin rash, itching, swelling, or severe dizziness.    Do not use the medication any more than instructed.    Avoid smoking while on this medicine. Smoking may increase your risk for stroke, heart attack, blood clots, high blood pressure, and other diseases of the heart and blood vessels.    Tell the doctor or pharmacist if you are pregnant, planning to be pregnant, or breastfeeding.    Ask your pharmacist if this medicine can interact with any of your other medicines. Be sure to tell them about all the medicines you take.    Please tell all your doctors and dentists that you are on this medicine before they provide care.    Side Effects  The following is a list of some common side effects from this medicine. Please speak with your doctor about what you should do if you experience these or other side effects.    skin irritation where medicine is applied    If you have any of the following side effects, you may be getting too much medicine. Please contact your doctor to let them know about these side effects.    diarrhea  dizziness  nausea  rapid heartbeat  vomiting    A few people may have an allergic reaction to this medicine. Symptoms can include difficulty breathing, skin rash, itching, swelling, or severe dizziness. If you notice any of these symptoms, seek medical help quickly.    Extra  Please speak with your doctor, nurse, or pharmacist if you have any questions about this medicine.      [email or web address]  IMPORTANT NOTE: This document tells you briefly how to take your medicine, but it does not tell you all there is to know about  it. Your doctor or pharmacist may give you other documents about your medicine. Please talk to them if you have any questions. Always follow their advice. There is a more complete description of this medicine available in English. Scan this code on your smartphone or tablet or use the web address below. You can also ask your pharmacist for a printout. If you have any questions, please ask your pharmacist.   2021 [employer].      1506-6420 [employer]. All rights reserved. This information is not intended as a substitute for professional medical care. Always follow your healthcare professional's instructions.

## 2023-09-11 NOTE — PROGRESS NOTES
Assessment & Plan   Jose Elias Reaves is a 53 year old presenting for the following health issues:      ICD-10-CM    1. Personality disorder (H)  F60.9 lamoTRIgine (LAMICTAL) 25 MG tablet      2. Tinnitus, bilateral  H93.13 Adult ENT  Referral      3. Need for vaccination against Streptococcus pneumoniae using pneumococcal conjugate vaccine 13  Z23 PNEUMOCOCCAL POLYSACCHARIDE PPV23 (PNEUMOVAX 23)      4. Nicotine dependence, uncomplicated, unspecified nicotine product type  F17.200 [place] Quit Partner Referral     nicotine (NICODERM CQ) 21 MG/24HR 24 hr patch     nicotine (NICODERM CQ) 14 MG/24HR 24 hr patch     nicotine (NICODERM CQ) 7 MG/24HR 24 hr patch      5. Uncomplicated asthma, unspecified asthma severity, unspecified whether persistent  J45.909 albuterol (PROAIR HFA/PROVENTIL HFA/VENTOLIN HFA) 108 (90 Base) MCG/ACT inhaler      6. Chronic obstructive pulmonary disease, unspecified COPD type (H)  J44.9 fluticasone-salmeterol (ADVAIR) 250-50 MCG/ACT inhaler          HYPERTENSION - Ongoing. Blood pressure is currently well controlled.  Medication side effects: None. Denies syncope or presyncope.   No changes for now. Continue Hyzaar, propanolol.   Medication list reviewed/updated. Refills completed as needed.      COPD - Patient has a longstanding history of COPD: Moderate = FeV1 < 79% -50%. Patient has been doing reasonably well overall noting SOB and continues on Advair, Spiriva, albuterol medication regimen without adverse reactions or side effects.  Instructed patient to increase albuterol use as he is only using this twice a day.  We will also increase his Advair dose.    ANXIETY: Patient has a long history of depression of severe severity requiring medication for control.  Recent symptoms include: insomnia, anxiety, irritablility.  Discussed in depth medication options.  Encouraged him to meet with mental health provider that does medication management in this.  Patient declines this and would like  "to try medication prescribed by PCP.  We will trial Lamictal 25 mg daily for 7 days, may increase to 50 mg daily if well-tolerated.  We will follow-up in 6 weeks.    Continues to struggle with tinnitus-he states that he has had to follow-up with ENT in the past and has had to have a tympanostomy tubes placed.  Physical examination shows impacted cerumen bilaterally, ear flush was provided by nurse.  ENT referral was placed.      Return in about 6 weeks (around 10/23/2023).    ANATOLY Chow [facility]      Raúl Jackson is a 53 year old, presenting for the following health issues:    Patient presents to clinic for multiple issues.  Follow-up on blood pressure, currently well controlled in clinic today.  Patient states that he continues to get short of breath, has ongoing fatigue, increased anxiety, increased stress.  Continues to have ongoing unspecified chest pain, has had EKG and lab work with negative findings in the past, has a stress test scheduled next week. His anxiety has been quite bad recently, he states that he has been on multiple mental health medications, including Seroquel, gabapentin.  States that he did not do well with these medications-but is interested in trying a new medication.      Anxiety  This is a chronic problem. The current episode started more than 1 month ago. Associated symptoms include arthralgias, chest pain, coughing and neck pain.                Review of Systems   HENT:  Positive for tinnitus.    Respiratory:  Positive for cough and shortness of breath.    Cardiovascular:  Positive for chest pain.   Musculoskeletal:  Positive for arthralgias and neck pain.   Psychiatric/Behavioral:  The patient is nervous/anxious.             Objective    /74   Pulse 67   Temp 98.3  F (36.8  C)   Ht 1.803 m (5' 11\")   Wt 100.3 kg (221 lb 2 oz)   SpO2 97%   BMI 30.84 kg/m    Body mass index is 30.84 kg/m .  Physical Exam  Vitals reviewed. "   Constitutional:       General: He is not in acute distress.     Appearance: He is well-developed.   HENT:      Head: Normocephalic and atraumatic.      Right Ear: There is impacted cerumen.      Left Ear: There is impacted cerumen.   Eyes:      General: No scleral icterus.     Conjunctiva/sclera: Conjunctivae normal.      Pupils: Pupils are equal, round, and reactive to light.   Neck:      Thyroid: No thyromegaly.   Cardiovascular:      Rate and Rhythm: Normal rate and regular rhythm.      Heart sounds: No murmur heard.  Pulmonary:      Effort: Pulmonary effort is normal. No respiratory distress.      Breath sounds: Normal breath sounds. No wheezing.   Musculoskeletal:      Cervical back: Normal range of motion and neck supple.   Skin:     General: Skin is warm and dry.      Findings: No rash.   Neurological:      Mental Status: He is alert and oriented to person, place, and time.      Cranial Nerves: No cranial nerve deficit.      Coordination: Coordination normal.   Psychiatric:         Mood and Affect: Mood is anxious.         Thought Content: Thought content is paranoid.

## 2023-09-11 NOTE — NURSING NOTE
"Patient here for blood pressure check and anxiety. Refused to answer questionnaires  Bre No LPN on 9/11/2023 at 12:05 PM       Chief Complaint   Patient presents with    Hypertension    Anxiety     Declined to answer questionnaires       Initial /74   Pulse 67   Temp 98.3  F (36.8  C)   Ht 1.803 m (5' 11\")   Wt 100.3 kg (221 lb 2 oz)   SpO2 97%   BMI 30.84 kg/m   Estimated body mass index is 30.84 kg/m  as calculated from the following:    Height as of this encounter: 1.803 m (5' 11\").    Weight as of this encounter: 100.3 kg (221 lb 2 oz).  Medication Reconciliation: complete    FOOD SECURITY SCREENING QUESTIONS  Hunger Vital Signs:  Within the past 12 months we worried whether our food would run out before we got money to buy more. Never  Within the past 12 months the food we bought just didn't last and we didn't have money to get more. Never  Bre No LPN 9/11/2023 12:05 PM   "

## 2023-09-14 RX ORDER — FLUTICASONE PROPIONATE AND SALMETEROL 250; 50 UG/1; UG/1
1 POWDER RESPIRATORY (INHALATION) EVERY 12 HOURS
OUTPATIENT
Start: 2023-09-14

## 2023-09-14 NOTE — TELEPHONE ENCOUNTER
[facility] sent Rx request for the following:    FLUTICASONE/SALM DISK 250/50MCG 60S   Last Prescription Date:   9/11/23  Last Fill Qty/Refills:         60, R-11    Last Office Visit:              9/11/23   Future Office visit:           10/9/23    Renee Erazo RN on 9/14/2023 at 11:37 AM

## 2023-09-18 ENCOUNTER — TELEPHONE (OUTPATIENT)
Dept: CARDIOLOGY | Facility: OTHER | Age: 53
End: 2023-09-18
Payer: COMMERCIAL

## 2023-09-18 NOTE — TELEPHONE ENCOUNTER
Patient verified .  Reminder call for stress test with instructions given.  Patient verbalized understanding.  Instructed to hold beta blocker   Patient states he understands and agrees

## 2023-09-20 ENCOUNTER — HOSPITAL ENCOUNTER (OUTPATIENT)
Dept: CARDIOLOGY | Facility: OTHER | Age: 53
Discharge: HOME | End: 2023-09-20
Payer: COMMERCIAL

## 2023-09-20 ENCOUNTER — MYC MEDICAL ADVICE (OUTPATIENT)
Dept: INTERNAL MEDICINE | Facility: OTHER | Age: 53
End: 2023-09-20

## 2023-09-20 VITALS — WEIGHT: 221 LBS | BODY MASS INDEX: 30.94 KG/M2 | HEIGHT: 71 IN

## 2023-09-20 DIAGNOSIS — J45.20 MILD INTERMITTENT ASTHMA WITHOUT COMPLICATION: ICD-10-CM

## 2023-09-20 DIAGNOSIS — J44.9 CHRONIC OBSTRUCTIVE PULMONARY DISEASE, UNSPECIFIED COPD TYPE (H): Primary | ICD-10-CM

## 2023-09-20 DIAGNOSIS — J22 LOWER RESPIRATORY INFECTION (E.G., BRONCHITIS, PNEUMONIA, PNEUMONITIS, PULMONITIS): ICD-10-CM

## 2023-09-20 DIAGNOSIS — R07.9 CHEST PAIN, UNSPECIFIED TYPE: ICD-10-CM

## 2023-09-20 PROCEDURE — 93016 CV STRESS TEST SUPVJ ONLY: CPT | Performed by: STUDENT IN AN ORGANIZED HEALTH CARE EDUCATION/TRAINING PROGRAM

## 2023-09-20 PROCEDURE — 93321 DOPPLER ECHO F-UP/LMTD STD: CPT | Mod: 26 | Performed by: INTERNAL MEDICINE

## 2023-09-20 PROCEDURE — 255N000002 HC RX 255 OP 636

## 2023-09-20 PROCEDURE — 93325 DOPPLER ECHO COLOR FLOW MAPG: CPT | Mod: TC

## 2023-09-20 PROCEDURE — 93325 DOPPLER ECHO COLOR FLOW MAPG: CPT | Mod: 26 | Performed by: INTERNAL MEDICINE

## 2023-09-20 PROCEDURE — 93017 CV STRESS TEST TRACING ONLY: CPT

## 2023-09-20 PROCEDURE — 93350 STRESS TTE ONLY: CPT | Mod: 26 | Performed by: INTERNAL MEDICINE

## 2023-09-20 PROCEDURE — 93018 CV STRESS TEST I&R ONLY: CPT | Performed by: STUDENT IN AN ORGANIZED HEALTH CARE EDUCATION/TRAINING PROGRAM

## 2023-09-20 RX ADMIN — PERFLUTREN 5 ML: 6.52 INJECTION, SUSPENSION INTRAVENOUS at 15:27

## 2023-09-20 NOTE — LETTER
September 27, 2023      Jose Elias Reaves  [address] [address]        To Whom It May Concern:    Please excuse Jose Elias Reaves from work through 10/9/23 as he continues to recover from respiratory illness and his symptoms still limit him from safely driving at this time.  He has a follow up with me on 10/9 at which time I hope to release him back to work without restrictions.          Sincerely,        ANATOLY Chow CNP

## 2023-09-20 NOTE — LETTER
September 27, 2023      Jose Elias Reaves  [address] [address]        To Whom It May Concern:      Please excuse Jose Elias Reaves from work through 10/9/23 as he continues to recover from respiratory illness and his symptoms still limit him from safely driving at this time.  He has a follow up with me on 10/9 at which time I hope to release him back to work without restrictions.        Sincerely,        ANATOLY Chow CNP

## 2023-09-20 NOTE — PROGRESS NOTES
1400 The patient arrived for a stress echo.  The procedure, risks and benefits were discussed and the consent was signed.  The patient was prepped for the stress test, and an IV was placed per procedure.  The echo sonographer did the initial images with definity for image enhancement.   Dr. Thompson arrived, and the patient biked 9 minutes and 58 seconds.  The patient developed back pain and the test was aborted prior to target heart rate.  Stress images were completed and the IV was removed per procedure.  The patient was released in stable condition.  The patient was instructed that the ordering MD will call with results in one to two days.  If you have not received your results within 3 days please call the ordering provider.  Please see the chart for complete test results.

## 2023-09-25 ENCOUNTER — MYC MEDICAL ADVICE (OUTPATIENT)
Dept: INTERNAL MEDICINE | Facility: OTHER | Age: 53
End: 2023-09-25
Payer: COMMERCIAL

## 2023-09-25 NOTE — TELEPHONE ENCOUNTER
Toptal message sent 9/25:    I sent you a message last week and I still haven't heard back from you.  Would you please respond to it.  Thank you very much for your time and effort Jose Elias Reaves       Patient updated in other Toptal encounter.  Re-routed to PCP with high priority for return to clinic on 9/26.    Nanette Royal RN on 9/25/2023 at 10:48 AM

## 2023-09-27 RX ORDER — BENZONATATE 100 MG/1
100 CAPSULE ORAL 3 TIMES DAILY PRN
Qty: 90 CAPSULE | Refills: 1 | Status: SHIPPED | OUTPATIENT
Start: 2023-09-27 | End: 2023-11-15

## 2023-09-27 RX ORDER — PREDNISONE 20 MG/1
TABLET ORAL
Qty: 20 TABLET | Refills: 0 | Status: SHIPPED | OUTPATIENT
Start: 2023-09-27 | End: 2023-10-23

## 2023-09-27 RX ORDER — PREDNISONE 20 MG/1
20 TABLET ORAL DAILY
Qty: 5 TABLET | Refills: 0 | Status: CANCELLED | OUTPATIENT
Start: 2023-09-27

## 2023-09-27 NOTE — TELEPHONE ENCOUNTER
Dinah:    Patient needs letter for work today so he doesn't lose his job.      I called and spoke with patient and I have drafted a letter for you to review and hopefully sign off on (I made it to post to [facility]).    I also saul'd up a couple of meds.  You can see my note below for more info but his main symptom remaining is the cough that ends in him being dizzy and lightheaded afterwards.  He has had to pull his own car over while driving recently due to this.       The inhalers aren't cutting it.  He's wondering about a neb but I was wondering about a prednisone burst and tesslon (which I have saul'd up).      Nanette Royal RN on 9/27/2023 at 11:07 AM

## 2023-09-27 NOTE — TELEPHONE ENCOUNTER
Work letter has been signed. Prednisone burst and tessalon sent to pharmacy- patient should present to clinic or rapid clinic if he is feeling sick to rule out pneumonia.

## 2023-09-27 NOTE — TELEPHONE ENCOUNTER
Called patient.  LMTCB.    Patient called back.     I take my inhalers (Advair, Spiriva, Albuterol) and my breathing is a little bit better than what it was.  I still cough a lot.  My ribs are still extremely sore.  I still get light headed and dizzy where I don't even feel comfortable driving my own car.      Lightheadedness after a coughing jag.      Do you drive for work?  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  His job is extremely safety conscious.  Often needs to be moving quickly.            Have you tried anything for cough?  No.  Just Halls cough drops.        Initially- Antibiotics for bronchitis.    Boss said they prefer a full release with no restrictions.      Since 9/22 he has had no letter for employer.  And employer is getting impatient.  He knows that [employer] and his  at work would say absolutely not as far as returning to work right now.       Works Tuesday through Saturday.      Work letter excusing him through 10/7.  Appt with Dinah on 10/9.      Patient request:  Please excuse Jose Elias for the next two weeks, to deal with breathing.      Wondering about a different medication.  He asked about a neb.  He's using 3 inhalers.  Prednisone?  He said this has helped in the past.      Nanette Royal RN on 9/27/2023 at 10:59 AM

## 2023-10-09 ENCOUNTER — OFFICE VISIT (OUTPATIENT)
Dept: INTERNAL MEDICINE | Facility: OTHER | Age: 53
End: 2023-10-09
Payer: COMMERCIAL

## 2023-10-09 VITALS
OXYGEN SATURATION: 97 % | DIASTOLIC BLOOD PRESSURE: 80 MMHG | HEIGHT: 71 IN | TEMPERATURE: 98.2 F | SYSTOLIC BLOOD PRESSURE: 132 MMHG | HEART RATE: 90 BPM | WEIGHT: 225.5 LBS | BODY MASS INDEX: 31.57 KG/M2

## 2023-10-09 DIAGNOSIS — I10 ESSENTIAL HYPERTENSION: ICD-10-CM

## 2023-10-09 DIAGNOSIS — E78.2 MIXED HYPERLIPIDEMIA: ICD-10-CM

## 2023-10-09 DIAGNOSIS — F60.9 PERSONALITY DISORDER (H): Primary | ICD-10-CM

## 2023-10-09 DIAGNOSIS — J44.9 CHRONIC OBSTRUCTIVE PULMONARY DISEASE, UNSPECIFIED COPD TYPE (H): ICD-10-CM

## 2023-10-09 DIAGNOSIS — Z23 ENCOUNTER FOR IMMUNIZATION: ICD-10-CM

## 2023-10-09 LAB
ALBUMIN SERPL BCG-MCNC: 4.4 G/DL (ref 3.5–5.2)
ALP SERPL-CCNC: 66 U/L (ref 40–129)
ALT SERPL W P-5'-P-CCNC: 14 U/L (ref 0–70)
ANION GAP SERPL CALCULATED.3IONS-SCNC: 9 MMOL/L (ref 7–15)
AST SERPL W P-5'-P-CCNC: 15 U/L (ref 0–45)
BILIRUB SERPL-MCNC: 0.4 MG/DL
BUN SERPL-MCNC: 20.5 MG/DL (ref 6–20)
CALCIUM SERPL-MCNC: 9.4 MG/DL (ref 8.6–10)
CHLORIDE SERPL-SCNC: 100 MMOL/L (ref 98–107)
CHOLEST SERPL-MCNC: 167 MG/DL
CREAT SERPL-MCNC: 1 MG/DL (ref 0.67–1.17)
DEPRECATED HCO3 PLAS-SCNC: 30 MMOL/L (ref 22–29)
EGFRCR SERPLBLD CKD-EPI 2021: 89 ML/MIN/1.73M2
GLUCOSE SERPL-MCNC: 105 MG/DL (ref 70–99)
HDLC SERPL-MCNC: 37 MG/DL
HOLD SPECIMEN: NORMAL
HOLD SPECIMEN: NORMAL
LDLC SERPL CALC-MCNC: 65 MG/DL
NONHDLC SERPL-MCNC: 130 MG/DL
POTASSIUM SERPL-SCNC: 3.5 MMOL/L (ref 3.4–5.3)
PROT SERPL-MCNC: 7.2 G/DL (ref 6.4–8.3)
SODIUM SERPL-SCNC: 139 MMOL/L (ref 135–145)
TRIGL SERPL-MCNC: 325 MG/DL

## 2023-10-09 PROCEDURE — 36415 COLL VENOUS BLD VENIPUNCTURE: CPT | Mod: ZL

## 2023-10-09 PROCEDURE — 99214 OFFICE O/P EST MOD 30 MIN: CPT | Mod: 25

## 2023-10-09 PROCEDURE — 80053 COMPREHEN METABOLIC PANEL: CPT | Mod: ZL

## 2023-10-09 PROCEDURE — 90471 IMMUNIZATION ADMIN: CPT

## 2023-10-09 PROCEDURE — 80061 LIPID PANEL: CPT | Mod: ZL

## 2023-10-09 PROCEDURE — 90682 RIV4 VACC RECOMBINANT DNA IM: CPT

## 2023-10-09 RX ORDER — TIOTROPIUM BROMIDE 18 UG/1
18 CAPSULE ORAL; RESPIRATORY (INHALATION) DAILY
Qty: 90 CAPSULE | Refills: 4 | Status: SHIPPED | OUTPATIENT
Start: 2023-10-09 | End: 2024-07-10

## 2023-10-09 RX ORDER — FLUTICASONE PROPIONATE AND SALMETEROL 500; 50 UG/1; UG/1
1 POWDER RESPIRATORY (INHALATION) EVERY 12 HOURS
Qty: 60 EACH | Refills: 11 | Status: SHIPPED | OUTPATIENT
Start: 2023-10-09

## 2023-10-09 RX ORDER — LAMOTRIGINE 100 MG/1
TABLET ORAL
Qty: 187 TABLET | Refills: 4 | Status: SHIPPED | OUTPATIENT
Start: 2023-10-09 | End: 2024-07-10

## 2023-10-09 RX ORDER — ATORVASTATIN CALCIUM 20 MG/1
20 TABLET, FILM COATED ORAL DAILY
Qty: 90 TABLET | Refills: 4 | Status: SHIPPED | OUTPATIENT
Start: 2023-10-09 | End: 2024-07-10

## 2023-10-09 RX ORDER — AMLODIPINE BESYLATE 5 MG/1
5 TABLET ORAL DAILY
Qty: 90 TABLET | Refills: 4 | Status: SHIPPED | OUTPATIENT
Start: 2023-10-09 | End: 2023-11-15

## 2023-10-09 RX ORDER — METAPROTERENOL SULFATE 10 MG
500 TABLET ORAL DAILY
Qty: 90 CAPSULE | Refills: 4 | Status: SHIPPED | OUTPATIENT
Start: 2023-10-09 | End: 2024-07-10

## 2023-10-09 ASSESSMENT — PATIENT HEALTH QUESTIONNAIRE - PHQ9
SUM OF ALL RESPONSES TO PHQ QUESTIONS 1-9: 6
SUM OF ALL RESPONSES TO PHQ QUESTIONS 1-9: 6

## 2023-10-09 ASSESSMENT — ENCOUNTER SYMPTOMS
NERVOUS/ANXIOUS: 1
SHORTNESS OF BREATH: 1
COUGH: 1
SLEEP DISTURBANCE: 1
ARTHRALGIAS: 1

## 2023-10-09 ASSESSMENT — PAIN SCALES - GENERAL: PAINLEVEL: SEVERE PAIN (6)

## 2023-10-09 NOTE — LETTER
October 9, 2023      Jose Elias Reaves  [address] [address]        To Whom It May Concern:    Jose Elias Reaves was seen in our clinic. He may return to work without restrictions.      Sincerely,        ANATOLY Chow CNP

## 2023-10-09 NOTE — PROGRESS NOTES
Assessment & Plan   Jose Elias Reaves is a 53 year old presenting for the following health issues:      ICD-10-CM    1. Personality disorder (H)  F60.9 lamoTRIgine (LAMICTAL) 100 MG tablet      2. Encounter for immunization  Z23 INFLUENZA VACCINE 18-64Y (FLUBLOK)      3. Chronic obstructive pulmonary disease, unspecified COPD type (H)  J44.9 fluticasone-salmeterol (ADVAIR) 500-50 MCG/ACT inhaler     tiotropium (SPIRIVA) 18 MCG inhaled capsule      4. Mixed hyperlipidemia  E78.2 Lipid Profile     Comprehensive metabolic panel     Omega-3 Fish Oil 500 MG capsule     atorvastatin (LIPITOR) 20 MG tablet      5. Essential hypertension  I10 amLODIPine (NORVASC) 5 MG tablet          COPD - Patient has a longstanding history of COPD: Moderate = FeV1 < 79% -50%. Patient has been doing reasonably well overall noting COUGH and continues on Advair, Spiriva, albuterol, medication regimen without adverse reactions or side effects.  Patient almost completed with prednisone taper pack, symptoms have significantly improved.  He is requesting an adjustment to his inhaler for additional support.  We increased his Advair to 500-50 mcg/ACT.  Offered pulmonology referral if desired.  Strongly encourage smoking cessation.    ANXIETY/PERSONALITY DISORDER: Improvement from Lamictal.  He is currently taking 50 mg once daily.  We will increase this to 50 mg twice daily for 7 days and then increase to 100 mg twice daily.  Follow-up as needed.    MIXED HYPERLIPIDEMIA: Recommended smoking cessation, prescription for omega-3 fish oil was sent to pharmacy to help improve HDL, decrease triglycerides.  HDL is at goal.  May need to consider additional medication adjustments.  Recommend we recheck cholesterol levels in 6 months.  Continue taking atorvastatin 20 mg at bedtime.    The 10-year ASCVD risk score (Dora FERNANDEZ, et al., 2019) is: 12.7%    Values used to calculate the score:      Age: 53 years      Sex: Male      Is Non-   American: No      Diabetic: No      Tobacco smoker: Yes      Systolic Blood Pressure: 132 mmHg      Is BP treated: Yes      HDL Cholesterol: 37 mg/dL      Total Cholesterol: 167 mg/dL     Vaccine counseling completed.  Encouraged routine / annual vaccinations.    Due for colonoscopy or Cologuard-patient declines this testing.  Education of importance of preventative screening was provided.    No follow-ups on file.    ANATOLY Chow CNP  [facility]      Subjective   Manuel is a 53 year old, presenting for the following health issues:    Patient presents to clinic for follow-up on COPD, anxiety.  His breathing has been better, he is almost done with his prednisone taper.  He is taking his medications regularly.  Moods have been better, states that anxiety is somewhat improved.  He states that he still feels anxious, struggles with insomnia.  He is planning on going back to work.      RECHECK (Follow up COPD, stress test, hypertension)      History of Present Illness       COPD:  He presents for follow up of COPD.  Overall, COPD symptoms are stable since last visit.  He has same as usual fatigue or shortness of breath with exertion and no shortness of breath at rest.  He constantly coughs and does not have change in sputum. No recent fever. He can walk the length of 3-5 rooms without stopping to rest. He can walk 2 flights of stairs without resting. The patient has had no ED, urgent care, or hospital admissions because of COPD since the last visit.     Hypertension: He presents for follow up of hypertension.  He does check blood pressure  regularly outside of the clinic. Outside blood pressures have been over 140/90. He does not follow a low salt diet.     He eats 0-1 servings of fruits and vegetables daily.He consumes 1 sweetened beverage(s) daily.He exercises with enough effort to increase his heart rate 9 or less minutes per day.  He exercises with enough effort to increase his heart rate  "3 or less days per week.   He is taking medications regularly.       Hypertension Follow-up    Do you check your blood pressure regularly outside of the clinic? Yes   Are you following a low salt diet? No  Are your blood pressures ever more than 140 on the top number (systolic) OR more   than 90 on the bottom number (diastolic), for example 140/90? Yes      Review of Systems   Respiratory:  Positive for cough and shortness of breath.    Musculoskeletal:  Positive for arthralgias (improved).   Psychiatric/Behavioral:  Positive for sleep disturbance. The patient is nervous/anxious (improved).    All other systems reviewed and are negative.           Objective    /80   Pulse 90   Temp 98.2  F (36.8  C)   Ht 1.803 m (5' 11\")   Wt 102.3 kg (225 lb 8 oz)   SpO2 97%   BMI 31.45 kg/m    Body mass index is 31.45 kg/m .  Physical Exam  Vitals reviewed.   Constitutional:       General: He is not in acute distress.     Appearance: He is not toxic-appearing.   HENT:      Head: Normocephalic and atraumatic.   Eyes:      Pupils: Pupils are equal, round, and reactive to light.   Cardiovascular:      Rate and Rhythm: Normal rate and regular rhythm.      Pulses: Normal pulses.      Heart sounds: Normal heart sounds.   Pulmonary:      Effort: Pulmonary effort is normal. No respiratory distress.      Breath sounds: Wheezing (scattered) present. No rhonchi.   Skin:     General: Skin is warm and dry.   Neurological:      Mental Status: He is alert.            Office Visit on 08/22/2023   Component Date Value Ref Range Status    Prostate Specific Antigen Screen 08/22/2023 1.26  0.00 - 3.50 ng/mL Final    Hemoglobin A1C 08/22/2023 5.9  4.0 - 6.2 % Final    Sodium 08/22/2023 137  136 - 145 mmol/L Final    Potassium 08/22/2023 4.2  3.4 - 5.3 mmol/L Final    Chloride 08/22/2023 100  98 - 107 mmol/L Final    Carbon Dioxide (CO2) 08/22/2023 27  22 - 29 mmol/L Final    Anion Gap 08/22/2023 10  7 - 15 mmol/L Final    Urea Nitrogen " 08/22/2023 16.3  6.0 - 20.0 mg/dL Final    Creatinine 08/22/2023 0.97  0.67 - 1.17 mg/dL Final    Calcium 08/22/2023 10.0  8.6 - 10.0 mg/dL Final    Glucose 08/22/2023 112 (H)  70 - 99 mg/dL Final    GFR Estimate 08/22/2023 >90  >60 mL/min/1.73m2 Final    WBC Count 08/22/2023 9.7  4.0 - 11.0 10e3/uL Final    RBC Count 08/22/2023 5.32  4.40 - 5.90 10e6/uL Final    Hemoglobin 08/22/2023 15.8  13.3 - 17.7 g/dL Final    Hematocrit 08/22/2023 46.6  40.0 - 53.0 % Final    MCV 08/22/2023 88  78 - 100 fL Final    MCH 08/22/2023 29.7  26.5 - 33.0 pg Final    MCHC 08/22/2023 33.9  31.5 - 36.5 g/dL Final    RDW 08/22/2023 13.5  10.0 - 15.0 % Final    Platelet Count 08/22/2023 432  150 - 450 10e3/uL Final    % Neutrophils 08/22/2023 68  % Final    % Lymphocytes 08/22/2023 21  % Final    % Monocytes 08/22/2023 8  % Final    % Eosinophils 08/22/2023 2  % Final    % Basophils 08/22/2023 1  % Final    % Immature Granulocytes 08/22/2023 0  % Final    NRBCs per 100 WBC 08/22/2023 0  <1 /100 Final    Absolute Neutrophils 08/22/2023 6.7  1.6 - 8.3 10e3/uL Final    Absolute Lymphocytes 08/22/2023 2.1  0.8 - 5.3 10e3/uL Final    Absolute Monocytes 08/22/2023 0.8  0.0 - 1.3 10e3/uL Final    Absolute Eosinophils 08/22/2023 0.2  0.0 - 0.7 10e3/uL Final    Absolute Basophils 08/22/2023 0.1  0.0 - 0.2 10e3/uL Final    Absolute Immature Granulocytes 08/22/2023 0.0  <=0.4 10e3/uL Final    Absolute NRBCs 08/22/2023 0.0  10e3/uL Final    Hold Specimen 08/22/2023 [facility]   Final    Cholesterol 08/22/2023 219 (H)  <200 mg/dL Final    Triglycerides 08/22/2023 268 (H)  <150 mg/dL Final    Direct Measure HDL 08/22/2023 38 (L)  >=40 mg/dL Final    LDL Cholesterol Calculated 08/22/2023 127 (H)  <=100 mg/dL Final    Non HDL Cholesterol 08/22/2023 181 (H)  <130 mg/dL Final

## 2023-10-09 NOTE — NURSING NOTE
"Patient here for follow up hypertension, COPD and stress test.  Bre No LPN on 10/9/2023 at 10:25 AM    Chief Complaint   Patient presents with    RECHECK     Follow up COPD, stress test, hypertension       Initial /80   Pulse 90   Temp 98.2  F (36.8  C)   Ht 1.803 m (5' 11\")   Wt 102.3 kg (225 lb 8 oz)   SpO2 97%   BMI 31.45 kg/m   Estimated body mass index is 31.45 kg/m  as calculated from the following:    Height as of this encounter: 1.803 m (5' 11\").    Weight as of this encounter: 102.3 kg (225 lb 8 oz).  Medication Reconciliation: complete    FOOD SECURITY SCREENING QUESTIONS  Hunger Vital Signs:  Within the past 12 months we worried whether our food would run out before we got money to buy more.   refused  Within the past 12 months the food we bought just didn't last and we didn't have money to get more.  refused  Bre No LPN 10/9/2023 10:25 AM     "

## 2023-10-23 ENCOUNTER — HOSPITAL ENCOUNTER (EMERGENCY)
Facility: OTHER | Age: 53
Discharge: HOME | End: 2023-10-23
Attending: STUDENT IN AN ORGANIZED HEALTH CARE EDUCATION/TRAINING PROGRAM | Admitting: STUDENT IN AN ORGANIZED HEALTH CARE EDUCATION/TRAINING PROGRAM
Payer: COMMERCIAL

## 2023-10-23 ENCOUNTER — APPOINTMENT (OUTPATIENT)
Dept: GENERAL RADIOLOGY | Facility: OTHER | Age: 53
End: 2023-10-23
Attending: STUDENT IN AN ORGANIZED HEALTH CARE EDUCATION/TRAINING PROGRAM
Payer: COMMERCIAL

## 2023-10-23 VITALS
RESPIRATION RATE: 24 BRPM | WEIGHT: 215 LBS | BODY MASS INDEX: 30.1 KG/M2 | TEMPERATURE: 98.2 F | HEIGHT: 71 IN | HEART RATE: 80 BPM | SYSTOLIC BLOOD PRESSURE: 101 MMHG | DIASTOLIC BLOOD PRESSURE: 65 MMHG | OXYGEN SATURATION: 94 %

## 2023-10-23 DIAGNOSIS — J44.1 COPD EXACERBATION (H): ICD-10-CM

## 2023-10-23 DIAGNOSIS — U07.1 COVID-19: ICD-10-CM

## 2023-10-23 LAB
FLUAV RNA SPEC QL NAA+PROBE: NEGATIVE
FLUBV RNA RESP QL NAA+PROBE: NEGATIVE
HOLD SPECIMEN: NORMAL
RSV RNA SPEC NAA+PROBE: NEGATIVE
SARS-COV-2 RNA RESP QL NAA+PROBE: POSITIVE

## 2023-10-23 PROCEDURE — 96372 THER/PROPH/DIAG INJ SC/IM: CPT | Mod: XU | Performed by: STUDENT IN AN ORGANIZED HEALTH CARE EDUCATION/TRAINING PROGRAM

## 2023-10-23 PROCEDURE — 99284 EMERGENCY DEPT VISIT MOD MDM: CPT | Performed by: STUDENT IN AN ORGANIZED HEALTH CARE EDUCATION/TRAINING PROGRAM

## 2023-10-23 PROCEDURE — 72100 X-RAY EXAM L-S SPINE 2/3 VWS: CPT

## 2023-10-23 PROCEDURE — 99284 EMERGENCY DEPT VISIT MOD MDM: CPT | Mod: 25 | Performed by: STUDENT IN AN ORGANIZED HEALTH CARE EDUCATION/TRAINING PROGRAM

## 2023-10-23 PROCEDURE — C9803 HOPD COVID-19 SPEC COLLECT: HCPCS | Performed by: STUDENT IN AN ORGANIZED HEALTH CARE EDUCATION/TRAINING PROGRAM

## 2023-10-23 PROCEDURE — 250N000009 HC RX 250: Performed by: STUDENT IN AN ORGANIZED HEALTH CARE EDUCATION/TRAINING PROGRAM

## 2023-10-23 PROCEDURE — 71046 X-RAY EXAM CHEST 2 VIEWS: CPT

## 2023-10-23 PROCEDURE — 94640 AIRWAY INHALATION TREATMENT: CPT

## 2023-10-23 PROCEDURE — 250N000011 HC RX IP 250 OP 636: Mod: JZ | Performed by: STUDENT IN AN ORGANIZED HEALTH CARE EDUCATION/TRAINING PROGRAM

## 2023-10-23 PROCEDURE — 96375 TX/PRO/DX INJ NEW DRUG ADDON: CPT | Performed by: STUDENT IN AN ORGANIZED HEALTH CARE EDUCATION/TRAINING PROGRAM

## 2023-10-23 PROCEDURE — 87637 SARSCOV2&INF A&B&RSV AMP PRB: CPT | Performed by: STUDENT IN AN ORGANIZED HEALTH CARE EDUCATION/TRAINING PROGRAM

## 2023-10-23 PROCEDURE — 96365 THER/PROPH/DIAG IV INF INIT: CPT | Performed by: STUDENT IN AN ORGANIZED HEALTH CARE EDUCATION/TRAINING PROGRAM

## 2023-10-23 PROCEDURE — 999N000157 HC STATISTIC RCP TIME EA 10 MIN

## 2023-10-23 RX ORDER — PREDNISONE 20 MG/1
TABLET ORAL
Qty: 10 TABLET | Refills: 0 | Status: SHIPPED | OUTPATIENT
Start: 2023-10-23 | End: 2024-07-10

## 2023-10-23 RX ORDER — IPRATROPIUM BROMIDE AND ALBUTEROL SULFATE 2.5; .5 MG/3ML; MG/3ML
3 SOLUTION RESPIRATORY (INHALATION) ONCE
Status: COMPLETED | OUTPATIENT
Start: 2023-10-23 | End: 2023-10-23

## 2023-10-23 RX ORDER — METHYLPREDNISOLONE SODIUM SUCCINATE 125 MG/2ML
60 INJECTION, POWDER, LYOPHILIZED, FOR SOLUTION INTRAMUSCULAR; INTRAVENOUS ONCE
Status: COMPLETED | OUTPATIENT
Start: 2023-10-23 | End: 2023-10-23

## 2023-10-23 RX ORDER — MAGNESIUM SULFATE HEPTAHYDRATE 40 MG/ML
2 INJECTION, SOLUTION INTRAVENOUS ONCE
Status: COMPLETED | OUTPATIENT
Start: 2023-10-23 | End: 2023-10-23

## 2023-10-23 RX ORDER — MAGNESIUM SULFATE HEPTAHYDRATE 500 MG/ML
2 INJECTION, SOLUTION INTRAMUSCULAR; INTRAVENOUS ONCE
Status: DISCONTINUED | OUTPATIENT
Start: 2023-10-23 | End: 2023-10-23 | Stop reason: DRUGHIGH

## 2023-10-23 RX ORDER — TERBUTALINE SULFATE 1 MG/ML
0.25 INJECTION, SOLUTION SUBCUTANEOUS ONCE
Status: COMPLETED | OUTPATIENT
Start: 2023-10-23 | End: 2023-10-23

## 2023-10-23 RX ADMIN — METHYLPREDNISOLONE SODIUM SUCCINATE 62.5 MG: 125 INJECTION, POWDER, FOR SOLUTION INTRAMUSCULAR; INTRAVENOUS at 16:19

## 2023-10-23 RX ADMIN — TERBUTALINE SULFATE 0.25 MG: 1 INJECTION SUBCUTANEOUS at 16:19

## 2023-10-23 RX ADMIN — IPRATROPIUM BROMIDE AND ALBUTEROL SULFATE 3 ML: 2.5; .5 SOLUTION RESPIRATORY (INHALATION) at 16:30

## 2023-10-23 RX ADMIN — MAGNESIUM SULFATE HEPTAHYDRATE 2 G: 40 INJECTION, SOLUTION INTRAVENOUS at 16:28

## 2023-10-23 RX ADMIN — IPRATROPIUM BROMIDE AND ALBUTEROL SULFATE 3 ML: 2.5; .5 SOLUTION RESPIRATORY (INHALATION) at 16:14

## 2023-10-23 ASSESSMENT — ACTIVITIES OF DAILY LIVING (ADL): ADLS_ACUITY_SCORE: 35

## 2023-10-23 NOTE — ED TRIAGE NOTES
Pt comes in with SOB x 3 days, using his home meds with no relief, also c/o back pain, Pt is wheezy in triage, brought to [facility] for intervention     Triage Assessment (Adult)       Row Name 10/23/23 1532          Respiratory WDL    Respiratory WDL X;cough;rhythm/pattern;expansion/retractions     Rhythm/Pattern, Respiratory shortness of breath;labored;tachypneic     Expansion/Accessory Muscles/Retractions accessory muscle use     Cough Frequency frequent     Cough Type dry        Skin Circulation/Temperature WDL    Skin Circulation/Temperature WDL WDL        Cardiac WDL    Cardiac WDL WDL        Peripheral/Neurovascular WDL    Peripheral Neurovascular WDL WDL        Cognitive/Neuro/Behavioral WDL    Cognitive/Neuro/Behavioral WDL WDL

## 2023-10-23 NOTE — ED PROVIDER NOTES
Emergency Department Provider Note  : 1970 Age: 53 year old Sex: male MRN: [id number]    Chief Complaint   Patient presents with    Shortness of Breath     X 3 days Hx: COPD    Cough    Back Pain     Lower back       Medical Decision Making / Assessment / Plan   53 year old male with a PMH of COPD and asthma presents with clear COPD versus asthma exacerbation here today.  Does not have frankly infectious symptoms.  On arrival he is afebrile, nontachycardic, and minimally hypertensive.  Oxygen saturation in the mid 90s despite tachypnea and significant wheezing throughout.  Will obtain viral testing as well as x-ray to evaluate for lower respiratory tract infection but I do not hear any focal findings on exam.  Given steroids, DuoNebs, magnesium and terbutaline.    ED Course as of 10/23/23 1721   Mon Oct 23, 2023   1650 SARS CoV2 PCR(!): Positive   1650 Resp Syncytial Virus: Negative   1650 Influenza B: Negative   1650 Influenza A: Negative     Covid Positive.  Although radiology interprets x-ray as patient having subtle right upper lobe findings, he has no historical, exam evidence consistent with bacterial pneumonia.  No indication to treat with antibiotics here but will discharge on ongoing steroids as the patient has had profound improvement in respiratory symptoms.  He is now satting in the high 90s with normal respiratory effort on room air and has significant improvement in wheezing.  Will discharge with ongoing steroids, outpatient follow-up, return precautions.      The patient was informed of the plan and verbalized understanding and agreed with the plan. The patient was given strict return to Emergency Department precautions as well as appropriate follow up instructions. The patient was discharged in stable condition.    New Prescriptions    PREDNISONE (DELTASONE) 20 MG TABLET    Take two tablets (= 40mg) each day for 5 (five) days       Final diagnoses:   COPD exacerbation (H)   COVID-19  "      Kemar Noguera MD  10/23/2023   Emergency Department    Subjective   Jose Elias is a 53 year old male with PMH of COPD, asthma who presents at  3:43 PM for evaluation of development of several days of shortness of breath refractory to home inhaler use.  Denies fevers but has felt warm and diaphoretic at times.  Denies any nasal congestion, rhinorrhea, loss of taste or smell.  No chest pain.  Does report some low back pain is been ongoing now for several days without preceding trauma.  The context of this he denies any numbness, tingling, weakness in his legs outside of some chronic numbness on the right shin related to prior venous procedure.  No saddle anesthesia.  No difficulty urinating or having a bowel movement.    I have reviewed the Medications, Allergies, Past Medical and Surgical History, and Social History in the Epic System and with family.    Review of Systems:  Please see HPI for pertinent positives and negatives. All other systems reviewed and found to be negative.      Objective   BP: (!) 145/84  Pulse: 87  Temp: 98.2  F (36.8  C)  Resp: 24  Height: 180.3 cm (5' 11\")  Weight: 97.5 kg (215 lb)  SpO2: 95 %    Physical Exam:   Gen: Uncomfortable appearing.  HEENT: MMM. AT/NC.  Eye: EOMI.   CV: Well perfused.  Intact distal pulses.  Pulm: Mild respiratory distress with tachypnea and diffuse wheezing with moderate air movement.  Abd: ND.   Ext: No significant edema.  Neuro: AOx3, no focal deficit noted  Psych: Appropriate affect, cognition intact    Procedures / Critical Care   Procedures    Critical Care Time: none         Medical/Surgical History:  Past Medical History:   Diagnosis Date    Cervical strain 01/03/2019    Closed head injury with concussion 12/12/2018    Concussion with brief LOC 12/12/2018    Concussion with brief LOC 12/12/2018    Hypertension     Impingement of right ulnar nerve 03/16/2020    Post concussion syndrome 03/26/2019    Post-concussion headache 12/26/2018    Vestibular " dysfunction 12/26/2018    Vision disturbance 12/26/2018     Past Surgical History:   Procedure Laterality Date    HERNIA REPAIR Left     KNEE SURGERY Left     ACL repair, patella graft    SEPTOPLASTY N/A 3/13/2018    Procedure: SEPTOPLASTY;  septoplasty, submucosal resection of the turbinates;  Surgeon: Piotr Quiroz MD;  Location:  OR       Medications:  Current Facility-Administered Medications   Medication    magnesium sulfate 2 g in 50 mL sterile water intermittent infusion     Current Outpatient Medications   Medication    predniSONE (DELTASONE) 20 MG tablet    Acetaminophen (TYLENOL PO)    albuterol (PROAIR HFA/PROVENTIL HFA/VENTOLIN HFA) 108 (90 Base) MCG/ACT inhaler    amLODIPine (NORVASC) 5 MG tablet    atorvastatin (LIPITOR) 20 MG tablet    atorvastatin (LIPITOR) 20 MG tablet    benzonatate (TESSALON) 100 MG capsule    fluticasone-salmeterol (ADVAIR) 500-50 MCG/ACT inhaler    fluticasone-salmeterol (ADVAIR-HFA) 230-21 MCG/ACT inhaler    lamoTRIgine (LAMICTAL) 100 MG tablet    losartan-hydrochlorothiazide (HYZAAR) 100-25 MG tablet    MEDS UNK - RECORDS REQUESTED    metaxalone (SKELAXIN) 800 MG tablet    methylPREDNISolone (MEDROL DOSEPAK) 4 MG tablet therapy pack    naproxen (NAPROSYN) 500 MG tablet    nicotine (NICODERM CQ) 14 MG/24HR 24 hr patch    nicotine (NICODERM CQ) 21 MG/24HR 24 hr patch    [START ON 11/7/2023] nicotine (NICODERM CQ) 7 MG/24HR 24 hr patch    Omega-3 Fish Oil 500 MG capsule    propranolol ER (INDERAL LA) 120 MG 24 hr capsule    tiotropium (SPIRIVA) 18 MCG inhaled capsule    triamcinolone (KENALOG) 0.5 % external ointment       Allergies:  Penicillins, Clindamycin, and Gabapentin    Relevant labs, images, EKGs, Epic and outside hospital (if applicable) charts were reviewed. The findings, diagnosis, plan, and need for follow up were discussed with the patient/family. Nursing notes were reviewed.      Kemar Noguera MD  10/23/23 4766

## 2023-10-24 ENCOUNTER — TELEPHONE (OUTPATIENT)
Dept: INTERNAL MEDICINE | Facility: OTHER | Age: 53
End: 2023-10-24
Payer: COMMERCIAL

## 2023-10-24 NOTE — TELEPHONE ENCOUNTER
ER Follow up needed this week- most worried about COPD with pneumonia covid and the flu-call today if can if not tomorrow-Will need note started Oct 23rd when in ER -he can get when comes in

## 2023-10-24 NOTE — TELEPHONE ENCOUNTER
Please coordinate with scheduling to get him set up for an ER follow-up appointment with any available provider.

## 2023-10-26 ENCOUNTER — APPOINTMENT (OUTPATIENT)
Dept: GENERAL RADIOLOGY | Facility: OTHER | Age: 53
End: 2023-10-26
Attending: STUDENT IN AN ORGANIZED HEALTH CARE EDUCATION/TRAINING PROGRAM
Payer: COMMERCIAL

## 2023-10-26 ENCOUNTER — HOSPITAL ENCOUNTER (EMERGENCY)
Facility: OTHER | Age: 53
Discharge: HOME | End: 2023-10-26
Attending: STUDENT IN AN ORGANIZED HEALTH CARE EDUCATION/TRAINING PROGRAM | Admitting: STUDENT IN AN ORGANIZED HEALTH CARE EDUCATION/TRAINING PROGRAM
Payer: COMMERCIAL

## 2023-10-26 VITALS
RESPIRATION RATE: 33 BRPM | WEIGHT: 215 LBS | BODY MASS INDEX: 30.1 KG/M2 | DIASTOLIC BLOOD PRESSURE: 84 MMHG | OXYGEN SATURATION: 90 % | TEMPERATURE: 100.9 F | HEART RATE: 109 BPM | HEIGHT: 71 IN | SYSTOLIC BLOOD PRESSURE: 120 MMHG

## 2023-10-26 DIAGNOSIS — R52 GENERALIZED BODY ACHES: ICD-10-CM

## 2023-10-26 DIAGNOSIS — U07.1 INFECTION DUE TO 2019 NOVEL CORONAVIRUS: ICD-10-CM

## 2023-10-26 LAB
ANION GAP SERPL CALCULATED.3IONS-SCNC: 12 MMOL/L (ref 7–15)
ATRIAL RATE - MUSE: 103 BPM
BASE EXCESS BLDV CALC-SCNC: 3.5 MMOL/L (ref -7.7–1.9)
BASOPHILS # BLD AUTO: 0 10E3/UL (ref 0–0.2)
BASOPHILS NFR BLD AUTO: 0 %
BUN SERPL-MCNC: 21.4 MG/DL (ref 6–20)
CALCIUM SERPL-MCNC: 9.1 MG/DL (ref 8.6–10)
CHLORIDE SERPL-SCNC: 97 MMOL/L (ref 98–107)
CREAT SERPL-MCNC: 1.01 MG/DL (ref 0.67–1.17)
DEPRECATED HCO3 PLAS-SCNC: 26 MMOL/L (ref 22–29)
DIASTOLIC BLOOD PRESSURE - MUSE: NORMAL MMHG
EGFRCR SERPLBLD CKD-EPI 2021: 88 ML/MIN/1.73M2
EOSINOPHIL # BLD AUTO: 0 10E3/UL (ref 0–0.7)
EOSINOPHIL NFR BLD AUTO: 0 %
ERYTHROCYTE [DISTWIDTH] IN BLOOD BY AUTOMATED COUNT: 13.8 % (ref 10–15)
GLUCOSE SERPL-MCNC: 98 MG/DL (ref 70–99)
HCO3 BLDV-SCNC: 29 MMOL/L (ref 21–28)
HCT VFR BLD AUTO: 45.3 % (ref 40–53)
HGB BLD-MCNC: 15.4 G/DL (ref 13.3–17.7)
HOLD SPECIMEN: NORMAL
IMM GRANULOCYTES # BLD: 0.1 10E3/UL
IMM GRANULOCYTES NFR BLD: 1 %
INTERPRETATION ECG - MUSE: NORMAL
LACTATE SERPL-SCNC: 1.5 MMOL/L (ref 0.7–2)
LYMPHOCYTES # BLD AUTO: 1.1 10E3/UL (ref 0.8–5.3)
LYMPHOCYTES NFR BLD AUTO: 13 %
MCH RBC QN AUTO: 29.3 PG (ref 26.5–33)
MCHC RBC AUTO-ENTMCNC: 34 G/DL (ref 31.5–36.5)
MCV RBC AUTO: 86 FL (ref 78–100)
MONOCYTES # BLD AUTO: 0.7 10E3/UL (ref 0–1.3)
MONOCYTES NFR BLD AUTO: 7 %
NEUTROPHILS # BLD AUTO: 6.9 10E3/UL (ref 1.6–8.3)
NEUTROPHILS NFR BLD AUTO: 79 %
NRBC # BLD AUTO: 0 10E3/UL
NRBC BLD AUTO-RTO: 0 /100
O2/TOTAL GAS SETTING VFR VENT: 28 %
P AXIS - MUSE: 51 DEGREES
PCO2 BLDV: 46 MM HG (ref 40–50)
PH BLDV: 7.41 [PH] (ref 7.32–7.43)
PLATELET # BLD AUTO: 440 10E3/UL (ref 150–450)
PO2 BLDV: 34 MM HG (ref 25–47)
POTASSIUM SERPL-SCNC: 3.7 MMOL/L (ref 3.4–5.3)
PR INTERVAL - MUSE: 136 MS
QRS DURATION - MUSE: 96 MS
QT - MUSE: 336 MS
QTC - MUSE: 440 MS
R AXIS - MUSE: 65 DEGREES
RBC # BLD AUTO: 5.25 10E6/UL (ref 4.4–5.9)
SODIUM SERPL-SCNC: 135 MMOL/L (ref 135–145)
SYSTOLIC BLOOD PRESSURE - MUSE: NORMAL MMHG
T AXIS - MUSE: 56 DEGREES
VENTRICULAR RATE- MUSE: 103 BPM
WBC # BLD AUTO: 8.7 10E3/UL (ref 4–11)

## 2023-10-26 PROCEDURE — 85025 COMPLETE CBC W/AUTO DIFF WBC: CPT | Performed by: STUDENT IN AN ORGANIZED HEALTH CARE EDUCATION/TRAINING PROGRAM

## 2023-10-26 PROCEDURE — 93010 ELECTROCARDIOGRAM REPORT: CPT | Performed by: INTERNAL MEDICINE

## 2023-10-26 PROCEDURE — 71045 X-RAY EXAM CHEST 1 VIEW: CPT

## 2023-10-26 PROCEDURE — 82803 BLOOD GASES ANY COMBINATION: CPT | Performed by: STUDENT IN AN ORGANIZED HEALTH CARE EDUCATION/TRAINING PROGRAM

## 2023-10-26 PROCEDURE — 83605 ASSAY OF LACTIC ACID: CPT | Performed by: STUDENT IN AN ORGANIZED HEALTH CARE EDUCATION/TRAINING PROGRAM

## 2023-10-26 PROCEDURE — 99291 CRITICAL CARE FIRST HOUR: CPT | Mod: 25 | Performed by: STUDENT IN AN ORGANIZED HEALTH CARE EDUCATION/TRAINING PROGRAM

## 2023-10-26 PROCEDURE — 36415 COLL VENOUS BLD VENIPUNCTURE: CPT | Performed by: STUDENT IN AN ORGANIZED HEALTH CARE EDUCATION/TRAINING PROGRAM

## 2023-10-26 PROCEDURE — 87040 BLOOD CULTURE FOR BACTERIA: CPT | Performed by: STUDENT IN AN ORGANIZED HEALTH CARE EDUCATION/TRAINING PROGRAM

## 2023-10-26 PROCEDURE — 93005 ELECTROCARDIOGRAM TRACING: CPT | Performed by: STUDENT IN AN ORGANIZED HEALTH CARE EDUCATION/TRAINING PROGRAM

## 2023-10-26 PROCEDURE — 80048 BASIC METABOLIC PNL TOTAL CA: CPT | Performed by: STUDENT IN AN ORGANIZED HEALTH CARE EDUCATION/TRAINING PROGRAM

## 2023-10-26 PROCEDURE — 99284 EMERGENCY DEPT VISIT MOD MDM: CPT | Performed by: STUDENT IN AN ORGANIZED HEALTH CARE EDUCATION/TRAINING PROGRAM

## 2023-10-26 RX ORDER — PROPRANOLOL HYDROCHLORIDE 80 MG/1
0.5 TABLET ORAL
COMMUNITY
Start: 2023-10-17 | End: 2024-07-10

## 2023-10-26 RX ORDER — ACETAMINOPHEN 325 MG/1
975 TABLET ORAL ONCE
Status: COMPLETED | OUTPATIENT
Start: 2023-10-26 | End: 2023-10-26

## 2023-10-26 ASSESSMENT — ACTIVITIES OF DAILY LIVING (ADL): ADLS_ACUITY_SCORE: 35

## 2023-10-26 NOTE — ED NOTES
"Pt tested positive for COVID on 10/23/23. This writer entered room to give pt medication for fever, pt was not wearing a mask, this writer asked if her could please put his mask on due to he being COVID positive, pt applied mask. I explained to pt that I had Tylenol and Ibuprofen for his fever and body aches, pt acknowledged. When this writer gave pt the med cup with medication in it pt refused stating, \"I'm not taking any medications if I have to wear this mask, I have bothered you enough. I attempted to explain that he is not bothering me, but he does need to wear a mask for the safety of everyone that enters his room, or is in contact. Pt continues to refuse all medications.  Provider updated.  "

## 2023-10-26 NOTE — ED PROVIDER NOTES
History     Chief Complaint   Patient presents with    Covid Concern    Shortness of Breath    Fatigue    Altered Mental Status       Jose Elias Reaves is a 53 year old male who presents with shortness of breath and body aches.  Diagnosed with COVID on 3 days ago while here in the ED and was discharged home with reassuring evaluation.  Subsequently has developed severe body aches which she states her worst ever.  He took 1 dose of remaining narcotic pain medication at home it was so bad.  He also feels short of breath.  He has been using his home COPD medications without significant improvement.  Endorses fever and chills and nonproductive cough.  Denies vomiting or diarrhea.  Not taking anything for pain at home.  No prior known COVID infection and no COVID vaccination history.    Allergies   Allergen Reactions    Penicillins Anaphylaxis     Tolerated cefazolin on 09/16/2015.    Clindamycin Hives    Gabapentin      Mood disturbance        Patient Active Problem List    Diagnosis Date Noted    Prediabetes 08/22/2023     Priority: Medium    Cigarette smoker 03/20/2019     Priority: Medium    Post-concussion headache 12/26/2018     Priority: Medium    Other chronic rhinitis 02/20/2018     Priority: Medium    Mild intermittent asthma 02/20/2018     Priority: Medium    Deviated nasal septum 02/20/2018     Priority: Medium    Tobacco abuse 02/20/2018     Priority: Medium    Mixed hyperlipidemia 04/18/2017     Priority: Medium    Chronic pain syndrome - Neck 04/18/2017     Priority: Medium    Essential hypertension 03/21/2017     Priority: Medium    Family history of ischemic heart disease 03/21/2017     Priority: Medium    Family history of diabetes mellitus 03/21/2017     Priority: Medium    Personality disorder (H) 05/05/2012     Priority: Medium       Past Medical History:   Diagnosis Date    Cervical strain 01/03/2019    Closed head injury with concussion 12/12/2018    Concussion with brief LOC 12/12/2018     Concussion with brief LOC 12/12/2018    Hypertension     Impingement of right ulnar nerve 03/16/2020    Post concussion syndrome 03/26/2019    Post-concussion headache 12/26/2018    Vestibular dysfunction 12/26/2018    Vision disturbance 12/26/2018       Past Surgical History:   Procedure Laterality Date    HERNIA REPAIR Left     KNEE SURGERY Left     ACL repair, patella graft    SEPTOPLASTY N/A 3/13/2018    Procedure: SEPTOPLASTY;  septoplasty, submucosal resection of the turbinates;  Surgeon: Piotr Qurioz MD;  Location: [facility] OR       Family History   Problem Relation Age of Onset    Diabetes Mother     Myocardial Infarction Father        Social History     Tobacco Use    Smoking status: Every Day     Packs/day: .5     Types: Cigarettes    Smokeless tobacco: Never   Vaping Use    Vaping Use: Never used   Substance Use Topics    Alcohol use: Not Currently     Alcohol/week: 2.0 standard drinks of alcohol     Types: 2 Cans of beer per week     Comment: moderately    Drug use: No       Medications:    propranolol (INDERAL) 80 MG tablet  Acetaminophen (TYLENOL PO)  albuterol (PROAIR HFA/PROVENTIL HFA/VENTOLIN HFA) 108 (90 Base) MCG/ACT inhaler  amLODIPine (NORVASC) 5 MG tablet  atorvastatin (LIPITOR) 20 MG tablet  atorvastatin (LIPITOR) 20 MG tablet  benzonatate (TESSALON) 100 MG capsule  fluticasone-salmeterol (ADVAIR) 500-50 MCG/ACT inhaler  fluticasone-salmeterol (ADVAIR-HFA) 230-21 MCG/ACT inhaler  lamoTRIgine (LAMICTAL) 100 MG tablet  losartan-hydrochlorothiazide (HYZAAR) 100-25 MG tablet  MEDS UNK - RECORDS REQUESTED  metaxalone (SKELAXIN) 800 MG tablet  methylPREDNISolone (MEDROL DOSEPAK) 4 MG tablet therapy pack  naproxen (NAPROSYN) 500 MG tablet  nicotine (NICODERM CQ) 14 MG/24HR 24 hr patch  [START ON 11/7/2023] nicotine (NICODERM CQ) 7 MG/24HR 24 hr patch  Omega-3 Fish Oil 500 MG capsule  predniSONE (DELTASONE) 20 MG tablet  propranolol ER (INDERAL LA) 120 MG 24 hr capsule  tiotropium (SPIRIVA) 18 MCG  "inhaled capsule  triamcinolone (KENALOG) 0.5 % external ointment        Review of Systems: See HPI for pertinent negatives and positives. All other systems reviewed and found to be negative.    Physical Exam   /84   Pulse 109   Temp (!) 100.9  F (38.3  C) (Tympanic)   Resp (!) 33   Ht 1.803 m (5' 11\")   Wt 97.5 kg (215 lb)   SpO2 90%   BMI 29.99 kg/m       General: awake, generalized discomfort, nontoxic  HEENT: atraumatic  Respiratory: normal effort, mild left lower lobe wheeze  Cardiovascular: Tachycardic regular rhythm, no murmurs, radial pulses 2+  Abdomen: soft, nondistended, nontender  Extremities: no deformities, edema, or tenderness  Skin: warm, dry, no rashes  Neuro: alert, no focal deficits  Psych: appropriate mood and affect    ED Course      ED Course as of 10/26/23 0954   Thu Oct 26, 2023   0941 EKG: Sinus tachycardia 103, no ST deviation, abnormal T wave inversion, LBBB, hyperacute T waves.  Nonischemic.       Results for orders placed or performed during the hospital encounter of 10/26/23 (from the past 24 hour(s))   Lactic Acid STAT   Result Value Ref Range    Lactic Acid 1.5 0.7 - 2.0 mmol/L   [facility] Draw    Narrative    The following orders were created for panel order [facility] Draw.  Procedure                               Abnormality         Status                     ---------                               -----------         ------                     Extra Blue Top Tube[[id number]]                              In process                 Extra Red Top Tube[[id number]]                               In process                 Extra Green Top (Lithium...[[id number]]                      In process                 Extra Purple Top Tube[[id number]]                            In process                   Please view results for these tests on the individual orders.   CBC with platelets differential    Narrative    The following orders were created for panel order CBC with platelets " differential.  Procedure                               Abnormality         Status                     ---------                               -----------         ------                     CBC with platelets and d...[[id number]]                      Final result                 Please view results for these tests on the individual orders.   Basic metabolic panel   Result Value Ref Range    Sodium 135 135 - 145 mmol/L    Potassium 3.7 3.4 - 5.3 mmol/L    Chloride 97 (L) 98 - 107 mmol/L    Carbon Dioxide (CO2) 26 22 - 29 mmol/L    Anion Gap 12 7 - 15 mmol/L    Urea Nitrogen 21.4 (H) 6.0 - 20.0 mg/dL    Creatinine 1.01 0.67 - 1.17 mg/dL    GFR Estimate 88 >60 mL/min/1.73m2    Calcium 9.1 8.6 - 10.0 mg/dL    Glucose 98 70 - 99 mg/dL   CBC with platelets and differential   Result Value Ref Range    WBC Count 8.7 4.0 - 11.0 10e3/uL    RBC Count 5.25 4.40 - 5.90 10e6/uL    Hemoglobin 15.4 13.3 - 17.7 g/dL    Hematocrit 45.3 40.0 - 53.0 %    MCV 86 78 - 100 fL    MCH 29.3 26.5 - 33.0 pg    MCHC 34.0 31.5 - 36.5 g/dL    RDW 13.8 10.0 - 15.0 %    Platelet Count 440 150 - 450 10e3/uL    % Neutrophils 79 %    % Lymphocytes 13 %    % Monocytes 7 %    % Eosinophils 0 %    % Basophils 0 %    % Immature Granulocytes 1 %    NRBCs per 100 WBC 0 <1 /100    Absolute Neutrophils 6.9 1.6 - 8.3 10e3/uL    Absolute Lymphocytes 1.1 0.8 - 5.3 10e3/uL    Absolute Monocytes 0.7 0.0 - 1.3 10e3/uL    Absolute Eosinophils 0.0 0.0 - 0.7 10e3/uL    Absolute Basophils 0.0 0.0 - 0.2 10e3/uL    Absolute Immature Granulocytes 0.1 <=0.4 10e3/uL    Absolute NRBCs 0.0 10e3/uL   EKG 12 lead   Result Value Ref Range    Systolic Blood Pressure  mmHg    Diastolic Blood Pressure  mmHg    Ventricular Rate 103 BPM    Atrial Rate 103 BPM    MS Interval 136 ms    QRS Duration 96 ms     ms    QTc 440 ms    P Axis 51 degrees    R AXIS 65 degrees    T Axis 56 degrees    Interpretation ECG       Sinus tachycardia  Otherwise normal ECG  When compared with ECG of  20-SEP-2023 15:16,  No significant change was found     Blood gas venous   Result Value Ref Range    pH Venous 7.41 7.32 - 7.43    pCO2 Venous 46 40 - 50 mm Hg    pO2 Venous 34 25 - 47 mm Hg    Bicarbonate Venous 29 (H) 21 - 28 mmol/L    Base Excess/Deficit 3.5 (H) -7.7 - 1.9 mmol/L    FIO2 28    XR Chest Port 1 View    Narrative    PROCEDURE:  XR CHEST PORT 1 VIEW    HISTORY: shortness of breath, covid+    COMPARISON:  Radiographs 10/23/2023, 8/7/2023    FINDINGS: Single view chest radiograph    Cardiomediastinal silhouette is within normal limits.  No focal consolidation, effusion or pneumothorax.    No suspicious osseous lesion or subdiaphragmatic free air.      Impression    IMPRESSION:   No acute cardiopulmonary process.      LEROY OLVERA MD         SYSTEM ID:  [id number]       Medications   acetaminophen (TYLENOL) tablet 975 mg (975 mg Oral Not Given 10/26/23 0940)   ibuprofen (ADVIL/MOTRIN) tablet 600 mg (600 mg Oral Not Given 10/26/23 0940)       Assessments & Plan (with Medical Decision Making)     I have reviewed the nursing notes.    53 year old male with no prior COVID vaccination or COVID infection history evaluated for continued COVID infection with body aches and shortness of breath.  With oxygen turned off he is in mid 90% During exam.  Arrived at 98% but did require a little bit of oxygen for short period of time prior to exam.  Exam remarkable for mild left lower lobe wheeze.  Good air movement throughout.  At this point still do not see indication for admission (seen here 3 days ago and discharged home).  Patient's main complaint is generalized body aches. Ibuprofen and Tylenol declined here.  Offered Toradol prescription which was declined.  Reassurance provided with no concerning findings on evaluation. Discharged home with attached instructions on diagnosis provided including ED return precautions.     I have reviewed the findings, diagnosis, plan, and need for any follow up with the  patient.    Patient instructions:   Your evaluation here was reassuring including labs and chest x-ray.  COVID symptoms typically peak by 5 to 7 days into illness.    You should isolate yourself until you are without fever for 24 hours and feel significant improvement.    Please review attached instructions including reasons to return to the emergency department.     New Prescriptions    No medications on file       Final diagnoses:   Infection due to 2019 novel coronavirus   Generalized body aches       10/26/2023   [facility] AND \[facility]\""     Karl Ramos MD  10/26/23 1002     14-Apr-2021 13:30

## 2023-10-26 NOTE — DISCHARGE INSTRUCTIONS
Your evaluation here was reassuring including labs and chest x-ray.  COVID symptoms typically peak by 5 to 7 days into illness.    You should isolate yourself until you are without fever for 24 hours and feel significant improvement.    Please review attached instructions including reasons to return to the emergency department.

## 2023-10-26 NOTE — ED TRIAGE NOTES
Pt to ER from home via EMS with c/o increase SOB, body aches, extreme weakness, nausea, difficulty walking, chest pain, auditory and visual hallucinations.  Pt dx with covid, flu, pneumonia on 10/23.  No medication today.      Triage Assessment (Adult)       Row Name 10/26/23 0857          Triage Assessment    Airway WDL all;WDL        Respiratory WDL    Respiratory WDL X;all     Rhythm/Pattern, Respiratory dyspnea upon exertion;shortness of breath;labored     Expansion/Accessory Muscles/Retractions abdominal muscle use     Cough Frequency frequent        Cardiac WDL    Cardiac WDL X;rhythm     Pulse Rate & Regularity tachycardic        Peripheral/Neurovascular WDL    Peripheral Neurovascular WDL WDL        Cognitive/Neuro/Behavioral WDL    Cognitive/Neuro/Behavioral WDL WDL;X  halluncination

## 2023-10-27 ENCOUNTER — NURSE TRIAGE (OUTPATIENT)
Dept: INTERNAL MEDICINE | Facility: OTHER | Age: 53
End: 2023-10-27

## 2023-10-27 NOTE — TELEPHONE ENCOUNTER
Patient called to talk to Dinah Michel's nurse. He has Covid and he is in a lot of pain. Please call.    Ramonita Lewis on 10/27/2023 at 9:05 AM

## 2023-10-27 NOTE — TELEPHONE ENCOUNTER
DWIGHT Michel, NP -   FYI only, patient scheduled an appointment with you 11/2/23 to address symptoms associated with COVID - particularly 10/10 low back pain. He also reports a new onset of hallucinations (both auditory and visual) of people talking to him. Sometimes they are good, sometimes they are bad, the worst is when they yell at him telling him to get up and out of bed and that he is not that sick.   Ayah May RN on 10/27/2023 at 9:54 AM      Reason for Disposition   Fever > 100.0 F (37.8 C) and bedridden (e.g., CVA, chronic illness, recovering from surgery)    Additional Information   Negative: SEVERE difficulty breathing (e.g., struggling for each breath, speaks in single words)   Negative: Difficult to awaken or acting confused (e.g., disoriented, slurred speech)   Negative: Bluish (or gray) lips or face now   Negative: Shock suspected (e.g., cold/pale/clammy skin, too weak to stand, low BP, rapid pulse)   Negative: Sounds like a life-threatening emergency to the triager   Negative: Diagnosed or suspected COVID-19 and symptoms lasting 3 or more weeks   Negative: COVID-19 exposure and no symptoms   Negative: COVID-19 vaccine reaction suspected (e.g., fever, headache, muscle aches) occurring 1 to 3 days after getting vaccine   Negative: COVID-19 vaccine, questions about   Negative: Lives with someone known to have influenza (flu test positive) and flu-like symptoms (e.g., cough, runny nose, sore throat, SOB; with or without fever)   Negative: Possible COVID-19 symptoms and triager concerned about severity of symptoms or other causes   Negative: COVID-19 and breastfeeding, questions about   Negative: SEVERE or constant chest pain or pressure  (Exception: Mild central chest pain, present only when coughing.)   Negative: MODERATE difficulty breathing (e.g., speaks in phrases, SOB even at rest, pulse 100-120)   Negative: Headache and stiff neck (can't touch chin to chest)   Negative: Oxygen level (e.g., pulse  "oximetry) 90% or lower   Negative: Chest pain or pressure  (Exception: MILD central chest pain, present only when coughing.)   Negative: Drinking very little and dehydration suspected (e.g., no urine > 12 hours, very dry mouth, very lightheaded)   Negative: Patient sounds very sick or weak to the triager   Negative: MILD difficulty breathing (e.g., minimal/no SOB at rest, SOB with walking, pulse <100)   Negative: Fever > 103 F (39.4 C)   Negative: Fever > 101 F (38.3 C) and over 60 years of age    Answer Assessment - Initial Assessment Questions  1. COVID-19 DIAGNOSIS: \"How do you know that you have COVID?\" (e.g., positive lab test or self-test, diagnosed by doctor or NP/PA, symptoms after exposure).      3 days ago in ER     2. COVID-19 EXPOSURE: \"Was there any known exposure to COVID before the symptoms began?\" CDC Definition of close contact: within 6 feet (2 meters) for a total of 15 minutes or more over a 24-hour period.          3. ONSET: \"When did the COVID-19 symptoms start?\"       6 days ago     4. WORST SYMPTOM: \"What is your worst symptom?\" (e.g., cough, fever, shortness of breath, muscle aches)      Pain is extreme in low back, 10/10    5. COUGH: \"Do you have a cough?\" If Yes, ask: \"How bad is the cough?\"        Yes, has asthma and COPD    6. FEVER: \"Do you have a fever?\" If Yes, ask: \"What is your temperature, how was it measured, and when did it start?\"      100.9 yesterday, unsure what it is, but feels certain it goes higher because he hallucinates     7. RESPIRATORY STATUS: \"Describe your breathing?\" (e.g., normal; shortness of breath, wheezing, unable to speak)       Labored breathing at rest, a friend is bringing him an O2 sat monitor this afternoon    8. BETTER-SAME-WORSE: \"Are you getting better, staying the same or getting worse compared to yesterday?\"  If getting worse, ask, \"In what way?\"      Same as yesterday     9. OTHER SYMPTOMS: \"Do you have any other symptoms?\"  (e.g., chills, fatigue, " "headache, loss of smell or taste, muscle pain, sore throat)      Stuffy nose, sputum is as per usual    10. HIGH RISK DISEASE: \"Do you have any chronic medical problems?\" (e.g., asthma, heart or lung disease, weak immune system, obesity, etc.)           11. VACCINE: \"Have you had the COVID-19 vaccine?\" If Yes, ask: \"Which one, how many shots, when did you get it?\"        Did not, medically disqualified     12. PREGNANCY: \"Is there any chance you are pregnant?\" \"When was your last menstrual period?\"        No    13. O2 SATURATION MONITOR:  \"Do you use an oxygen saturation monitor (pulse oximeter) at home?\" If Yes, ask \"What is your reading (oxygen level) today?\" \"What is your usual oxygen saturation reading?\" (e.g., 95%)        As above    Protocols used: Coronavirus (COVID-19) Diagnosed or [id number]    "

## 2023-10-30 ENCOUNTER — NURSE TRIAGE (OUTPATIENT)
Dept: NURSING | Facility: CLINIC | Age: 53
End: 2023-10-30
Payer: COMMERCIAL

## 2023-10-31 LAB
BACTERIA BLD CULT: NO GROWTH
BACTERIA BLD CULT: NO GROWTH

## 2023-10-31 NOTE — TELEPHONE ENCOUNTER
Nurse Triage SBAR    Is this a 2nd Level Triage? NO    Situation: difficulty breathing    Background:  patient was diagnosed with COVID on 10/23/2023 and complains of shortness of breath at rest patient also complains of fatigue and body aches.  Patient states that his current temperature is 99.3 oral thermometer and his oxygen saturations are 92%, heart rate 87.  Patient has a history of COPD.    Assessment: short of breath at rest    Protocol Recommended Disposition:   Go to ED Now    Recommendation: Patient verbalized understanding of care advice.       Marci Ludwig RN on 10/30/2023 at 8:24 PM      Does the patient meet one of the following criteria for ADS visit consideration? No    Reason for Disposition   [1] MODERATE difficulty breathing (e.g., speaks in phrases, SOB even at rest, pulse 100-120) AND [2] NEW-onset or WORSE than normal    Additional Information   Negative: SEVERE difficulty breathing (e.g., struggling for each breath, speaks in single words)   Negative: [1] Breathing stopped AND [2] hasn't returned   Negative: Choking on something   Negative: Bluish (or gray) lips or face now   Negative: Difficult to awaken or acting confused (e.g., disoriented, slurred speech)   Negative: Passed out (i.e., lost consciousness, collapsed and was not responding)   Negative: Wheezing started suddenly after medicine, an allergic food or bee sting   Negative: Stridor (harsh sound while breathing in)   Negative: Slow, shallow and weak breathing   Negative: Sounds like a life-threatening emergency to the triager    Protocols used: Breathing Difficulty-A-AH

## 2023-11-02 ENCOUNTER — OFFICE VISIT (OUTPATIENT)
Dept: INTERNAL MEDICINE | Facility: OTHER | Age: 53
End: 2023-11-02
Payer: COMMERCIAL

## 2023-11-02 ENCOUNTER — APPOINTMENT (OUTPATIENT)
Dept: ULTRASOUND IMAGING | Facility: OTHER | Age: 53
End: 2023-11-02
Attending: FAMILY MEDICINE
Payer: COMMERCIAL

## 2023-11-02 ENCOUNTER — APPOINTMENT (OUTPATIENT)
Dept: GENERAL RADIOLOGY | Facility: OTHER | Age: 53
End: 2023-11-02
Attending: FAMILY MEDICINE
Payer: COMMERCIAL

## 2023-11-02 ENCOUNTER — MYC MEDICAL ADVICE (OUTPATIENT)
Dept: INTERNAL MEDICINE | Facility: OTHER | Age: 53
End: 2023-11-02

## 2023-11-02 ENCOUNTER — HOSPITAL ENCOUNTER (EMERGENCY)
Facility: OTHER | Age: 53
Discharge: HOME | End: 2023-11-02
Attending: FAMILY MEDICINE | Admitting: FAMILY MEDICINE
Payer: COMMERCIAL

## 2023-11-02 VITALS
OXYGEN SATURATION: 95 % | WEIGHT: 219 LBS | RESPIRATION RATE: 16 BRPM | SYSTOLIC BLOOD PRESSURE: 108 MMHG | BODY MASS INDEX: 30.66 KG/M2 | HEART RATE: 89 BPM | HEIGHT: 71 IN | DIASTOLIC BLOOD PRESSURE: 73 MMHG | TEMPERATURE: 97 F

## 2023-11-02 VITALS
BODY MASS INDEX: 30.66 KG/M2 | SYSTOLIC BLOOD PRESSURE: 126 MMHG | HEART RATE: 101 BPM | HEIGHT: 71 IN | DIASTOLIC BLOOD PRESSURE: 78 MMHG | RESPIRATION RATE: 20 BRPM | TEMPERATURE: 97.3 F | OXYGEN SATURATION: 93 % | WEIGHT: 219 LBS

## 2023-11-02 DIAGNOSIS — J44.0 CHRONIC OBSTRUCTIVE PULMONARY DISEASE WITH ACUTE LOWER RESPIRATORY INFECTION (H): ICD-10-CM

## 2023-11-02 DIAGNOSIS — R23.8 DUSKY FEET: ICD-10-CM

## 2023-11-02 DIAGNOSIS — I10 ESSENTIAL HYPERTENSION: ICD-10-CM

## 2023-11-02 DIAGNOSIS — J44.1 COPD EXACERBATION (H): ICD-10-CM

## 2023-11-02 DIAGNOSIS — J18.9 PNEUMONIA: Primary | ICD-10-CM

## 2023-11-02 DIAGNOSIS — E78.2 MIXED HYPERLIPIDEMIA: ICD-10-CM

## 2023-11-02 DIAGNOSIS — R79.89 ELEVATED D-DIMER: ICD-10-CM

## 2023-11-02 DIAGNOSIS — M79.661 PAIN OF RIGHT LOWER LEG: ICD-10-CM

## 2023-11-02 DIAGNOSIS — J18.9 PNEUMONIA DUE TO INFECTIOUS ORGANISM, UNSPECIFIED LATERALITY, UNSPECIFIED PART OF LUNG: ICD-10-CM

## 2023-11-02 DIAGNOSIS — R06.02 SHORTNESS OF BREATH: ICD-10-CM

## 2023-11-02 DIAGNOSIS — J44.9 CHRONIC OBSTRUCTIVE PULMONARY DISEASE, UNSPECIFIED COPD TYPE (H): ICD-10-CM

## 2023-11-02 DIAGNOSIS — R60.0 LOWER EXTREMITY EDEMA: ICD-10-CM

## 2023-11-02 DIAGNOSIS — M79.661 PAIN OF RIGHT LOWER LEG: Primary | ICD-10-CM

## 2023-11-02 LAB
ANION GAP SERPL CALCULATED.3IONS-SCNC: 11 MMOL/L (ref 7–15)
ATRIAL RATE - MUSE: 82 BPM
BASOPHILS # BLD AUTO: 0 10E3/UL (ref 0–0.2)
BASOPHILS NFR BLD AUTO: 0 %
BUN SERPL-MCNC: 13.5 MG/DL (ref 6–20)
CALCIUM SERPL-MCNC: 9.5 MG/DL (ref 8.6–10)
CHLORIDE SERPL-SCNC: 98 MMOL/L (ref 98–107)
CREAT SERPL-MCNC: 1.05 MG/DL (ref 0.67–1.17)
D DIMER PPP FEU-MCNC: 1.04 UG/ML FEU (ref 0–0.5)
DEPRECATED HCO3 PLAS-SCNC: 27 MMOL/L (ref 22–29)
DIASTOLIC BLOOD PRESSURE - MUSE: NORMAL MMHG
EGFRCR SERPLBLD CKD-EPI 2021: 84 ML/MIN/1.73M2
EOSINOPHIL # BLD AUTO: 0.3 10E3/UL (ref 0–0.7)
EOSINOPHIL NFR BLD AUTO: 2 %
ERYTHROCYTE [DISTWIDTH] IN BLOOD BY AUTOMATED COUNT: 13.3 % (ref 10–15)
GLUCOSE SERPL-MCNC: 116 MG/DL (ref 70–99)
HCT VFR BLD AUTO: 44.3 % (ref 40–53)
HGB BLD-MCNC: 14.8 G/DL (ref 13.3–17.7)
HOLD SPECIMEN: NORMAL
IMM GRANULOCYTES # BLD: 0.3 10E3/UL
IMM GRANULOCYTES NFR BLD: 2 %
INTERPRETATION ECG - MUSE: NORMAL
LYMPHOCYTES # BLD AUTO: 1.4 10E3/UL (ref 0.8–5.3)
LYMPHOCYTES NFR BLD AUTO: 11 %
MCH RBC QN AUTO: 28.8 PG (ref 26.5–33)
MCHC RBC AUTO-ENTMCNC: 33.4 G/DL (ref 31.5–36.5)
MCV RBC AUTO: 86 FL (ref 78–100)
MONOCYTES # BLD AUTO: 0.9 10E3/UL (ref 0–1.3)
MONOCYTES NFR BLD AUTO: 7 %
NEUTROPHILS # BLD AUTO: 9.9 10E3/UL (ref 1.6–8.3)
NEUTROPHILS NFR BLD AUTO: 78 %
NRBC # BLD AUTO: 0 10E3/UL
NRBC BLD AUTO-RTO: 0 /100
P AXIS - MUSE: NORMAL DEGREES
PLATELET # BLD AUTO: 505 10E3/UL (ref 150–450)
POTASSIUM SERPL-SCNC: 4.6 MMOL/L (ref 3.4–5.3)
PR INTERVAL - MUSE: NORMAL MS
QRS DURATION - MUSE: 94 MS
QT - MUSE: 370 MS
QTC - MUSE: 432 MS
R AXIS - MUSE: 54 DEGREES
RBC # BLD AUTO: 5.13 10E6/UL (ref 4.4–5.9)
SODIUM SERPL-SCNC: 136 MMOL/L (ref 135–145)
SYSTOLIC BLOOD PRESSURE - MUSE: NORMAL MMHG
T AXIS - MUSE: 34 DEGREES
TROPONIN T SERPL HS-MCNC: 17 NG/L
VENTRICULAR RATE- MUSE: 82 BPM
WBC # BLD AUTO: 12.8 10E3/UL (ref 4–11)

## 2023-11-02 PROCEDURE — 93005 ELECTROCARDIOGRAM TRACING: CPT | Performed by: FAMILY MEDICINE

## 2023-11-02 PROCEDURE — 71045 X-RAY EXAM CHEST 1 VIEW: CPT

## 2023-11-02 PROCEDURE — 85025 COMPLETE CBC W/AUTO DIFF WBC: CPT | Mod: ZL

## 2023-11-02 PROCEDURE — 84484 ASSAY OF TROPONIN QUANT: CPT | Performed by: FAMILY MEDICINE

## 2023-11-02 PROCEDURE — 99284 EMERGENCY DEPT VISIT MOD MDM: CPT | Performed by: FAMILY MEDICINE

## 2023-11-02 PROCEDURE — 93010 ELECTROCARDIOGRAM REPORT: CPT | Performed by: INTERNAL MEDICINE

## 2023-11-02 PROCEDURE — 80048 BASIC METABOLIC PNL TOTAL CA: CPT | Mod: ZL

## 2023-11-02 PROCEDURE — 36415 COLL VENOUS BLD VENIPUNCTURE: CPT | Mod: ZL

## 2023-11-02 PROCEDURE — 93971 EXTREMITY STUDY: CPT | Mod: RT

## 2023-11-02 PROCEDURE — 85379 FIBRIN DEGRADATION QUANT: CPT | Mod: ZL

## 2023-11-02 PROCEDURE — 99215 OFFICE O/P EST HI 40 MIN: CPT

## 2023-11-02 PROCEDURE — 99285 EMERGENCY DEPT VISIT HI MDM: CPT | Mod: 25 | Performed by: FAMILY MEDICINE

## 2023-11-02 RX ORDER — AZITHROMYCIN 250 MG/1
TABLET, FILM COATED ORAL
Qty: 6 TABLET | Refills: 0 | Status: SHIPPED | OUTPATIENT
Start: 2023-11-02 | End: 2023-11-07

## 2023-11-02 ASSESSMENT — ANXIETY QUESTIONNAIRES
4. TROUBLE RELAXING: NOT AT ALL
1. FEELING NERVOUS, ANXIOUS, OR ON EDGE: NOT AT ALL
7. FEELING AFRAID AS IF SOMETHING AWFUL MIGHT HAPPEN: NOT AT ALL
6. BECOMING EASILY ANNOYED OR IRRITABLE: NOT AT ALL
5. BEING SO RESTLESS THAT IT IS HARD TO SIT STILL: NOT AT ALL
2. NOT BEING ABLE TO STOP OR CONTROL WORRYING: NOT AT ALL
GAD7 TOTAL SCORE: 0
3. WORRYING TOO MUCH ABOUT DIFFERENT THINGS: NOT AT ALL
GAD7 TOTAL SCORE: 0

## 2023-11-02 ASSESSMENT — ENCOUNTER SYMPTOMS
BACK PAIN: 1
COUGH: 1
WOUND: 0
COLOR CHANGE: 1
SHORTNESS OF BREATH: 1
FEVER: 0
ARTHRALGIAS: 1
CHILLS: 0

## 2023-11-02 ASSESSMENT — ACTIVITIES OF DAILY LIVING (ADL)
ADLS_ACUITY_SCORE: 33
ADLS_ACUITY_SCORE: 35

## 2023-11-02 ASSESSMENT — PAIN SCALES - GENERAL: PAINLEVEL: WORST PAIN (10)

## 2023-11-02 NOTE — ED PROVIDER NOTES
History     Chief Complaint   Patient presents with    Shortness of Breath    Numbness     HPI  Jose Elias Reaves is a 53 year old male with history of COPD who presents to the emergency department with shortness of breath, right lower extremity swelling, recent COVID positivity.  Patient was brought over from the clinic by his provider there, concerned that he may have a DVT.  .nowPatient does not endorse chest pain.  No fevers or chills, no profuse sweating, no chest pain or radiation of pain with ambulation.    Reviewed nurses notes below, similar history is related to me.  Pt arrives via private vehicle from clinic with c/o SOB, and numbness in lower extremities. Pt tested positive ten days ago.      Allergies:  Allergies   Allergen Reactions    Penicillins Anaphylaxis     Tolerated cefazolin on 09/16/2015.    Clindamycin Hives    Gabapentin      Mood disturbance        Problem List:    Patient Active Problem List    Diagnosis Date Noted    Prediabetes 08/22/2023     Priority: Medium    Cigarette smoker 03/20/2019     Priority: Medium    Post-concussion headache 12/26/2018     Priority: Medium    Other chronic rhinitis 02/20/2018     Priority: Medium    Mild intermittent asthma 02/20/2018     Priority: Medium    Deviated nasal septum 02/20/2018     Priority: Medium    Tobacco abuse 02/20/2018     Priority: Medium    Mixed hyperlipidemia 04/18/2017     Priority: Medium    Chronic pain syndrome - Neck 04/18/2017     Priority: Medium    Essential hypertension 03/21/2017     Priority: Medium    Family history of ischemic heart disease 03/21/2017     Priority: Medium    Family history of diabetes mellitus 03/21/2017     Priority: Medium    Personality disorder (H) 05/05/2012     Priority: Medium        Past Medical History:    Past Medical History:   Diagnosis Date    Cervical strain 01/03/2019    Closed head injury with concussion 12/12/2018    Concussion with brief LOC 12/12/2018    Concussion with brief LOC  12/12/2018    Hypertension     Impingement of right ulnar nerve 03/16/2020    Post concussion syndrome 03/26/2019    Post-concussion headache 12/26/2018    Vestibular dysfunction 12/26/2018    Vision disturbance 12/26/2018       Past Surgical History:    Past Surgical History:   Procedure Laterality Date    HERNIA REPAIR Left     KNEE SURGERY Left     ACL repair, patella graft    SEPTOPLASTY N/A 3/13/2018    Procedure: SEPTOPLASTY;  septoplasty, submucosal resection of the turbinates;  Surgeon: Piotr Quiroz MD;  Location: [facility] OR       Family History:    Family History   Problem Relation Age of Onset    Diabetes Mother     Myocardial Infarction Father        Social History:  Marital Status:   [4]  Social History     Tobacco Use    Smoking status: Every Day     Types: Cigarettes    Smokeless tobacco: Never    Tobacco comments:     3 cigarettes per day    Vaping Use    Vaping Use: Never used   Substance Use Topics    Alcohol use: Not Currently     Alcohol/week: 2.0 standard drinks of alcohol     Types: 2 Cans of beer per week     Comment: moderately    Drug use: No        Medications:    azithromycin (ZITHROMAX) 250 MG tablet  Acetaminophen (TYLENOL PO)  albuterol (PROAIR HFA/PROVENTIL HFA/VENTOLIN HFA) 108 (90 Base) MCG/ACT inhaler  amLODIPine (NORVASC) 5 MG tablet  atorvastatin (LIPITOR) 20 MG tablet  atorvastatin (LIPITOR) 20 MG tablet  benzonatate (TESSALON) 100 MG capsule  fluticasone-salmeterol (ADVAIR) 500-50 MCG/ACT inhaler  fluticasone-salmeterol (ADVAIR-HFA) 230-21 MCG/ACT inhaler  lamoTRIgine (LAMICTAL) 100 MG tablet  losartan-hydrochlorothiazide (HYZAAR) 100-25 MG tablet  MEDS UNK - RECORDS REQUESTED  metaxalone (SKELAXIN) 800 MG tablet  methylPREDNISolone (MEDROL DOSEPAK) 4 MG tablet therapy pack  naproxen (NAPROSYN) 500 MG tablet  nicotine (NICODERM CQ) 14 MG/24HR 24 hr patch  [START ON 11/7/2023] nicotine (NICODERM CQ) 7 MG/24HR 24 hr patch  Omega-3 Fish Oil 500 MG capsule  predniSONE  "(DELTASONE) 20 MG tablet  propranolol (INDERAL) 80 MG tablet  propranolol ER (INDERAL LA) 120 MG 24 hr capsule  tiotropium (SPIRIVA) 18 MCG inhaled capsule  triamcinolone (KENALOG) 0.5 % external ointment          Review of Systems   Constitutional:  Negative for fever.   Respiratory:  Negative for cough and shortness of breath.    Cardiovascular:  Negative for chest pain.   Gastrointestinal:  Negative for abdominal pain.   Skin:  Negative for rash.   All other systems reviewed and are negative.      Physical Exam   BP: 108/73  Pulse: 89  Temp: 97  F (36.1  C)  Resp: 16  Height: 180.3 cm (5' 11\")  Weight: 99.3 kg (219 lb)  SpO2: 95 %      Physical Exam  Vitals and nursing note reviewed.   Constitutional:       General: He is not in acute distress.     Appearance: Normal appearance. He is not toxic-appearing.   HENT:      Head: Atraumatic.   Eyes:      General: No scleral icterus.     Conjunctiva/sclera: Conjunctivae normal.   Cardiovascular:      Rate and Rhythm: Normal rate.      Heart sounds: Normal heart sounds.   Pulmonary:      Effort: Pulmonary effort is normal. No respiratory distress.      Breath sounds: Normal breath sounds. No decreased breath sounds, wheezing or rhonchi.   Abdominal:      Palpations: Abdomen is soft.      Tenderness: There is no abdominal tenderness.   Musculoskeletal:         General: No deformity.      Cervical back: Neck supple.      Right lower leg: Edema present.   Skin:     General: Skin is warm.   Neurological:      Mental Status: He is alert.           Results for orders placed or performed during the hospital encounter of 11/02/23 (from the past 24 hour(s))   Troponin T, High Sensitivity   Result Value Ref Range    Troponin T, High Sensitivity 17 <=22 ng/L   EKG 12-lead, tracing only   Result Value Ref Range    Systolic Blood Pressure  mmHg    Diastolic Blood Pressure  mmHg    Ventricular Rate 82 BPM    Atrial Rate 82 BPM    MD Interval  ms    QRS Duration 94 ms     ms    " QTc 432 ms    P Axis  degrees    R AXIS 54 degrees    T Axis 34 degrees    Interpretation ECG       noisy baseline limits interpretation.  Probable sinus rhythm vs  Accelerated Junctional rhythm  Abnormal ECG  When compared with ECG of 26-OCT-2023 09:19,  appears similar  Confirmed by MD HERMES, [facility] ([id number]) on 11/2/2023 1:14:24 PM     US Lower Extremity Venous Duplex Right    Narrative    Procedure: US LOWER EXTREMITY VENOUS DUPLEX RIGHT    HISTORY:  leg swelling, ?DVT, elevated ddimer.    TECHNIQUE: Grayscale, color Doppler and compression views of the deep  venous structures of the right lower extremity.    COMPARISON: None.    FINDINGS:    Interrogation of the deep venous structures from the right common  femoral vein through the visualized veins of the proximal calf  demonstrate normal grayscale appearance, compressibility and  augmentable color Doppler flow.      Impression    IMPRESSION:     No evidence of right lower extremity DVT.      GRAYSON MENDIETA MD         SYSTEM ID:  [id number]   XR Chest Port 1 View    Narrative    PROCEDURE:  XR CHEST PORT 1 VIEW    HISTORY: sob, 10 DAYS POST COVID. .    COMPARISON:  10/26/2023    FINDINGS:    The cardiomediastinal contours are stable.  New multifocal airspace consolidation is seen, most pronounced at the  right lung base. No effusion or pneumothorax.      Impression    IMPRESSION: Multifocal pneumonia. Recommend follow-up to resolution.    GRAYSON MENDIETA MD         SYSTEM ID:  [id number]       Medications - No data to display    Assessments & Plan (with Medical Decision Making)     I have reviewed the nursing notes.    I have reviewed the findings, diagnosis, plan and need for follow up with the patient.      Medical Decision Making  The patient's presentation was of moderate complexity (an acute illness with systemic symptoms).    The patient's evaluation involved:  history and exam without other MDM data elements    The patient's management necessitated  high risk (a decision regarding hospitalization).      Discharge Medication List as of 11/2/2023  1:44 PM        START taking these medications    Details   azithromycin (ZITHROMAX) 250 MG tablet Take 2 tablets (500 mg) by mouth daily for 1 day, THEN 1 tablet (250 mg) daily for 4 days., Disp-6 tablet, R-0, E-Prescribe           No DVT, edema likely musculoskeletal in nature or lymphedema.  No sign of cellulitis.    Still dyspneic, 10 days out from diagnosis, multifocal pneumonia on chest x-ray.  Due to history of COPD will trial azithromycin at home.  Return to ED with worsening symptoms, resume usual COPD treatment at home.  Patient agrees with going home, he has no oxygen requirement here, after shared decision-making conversation, patient is in agreement, and verbalizes understanding of plan.    Final diagnoses:   COPD exacerbation (H)   Pneumonia due to infectious organism, unspecified laterality, unspecified part of lung   Lower extremity edema       11/2/2023   [facility]       Ulisses Rodas MD  11/08/23 0259       Ulisses Rodas MD  11/08/23 0306

## 2023-11-02 NOTE — LETTER
November 3, 2023      Jose Elias Reaves  [address]  [place] [address]        To Whom It May Concern:    Jose Elias Reaves  was seen at our facility on 10/26/2023 and has been out ill since this time.  Please excuse him  until 11/13/23 due to illness at which time we expect him to be able to come back to work without restrictions.        Sincerely,        ANATOLY Chow CNP

## 2023-11-02 NOTE — NURSING NOTE
"Chief Complaint   Patient presents with    Consult     Low back pain    Patient is being seen in the clinic today for worsening lower back pain. The patient states that his pain started 10 days ago and is rating at a 10 constantly.    Initial /78   Pulse 101   Temp 97.3  F (36.3  C) (Temporal)   Resp 20   Ht 1.803 m (5' 11\")   Wt 99.3 kg (219 lb)   SpO2 93%   BMI 30.54 kg/m   Estimated body mass index is 30.54 kg/m  as calculated from the following:    Height as of this encounter: 1.803 m (5' 11\").    Weight as of this encounter: 99.3 kg (219 lb).  Medication Review: complete    The next two questions are to help us understand your food security.  If you are feeling you need any assistance in this area, we have resources available to support you today.          10/9/2023   SDOH- Food Insecurity   Within the past 12 months, did you worry that your food would run out before you got money to buy more? NC   Within the past 12 months, did the food you bought just not last and you didn t have money to get more? NC         Health Care Directive:  Patient does not have a Health Care Directive or Living Will: Discussed advance care planning with patient; however, patient declined at this time.    Gaby Jeong LPN      "

## 2023-11-02 NOTE — LETTER
November 15, 2023      Jose Elias Reaves  [address]  [place] [address]        To Whom It May Concern:    Jose Elias Reaves was seen on 11/10/23 and has been out ill since this time.  Please excuse him until Tuesday November 21, 2023 at which time he may return to work without restrictions.      Sincerely,        ANATOLY Chow CNP

## 2023-11-02 NOTE — Clinical Note
Jose Elias Reaves was seen and treated in our emergency department on 11/2/2023.  He may return to work on 11/06/2023.       If you have any questions or concerns, please don't hesitate to call.      Ulisses Rodas MD

## 2023-11-02 NOTE — ED TRIAGE NOTES
Pt arrives via private vehicle from clinic with c/o SOB, and numbness in lower extremities. Pt tested positive ten days ago.      Triage Assessment (Adult)       Row Name 11/02/23 1021          Triage Assessment    Airway WDL WDL        Respiratory WDL    Respiratory WDL X;rhythm/pattern     Rhythm/Pattern, Respiratory shortness of breath        Skin Circulation/Temperature WDL    Skin Circulation/Temperature WDL WDL        Cardiac WDL    Cardiac WDL WDL        Peripheral/Neurovascular WDL    Peripheral Neurovascular WDL WDL        Cognitive/Neuro/Behavioral WDL    Cognitive/Neuro/Behavioral WDL WDL

## 2023-11-02 NOTE — PROGRESS NOTES
Assessment & Plan   Jose Elias Reaves is a 53 year old presenting for the following health issues:      ICD-10-CM    1. Pain of right lower leg  M79.661 CBC and Differential     Basic Metabolic Panel     D dimer quantitative     CBC and Differential     D dimer quantitative     Basic Metabolic Panel      2. Dusky feet  R23.8       3. Shortness of breath  R06.02       4. Elevated d-dimer  R79.89         With patient's increased right lower leg pain, dusky cold toes, worsening numbness and difficulty with movement there is concern for critical limb ischemia.  In addition with his continued shortness of breath, we  proceeded with lab work.  D-dimer is elevated at 1.04.  He did have a normal D-dimer 2 months ago. WBC slightly elevated at 12.8  Concern for possible pulmonary embolism. Transferred patient to emergency room to have further diagnostic testing to rule out critical limb ischemia and pulmonary embolism.    No follow-ups on file.    ANATOLY Chow [facility]      Raúl Jackson is a 53 year old, presenting for the following health issues:  Consult (Low back pain )    History of Present Illness       Back Pain:  He presents for follow up of back pain. Patient's back pain is a new problem.    Original cause of back pain: other  First noticed back pain: in the last week  Patient feels back pain: constantlyLocation of back pain:  Right lower back and right middle of back  Description of back pain: burning, sharp and stabbing  Back pain spreads: nowhere    Since patient first noticed back pain, pain is: gradually worsening  Does back pain interfere with his job:  Yes  On a scale of 1-10 (10 being the worst), patient describes pain as:  10  What makes back pain worse: bending, coughing, certain positions, lying down, sitting, standing and twisting   Acupuncture: not tried  Acetaminophen: not tried  Activity or exercise: not tried  Chiropractor:  Not tried  Cold: not helpful  Heat:  helpful  Massage: not tried  Muscle relaxants: not tried  NSAIDS: not helpful  Opioids: not tried  Physical Therapy: not tried  Rest: helpful  Steroid Injection: not tried  Stretching: not tried  Surgery: not tried  TENS unit: not tried  Topical pain relievers: not tried  Other healthcare providers patient is seeing for back pain: None    Reason for visit:  D  Symptom onset:  1-2 weeks ago  Symptom intensity:  Severe  Symptom progression:  Worsening  Had these symptoms before:  No  What makes it worse:  Just    He eats 2-3 servings of fruits and vegetables daily.He consumes 0 sweetened beverage(s) daily.He exercises with enough effort to increase his heart rate 9 or less minutes per day.  He exercises with enough effort to increase his heart rate 3 or less days per week.   He is taking medications regularly.         Patient has had worsening pain in his lower leg- His toes are blue, he cannot move his ankle and the pain radiates up his leg and into his back. He noticed it was worse two days after he got sick with COVID.  He does have a past history of a venous ablation and has followed up with vascular in the past several years ago.  But he does report that his leg pain has suddenly become worse over the last several days and has noted overlying skin changes..    He has had continued difficulty with his breathing.  He is a chronic every day smoker.  He does have a history of COPD, is currently on albuterol, Advair and Spiriva.  He does not feel like his inhalers are helping with his symptoms.              Review of Systems   Constitutional:  Negative for chills and fever.   Respiratory:  Positive for cough and shortness of breath.    Cardiovascular:  Negative for chest pain and peripheral edema.   Musculoskeletal:  Positive for arthralgias (right leg pain) and back pain.   Skin:  Positive for color change (right leg) and pallor (toes). Negative for wound.   All other systems reviewed and are negative.    "  Constitutional, HEENT, cardiovascular, pulmonary, gi and gu systems are negative, except as otherwise noted.      Objective    /78   Pulse 101   Temp 97.3  F (36.3  C) (Temporal)   Resp 20   Ht 1.803 m (5' 11\")   Wt 99.3 kg (219 lb)   SpO2 93%   BMI 30.54 kg/m    Body mass index is 30.54 kg/m .  Physical Exam  Vitals reviewed.   Constitutional:       General: He is not in acute distress.     Appearance: He is not toxic-appearing.   HENT:      Head: Normocephalic and atraumatic.   Eyes:      Pupils: Pupils are equal, round, and reactive to light.   Cardiovascular:      Rate and Rhythm: Normal rate and regular rhythm.      Pulses: Normal pulses.           Dorsalis pedis pulses are 2+ on the right side.      Heart sounds: Normal heart sounds.   Pulmonary:      Effort: Pulmonary effort is normal. No respiratory distress.      Breath sounds: Wheezing (scattered) present. No rhonchi.   Feet:      Comments: Multiple varicose veins of right lower leg, pigmented skin changes on right calf,   Skin:     General: Skin is warm and dry.   Neurological:      Mental Status: He is alert.        Results for orders placed or performed during the hospital encounter of 11/02/23   Troponin T, High Sensitivity     Status: Normal   Result Value Ref Range    Troponin T, High Sensitivity 17 <=22 ng/L   Results for orders placed or performed in visit on 11/02/23   D dimer quantitative     Status: Abnormal   Result Value Ref Range    D-Dimer Quantitative 1.04 (H) 0.00 - 0.50 ug/mL FEU    Narrative    This D-dimer assay is intended for use in conjunction with a clinical pretest probability assessment model to exclude pulmonary embolism (PE) and deep venous thrombosis (DVT) in outpatients suspected of PE or DVT. The cut-off value is 0.50 ug/mL FEU.    For patients 50 years of age or older, the application of age-adjusted cut-off values for D-Dimer may increase the specificity without significant effect on sensitivity. The " literature suggested calculation age adjusted cut-off in ug/L = age in years x 10 ug/L. The results in this laboratory are reported as ug/mL rather than ug/L. The calculation for age adjusted cut off in ug/mL= age in years x 0.01 ug/mL. For example, the cut off for a 76 year old male is 76 x 0.01 ug/mL = 0.76 ug/mL (760 ug/L).    M Sen et al. Age adjusted D-dimer cut-off levels to rule out pulmonary embolism: The ADJUST-PE Study. ANDRÉS 2014;311:2100-8853.; HJ Donovan et al. Diagnostic accuracy of conventional or age adjusted D-dimer cutoff values in older patients with suspected venous thromboembolism. Systemic review and meta-analysis. BMJ 2013:346:f2492.   Basic Metabolic Panel     Status: Abnormal   Result Value Ref Range    Sodium 136 135 - 145 mmol/L    Potassium 4.6 3.4 - 5.3 mmol/L    Chloride 98 98 - 107 mmol/L    Carbon Dioxide (CO2) 27 22 - 29 mmol/L    Anion Gap 11 7 - 15 mmol/L    Urea Nitrogen 13.5 6.0 - 20.0 mg/dL    Creatinine 1.05 0.67 - 1.17 mg/dL    GFR Estimate 84 >60 mL/min/1.73m2    Calcium 9.5 8.6 - 10.0 mg/dL    Glucose 116 (H) 70 - 99 mg/dL   CBC with platelets and differential     Status: Abnormal   Result Value Ref Range    WBC Count 12.8 (H) 4.0 - 11.0 10e3/uL    RBC Count 5.13 4.40 - 5.90 10e6/uL    Hemoglobin 14.8 13.3 - 17.7 g/dL    Hematocrit 44.3 40.0 - 53.0 %    MCV 86 78 - 100 fL    MCH 28.8 26.5 - 33.0 pg    MCHC 33.4 31.5 - 36.5 g/dL    RDW 13.3 10.0 - 15.0 %    Platelet Count 505 (H) 150 - 450 10e3/uL    % Neutrophils 78 %    % Lymphocytes 11 %    % Monocytes 7 %    % Eosinophils 2 %    % Basophils 0 %    % Immature Granulocytes 2 %    NRBCs per 100 WBC 0 <1 /100    Absolute Neutrophils 9.9 (H) 1.6 - 8.3 10e3/uL    Absolute Lymphocytes 1.4 0.8 - 5.3 10e3/uL    Absolute Monocytes 0.9 0.0 - 1.3 10e3/uL    Absolute Eosinophils 0.3 0.0 - 0.7 10e3/uL    Absolute Basophils 0.0 0.0 - 0.2 10e3/uL    Absolute Immature Granulocytes 0.3 <=0.4 10e3/uL    Absolute NRBCs 0.0 10e3/uL    Extra Tube     Status: None    Narrative    The following orders were created for panel order Extra Tube.  Procedure                               Abnormality         Status                     ---------                               -----------         ------                     Extra Serum Separator Tu...[[id number]]                      Final result                 Please view results for these tests on the individual orders.   Extra Serum Separator Tube (SST)     Status: None   Result Value Ref Range    Hold Specimen [facility]    CBC and Differential     Status: Abnormal    Narrative    The following orders were created for panel order CBC and Differential.  Procedure                               Abnormality         Status                     ---------                               -----------         ------                     CBC with platelets and d...[[id number]]  Abnormal            Final result                 Please view results for these tests on the individual orders.

## 2023-11-03 RX ORDER — ALBUTEROL SULFATE 0.83 MG/ML
2.5 SOLUTION RESPIRATORY (INHALATION) EVERY 6 HOURS PRN
Qty: 90 ML | Refills: 1 | Status: SHIPPED | OUTPATIENT
Start: 2023-11-03 | End: 2024-07-10

## 2023-11-03 NOTE — TELEPHONE ENCOUNTER
Letter signed and left at [address] check-in desk.      Neb machine and solution at [facility] pharmacy.  Called them to confirm that they can fill.  They can.    Called patient back and updated him on above.  Patient grateful for the help.    Nanette Royal RN on 11/3/2023 at 10:04 AM

## 2023-11-03 NOTE — TELEPHONE ENCOUNTER
Called patient about letter.     Off due to illness from 10/26 until present.  Expected to return back to work with full duties on 11/13 without restrictions.        ER doc wrote a letter from yesterday to 11/6.      Diagnosed with pneumonia.  Hasn't started antibiotic yet.      Needs nebulizer and solution orders.  Will call [facility] to see if they have nebs.      Nanette Royal RN on 11/3/2023 at 9:09 AM

## 2023-11-08 ASSESSMENT — ENCOUNTER SYMPTOMS
SHORTNESS OF BREATH: 0
COUGH: 0
ABDOMINAL PAIN: 0
FEVER: 0

## 2023-11-10 ENCOUNTER — HOSPITAL ENCOUNTER (EMERGENCY)
Facility: OTHER | Age: 53
Discharge: HOME | End: 2023-11-10
Attending: FAMILY MEDICINE | Admitting: FAMILY MEDICINE
Payer: COMMERCIAL

## 2023-11-10 ENCOUNTER — APPOINTMENT (OUTPATIENT)
Dept: GENERAL RADIOLOGY | Facility: OTHER | Age: 53
End: 2023-11-10
Attending: FAMILY MEDICINE
Payer: COMMERCIAL

## 2023-11-10 VITALS
DIASTOLIC BLOOD PRESSURE: 91 MMHG | OXYGEN SATURATION: 95 % | BODY MASS INDEX: 30.54 KG/M2 | TEMPERATURE: 97.9 F | SYSTOLIC BLOOD PRESSURE: 129 MMHG | WEIGHT: 219 LBS | RESPIRATION RATE: 24 BRPM | HEART RATE: 98 BPM

## 2023-11-10 DIAGNOSIS — M79.604 RIGHT LEG PAIN: ICD-10-CM

## 2023-11-10 PROCEDURE — 72100 X-RAY EXAM L-S SPINE 2/3 VWS: CPT | Mod: TC

## 2023-11-10 PROCEDURE — 99284 EMERGENCY DEPT VISIT MOD MDM: CPT | Performed by: FAMILY MEDICINE

## 2023-11-10 PROCEDURE — 250N000013 HC RX MED GY IP 250 OP 250 PS 637: Performed by: FAMILY MEDICINE

## 2023-11-10 PROCEDURE — 250N000012 HC RX MED GY IP 250 OP 636 PS 637: Performed by: FAMILY MEDICINE

## 2023-11-10 RX ORDER — PREDNISONE 20 MG/1
20 TABLET ORAL ONCE
Status: COMPLETED | OUTPATIENT
Start: 2023-11-10 | End: 2023-11-10

## 2023-11-10 RX ORDER — VALACYCLOVIR HYDROCHLORIDE 500 MG/1
1000 TABLET, FILM COATED ORAL ONCE
Status: COMPLETED | OUTPATIENT
Start: 2023-11-10 | End: 2023-11-10

## 2023-11-10 RX ORDER — OXYCODONE AND ACETAMINOPHEN 5; 325 MG/1; MG/1
1 TABLET ORAL EVERY 6 HOURS PRN
Qty: 12 TABLET | Refills: 0 | Status: SHIPPED | OUTPATIENT
Start: 2023-11-10 | End: 2023-11-13

## 2023-11-10 RX ORDER — VALACYCLOVIR HYDROCHLORIDE 1 G/1
1000 TABLET, FILM COATED ORAL 3 TIMES DAILY
Qty: 21 TABLET | Refills: 0 | Status: SHIPPED | OUTPATIENT
Start: 2023-11-10 | End: 2023-11-17

## 2023-11-10 RX ORDER — OXYCODONE AND ACETAMINOPHEN 5; 325 MG/1; MG/1
1 TABLET ORAL ONCE
Status: COMPLETED | OUTPATIENT
Start: 2023-11-10 | End: 2023-11-10

## 2023-11-10 RX ORDER — PREDNISONE 20 MG/1
20 TABLET ORAL 2 TIMES DAILY
Qty: 6 TABLET | Refills: 0 | Status: SHIPPED | OUTPATIENT
Start: 2023-11-10 | End: 2023-11-13

## 2023-11-10 RX ADMIN — OXYCODONE HYDROCHLORIDE AND ACETAMINOPHEN 1 TABLET: 5; 325 TABLET ORAL at 22:44

## 2023-11-10 RX ADMIN — PREDNISONE 20 MG: 20 TABLET ORAL at 23:14

## 2023-11-10 RX ADMIN — VALACYCLOVIR 1000 MG: 500 TABLET, FILM COATED ORAL at 23:14

## 2023-11-10 ASSESSMENT — ACTIVITIES OF DAILY LIVING (ADL): ADLS_ACUITY_SCORE: 35

## 2023-11-10 ASSESSMENT — ENCOUNTER SYMPTOMS: BACK PAIN: 1

## 2023-11-10 NOTE — LETTER
November 10, 2023      To Whom It May Concern:      Jose Elias Reaves was seen in our Emergency Department today, 11/10/23.  I expect his condition to improve over the next few days.  He may return to work when improved.    Sincerely,            Galileo Conn MD

## 2023-11-11 NOTE — ED PROVIDER NOTES
History     Chief Complaint   Patient presents with    Leg Pain    Back Pain     The history is provided by the patient and a significant other.     Jose Elias Reaves is a 53 year old male here with right leg pain. The pain is located in the buttock and on the right lower lateral leg.  He was COVID positive October 23 and since that time he has some back pain and right leg pain.     He was seen here October 23 for the COVID symptoms and back pain.  Lumbar xray showed slight anterior wedging of the L1 vertebral body and disc space narrowing at the lumbosacral junction are similar in appearance compared to prior study.     He was seen here November 2 for this, negative RLE Doppler U/S.      Over the past two days the pain is pretty severe. He does not want anything to touch it, not even blankets at night. He has tried lidocaine patches, roll on Aspercreme, aspirin, naproxen, ice, heat. Now the pain is severe even with light touch. No skin rash. No fall or back injury and no history of back problems like this. He has had low back pain before but no history of sciatica.    Allergies:  Allergies   Allergen Reactions    Penicillins Anaphylaxis     Tolerated cefazolin on 09/16/2015.    Clindamycin Hives    Gabapentin      Mood disturbance        Problem List:    Patient Active Problem List    Diagnosis Date Noted    Prediabetes 08/22/2023     Priority: Medium    Cigarette smoker 03/20/2019     Priority: Medium    Post-concussion headache 12/26/2018     Priority: Medium    Other chronic rhinitis 02/20/2018     Priority: Medium    Mild intermittent asthma 02/20/2018     Priority: Medium    Deviated nasal septum 02/20/2018     Priority: Medium    Tobacco abuse 02/20/2018     Priority: Medium    Mixed hyperlipidemia 04/18/2017     Priority: Medium    Chronic pain syndrome - Neck 04/18/2017     Priority: Medium    Essential hypertension 03/21/2017     Priority: Medium    Family history of ischemic heart disease 03/21/2017      Priority: Medium    Family history of diabetes mellitus 03/21/2017     Priority: Medium    Personality disorder (H) 05/05/2012     Priority: Medium        Past Medical History:    Past Medical History:   Diagnosis Date    Cervical strain 01/03/2019    Closed head injury with concussion 12/12/2018    Concussion with brief LOC 12/12/2018    Concussion with brief LOC 12/12/2018    Hypertension     Impingement of right ulnar nerve 03/16/2020    Post concussion syndrome 03/26/2019    Post-concussion headache 12/26/2018    Vestibular dysfunction 12/26/2018    Vision disturbance 12/26/2018       Past Surgical History:    Past Surgical History:   Procedure Laterality Date    HERNIA REPAIR Left     KNEE SURGERY Left     ACL repair, patella graft    SEPTOPLASTY N/A 3/13/2018    Procedure: SEPTOPLASTY;  septoplasty, submucosal resection of the turbinates;  Surgeon: Piotr Quiroz MD;  Location: [facility] OR       Family History:    Family History   Problem Relation Age of Onset    Diabetes Mother     Myocardial Infarction Father        Social History:  Marital Status:   [4]  Social History     Tobacco Use    Smoking status: Every Day     Types: Cigarettes    Smokeless tobacco: Never    Tobacco comments:     3 cigarettes per day    Vaping Use    Vaping Use: Never used   Substance Use Topics    Alcohol use: Not Currently     Alcohol/week: 2.0 standard drinks of alcohol     Types: 2 Cans of beer per week     Comment: moderately    Drug use: No        Medications:    oxyCODONE-acetaminophen (PERCOCET) 5-325 MG tablet  predniSONE (DELTASONE) 20 MG tablet  valACYclovir (VALTREX) 1000 mg tablet  Acetaminophen (TYLENOL PO)  albuterol (PROAIR HFA/PROVENTIL HFA/VENTOLIN HFA) 108 (90 Base) MCG/ACT inhaler  albuterol (PROVENTIL) (2.5 MG/3ML) 0.083% neb solution  amLODIPine (NORVASC) 5 MG tablet  atorvastatin (LIPITOR) 20 MG tablet  atorvastatin (LIPITOR) 20 MG tablet  benzonatate (TESSALON) 100 MG capsule  fluticasone-salmeterol  (ADVAIR) 500-50 MCG/ACT inhaler  fluticasone-salmeterol (ADVAIR-HFA) 230-21 MCG/ACT inhaler  lamoTRIgine (LAMICTAL) 100 MG tablet  losartan-hydrochlorothiazide (HYZAAR) 100-25 MG tablet  MEDS UNK - RECORDS REQUESTED  metaxalone (SKELAXIN) 800 MG tablet  methylPREDNISolone (MEDROL DOSEPAK) 4 MG tablet therapy pack  naproxen (NAPROSYN) 500 MG tablet  nicotine (NICODERM CQ) 7 MG/24HR 24 hr patch  Omega-3 Fish Oil 500 MG capsule  predniSONE (DELTASONE) 20 MG tablet  propranolol (INDERAL) 80 MG tablet  propranolol ER (INDERAL LA) 120 MG 24 hr capsule  tiotropium (SPIRIVA) 18 MCG inhaled capsule  triamcinolone (KENALOG) 0.5 % external ointment      Review of Systems   Musculoskeletal:  Positive for back pain and gait problem.   All other systems reviewed and are negative.      Physical Exam   BP: (!) 129/91  Pulse: 98  Temp: 97.9  F (36.6  C)  Resp: 24  Weight: 99.3 kg (219 lb)  SpO2: 95 %      Physical Exam  Vitals and nursing note reviewed.   Constitutional:       General: He is in acute distress.   Skin:     Comments: He has chronic skin changes from varicose veins and likely PAD but no skin rash concerning for shingles. He has significant pain to light touch over the L5 or S1 dermatome on the right.    Neurological:      Mental Status: He is alert.         Results for orders placed or performed during the hospital encounter of 11/10/23 (from the past 24 hour(s))   XR Lumbar Spine 2/3 Views    Narrative    PROCEDURE INFORMATION:   Exam: XR Lumbosacral Spine   Exam date and time: 11/10/2023 10:44 PM   Age: 53 years old   Clinical indication: Low back pain; Additional info: Pain over the right l5 or   s1 dermatome     TECHNIQUE:   Imaging protocol: Radiologic exam of the lumbosacral spine.   Views: 2 or 3 views.     COMPARISON:   CR XR LUMBAR SPINE 2/3 VIEWS 10/23/2023 4:57 PM     FINDINGS:   Bones/joints: Moderate degenerative disc change and facet arthropathy at the   lumbosacral junction. Other lumbar disc spaces are  well-maintained. Minimal   leftward convexity of the lumbar spine. Osseous alignment otherwise normal. No   vertebral body compression or acute fracture.   Soft tissues: Unremarkable.       Impression    IMPRESSION:   Stable degenerative changes.  No acute abnormality.    THIS DOCUMENT HAS BEEN ELECTRONICALLY SIGNED BY CONNOR WEINSTEIN MD       Medications   valACYclovir (VALTREX) tablet 1,000 mg (has no administration in time range)   predniSONE (DELTASONE) tablet 20 mg (has no administration in time range)   oxyCODONE-acetaminophen (PERCOCET) 5-325 MG per tablet 1 tablet (1 tablet Oral $Given 11/10/23 4360)       Assessments & Plan (with Medical Decision Making)  Jose Elias Reaves is a 53 year old male here with right leg pain. The pain is located in the buttock and on the right lower lateral leg.  He was COVID positive October 23 and since that time he has some back pain and right leg pain.  He was seen here October 23 for the COVID symptoms and back pain.  Lumbar xray showed slight anterior wedging of the L1 vertebral body and disc space narrowing at the lumbosacral junction are similar in appearance compared to prior study. He was seen here November 2 for this, negative RLE Doppler U/S.  Over the past two days the pain is pretty severe. He does not want anything to touch it, not even blankets at night. He has tried lidocaine patches, roll on Aspercreme, aspirin, naproxen, ice, heat. Now the pain is severe even with light touch. No skin rash. No fall or back injury and no history of back problems like this. He has had low back pain before but no history of sciatica.  VS in the ED BP (!) 129/91   Pulse 98   Temp 97.9  F (36.6  C) (Temporal)   Resp 24   Wt 99.3 kg (219 lb)   SpO2 95%   BMI 30.54 kg/m    Exam shows POOP with light touch to the RLE over the L5 or S1 dermatome.  No skin rash. We gave Percocet.   Xray stable.  I did treat him for shingles with valacyclovir, pain with Percocet and prednisone, Rx to [facility]  [place].      I have reviewed the nursing notes.    I have reviewed the findings, diagnosis, plan and need for follow up with the patient.     Medical Decision Making  The patient's presentation was of moderate complexity (an acute illness with systemic symptoms).    The patient's evaluation involved:  an assessment requiring an independent historian (see separate area of note for details)  ordering and/or review of 1 test(s) in this encounter (see separate area of note for details)    The patient's management necessitated moderate risk (prescription drug management including medications given in the ED).      New Prescriptions    OXYCODONE-ACETAMINOPHEN (PERCOCET) 5-325 MG TABLET    Take 1 tablet by mouth every 6 hours as needed for pain    PREDNISONE (DELTASONE) 20 MG TABLET    Take 1 tablet (20 mg) by mouth 2 times daily for 6 doses    VALACYCLOVIR (VALTREX) 1000 MG TABLET    Take 1 tablet (1,000 mg) by mouth 3 times daily for 7 days       Final diagnoses:   Right leg pain - thought to be shingles       11/10/2023   [facility] AND [facility], Galileo Bedolla MD  11/10/23 3346

## 2023-11-11 NOTE — DISCHARGE INSTRUCTIONS
Jose Elias    I think you have shingles. I have ordered Percocet for pain, valacyclovir to help fight the virus and prednisone to calm down the nerve pain.     Thank you for choosing our Emergency Department for your care.     You may receive a phone call or letter for a survey about your care in our ED.  Please complete this as this is how we improve care for our patients.     If you have any questions after leaving the ED you can call or text me on my cell phone at [phone].  This does not mean that I am on call, but I will get back to you.  If you are not doing well please return to the ED.     Sincerely,    Dr Puma Conn M.D.

## 2023-11-11 NOTE — ED TRIAGE NOTES
Patient reports right lower leg pain and numbness in right large toe, as well as lower back pain 10/10. It has been ongoing for 2.5 weeks but worsening over two days. He recently had covid 10/23, d-dimer was elevated at that time. Patient has been taking tylenol and naproxen for the pain with no relief.      Triage Assessment (Adult)       Row Name 11/10/23 9178          Triage Assessment    Airway WDL WDL        Respiratory WDL    Respiratory WDL WDL        Skin Circulation/Temperature WDL    Skin Circulation/Temperature WDL WDL        Cardiac WDL    Cardiac WDL WDL        Peripheral/Neurovascular WDL    Peripheral Neurovascular WDL X;neurovascular assessment lower        Cognitive/Neuro/Behavioral WDL    Cognitive/Neuro/Behavioral WDL WDL        RLE Neurovascular Assessment    Sensation RLE numbness present

## 2023-11-15 RX ORDER — KETOROLAC TROMETHAMINE 10 MG/1
10 TABLET, FILM COATED ORAL EVERY 6 HOURS PRN
Qty: 30 TABLET | Refills: 1 | Status: SHIPPED | OUTPATIENT
Start: 2023-11-15 | End: 2023-12-27

## 2023-11-15 RX ORDER — ATORVASTATIN CALCIUM 20 MG/1
20 TABLET, FILM COATED ORAL AT BEDTIME
Qty: 90 TABLET | Refills: 4 | Status: SHIPPED | OUTPATIENT
Start: 2023-11-15 | End: 2024-07-10

## 2023-11-15 RX ORDER — BENZONATATE 100 MG/1
100 CAPSULE ORAL 3 TIMES DAILY PRN
Qty: 90 CAPSULE | Refills: 1 | Status: SHIPPED | OUTPATIENT
Start: 2023-11-15 | End: 2024-03-06

## 2023-11-15 RX ORDER — LOSARTAN POTASSIUM AND HYDROCHLOROTHIAZIDE 25; 100 MG/1; MG/1
1 TABLET ORAL DAILY
Qty: 90 TABLET | Refills: 4 | Status: SHIPPED | OUTPATIENT
Start: 2023-11-15 | End: 2024-07-10

## 2023-11-15 RX ORDER — AMLODIPINE BESYLATE 5 MG/1
5 TABLET ORAL DAILY
Qty: 90 TABLET | Refills: 4 | Status: SHIPPED | OUTPATIENT
Start: 2023-11-15 | End: 2024-07-10

## 2023-11-15 NOTE — TELEPHONE ENCOUNTER
Letter drafted and pended for review.    Toradol saul'd up.    Nanette Royal RN on 11/15/2023 at 5:01 PM

## 2023-12-13 DIAGNOSIS — G89.4 CHRONIC PAIN SYNDROME: ICD-10-CM

## 2023-12-15 RX ORDER — NAPROXEN 500 MG/1
TABLET ORAL
Qty: 90 TABLET | Refills: 2 | Status: SHIPPED | OUTPATIENT
Start: 2023-12-15

## 2023-12-15 NOTE — TELEPHONE ENCOUNTER
[facility] sent Rx request for the following:      Requested Prescriptions   Pending Prescriptions Disp Refills    naproxen (NAPROSYN) 500 MG tablet [Pharmacy Med Name: NAPROXEN 500MG TABLETS] 90 tablet 1     Sig: TAKE 1 TABLET(500 MG) BY MOUTH TWICE DAILY AS NEEDED FOR PAIN       NSAID Medications Failed - 12/15/2023  1:59 PM        Failed - Blood pressure under 140/90 in past 12 months     BP Readings from Last 3 Encounters:   11/10/23 (!) 129/91   11/02/23 108/73   11/02/23 126/78           Failed - Normal CBC on file in past 12 months     Recent Labs   Lab Test 11/02/23  0933   WBC 12.8*   RBC 5.13   HGB 14.8   HCT 44.3   *        Last Prescription Date:   8/30/23  Last Fill Qty/Refills:         90, R-1    Last Office Visit:              11/2/23   Future Office visit:           None    Unable to complete prescription refill per RN Medication Refill Policy.     Theresa Ireland RN .............. 12/15/2023  2:07 PM

## 2023-12-26 DIAGNOSIS — M79.661 PAIN OF RIGHT LOWER LEG: ICD-10-CM

## 2023-12-27 RX ORDER — KETOROLAC TROMETHAMINE 10 MG/1
TABLET, FILM COATED ORAL
Qty: 30 TABLET | Refills: 1 | Status: SHIPPED | OUTPATIENT
Start: 2023-12-27 | End: 2024-07-10

## 2023-12-27 NOTE — TELEPHONE ENCOUNTER
Last Prescription Date: 11/15/2023  Last Qty/Refills: 30 / R-1  Last Office Visit: 11/02/2023  Future Office Visit: None     Requested Prescriptions   Pending Prescriptions Disp Refills    ketorolac (TORADOL) 10 MG tablet [Pharmacy Med Name: KETOROLAC 10MG TABLETS] 30 tablet 1     Sig: TAKE 1 TABLET(10 MG) BY MOUTH EVERY 6 HOURS AS NEEDED FOR MODERATE PAIN       There is no refill protocol information for this order        Routing refill request to provider for review/approval because:  Drug not on the [facility] refill protocol     Erika Patel RN on 12/27/2023 at 3:43 PM

## 2024-02-02 ENCOUNTER — NURSE TRIAGE (OUTPATIENT)
Dept: INTERNAL MEDICINE | Facility: OTHER | Age: 54
End: 2024-02-02
Payer: COMMERCIAL

## 2024-02-02 ENCOUNTER — APPOINTMENT (OUTPATIENT)
Dept: GENERAL RADIOLOGY | Facility: OTHER | Age: 54
End: 2024-02-02
Attending: FAMILY MEDICINE

## 2024-02-02 ENCOUNTER — HOSPITAL ENCOUNTER (EMERGENCY)
Facility: OTHER | Age: 54
Discharge: HOME | End: 2024-02-02
Attending: FAMILY MEDICINE | Admitting: FAMILY MEDICINE
Payer: OTHER MISCELLANEOUS

## 2024-02-02 VITALS
HEART RATE: 64 BPM | SYSTOLIC BLOOD PRESSURE: 143 MMHG | BODY MASS INDEX: 30.8 KG/M2 | HEIGHT: 71 IN | OXYGEN SATURATION: 99 % | WEIGHT: 220 LBS | TEMPERATURE: 97 F | DIASTOLIC BLOOD PRESSURE: 87 MMHG | RESPIRATION RATE: 18 BRPM

## 2024-02-02 DIAGNOSIS — S06.0X0A CONCUSSION WITHOUT LOSS OF CONSCIOUSNESS, INITIAL ENCOUNTER: ICD-10-CM

## 2024-02-02 DIAGNOSIS — S09.92XA NASAL INJURY, INITIAL ENCOUNTER: ICD-10-CM

## 2024-02-02 PROCEDURE — 70160 X-RAY EXAM OF NASAL BONES: CPT

## 2024-02-02 PROCEDURE — 99282 EMERGENCY DEPT VISIT SF MDM: CPT | Performed by: FAMILY MEDICINE

## 2024-02-02 PROCEDURE — 99283 EMERGENCY DEPT VISIT LOW MDM: CPT | Performed by: FAMILY MEDICINE

## 2024-02-02 ASSESSMENT — ACTIVITIES OF DAILY LIVING (ADL): ADLS_ACUITY_SCORE: 33

## 2024-02-02 ASSESSMENT — ENCOUNTER SYMPTOMS: FACIAL SWELLING: 1
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
## 2024-02-02 NOTE — ED PROVIDER NOTES
History     Chief Complaint   Patient presents with    Facial Injury     The history is provided by the patient.     This is Work Comp    Jose Elias Reaves is a 53 year old male who had minor trauma to the bridge of his nose. He was at work pulling a gas hose onto a fitting and the end came loose and knocked him in the nose.  He has swelling across the nose. No bleeding, no injury to his eyes.     He had an ENT reconstruct his nose a few years ago.     Allergies:  Allergies   Allergen Reactions    Penicillins Anaphylaxis     Tolerated cefazolin on 09/16/2015.    Clindamycin Hives    Gabapentin      Mood disturbance        Problem List:    Patient Active Problem List    Diagnosis Date Noted    Prediabetes 08/22/2023     Priority: Medium    Cigarette smoker 03/20/2019     Priority: Medium    Post-concussion headache 12/26/2018     Priority: Medium    Other chronic rhinitis 02/20/2018     Priority: Medium    Mild intermittent asthma 02/20/2018     Priority: Medium    Deviated nasal septum 02/20/2018     Priority: Medium    Tobacco abuse 02/20/2018     Priority: Medium    Mixed hyperlipidemia 04/18/2017     Priority: Medium    Chronic pain syndrome - Neck 04/18/2017     Priority: Medium    Essential hypertension 03/21/2017     Priority: Medium    Family history of ischemic heart disease 03/21/2017     Priority: Medium    Family history of diabetes mellitus 03/21/2017     Priority: Medium    Personality disorder (H) 05/05/2012     Priority: Medium        Past Medical History:    Past Medical History:   Diagnosis Date    Cervical strain 01/03/2019    Closed head injury with concussion 12/12/2018    Concussion with brief LOC 12/12/2018    Concussion with brief LOC 12/12/2018    Hypertension     Impingement of right ulnar nerve 03/16/2020    Post concussion syndrome 03/26/2019    Post-concussion headache 12/26/2018    Vestibular dysfunction 12/26/2018    Vision disturbance 12/26/2018       Past Surgical History:    Past  Surgical History:   Procedure Laterality Date    HERNIA REPAIR Left     KNEE SURGERY Left     ACL repair, patella graft    SEPTOPLASTY N/A 3/13/2018    Procedure: SEPTOPLASTY;  septoplasty, submucosal resection of the turbinates;  Surgeon: Piotr Quiroz MD;  Location: [facility] OR       Family History:    Family History   Problem Relation Age of Onset    Diabetes Mother     Myocardial Infarction Father        Social History:  Marital Status:   [4]  Social History     Tobacco Use    Smoking status: Every Day     Types: Cigarettes    Smokeless tobacco: Never    Tobacco comments:     3 cigarettes per day    Vaping Use    Vaping Use: Never used   Substance Use Topics    Alcohol use: Not Currently     Alcohol/week: 2.0 standard drinks of alcohol     Types: 2 Cans of beer per week     Comment: moderately    Drug use: No        Medications:    Acetaminophen (TYLENOL PO)  albuterol (PROAIR HFA/PROVENTIL HFA/VENTOLIN HFA) 108 (90 Base) MCG/ACT inhaler  albuterol (PROVENTIL) (2.5 MG/3ML) 0.083% neb solution  amLODIPine (NORVASC) 5 MG tablet  atorvastatin (LIPITOR) 20 MG tablet  benzonatate (TESSALON) 100 MG capsule  fluticasone-salmeterol (ADVAIR) 500-50 MCG/ACT inhaler  fluticasone-salmeterol (ADVAIR-HFA) 230-21 MCG/ACT inhaler  ketorolac (TORADOL) 10 MG tablet  losartan-hydrochlorothiazide (HYZAAR) 100-25 MG tablet  MEDS UNK - RECORDS REQUESTED  metaxalone (SKELAXIN) 800 MG tablet  methylPREDNISolone (MEDROL DOSEPAK) 4 MG tablet therapy pack  naproxen (NAPROSYN) 500 MG tablet  Omega-3 Fish Oil 500 MG capsule  predniSONE (DELTASONE) 20 MG tablet  propranolol (INDERAL) 80 MG tablet  propranolol ER (INDERAL LA) 120 MG 24 hr capsule  tiotropium (SPIRIVA) 18 MCG inhaled capsule  triamcinolone (KENALOG) 0.5 % external ointment  atorvastatin (LIPITOR) 20 MG tablet  lamoTRIgine (LAMICTAL) 100 MG tablet  valACYclovir (VALTREX) 1000 mg tablet        Review of Systems   HENT:  Positive for facial swelling.    All other systems  "reviewed and are negative.      Physical Exam   BP: (!) 143/87  Pulse: 64  Temp: 97  F (36.1  C)  Resp: 18  Height: 180.3 cm (5' 11\")  Weight: 99.8 kg (220 lb)  SpO2: 99 %      Physical Exam  Vitals and nursing note reviewed.   Constitutional:       General: He is not in acute distress.     Appearance: Normal appearance. He is not ill-appearing, toxic-appearing or diaphoretic.   HENT:      Right Ear: External ear normal.      Left Ear: External ear normal.      Nose:      Comments: He has an area of redness across his nose and some swelling.   Neurological:      Mental Status: He is alert.         Results for orders placed or performed during the hospital encounter of 02/02/24 (from the past 24 hour(s))   XR Nasal Bones 3 Views    Narrative    PROCEDURE: XR NASAL BONES 3 VIEWS 2/2/2024 5:48 PM    HISTORY: facial trauma to the bridge of the nose    COMPARISONS: None.    TECHNIQUE: 3 views of the nasal bones were obtained    FINDINGS: There is no nasal bone fracture. Maxillary spine is intact.  The adjacent paranasal sinuses are clear.         Impression    IMPRESSION: No evidence of nasal bone fracture    LOPEZ MUHAMMAD MD         SYSTEM ID:  [id number]       Medications - No data to display    Assessments & Plan (with Medical Decision Making)  Jose Elias Reaves is a 53 year old male who had minor trauma to the bridge of his nose. He was at work pulling a gas hose onto a fitting and the end came loose and knocked him in the nose.  He has swelling across the nose. No bleeding, no injury to his eyes.   He had an ENT reconstruct his nose a few years ago.   VS in the ED BP (!) 143/87   Pulse 64   Temp 97  F (36.1  C)   Resp 18   Ht 1.803 m (5' 11\")   Wt 99.8 kg (220 lb)   SpO2 99%   BMI 30.68 kg/m    Exam shows nasal swelling.   Xray negative.   He still feels a bit dizzy. He has a concussion. We will get him set up to see Dr Lynn.      I have reviewed the nursing notes.    I have reviewed the findings, " diagnosis, plan and need for follow up with the patient.  Medical Decision Making  The patient's presentation was of low complexity (an acute and uncomplicated illness or injury).    The patient's evaluation involved:  an assessment requiring an independent historian (see separate area of note for details)  ordering and/or review of 1 test(s) in this encounter (see separate area of note for details)    The patient's management necessitated only low risk treatment.    Final diagnoses:   Nasal injury, initial encounter   Concussion without loss of consciousness, initial encounter       2/2/2024   [facility] AND [facility], Galileo Bedolla MD  02/02/24 1800

## 2024-02-02 NOTE — DISCHARGE INSTRUCTIONS
Thank you for choosing our Emergency Department for your care.     You may receive a phone call or letter for a survey about your care in our ED.  Please complete this as this is how we improve care for our patients.     If you have any questions after leaving the ED you can call or text me on my cell phone at [phone].  This does not mean that I am on call, but I will get back to you.  If you are not doing well please return to the ED.     Sincerely,    Dr Puma Conn M.D.

## 2024-02-02 NOTE — TELEPHONE ENCOUNTER
"S-(situation): Patient was hit in the bridge of their nose with a heavy aluminum pipe.    B-(background): Occurred yesterday while at work    A-(assessment): Patient reports they were hit in the bridge of the nose yesterday with a thick aluminum pipe, patient reports their head was whipped backwards and is experiencing increased neck pain. Patient reports bruising, an abrasion and swelling of the nose, reports feeling one side of nose is blocked. Denies being knocked unconscious, vomiting. Patient reports they are feeling \"off\", and not able to walk very straight.     R-(recommendations): Per protocol patient should be seen in office today. Patient states they will go into the ED to be evaluated.    Erika Patel RN on 2/2/2024 at 4:04 PM    Reason for Disposition   Breathing through the nose is blocked on one side or both sides    Additional Information   Negative: Knocked out (unconscious) > 1 minute   Negative: Major bleeding (actively dripping or spurting) that can't be stopped   Negative: Sounds like a life-threatening emergency to the triager   Negative: Wound looks infected   Negative: Nosebleed not from trauma   Negative: Nosebleed won't stop after 10 minutes of pinching the nostrils closed (applied twice)   Negative: Black and blue skin around both eyes (bilateral periorbital ecchymosis)   Negative: Clear fluid is dripping from the nose   Negative: Skin is split open or gaping (length > 1/4 inch or 6 mm)   Negative: Sounds like a serious injury to the triager    Answer Assessment - Initial Assessment Questions  1. MECHANISM: \"How did the injury happen?\"       Metal pipe swung back and hit nose  2. ONSET: \"When did the injury happen?\" (Minutes or hours ago)       Yesterday  3. LOCATION: \"What part of the nose is injured?\"       Bridge of nose- eye socket pressure  4. APPEARANCE of INJURY: \"What does the nose look like?\"       Bruised, small scrape on nose  5. BLEEDING: \"Is the nose still bleeding?\" If " "Yes, ask: \"Is it difficult to stop?\"       No bleeding  6. SIZE: For cuts, bruises, or swelling, ask: \"How large is it?\" (e.g., inches or centimeters;  entire nose)       Bruised, swollen and cut  7. PAIN: \"Is it painful?\" If Yes, ask: \"How bad is the pain?\"   (Scale 1-10; or mild, moderate, severe)      9  8. TETANUS: For any breaks in the skin, ask: \"When was the last tetanus booster?\"      Up to date   9. OTHER SYMPTOMS: \"Do you have any other symptoms?\" (e.g., headache, neck pain, loss of consciousness)      Headache    Protocols used: Nose Injury-A-[place]    "

## 2024-02-05 ENCOUNTER — TELEPHONE (OUTPATIENT)
Dept: FAMILY MEDICINE | Facility: OTHER | Age: 54
End: 2024-02-05
Payer: COMMERCIAL

## 2024-02-05 ENCOUNTER — OFFICE VISIT (OUTPATIENT)
Dept: FAMILY MEDICINE | Facility: OTHER | Age: 54
End: 2024-02-05
Attending: CHIROPRACTOR
Payer: OTHER MISCELLANEOUS

## 2024-02-05 VITALS
SYSTOLIC BLOOD PRESSURE: 118 MMHG | HEART RATE: 80 BPM | OXYGEN SATURATION: 97 % | WEIGHT: 226 LBS | TEMPERATURE: 97.3 F | DIASTOLIC BLOOD PRESSURE: 80 MMHG | BODY MASS INDEX: 31.64 KG/M2 | RESPIRATION RATE: 17 BRPM | HEIGHT: 71 IN

## 2024-02-05 DIAGNOSIS — S00.33XA CONTUSION OF NOSE, INITIAL ENCOUNTER: ICD-10-CM
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
## 2024-02-05 PROCEDURE — 99213 OFFICE O/P EST LOW 20 MIN: CPT | Performed by: CHIROPRACTOR

## 2024-02-05 ASSESSMENT — PAIN SCALES - GENERAL: PAINLEVEL: MODERATE PAIN (4)

## 2024-02-05 NOTE — TELEPHONE ENCOUNTER
Spoke with patient to offer earlier appointment time, he stated he would like his appointment moved to 2:20.

## 2024-02-05 NOTE — PROGRESS NOTES
CHIEF COMPLAINT:   Chief Complaint   Patient presents with    Work Comp       HISTORY OF PRESENTING INJURY     Manuel is here for follow-up of a work-related injury occurring February 1, 2024.  He was first seen in the ED on February 2, 2024.  He was attempting to pull a 4 inch aluminum pipe upwards and out of the ground, when it slipped and struck him on the bridge of his nose.  X-rays were performed which were negative for fracture.  ED note documented concussion but he denies any vertigo, headache, excessive sleepiness, or other new symptoms of the head.  He does have some pain still at the bridge of the nose and more so on the bottom of the left eye lid.  Denies blurriness of vision.  He does show me his glasses that have a small scratch from the impact of the pipe.  He is most concerned about affecting his previous surgical correction for deviated septum.    PAST MEDICAL HISTORY:  Past Medical History:   Diagnosis Date    Cervical strain 01/03/2019    Closed head injury with concussion 12/12/2018    Concussion with brief LOC 12/12/2018    Concussion with brief LOC 12/12/2018    Hypertension     Impingement of right ulnar nerve 03/16/2020    Post concussion syndrome 03/26/2019    Post-concussion headache 12/26/2018    Vestibular dysfunction 12/26/2018    Vision disturbance 12/26/2018       PAST SURGICAL HISTORY:  Past Surgical History:   Procedure Laterality Date    HERNIA REPAIR Left     KNEE SURGERY Left     ACL repair, patella graft    SEPTOPLASTY N/A 3/13/2018    Procedure: SEPTOPLASTY;  septoplasty, submucosal resection of the turbinates;  Surgeon: Piotr Quiroz MD;  Location: [facility] OR       ALLERGIES:  Allergies   Allergen Reactions    Penicillins Anaphylaxis     Tolerated cefazolin on 09/16/2015.    Clindamycin Hives    Gabapentin      Mood disturbance        CURRENT MEDICATIONS:  Current Outpatient Medications   Medication Sig Dispense Refill    Acetaminophen (TYLENOL PO) Take 1,000 mg by mouth daily        albuterol (PROAIR HFA/PROVENTIL HFA/VENTOLIN HFA) 108 (90 Base) MCG/ACT inhaler Inhale 2 puffs into the lungs every 4 hours as needed for shortness of breath or wheezing 18 g 11    albuterol (PROVENTIL) (2.5 MG/3ML) 0.083% neb solution Take 1 vial (2.5 mg) by nebulization every 6 hours as needed for shortness of breath, wheezing or cough 90 mL 1    amLODIPine (NORVASC) 5 MG tablet Take 1 tablet (5 mg) by mouth daily 90 tablet 4    atorvastatin (LIPITOR) 20 MG tablet Take 1 tablet (20 mg) by mouth at bedtime 90 tablet 4    atorvastatin (LIPITOR) 20 MG tablet Take 1 tablet (20 mg) by mouth daily 90 tablet 4    benzonatate (TESSALON) 100 MG capsule Take 1 capsule (100 mg) by mouth 3 times daily as needed for cough 90 capsule 1    fluticasone-salmeterol (ADVAIR) 500-50 MCG/ACT inhaler Inhale 1 puff into the lungs every 12 hours 60 each 11    fluticasone-salmeterol (ADVAIR-HFA) 230-21 MCG/ACT inhaler Inhale 2 puffs into the lungs 2 times daily 12 g 11    ketorolac (TORADOL) 10 MG tablet TAKE 1 TABLET(10 MG) BY MOUTH EVERY 6 HOURS AS NEEDED FOR MODERATE PAIN 30 tablet 1    losartan-hydrochlorothiazide (HYZAAR) 100-25 MG tablet Take 1 tablet by mouth daily 90 tablet 4    MEDS UNK - RECORDS REQUESTED For cholesterol      metaxalone (SKELAXIN) 800 MG tablet Take 1 tablet (800 mg) by mouth 3 times daily 90 tablet 4    methylPREDNISolone (MEDROL DOSEPAK) 4 MG tablet therapy pack Follow Package Directions 21 tablet 0    naproxen (NAPROSYN) 500 MG tablet TAKE 1 TABLET(500 MG) BY MOUTH TWICE DAILY AS NEEDED FOR PAIN 90 tablet 2    Omega-3 Fish Oil 500 MG capsule Take 1 capsule (500 mg) by mouth daily 90 capsule 4    predniSONE (DELTASONE) 20 MG tablet Take two tablets (= 40mg) each day for 5 (five) days 10 tablet 0    propranolol (INDERAL) 80 MG tablet Take 0.5 tablets by mouth daily at 2 pm      propranolol ER (INDERAL LA) 120 MG 24 hr capsule Take 1 capsule (120 mg) by mouth daily 90 capsule 4    tiotropium (SPIRIVA) 18 MCG  inhaled capsule Inhale 1 capsule (18 mcg) into the lungs daily 90 capsule 4    triamcinolone (KENALOG) 0.5 % external ointment Apply 1 g topically 2 times daily 15 g 0    lamoTRIgine (LAMICTAL) 100 MG tablet Take 0.5 tablets (50 mg) by mouth 2 times daily for 7 days, THEN 1 tablet (100 mg) 2 times daily for 90 days. - For Irritability / Anger 187 tablet 4    valACYclovir (VALTREX) 1000 mg tablet Take 1 tablet (1,000 mg) by mouth 3 times daily for 7 days 21 tablet 0       SOCIAL HISTORY:  Social History     Socioeconomic History    Marital status:      Spouse name: Not on file    Number of children: Not on file    Years of education: Not on file    Highest education level: Not on file   Occupational History    Not on file   Tobacco Use    Smoking status: Every Day     Types: Cigarettes    Smokeless tobacco: Never    Tobacco comments:     2 cigarettes per day    Vaping Use    Vaping Use: Never used   Substance and Sexual Activity    Alcohol use: Not Currently     Alcohol/week: 2.0 standard drinks of alcohol     Types: 2 Cans of beer per week     Comment: moderately    Drug use: No    Sexual activity: Yes     Partners: Female   Other Topics Concern    Parent/sibling w/ CABG, MI or angioplasty before 65F 55M? Not Asked   Social History Narrative    Not on file     Social Determinants of Health     Financial Resource Strain: Unknown (10/9/2023)    Financial Resource Strain     Within the past 12 months, have you or your family members you live with been unable to get utilities (heat, electricity) when it was really needed?: Patient refused   Food Insecurity: Unknown (10/9/2023)    Food Insecurity     Within the past 12 months, did you worry that your food would run out before you got money to buy more?: Patient refused     Within the past 12 months, did the food you bought just not last and you didn t have money to get more?: Patient refused   Transportation Needs: Unknown (10/9/2023)    Transportation Needs      "Within the past 12 months, has lack of transportation kept you from medical appointments, getting your medicines, non-medical meetings or appointments, work, or from getting things that you need?: Patient refused   Physical Activity: Not on file   Stress: Not on file   Social Connections: Not on file   Interpersonal Safety: Low Risk  (2/5/2024)    Interpersonal Safety     Do you feel physically and emotionally safe where you currently live?: Yes     Within the past 12 months, have you been hit, slapped, kicked or otherwise physically hurt by someone?: No     Within the past 12 months, have you been humiliated or emotionally abused in other ways by your partner or ex-partner?: No   Housing Stability: Unknown (10/9/2023)    Housing Stability     Do you have housing? : Patient refused     Are you worried about losing your housing?: Patient refused       FAMILY HISTORY:  Family History   Problem Relation Age of Onset    Diabetes Mother     Myocardial Infarction Father        REVIEW OF SYSTEMS:    Unremarkable other than chief complaint      PHYSICAL EXAM:   /80   Pulse 80   Temp 97.3  F (36.3  C) (Tympanic)   Resp 17   Ht 1.803 m (5' 11\")   Wt 102.5 kg (226 lb)   SpO2 97%   BMI 31.52 kg/m   Body mass index is 31.52 kg/m .    General Appearance: No acute distress.  Normal mentation, appropriate responses, normal affect.  Mild swelling noted left bridge of nose.  His breathing is normal.  There is some pain with palpation to the nose.  Septal alignment is WNL.        IMPRESSION/PLAN:    Review of records show a surgical repair of his septum back in 2018.  I did offer a referral to that ENT but patient declines this today.  We decided to monitor his condition for the next 2 weeks and consider ENT referral should he not have improvement.  Manuel understood to contact me with any worsening or new symptoms.  Otherwise he is capable of returning to work at full duties and he agreed to these.  2 copies of the signed " workability were issued to him.    Total time spent today in chart review/preparation, face to face evaluation, and documentation: 29 minutes.      Franc Newell DC, FRANCISFP, CICE  Director - Occupational Medicine Department  Diplomate of the American Board of Forensic Professionals  Board Certified - American Board of Independent Medical Examiners      3:15 PM 2/5/2024

## 2024-02-19 ENCOUNTER — OFFICE VISIT (OUTPATIENT)
Dept: FAMILY MEDICINE | Facility: OTHER | Age: 54
End: 2024-02-19
Attending: CHIROPRACTOR
Payer: OTHER MISCELLANEOUS

## 2024-02-19 VITALS
DIASTOLIC BLOOD PRESSURE: 75 MMHG | WEIGHT: 220 LBS | RESPIRATION RATE: 17 BRPM | HEIGHT: 71 IN | BODY MASS INDEX: 30.8 KG/M2 | OXYGEN SATURATION: 99 % | HEART RATE: 76 BPM | SYSTOLIC BLOOD PRESSURE: 109 MMHG
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
## 2024-02-19 DIAGNOSIS — S00.33XA CONTUSION OF NOSE, INITIAL ENCOUNTER: ICD-10-CM

## 2024-02-19 PROCEDURE — 99213 OFFICE O/P EST LOW 20 MIN: CPT | Performed by: CHIROPRACTOR

## 2024-02-19 ASSESSMENT — PAIN SCALES - GENERAL: PAINLEVEL: NO PAIN (0)

## 2024-02-19 NOTE — PROGRESS NOTES
CHIEF COMPLAINT:   Chief Complaint   Patient presents with    Work Comp       HISTORY OF PRESENTING INJURY     Manuel is still concerned with the amount of pain in his nose.  He denies any significant breathing difficulties but has pain when touching the bridge of his nose and would like to proceed with referral to evaluate further.        PAST MEDICAL HISTORY:  Past Medical History:   Diagnosis Date    Cervical strain 01/03/2019    Closed head injury with concussion 12/12/2018    Concussion with brief LOC 12/12/2018    Concussion with brief LOC 12/12/2018    Hypertension     Impingement of right ulnar nerve 03/16/2020    Post concussion syndrome 03/26/2019    Post-concussion headache 12/26/2018    Vestibular dysfunction 12/26/2018    Vision disturbance 12/26/2018       PAST SURGICAL HISTORY:  Past Surgical History:   Procedure Laterality Date    HERNIA REPAIR Left     KNEE SURGERY Left     ACL repair, patella graft    SEPTOPLASTY N/A 3/13/2018    Procedure: SEPTOPLASTY;  septoplasty, submucosal resection of the turbinates;  Surgeon: Piotr Quiroz MD;  Location: [facility]       ALLERGIES:  Allergies   Allergen Reactions    Penicillins Anaphylaxis     Tolerated cefazolin on 09/16/2015.    Clindamycin Hives    Gabapentin      Mood disturbance        CURRENT MEDICATIONS:  Current Outpatient Medications   Medication Sig Dispense Refill    Acetaminophen (TYLENOL PO) Take 1,000 mg by mouth daily       albuterol (PROAIR HFA/PROVENTIL HFA/VENTOLIN HFA) 108 (90 Base) MCG/ACT inhaler Inhale 2 puffs into the lungs every 4 hours as needed for shortness of breath or wheezing 18 g 11    albuterol (PROVENTIL) (2.5 MG/3ML) 0.083% neb solution Take 1 vial (2.5 mg) by nebulization every 6 hours as needed for shortness of breath, wheezing or cough 90 mL 1    amLODIPine (NORVASC) 5 MG tablet Take 1 tablet (5 mg) by mouth daily 90 tablet 4    atorvastatin (LIPITOR) 20 MG tablet Take 1 tablet (20 mg) by mouth at bedtime 90 tablet 4     atorvastatin (LIPITOR) 20 MG tablet Take 1 tablet (20 mg) by mouth daily 90 tablet 4    benzonatate (TESSALON) 100 MG capsule Take 1 capsule (100 mg) by mouth 3 times daily as needed for cough 90 capsule 1    fluticasone-salmeterol (ADVAIR) 500-50 MCG/ACT inhaler Inhale 1 puff into the lungs every 12 hours 60 each 11    fluticasone-salmeterol (ADVAIR-HFA) 230-21 MCG/ACT inhaler Inhale 2 puffs into the lungs 2 times daily 12 g 11    ketorolac (TORADOL) 10 MG tablet TAKE 1 TABLET(10 MG) BY MOUTH EVERY 6 HOURS AS NEEDED FOR MODERATE PAIN 30 tablet 1    losartan-hydrochlorothiazide (HYZAAR) 100-25 MG tablet Take 1 tablet by mouth daily 90 tablet 4    MEDS UNK - RECORDS REQUESTED For cholesterol      metaxalone (SKELAXIN) 800 MG tablet Take 1 tablet (800 mg) by mouth 3 times daily 90 tablet 4    methylPREDNISolone (MEDROL DOSEPAK) 4 MG tablet therapy pack Follow Package Directions 21 tablet 0    naproxen (NAPROSYN) 500 MG tablet TAKE 1 TABLET(500 MG) BY MOUTH TWICE DAILY AS NEEDED FOR PAIN 90 tablet 2    Omega-3 Fish Oil 500 MG capsule Take 1 capsule (500 mg) by mouth daily 90 capsule 4    predniSONE (DELTASONE) 20 MG tablet Take two tablets (= 40mg) each day for 5 (five) days 10 tablet 0    propranolol (INDERAL) 80 MG tablet Take 0.5 tablets by mouth daily at 2 pm      propranolol ER (INDERAL LA) 120 MG 24 hr capsule Take 1 capsule (120 mg) by mouth daily 90 capsule 4    tiotropium (SPIRIVA) 18 MCG inhaled capsule Inhale 1 capsule (18 mcg) into the lungs daily 90 capsule 4    triamcinolone (KENALOG) 0.5 % external ointment Apply 1 g topically 2 times daily 15 g 0    lamoTRIgine (LAMICTAL) 100 MG tablet Take 0.5 tablets (50 mg) by mouth 2 times daily for 7 days, THEN 1 tablet (100 mg) 2 times daily for 90 days. - For Irritability / Anger 187 tablet 4    valACYclovir (VALTREX) 1000 mg tablet Take 1 tablet (1,000 mg) by mouth 3 times daily for 7 days 21 tablet 0       SOCIAL HISTORY:  Social History     Socioeconomic  History    Marital status:      Spouse name: Not on file    Number of children: Not on file    Years of education: Not on file    Highest education level: Not on file   Occupational History    Not on file   Tobacco Use    Smoking status: Every Day     Types: Cigarettes    Smokeless tobacco: Never    Tobacco comments:     2 cigarettes per day    Vaping Use    Vaping Use: Never used   Substance and Sexual Activity    Alcohol use: Not Currently     Alcohol/week: 2.0 standard drinks of alcohol     Types: 2 Cans of beer per week     Comment: moderately    Drug use: No    Sexual activity: Yes     Partners: Female   Other Topics Concern    Parent/sibling w/ CABG, MI or angioplasty before 65F 55M? Not Asked   Social History Narrative    Not on file     Social Determinants of Health     Financial Resource Strain: Unknown (10/9/2023)    Financial Resource Strain     Within the past 12 months, have you or your family members you live with been unable to get utilities (heat, electricity) when it was really needed?: Patient refused   Food Insecurity: Unknown (10/9/2023)    Food Insecurity     Within the past 12 months, did you worry that your food would run out before you got money to buy more?: Patient refused     Within the past 12 months, did the food you bought just not last and you didn t have money to get more?: Patient refused   Transportation Needs: Unknown (10/9/2023)    Transportation Needs     Within the past 12 months, has lack of transportation kept you from medical appointments, getting your medicines, non-medical meetings or appointments, work, or from getting things that you need?: Patient refused   Physical Activity: Not on file   Stress: Not on file   Social Connections: Not on file   Interpersonal Safety: Low Risk  (2/19/2024)    Interpersonal Safety     Do you feel physically and emotionally safe where you currently live?: Yes     Within the past 12 months, have you been hit, slapped, kicked or  "otherwise physically hurt by someone?: No     Within the past 12 months, have you been humiliated or emotionally abused in other ways by your partner or ex-partner?: No   Housing Stability: Unknown (10/9/2023)    Housing Stability     Do you have housing? : Patient refused     Are you worried about losing your housing?: Patient refused       FAMILY HISTORY:  Family History   Problem Relation Age of Onset    Diabetes Mother     Myocardial Infarction Father        REVIEW OF SYSTEMS:    Unremarkable other than chief complaint      PHYSICAL EXAM:   /75   Pulse 76   Resp 17   Ht 1.803 m (5' 11\")   Wt 99.8 kg (220 lb)   SpO2 99%   BMI 30.68 kg/m   Body mass index is 30.68 kg/m . General Appearance: No acute distress.       IMPRESSION/PLAN:    Given his surgical history for deviated septum, I will order him a consult with ENT for evaluation of his injuries and possible effect on prior surgery.  He is not interested in trialing to see his prior surgeon and would like to see our local ENT.  Referral placed.  No work restrictions again and 2 copies of the new workability were completed for him.  His next appointment will be with ENT pending approval by his Worker's Compensation.  I encouraged him to reach out to me if he is not hearing approval for this.    Total time spent today in chart review/preparation, face to face evaluation, and documentation: 24 minutes.      Franc Newell DC, SONAL, CORNELIUS  Director - Occupational Medicine Department  Diplomate of the American Board of Forensic Professionals  Board Certified - American Board of Independent Medical Examiners      9:46 AM 2/19/2024    "

## 2024-03-01 DIAGNOSIS — J44.9 CHRONIC OBSTRUCTIVE PULMONARY DISEASE, UNSPECIFIED COPD TYPE (H): ICD-10-CM

## 2024-03-06 RX ORDER — BENZONATATE 100 MG/1
CAPSULE ORAL
Qty: 90 CAPSULE | Refills: 1 | Status: SHIPPED | OUTPATIENT
Start: 2024-03-06 | End: 2024-07-10

## 2024-03-06 NOTE — TELEPHONE ENCOUNTER
[facility] sent Rx request for the following:      Requested Prescriptions   Pending Prescriptions Disp Refills    benzonatate (TESSALON) 100 MG capsule [Pharmacy Med Name: BENZONATATE 100MG CAPSULES] 90 capsule 1     Sig: TAKE 1 CAPSULE(100 MG) BY MOUTH THREE TIMES DAILY AS NEEDED FOR COUGH       There is no refill protocol information for this order      Last Prescription Date:   11/15/23  Last Fill Qty/Refills:         90, R-1      Last Office Visit:              11/02/23   Future Office visit:           none    Routing refill request to provider for review/approval because:  Drug not on the [facility] refill protocol    Yana Lopez, RN on 3/6/2024 at 4:14 PM

## 2024-05-04 ENCOUNTER — HEALTH MAINTENANCE LETTER (OUTPATIENT)
Age: 54
End: 2024-05-04

## 2024-06-26 ENCOUNTER — NURSE TRIAGE (OUTPATIENT)
Dept: INTERNAL MEDICINE | Facility: OTHER | Age: 54
End: 2024-06-26

## 2024-06-26 NOTE — TELEPHONE ENCOUNTER
Patient has questions regarding possible blood pressure medication side effects such as dizziness.  Please call to advise.  Scarlet Monet on 6/26/2024 at 10:09 AM

## 2024-06-26 NOTE — TELEPHONE ENCOUNTER
"The patient stated for the past 2 days he has gotten 4 \"spells\" where it feels like his head is going to sleep. He tries to shake it off and closes his eye, when he opens his eyes he feels lightheaded and the room is spinning.  It lasts for 3-5 minutes then it is gone. He is sitting down when this happen. He is not checking his blood pressure. He states he is taking all of his medications.  Geno Nieto LPN..................6/26/2024   10:24 AM'    "

## 2024-06-26 NOTE — TELEPHONE ENCOUNTER
Patient states that he has to attend a meeting at 1:00 PM and will have his girlfriend bring him into ER after. Advised patient if he were to experience active symptoms during that time he should call 911 for ambulance transport. Patient agrees to plan. ER notified. Ayah May RN on 6/26/2024 at 12:26 PM      Reason for Disposition   Neurologic deficit that was brief (now gone), ANY of the following:* Weakness of the face, arm, or leg on one side of the body* Numbness of the face, arm, or leg on one side of the body* Loss of speech or garbled speech    Additional Information   Negative: SEVERE difficulty breathing (e.g., struggling for each breath, speaks in single words)   Negative: Shock suspected (e.g., cold/pale/clammy skin, too weak to stand, low BP, rapid pulse)   Negative: Difficult to awaken or acting confused (e.g., disoriented, slurred speech)   Negative: Fainted, and still feels dizzy afterwards   Negative: Overdose (accidental or intentional) of medications   Negative: New neurologic deficit that is present now: * Weakness of the face, arm, or leg on one side of the body * Numbness of the face, arm, or leg on one side of the body * Loss of speech or garbled speech   Negative: Heart beating < 50 beats per minute OR > 140 beats per minute   Negative: Sounds like a life-threatening emergency to the triager   Negative: Chest pain   Negative: Rectal bleeding, bloody stool, or tarry-black stool   Negative: Vomiting is main symptom   Negative: Diarrhea is main symptom   Negative: Headache is main symptom   Negative: Heat exhaustion suspected (i.e., dehydration from heat exposure)   Negative: Patient states that they are having an anxiety or panic attack   Negative: Dizziness from low blood sugar (i.e., < 60 mg/dl or 3.5 mmol/l)   Negative: SEVERE dizziness (e.g., unable to stand, requires support to walk, feels like passing out now)   Negative: SEVERE headache or neck pain   Negative: Spinning or tilting  "sensation (vertigo) present now and one or more stroke risk factors (i.e., hypertension, diabetes mellitus, prior stroke/TIA, heart attack, age over 60) (Exception: Prior physician evaluation for this AND no different/worse than usual.)    Answer Assessment - Initial Assessment Questions  1. DESCRIPTION: \"Describe your dizziness.\"      Patient states for the last two days he has gotten spells where his head goes numb and tingly for about 4 minutes, closes his eyes and shakes it off, when he opens his eyes the whole room starts to spin. The last few times the spinning lasts for two minutes proceeded by a headache and feeling drained. He has bad coughing spells with COPD but he is not currently having any breathing issues. During the spell the numbness and tingling is predominantly on the left side with only mild right-sided symptoms.     2. LIGHTHEADED: \"Do you feel lightheaded?\" (e.g., somewhat faint, woozy, weak upon standing)      Denies presently     3. VERTIGO: \"Do you feel like either you or the room is spinning or tilting?\" (i.e. vertigo)      Yes, as above     4. SEVERITY: \"How bad is it?\"  \"Do you feel like you are going to faint?\" \"Can you stand and walk?\"    - MILD: Feels slightly dizzy, but walking normally.    - MODERATE: Feels unsteady when walking, but not falling; interferes with normal activities (e.g., school, work).    - SEVERE: Unable to walk without falling, or requires assistance to walk without falling; feels like passing out now.       When the spell is happening, it is severe, he would not be able to stand up when it's active.     5. ONSET:  \"When did the dizziness begin?\"      The last two days while sitting     6. AGGRAVATING FACTORS: \"Does anything make it worse?\" (e.g., standing, change in head position)      Unable to move while happening     7. HEART RATE: \"Can you tell me your heart rate?\" \"How many beats in 15 seconds?\"  (Note: not all patients can do this)        The only thing he " "has noticed is slight chest pain toward the middle/left side which go away    8. CAUSE: \"What do you think is causing the dizziness?\"      Wondering if it's because he has not taken amlodipine 5 mg once daily 1.5-2 weeks.     9. RECURRENT SYMPTOM: \"Have you had dizziness before?\" If Yes, ask: \"When was the last time?\" \"What happened that time?\"      This has not happened before     10. OTHER SYMPTOMS: \"Do you have any other symptoms?\" (e.g., fever, chest pain, vomiting, diarrhea, bleeding)        As above, otherwise negative    11. PREGNANCY: \"Is there any chance you are pregnant?\" \"When was your last menstrual period?\"        no    Protocols used: Dizziness-A-OH    "

## 2024-06-27 ENCOUNTER — APPOINTMENT (OUTPATIENT)
Dept: MRI IMAGING | Facility: OTHER | Age: 54
End: 2024-06-27
Attending: STUDENT IN AN ORGANIZED HEALTH CARE EDUCATION/TRAINING PROGRAM

## 2024-06-27 ENCOUNTER — APPOINTMENT (OUTPATIENT)
Dept: MRI IMAGING | Facility: OTHER | Age: 54
End: 2024-06-27

## 2024-06-27 ENCOUNTER — APPOINTMENT (OUTPATIENT)
Dept: GENERAL RADIOLOGY | Facility: OTHER | Age: 54
End: 2024-06-27

## 2024-06-27 ENCOUNTER — APPOINTMENT (OUTPATIENT)
Dept: CT IMAGING | Facility: OTHER | Age: 54
End: 2024-06-27

## 2024-06-27 ENCOUNTER — HOSPITAL ENCOUNTER (EMERGENCY)
Facility: OTHER | Age: 54
Discharge: HOME | End: 2024-06-27

## 2024-06-27 VITALS
HEART RATE: 60 BPM | TEMPERATURE: 97.7 F | BODY MASS INDEX: 32.2 KG/M2 | HEIGHT: 71 IN | WEIGHT: 230 LBS | OXYGEN SATURATION: 98 % | RESPIRATION RATE: 20 BRPM | SYSTOLIC BLOOD PRESSURE: 148 MMHG | DIASTOLIC BLOOD PRESSURE: 61 MMHG

## 2024-06-27 DIAGNOSIS — R42 DIZZINESS: ICD-10-CM

## 2024-06-27 DIAGNOSIS — I67.1 NONRUPTURED CEREBRAL ANEURYSM: Primary | ICD-10-CM

## 2024-06-27 DIAGNOSIS — R20.0 NUMBNESS: ICD-10-CM

## 2024-06-27 DIAGNOSIS — I67.1 INTRACRANIAL ANEURYSM: ICD-10-CM

## 2024-06-27 LAB
ALBUMIN SERPL BCG-MCNC: 4.9 G/DL (ref 3.5–5.2)
ALP SERPL-CCNC: 73 U/L (ref 40–150)
ALT SERPL W P-5'-P-CCNC: 16 U/L (ref 0–70)
ANION GAP SERPL CALCULATED.3IONS-SCNC: 10 MMOL/L (ref 7–15)
APTT PPP: 31 SECONDS (ref 22–38)
AST SERPL W P-5'-P-CCNC: 18 U/L (ref 0–45)
BASOPHILS # BLD AUTO: 0.1 10E3/UL (ref 0–0.2)
BASOPHILS NFR BLD AUTO: 1 %
BILIRUB SERPL-MCNC: 0.5 MG/DL
BUN SERPL-MCNC: 15.6 MG/DL (ref 6–20)
CALCIUM SERPL-MCNC: 9.9 MG/DL (ref 8.6–10)
CHLORIDE SERPL-SCNC: 100 MMOL/L (ref 98–107)
CREAT SERPL-MCNC: 1.12 MG/DL (ref 0.67–1.17)
DEPRECATED HCO3 PLAS-SCNC: 26 MMOL/L (ref 22–29)
EGFRCR SERPLBLD CKD-EPI 2021: 78 ML/MIN/1.73M2
EOSINOPHIL # BLD AUTO: 0.1 10E3/UL (ref 0–0.7)
EOSINOPHIL NFR BLD AUTO: 1 %
ERYTHROCYTE [DISTWIDTH] IN BLOOD BY AUTOMATED COUNT: 14.2 % (ref 10–15)
ETHANOL SERPL-MCNC: <0.01 G/DL
FLUAV RNA SPEC QL NAA+PROBE: NEGATIVE
FLUBV RNA RESP QL NAA+PROBE: NEGATIVE
GLUCOSE SERPL-MCNC: 102 MG/DL (ref 70–99)
HCT VFR BLD AUTO: 47.5 % (ref 40–53)
HGB BLD-MCNC: 15.7 G/DL (ref 13.3–17.7)
HOLD SPECIMEN: NORMAL
IMM GRANULOCYTES # BLD: 0 10E3/UL
IMM GRANULOCYTES NFR BLD: 0 %
INR PPP: 0.9 (ref 0.85–1.15)
LYMPHOCYTES # BLD AUTO: 1.5 10E3/UL (ref 0.8–5.3)
LYMPHOCYTES NFR BLD AUTO: 15 %
MAGNESIUM SERPL-MCNC: 2.1 MG/DL (ref 1.7–2.3)
MCH RBC QN AUTO: 28.8 PG (ref 26.5–33)
MCHC RBC AUTO-ENTMCNC: 33.1 G/DL (ref 31.5–36.5)
MCV RBC AUTO: 87 FL (ref 78–100)
MONOCYTES # BLD AUTO: 0.7 10E3/UL (ref 0–1.3)
MONOCYTES NFR BLD AUTO: 6 %
NEUTROPHILS # BLD AUTO: 7.9 10E3/UL (ref 1.6–8.3)
NEUTROPHILS NFR BLD AUTO: 77 %
NRBC # BLD AUTO: 0 10E3/UL
NRBC BLD AUTO-RTO: 0 /100
PLATELET # BLD AUTO: 461 10E3/UL (ref 150–450)
POTASSIUM SERPL-SCNC: 4.3 MMOL/L (ref 3.4–5.3)
PROT SERPL-MCNC: 8 G/DL (ref 6.4–8.3)
RBC # BLD AUTO: 5.45 10E6/UL (ref 4.4–5.9)
RSV RNA SPEC NAA+PROBE: NEGATIVE
SARS-COV-2 RNA RESP QL NAA+PROBE: NEGATIVE
SODIUM SERPL-SCNC: 136 MMOL/L (ref 135–145)
TROPONIN T SERPL HS-MCNC: 12 NG/L
WBC # BLD AUTO: 10.3 10E3/UL (ref 4–11)

## 2024-06-27 PROCEDURE — 99285 EMERGENCY DEPT VISIT HI MDM: CPT

## 2024-06-27 PROCEDURE — 71046 X-RAY EXAM CHEST 2 VIEWS: CPT | Mod: TC

## 2024-06-27 PROCEDURE — 258N000003 HC RX IP 258 OP 636

## 2024-06-27 PROCEDURE — 70544 MR ANGIOGRAPHY HEAD W/O DYE: CPT | Mod: XU

## 2024-06-27 PROCEDURE — 99291 CRITICAL CARE FIRST HOUR: CPT | Mod: 25

## 2024-06-27 PROCEDURE — 83735 ASSAY OF MAGNESIUM: CPT

## 2024-06-27 PROCEDURE — 255N000002 HC RX 255 OP 636

## 2024-06-27 PROCEDURE — 93005 ELECTROCARDIOGRAM TRACING: CPT

## 2024-06-27 PROCEDURE — 85730 THROMBOPLASTIN TIME PARTIAL: CPT

## 2024-06-27 PROCEDURE — 99442 PR PHYSICIAN TELEPHONE EVALUATION 11-20 MIN: CPT | Mod: 95 | Performed by: PSYCHIATRY & NEUROLOGY

## 2024-06-27 PROCEDURE — 70553 MRI BRAIN STEM W/O & W/DYE: CPT

## 2024-06-27 PROCEDURE — 85610 PROTHROMBIN TIME: CPT

## 2024-06-27 PROCEDURE — 80053 COMPREHEN METABOLIC PANEL: CPT

## 2024-06-27 PROCEDURE — A9575 INJ GADOTERATE MEGLUMI 0.1ML: HCPCS

## 2024-06-27 PROCEDURE — 70450 CT HEAD/BRAIN W/O DYE: CPT

## 2024-06-27 PROCEDURE — 87637 SARSCOV2&INF A&B&RSV AMP PRB: CPT

## 2024-06-27 PROCEDURE — 82077 ASSAY SPEC XCP UR&BREATH IA: CPT

## 2024-06-27 PROCEDURE — 85025 COMPLETE CBC W/AUTO DIFF WBC: CPT

## 2024-06-27 PROCEDURE — 70549 MR ANGIOGRAPH NECK W/O&W/DYE: CPT

## 2024-06-27 PROCEDURE — 84484 ASSAY OF TROPONIN QUANT: CPT

## 2024-06-27 PROCEDURE — 36415 COLL VENOUS BLD VENIPUNCTURE: CPT

## 2024-06-27 PROCEDURE — 93010 ELECTROCARDIOGRAM REPORT: CPT | Performed by: INTERNAL MEDICINE

## 2024-06-27 RX ADMIN — GADOTERATE MEGLUMINE 20 ML: 376.9 INJECTION INTRAVENOUS at 21:10

## 2024-06-27 RX ADMIN — SODIUM CHLORIDE 1000 ML: 9 INJECTION, SOLUTION INTRAVENOUS at 22:37

## 2024-06-27 ASSESSMENT — ACTIVITIES OF DAILY LIVING (ADL)
ADLS_ACUITY_SCORE: 35

## 2024-06-27 ASSESSMENT — ENCOUNTER SYMPTOMS
DIZZINESS: 1
SPEECH DIFFICULTY: 1

## 2024-06-27 NOTE — CONSULTS
"  [facility]    Stroke Telephone Note    I was called by Brianna Hernandez on 06/27/24 regarding patient Jose Elias Reaves. The patient is a 54 year old male with past medical history of HLD, HTN who comes in with lightheadedness, dizziness, tingling in the head, generalized weakness, slurred speech, difficulty speaking that started yesterday morning.  He called the triage nurse yesterday who advised him to present to ED but he got time to come today.  Initially stroke code activated however given symptom onset more than 24 hours code canceled and workup pursued as a regular urgent consult.    Vitals  BP: (!) 165/102   Pulse: 82   Resp: 18   Temp: 97.7  F (36.5  C)   Weight: 104.3 kg (230 lb)      Impression  Generalized weakness, tingling, dysarthria, dizziness and difficulty speaking    Recommendations  -Recommend getting MRI brain with and without contrast and MRA head and neck.  -If unable to obtain MRI, consider getting CT head and CTA head and neck.  -Infectious, metabolic workup per primary team.  -Once the images are complete, please reach back to stroke neurology on-call to review and make further recommendation in case positive for acute ischemic stroke.    My recommendations are based on the information provided over the phone by Jose Elias Reaves's in-person providers. They are not intended to replace the clinical judgment of his in-person providers. I was not requested to personally see or examine the patient at this time.     The Stroke Staff is Dr. Allen.    Keena Childers MD  Vascular Neurology Fellow    To page me or covering stroke neurology team member, click here: [facility]  Choose \"On Call\" tab at top, then select \"NEUROLOGY/ALL SITES\" from middle drop-down box, press Enter, then look for \"stroke\" or \"telestroke\" for your site.    "

## 2024-06-27 NOTE — ED PROVIDER NOTES
"  History     Chief Complaint   Patient presents with    Tingling    Dizziness    Chest Pain     The history is provided by the patient and medical records.     Jose Elias Reaves is a 54 year old male who presents to the emergency department independently by himself, patient drove himself in for evaluation.  Patient reports that he has been feeling lightheaded \"drunk\" dizzy, has tingling in his face hands and feet, slurred speech that started yesterday morning sometime, it has been getting worse since then.  Also reports that his vision has been blurry in both of his eyes.  Patient said that he called the clinic yesterday and they advised him to come in for evaluation, however he got busy and did not come in until today.  He is also been having intermittent left-sided chest wall pain, it is sharp in nature does not radiate, this has been going on for months, it does not change with activity.  Denies shortness of breath.    He also reports that he has been feeling tired, but no fevers.  He has not fallen and hit his head, denies any drugs or alcohol.  Triage nurse called tier 2 stroke prior to provider assessment given patient's complaint of dizziness and slurred speech    Patient has a past medical history of hypertension, hyperlipidemia, asthma, tobacco use, personality disorder, postconcussion headache, prediabetes, family history of ischemic heart disease and diabetes    Allergies:  Allergies   Allergen Reactions    Penicillins Anaphylaxis     Tolerated cefazolin on 09/16/2015.    Clindamycin Hives    Gabapentin      Mood disturbance        Problem List:    Patient Active Problem List    Diagnosis Date Noted    Prediabetes 08/22/2023     Priority: Medium    Cigarette smoker 03/20/2019     Priority: Medium    Post-concussion headache 12/26/2018     Priority: Medium    Other chronic rhinitis 02/20/2018     Priority: Medium    Mild intermittent asthma 02/20/2018     Priority: Medium    Deviated nasal septum 02/20/2018 "     Priority: Medium    Tobacco abuse 02/20/2018     Priority: Medium    Mixed hyperlipidemia 04/18/2017     Priority: Medium    Chronic pain syndrome - Neck 04/18/2017     Priority: Medium    Essential hypertension 03/21/2017     Priority: Medium    Family history of ischemic heart disease 03/21/2017     Priority: Medium    Family history of diabetes mellitus 03/21/2017     Priority: Medium    Personality disorder (H) 05/05/2012     Priority: Medium        Past Medical History:    Past Medical History:   Diagnosis Date    Cervical strain 01/03/2019    Closed head injury with concussion 12/12/2018    Concussion with brief LOC 12/12/2018    Concussion with brief LOC 12/12/2018    Hypertension     Impingement of right ulnar nerve 03/16/2020    Post concussion syndrome 03/26/2019    Post-concussion headache 12/26/2018    Vestibular dysfunction 12/26/2018    Vision disturbance 12/26/2018       Past Surgical History:    Past Surgical History:   Procedure Laterality Date    HERNIA REPAIR Left     KNEE SURGERY Left     ACL repair, patella graft    SEPTOPLASTY N/A 3/13/2018    Procedure: SEPTOPLASTY;  septoplasty, submucosal resection of the turbinates;  Surgeon: Piotr Quiroz MD;  Location: [facility]       Family History:    Family History   Problem Relation Age of Onset    Diabetes Mother     Myocardial Infarction Father        Social History:  Marital Status:   [4]  Social History     Tobacco Use    Smoking status: Every Day     Types: Cigarettes    Smokeless tobacco: Never    Tobacco comments:     2 cigarettes per day    Vaping Use    Vaping status: Never Used   Substance Use Topics    Alcohol use: Not Currently     Alcohol/week: 2.0 standard drinks of alcohol     Types: 2 Cans of beer per week     Comment: moderately    Drug use: No        Medications:    Acetaminophen (TYLENOL PO)  albuterol (PROAIR HFA/PROVENTIL HFA/VENTOLIN HFA) 108 (90 Base) MCG/ACT inhaler  albuterol (PROVENTIL) (2.5 MG/3ML) 0.083% neb  "solution  amLODIPine (NORVASC) 5 MG tablet  atorvastatin (LIPITOR) 20 MG tablet  atorvastatin (LIPITOR) 20 MG tablet  benzonatate (TESSALON) 100 MG capsule  fluticasone-salmeterol (ADVAIR) 500-50 MCG/ACT inhaler  fluticasone-salmeterol (ADVAIR-HFA) 230-21 MCG/ACT inhaler  ketorolac (TORADOL) 10 MG tablet  lamoTRIgine (LAMICTAL) 100 MG tablet  losartan-hydrochlorothiazide (HYZAAR) 100-25 MG tablet  MEDS UNK - RECORDS REQUESTED  metaxalone (SKELAXIN) 800 MG tablet  methylPREDNISolone (MEDROL DOSEPAK) 4 MG tablet therapy pack  naproxen (NAPROSYN) 500 MG tablet  Omega-3 Fish Oil 500 MG capsule  predniSONE (DELTASONE) 20 MG tablet  propranolol (INDERAL) 80 MG tablet  propranolol ER (INDERAL LA) 120 MG 24 hr capsule  tiotropium (SPIRIVA) 18 MCG inhaled capsule  triamcinolone (KENALOG) 0.5 % external ointment  valACYclovir (VALTREX) 1000 mg tablet          Review of Systems   Cardiovascular:  Positive for chest pain.   Neurological:  Positive for dizziness, speech difficulty and numbness.   All other systems reviewed and are negative.  See HPI    Physical Exam   BP: (!) 165/102  Pulse: 82  Temp: 97.7  F (36.5  C)  Resp: 18  Height: 180.3 cm (5' 11\")  Weight: 104.3 kg (230 lb)  SpO2: 97 %      Physical Exam  Vitals and nursing note reviewed.   Constitutional:       General: He is not in acute distress.     Appearance: He is well-developed. He is not ill-appearing or toxic-appearing.   HENT:      Head: Normocephalic.      Right Ear: There is impacted cerumen.      Left Ear: There is impacted cerumen.   Eyes:      Extraocular Movements: Extraocular movements intact.      Pupils: Pupils are equal, round, and reactive to light.   Cardiovascular:      Rate and Rhythm: Normal rate and regular rhythm.      Heart sounds: Normal heart sounds.   Pulmonary:      Effort: Pulmonary effort is normal.      Breath sounds: Wheezing present.   Abdominal:      Palpations: Abdomen is soft.   Musculoskeletal:         General: Normal range of " motion.      Cervical back: Neck supple.   Skin:     General: Skin is warm.      Capillary Refill: Capillary refill takes less than 2 seconds.   Neurological:      General: No focal deficit present.      Mental Status: He is alert.      GCS: GCS eye subscore is 4. GCS verbal subscore is 5. GCS motor subscore is 6.      Cranial Nerves: Cranial nerves 2-12 are intact.      Motor: Weakness present.      Coordination: Coordination is intact.      Gait: Gait is intact.      Comments: Bilateral arm weakness during initial examination   Psychiatric:         Mood and Affect: Mood normal.         ED Course            EKG Interpretation:      Interpreted by ANATOLY Jj CNP  Time reviewed: 1753  Symptoms at time of EKG: dizzy   Rhythm: normal sinus   Rate: 81  ST Segments/ T Waves: No ST-T wave changes  Comparison to prior: similar to previous  Clinical Impression: as above  Pending  EKG over read by internal medicine       Results for orders placed or performed during the hospital encounter of 06/27/24 (from the past 24 hour(s))   Extra Tube ([place] Draw)    Narrative    The following orders were created for panel order Extra Tube ([place] Draw).  Procedure                               Abnormality         Status                     ---------                               -----------         ------                     Extra Blue Top Tube[[id number]]                              Final result               Extra Red Top Tube[[id number]]                               Final result               Extra Green Top (Lithium...[[id number]]                      Final result               Extra Purple Top Tube[[id number]]                            Final result               Extra Green Top (Lithium...[[id number]]                      Final result                 Please view results for these tests on the individual orders.   Extra Blue Top Tube   Result Value Ref Range    Hold Specimen x    Extra Red Top Tube   Result Value Ref Range     Hold Specimen x    Extra Green Top (Lithium Heparin) Tube   Result Value Ref Range    Hold Specimen x    Extra Purple Top Tube   Result Value Ref Range    Hold Specimen x    Extra Green Top (Lithium Heparin) ON ICE   Result Value Ref Range    Hold Specimen x    CBC with Platelets & Differential    Narrative    The following orders were created for panel order CBC with Platelets & Differential.  Procedure                               Abnormality         Status                     ---------                               -----------         ------                     CBC with platelets and d...[[id number]]  Abnormal            Final result                 Please view results for these tests on the individual orders.   INR   Result Value Ref Range    INR 0.90 0.85 - 1.15   Partial thromboplastin time   Result Value Ref Range    aPTT 31 22 - 38 Seconds   Troponin T, High Sensitivity   Result Value Ref Range    Troponin T, High Sensitivity 12 <=22 ng/L   Comprehensive metabolic panel   Result Value Ref Range    Sodium 136 135 - 145 mmol/L    Potassium 4.3 3.4 - 5.3 mmol/L    Carbon Dioxide (CO2) 26 22 - 29 mmol/L    Anion Gap 10 7 - 15 mmol/L    Urea Nitrogen 15.6 6.0 - 20.0 mg/dL    Creatinine 1.12 0.67 - 1.17 mg/dL    GFR Estimate 78 >60 mL/min/1.73m2    Calcium 9.9 8.6 - 10.0 mg/dL    Chloride 100 98 - 107 mmol/L    Glucose 102 (H) 70 - 99 mg/dL    Alkaline Phosphatase 73 40 - 150 U/L    AST 18 0 - 45 U/L    ALT 16 0 - 70 U/L    Protein Total 8.0 6.4 - 8.3 g/dL    Albumin 4.9 3.5 - 5.2 g/dL    Bilirubin Total 0.5 <=1.2 mg/dL   Magnesium   Result Value Ref Range    Magnesium 2.1 1.7 - 2.3 mg/dL   Ethanol GH   Result Value Ref Range    Alcohol ethyl <0.01 <=0.01 g/dL   CBC with platelets and differential   Result Value Ref Range    WBC Count 10.3 4.0 - 11.0 10e3/uL    RBC Count 5.45 4.40 - 5.90 10e6/uL    Hemoglobin 15.7 13.3 - 17.7 g/dL    Hematocrit 47.5 40.0 - 53.0 %    MCV 87 78 - 100 fL    MCH 28.8 26.5 - 33.0 pg     MCHC 33.1 31.5 - 36.5 g/dL    RDW 14.2 10.0 - 15.0 %    Platelet Count 461 (H) 150 - 450 10e3/uL    % Neutrophils 77 %    % Lymphocytes 15 %    % Monocytes 6 %    % Eosinophils 1 %    % Basophils 1 %    % Immature Granulocytes 0 %    NRBCs per 100 WBC 0 <1 /100    Absolute Neutrophils 7.9 1.6 - 8.3 10e3/uL    Absolute Lymphocytes 1.5 0.8 - 5.3 10e3/uL    Absolute Monocytes 0.7 0.0 - 1.3 10e3/uL    Absolute Eosinophils 0.1 0.0 - 0.7 10e3/uL    Absolute Basophils 0.1 0.0 - 0.2 10e3/uL    Absolute Immature Granulocytes 0.0 <=0.4 10e3/uL    Absolute NRBCs 0.0 10e3/uL   CT Head w/o Contrast    Narrative    EXAM: CT HEAD W/O CONTRAST, 6/27/2024 6:06 PM    HISTORY: Acute neuro deficit, stroke/TIA suspected    COMPARISON: No relevant priors available for comparison    TECHNIQUE:   Imaging protocol: Multiplanar CT images of the head without  intravenous contrast.   Acquisition: This CT exam was performed using one or more the  following dose reduction techniques: automated exposure control,  adjustment of the mA and/or kV according to patient size, and/or  iterative reconstruction technique.    FINDINGS:  No intracranial hemorrhage, mass effect, or midline shift. The  ventricles are proportionate to the cerebral sulci. No acute loss of  gray-white matter differentiation.    No acute osseous abnormality. Nonspecific thin subcutaneous density  over the left occipital region, could represent normal variant  appearance versus recent soft tissue injury. No paranasal sinus  mucosal thickening. Mastoid air cells are clear. The orbits are  grossly unremarkable.       Impression    IMPRESSION:  No acute intracranial abnormality.      LEROY OLVERA MD         SYSTEM ID:  [id number]   Symptomatic Influenza A/B, RSV, & SARS-CoV2 PCR (COVID-19) Nose    Specimen: Nose; Swab   Result Value Ref Range    Influenza A PCR Negative Negative    Influenza B PCR Negative Negative    RSV PCR Negative Negative    SARS CoV2 PCR Negative Negative     Narrative    Testing was performed using the Xpert Xpress CoV2/Flu/RSV Assay on the "[employer] GeneXpert Instrument. This test should be ordered for the detection of SARS-CoV-2, influenza, and RSV viruses in individuals who meet clinical and/or epidemiological criteria. Test performance is unknown in asymptomatic patients. This test is for in vitro diagnostic use under the FDA EUA for laboratories certified under CLIA to perform high or moderate complexity testing. This test has not been FDA cleared or approved. A negative result does not rule out the presence of PCR inhibitors in the specimen or target RNA in concentration below the limit of detection for the assay. If only one viral target is positive but coinfection with multiple targets is suspected, the sample should be re-tested with another FDA cleared, approved, or authorized test, if coinfection would change clinical management. This test was validated by the [facility]. These laboratories are certified under the Clinical Laboratory Improvement Amendments of 1988 (CLIA-88) as qualified to perform high complexity laboratory testing.   MR Brain w/o & w Contrast    Narrative    EXAM:  MR BRAIN W/O & W CONTRAST    HISTORY:  Acute neuro deficit, stroke suspected.    TECHNIQUE:  Multiplanar, multisequence MR imaging of the head obtained  prior to, and after, intravenous contrast administration    MEDS/CONTRAST: 20 ML DOTAREM    COMPARISON:  CT head 6/27/2024     FINDINGS:    There is no mass effect, midline shift, or evidence of acute  intracranial hemorrhage. Diffusion weighted images demonstrate no  evidence of acute infarction. The ventricles are proportionate to the  cerebral sulci. Scattered foci of T2 hyperintense FLAIR signal in the  periventricular and subcortical white matter, which is nonspecific,  likely related to chronic small vessel ischemic disease given the  patient's age. Punctate foci of of encephalomalacia in the  cerebellum,  likely sequela of prior lacunar-type infarcts. Normal parenchymal  volume for age. Normal major intracranial vascular flow voids. Large  saccular right cavernous ICA and small saccular right PCA aneurysms,  better characterized on same day MRA head.    Postcontrast images demonstrate no abnormal intracranial enhancement.    No suspicious abnormality of the skull marrow signal. Mild to moderate  paranasal sinus mucosal thickening. Mastoid air cells are clear. The  orbits are grossly unremarkable.      Impression    IMPRESSION:  1. No evidence of acute infarction or intracranial hemorrhage.  2. Mild presumed small vessel ischemic disease changes.  3. Large saccular right cavernous ICA and small saccular right PCA  aneurysms, better characterized on same day MRA head.    LEROY OLVERA MD         SYSTEM ID:  [id number]   MRA Neck (Carotids) wo & w Contrast    Narrative    EXAM: MRA NECK (CAROTIDS) W/O & W CONTRAST 6/27/2024 9:11 PM    HISTORY: Acute neuro deficit, stroke suspected      COMPARISON: MRA head 6/27/2024    TECHNIQUE:   Neck MRA:  Limited non contrast 2DTOF images were obtained of the  mid-cervical region. Following intravenous gadolinium-based contrast  administration, a contrast enhanced MRA of the neck/cervical vessels  was performed.  Three-dimensional reconstructions of the neck and head MRA were  created, which were reviewed by the radiologist.    CONTRAST: 20 ML DOTAREM    FINDINGS:     Neck MRA demonstrates patent major cervical arteries. There is no  evidence of flow-limiting stenosis or occlusion of the carotid  arteries.    Neither vertebral artery is dominant. There is no evidence of  flow-limiting stenosis or occlusion of the vertebral arteries. Poor  visualization of the origin of the left vertebral artery, likely  artifactual.    Antegrade flow in the major cervical vasculature.    Poor visualization of the right proximal subclavian artery near the  origin of the right carotid,  favor artifactual, less likely due to  hemodynamically significant stenosis.    Right ICA and right PCA aneurysms, described in further detail on same  day MRA head.      Impression    IMPRESSION:  1.  Patent major cervical arteries without hemodynamically significant  stenosis.  2.  Poor visualization of right proximal subclavian artery, favored  artifactual, less likely due to hemodynamically significant stenosis.  Depending on degree of clinical suspicion, this could be characterized  further with nonemergent follow-up arterial ultrasound or CT chest.    LEROY OLVERA MD         SYSTEM ID:  [id number]   MRA Brain ([place] [facility] [place]) wo Contrast    Narrative    EXAM: MRA BRAIN ([place] [facility] [place]) W/O CONTRAST 6/27/2024 9:15 PM    HISTORY: Stroke workup    COMPARISON:  Same-day MRA of the neck.     TECHNIQUE:     HEAD MR Angiogram: Using a 3D time-of-flight image acquisition  technique, MRA of the major arteries at the base of the brain was  obtained without intravenous contrast. Three-dimensional  reconstructions of the head MRA were created, which were reviewed by  the radiologist.    FINDINGS:    Head MRA demonstrates no high flow vascular malformation.    The petrous, cavernous, clinoid, supraclinoid and terminal internal  carotid arteries demonstrated no flow limiting stenosis.     Large right ICA saccular aneurysm, appears to originate from the off  the cavernous ICA, , is anteriorly directed, and measures  approximately 9 x 7 mm in cross-section and 11 mm in length. Neck  measures approximately 5 mm.    Normal appearing anterior communicating artery. The SUJATA vessels are  within normal limits. The MCA vessels are unremarkable.     The posterior communicating arteries are patent bilaterally. Small  anteromedially directed 2.0 x 1.5 mm aneurysm originating off the  right P1/P2 PCA near the junction with the right posterior technique  including artery (series 8 image 139).    The basilar and vertebral arteries are  within normal limits.        Impression    IMPRESSION:   1.  No hemodynamically significant stenosis of the major intracranial  arteries.  2.  Large saccular 11 mm right cavernous ICA aneurysm. Recommend  follow-up neurointerventional consult.  3.  Small saccular 2 mm right PCA aneurysm.    LEROY OLVERA MD         SYSTEM ID:  [id number]   XR Chest 2 Views    Narrative    PROCEDURE INFORMATION:   Exam: XR Chest   Exam date and time: 6/27/2024 9:09 PM   Age: 54 years old   Clinical indication: Shortness of breath; Additional info: Shortness of breath,   cough, cp     TECHNIQUE:   Imaging protocol: Radiologic exam of the chest.   Views: 2 views.     COMPARISON:   CR XR CHEST PORT 1 VIEW 11/2/2023 12:33 PM     FINDINGS:   Lungs: Lungs are hyperinflated with flattening of the hemidiaphragms and   increased AP diameter of the chest. No focal consolidation.   Pleural spaces: Unremarkable. No pleural effusion. No pneumothorax.   Heart/Mediastinum: Unremarkable. No cardiomegaly.   Bones/joints: Mild degenerative changes of the thoracic spine.       Impression    IMPRESSION:   1.   No acute pulmonary process.   2.   Underlying chronic obstructive pulmonary disease.     THIS DOCUMENT HAS BEEN ELECTRONICALLY SIGNED BY MAHOGANY QUEVEDO, DO       Medications   gadoterate meglumine (DOTAREM) injection 20 mL (20 mLs Intravenous $Given 6/27/24 2110)   sodium chloride 0.9% BOLUS 1,000 mL (0 mLs Intravenous Stopped 6/27/24 2305)       Assessments & Plan (with Medical Decision Making)  165/102, temp of 97.7, heart rate of 82, respiratory rate of 18, oxygen saturation 97% on room air- hemodynamically stable  National Institutes of [employer] Stroke Scale  Exam Interval: Baseline   Score    Level of consciousness: (0)   Alert, keenly responsive    LOC questions: (0)   Answers both questions correctly    LOC commands: (0)   Performs both tasks correctly    Best gaze: (0)   Normal    Visual: (0)   No visual loss    Facial palsy: (0)   Normal  symmetrical movements    Motor arm (left): (0)   No drift    Motor arm (right): (0)   No drift    Motor leg (left): (1)   Drift    Motor leg (right): (1)   Drift    Limb ataxia: (0)   Absent    Sensory: (0)   Normal- no sensory loss    Best language: (0)   Normal- no aphasia    Dysarthria: (1)   Mild to moderate dysarthria    Extinction and inattention: (0)   No abnormality        Total Score:  3      NIH stroke scale: Patient shows generalized weakness during his NIH stroke scale no unilateral weakness subjective feeling of numbness and tingling in his hands and fingers bilaterally- facial tingling/numbness-not unilateral.  though he is able to appreciate me touching his extremities  Question mild slurring of speech.   Stroke code was de-escalated and patient is going to be receiving an MRI/MRA head and neck per stroke neurologist who responded to the initial tier 2 that was called by triage nurse.  Patient's symptoms are greater than 24 hours.    Differential is broad however stroke remains on the differential  EKG is stable no ST changes  Labs & Radiology results interpreted by radiologist:   ED Course as of 06/28/24 1432   Thu Jun 27, 2024   1816 CBC with Platelets & Differential(!)  Stable, no leukocytosis or anemia   1901 Comprehensive metabolic panel(!)  Stable   1901 Troponin T, High Sensitivity  Negative   1901 Ethanol GH  Negative   1901 INR   1903 CT Head w/o Contrast  FINDINGS:  No intracranial hemorrhage, mass effect, or midline shift. The  ventricles are proportionate to the cerebral sulci. No acute loss of  gray-white matter differentiation.     No acute osseous abnormality. Nonspecific thin subcutaneous density  over the left occipital region, could represent normal variant  appearance versus recent soft tissue injury. No paranasal sinus  mucosal thickening. Mastoid air cells are clear. The orbits are  grossly unremarkable.                                                                       IMPRESSION:  No acute intracranial abnormality     2157 MRA Brain ([place] [facility] [place]) wo Contrast  IMPRESSION:   1.  No hemodynamically significant stenosis of the major intracranial  arteries.  2.  Large saccular 11 mm right cavernous ICA aneurysm. Recommend  follow-up neurointerventional consult.  3.  Small saccular 2 mm right PCA aneurysm     2158 MR Brain w/o & w Contrast  1. No evidence of acute infarction or intracranial hemorrhage.  2. Mild presumed small vessel ischemic disease changes.  3. Large saccular right cavernous ICA and small saccular right PCA  aneurysms, better characterized on same day MRA head.        2159 CT Head w/o Contrast   Patent major cervical arteries without hemodynamically significant  stenosis.  2.  Poor visualization of right proximal subclavian artery, favored  artifactual, less likely due to hemodynamically significant stenosis.  Depending on degree of clinical suspicion, this could be characterized  further with nonemergent follow-up arterial ultrasound or CT chest.     2217 XR Chest 2 Views  IMPRESSION:   1.   No acute pulmonary process.   2.   Underlying chronic obstructive pulmonary disease       10:09 PM paged stroke neurology with MRI results- Large right cavernous ICA and small saccular right PCA  Vitals:    06/27/24 2121 06/27/24 2136 06/27/24 2221 06/27/24 2236   BP:   (!) 150/118 (!) 148/61   Pulse:       Resp:       Temp:       TempSrc:       SpO2: 97% 98%     Weight:       Height:          Patient reports he is feeling better. Has numbness/tingling in his fingertips, around his face. This is unchanged.  He is moving all of his extremities without difficulty and he is ambulatory in the hallway, steady on feet.  10:46 PM spoke with  (see his note) regarding MRI results today.  He evaluated MRI results independently.  MRI findings today do not exactly explain patient's symptoms that he is experiencing that brought him into the emergency room for evaluation. he  advised that patient follow-up outpatient for intracranial aneurysm treatment at the [facility] in the next couple of months, patient can follow-up via telemedicine or inpatient clinic; he advised that patient stop smoking and maintain normal blood pressures.  He placed the outpatient neurosurgery referral and patient's discharge instructions today  Lab work stable as well as vitals today.  Unclear what is causing patient's bilateral finger numbness and facial numbness.  Troponin was negative EKG stable for acute ACS, chest pain seems to be chest wall in nature sharp and nonradiating. He has had a normal echo in fall 2023 as well as a normal cardiac stress test.  Patient to discharge home in stable condition advised to follow-up with neurology referral placed     I have reviewed the nursing notes.    I have reviewed the findings, diagnosis, plan and need for follow up with the patient.    Medical Decision Making  The patient's presentation was of moderate complexity (an undiagnosed new problem with uncertain prognosis).    The patient's evaluation involved:  review of external note(s) from 1 sources (see separate area of note for details)  review of 3+ test result(s) ordered prior to this encounter (see separate area of note for details)  ordering and/or review of 3+ test(s) in this encounter (see separate area of note for details)  discussion of management or test interpretation with another health professional (see separate area of note for details)    The patient's management necessitated moderate risk (prescription drug management including medications given in the ED).        Discharge Medication List as of 6/27/2024 11:05 PM          Final diagnoses:   Dizziness   Intracranial aneurysm   Numbness   Nonruptured cerebral aneurysm    6/27/2024   [facility] AND [facility], ANATOLY CNP  06/28/24 7008

## 2024-06-27 NOTE — ED TRIAGE NOTES
Pt has tingling in head, chest heaviness and slurred speech, trouble with sentences.     Triage Assessment (Adult)       Row Name 06/27/24 1361          Respiratory WDL    Respiratory WDL WDL        Skin Circulation/Temperature WDL    Skin Circulation/Temperature WDL WDL        Cardiac WDL    Cardiac WDL WDL        Peripheral/Neurovascular WDL    Peripheral Neurovascular WDL WDL        Cognitive/Neuro/Behavioral WDL    Cognitive/Neuro/Behavioral WDL speech     Speech slurred

## 2024-06-28 ENCOUNTER — TELEPHONE (OUTPATIENT)
Dept: INTERNAL MEDICINE | Facility: OTHER | Age: 54
End: 2024-06-28

## 2024-06-28 ASSESSMENT — ENCOUNTER SYMPTOMS: NUMBNESS: 1

## 2024-06-28 NOTE — TELEPHONE ENCOUNTER
It is not urgent that the patient be seen in the clinic. The ER placed a neurosurgery consult already so this should be in the works. The patient can be placed into Dinah's schedule or mine at soonest availability. Please reassure the patient. If he is to have any more of the same complaints that he went to the ER, I would suggest he return to the ER.

## 2024-06-28 NOTE — DISCHARGE INSTRUCTIONS
I would advise that you stop smoking.  Smoking is very bad for your blood vessels and can also contribute to further aneurysms as well as rupture of aneurysms.  Make sure that you are taking your blood pressure medication and maintaining a good blood pressure 120/80  Return to be seen:    You passed out (lost consciousness).     You have sudden dizziness that doesn't get better.     You have dizziness along with symptoms of a heart attack. These may include:  Chest pain or pressure, or a strange feeling in the chest.  Sweating.  Shortness of breath.  Nausea or vomiting.  Pain, pressure, or a strange feeling in the back, neck, jaw, or upper belly or in one or both shoulders or arms.  Lightheadedness or sudden weakness.  A fast or irregular heartbeat.     You have symptoms of a stroke. These may include:  Sudden numbness, tingling, weakness, or loss of movement in your face, arm, or leg, especially on only one side of your body.  Sudden vision changes.  Sudden trouble speaking.  Sudden confusion or trouble understanding simple statements.  Sudden problems with walking or balance.  A sudden, severe headache that is different from past headaches.   Call your doctor now or seek immediate medical care if:    You feel dizzy and have a fever, headache, or ringing in your ears.     You have new or increased nausea and vomiting.     Your dizziness does not go away or comes back.   Watch closely for changes in your health, and be sure to contact your doctor if:    You do not get better as expected.   A referral was placed for you today for neurosurgery at the [facility].  You will eventually need to have treatment for the aneurysm in your head.  They can see you in telemedicine or in person there is a phone number listed for you to call to set up an appointment.  I would call and set this up tomorrow

## 2024-06-28 NOTE — TELEPHONE ENCOUNTER
Patient was in the ER last night and was told he has two aneurysm and to see his doctor with in 2 business days.  He wants to see Anand. Can he be worked in on Monday.      Bonita Best on 6/28/2024 at 11:53 AM

## 2024-06-28 NOTE — TELEPHONE ENCOUNTER
Would you be willing to work patient in on Monday 7/01/2024? Patient was informed of aneurysms and to follow up in clinic as soon as possible.     Gaby Jeong LPN on 6/28/2024 at 1:39 PM   Ext. [phone]

## 2024-07-01 ENCOUNTER — TRANSCRIBE ORDERS (OUTPATIENT)
Dept: NEUROLOGY | Facility: CLINIC | Age: 54
End: 2024-07-01

## 2024-07-01 DIAGNOSIS — I67.1 NONRUPTURED CEREBRAL ANEURYSM: Primary | ICD-10-CM

## 2024-07-01 NOTE — TELEPHONE ENCOUNTER
Patient was contacted and an appointment was made with Dinah for next available on 7.10.2024.  Shelia Alejandre on 7/1/2024 at 9:42 AM

## 2024-07-01 NOTE — TELEPHONE ENCOUNTER
Patient notified. Will send to schedulers to see if he can be put into Dinah Michel CNP or Ayah Diez's, NP schedules this week.  Margret Valle LPN  7/1/2024  8:17 AM

## 2024-07-03 ENCOUNTER — TELEPHONE (OUTPATIENT)
Dept: NEUROSURGERY | Facility: CLINIC | Age: 54
End: 2024-07-03

## 2024-07-03 NOTE — TELEPHONE ENCOUNTER
[facility] Call Center    Phone Message    May a detailed message be left on voicemail: yes     Reason for Call: Other: Patient calling back to say his employer Is canceling his insurance as of today.  He can not afford to pay out of pocket.  He is not sure what to do.  Please call him back to advise.     Action Taken: Message routed to:  [facility] Neurosurgery     Travel Screening: Not Applicable     Date of Service:

## 2024-07-03 NOTE — TELEPHONE ENCOUNTER
L/m for patient to rescheduled appointment w/ Dr. Raza to 08/13/2024 for in person or virtual per Adilia Sepulveda via patient call list. -KB

## 2024-07-08 ENCOUNTER — TELEPHONE (OUTPATIENT)
Dept: NEUROSURGERY | Facility: CLINIC | Age: 54
End: 2024-07-08

## 2024-07-08 NOTE — TELEPHONE ENCOUNTER
Called patient and rescheduled appointment w/ Dr. Raza per Adilia Sepulveda via patient call list. -KB

## 2024-07-10 ENCOUNTER — OFFICE VISIT (OUTPATIENT)
Dept: INTERNAL MEDICINE | Facility: OTHER | Age: 54
End: 2024-07-10

## 2024-07-10 VITALS
OXYGEN SATURATION: 98 % | RESPIRATION RATE: 16 BRPM | DIASTOLIC BLOOD PRESSURE: 88 MMHG | BODY MASS INDEX: 31.24 KG/M2 | TEMPERATURE: 97.9 F | SYSTOLIC BLOOD PRESSURE: 134 MMHG | HEART RATE: 60 BPM | WEIGHT: 224 LBS

## 2024-07-10 DIAGNOSIS — J44.0 CHRONIC OBSTRUCTIVE PULMONARY DISEASE WITH ACUTE LOWER RESPIRATORY INFECTION (H): ICD-10-CM

## 2024-07-10 DIAGNOSIS — J44.9 CHRONIC OBSTRUCTIVE PULMONARY DISEASE, UNSPECIFIED COPD TYPE (H): ICD-10-CM

## 2024-07-10 DIAGNOSIS — R73.03 PREDIABETES: ICD-10-CM

## 2024-07-10 DIAGNOSIS — I10 ESSENTIAL HYPERTENSION: Primary | ICD-10-CM

## 2024-07-10 DIAGNOSIS — F60.9 PERSONALITY DISORDER (H): ICD-10-CM

## 2024-07-10 DIAGNOSIS — I67.1 SACCULAR ANEURYSM: ICD-10-CM

## 2024-07-10 DIAGNOSIS — Z87.891 PERSONAL HISTORY OF TOBACCO USE: ICD-10-CM

## 2024-07-10 DIAGNOSIS — Z00.00 ROUTINE GENERAL MEDICAL EXAMINATION AT A HEALTH CARE FACILITY: ICD-10-CM

## 2024-07-10 DIAGNOSIS — H61.23 BILATERAL IMPACTED CERUMEN: ICD-10-CM

## 2024-07-10 DIAGNOSIS — E78.2 MIXED HYPERLIPIDEMIA: ICD-10-CM

## 2024-07-10 DIAGNOSIS — G89.4 CHRONIC PAIN SYNDROME: ICD-10-CM

## 2024-07-10 PROBLEM — M54.2 CERVICALGIA: Status: ACTIVE | Noted: 2018-12-26

## 2024-07-10 LAB
CHOLEST SERPL-MCNC: 168 MG/DL
FASTING STATUS PATIENT QL REPORTED: ABNORMAL
HBA1C MFR BLD: 5.6 % (ref 4–6.2)
HDLC SERPL-MCNC: 43 MG/DL
LDLC SERPL CALC-MCNC: 104 MG/DL
NONHDLC SERPL-MCNC: 125 MG/DL
TRIGL SERPL-MCNC: 104 MG/DL

## 2024-07-10 PROCEDURE — 83036 HEMOGLOBIN GLYCOSYLATED A1C: CPT | Mod: ZL

## 2024-07-10 PROCEDURE — 36415 COLL VENOUS BLD VENIPUNCTURE: CPT | Mod: ZL

## 2024-07-10 PROCEDURE — G0296 VISIT TO DETERM LDCT ELIG: HCPCS

## 2024-07-10 PROCEDURE — 99396 PREV VISIT EST AGE 40-64: CPT | Mod: 25

## 2024-07-10 PROCEDURE — 99215 OFFICE O/P EST HI 40 MIN: CPT | Mod: 25

## 2024-07-10 PROCEDURE — 82465 ASSAY BLD/SERUM CHOLESTEROL: CPT | Mod: ZL

## 2024-07-10 PROCEDURE — 99406 BEHAV CHNG SMOKING 3-10 MIN: CPT

## 2024-07-10 PROCEDURE — 83718 ASSAY OF LIPOPROTEIN: CPT | Mod: ZL

## 2024-07-10 RX ORDER — LAMOTRIGINE 100 MG/1
TABLET ORAL
Qty: 187 TABLET | Refills: 4 | Status: SHIPPED | OUTPATIENT
Start: 2024-07-10 | End: 2024-10-15

## 2024-07-10 RX ORDER — ALBUTEROL SULFATE 0.83 MG/ML
2.5 SOLUTION RESPIRATORY (INHALATION) EVERY 6 HOURS PRN
Qty: 90 ML | Refills: 5 | Status: SHIPPED | OUTPATIENT
Start: 2024-07-10

## 2024-07-10 RX ORDER — LOSARTAN POTASSIUM AND HYDROCHLOROTHIAZIDE 25; 100 MG/1; MG/1
1 TABLET ORAL DAILY
Qty: 90 TABLET | Refills: 4 | Status: SHIPPED | OUTPATIENT
Start: 2024-07-10 | End: 2024-09-27

## 2024-07-10 RX ORDER — METAPROTERENOL SULFATE 10 MG
500 TABLET ORAL DAILY
Qty: 90 CAPSULE | Refills: 4 | Status: SHIPPED | OUTPATIENT
Start: 2024-07-10

## 2024-07-10 RX ORDER — TIOTROPIUM BROMIDE 18 UG/1
18 CAPSULE ORAL; RESPIRATORY (INHALATION) DAILY
Qty: 90 CAPSULE | Refills: 4 | Status: SHIPPED | OUTPATIENT
Start: 2024-07-10

## 2024-07-10 RX ORDER — NAPROXEN SODIUM 500 MG/1
500 TABLET, FILM COATED, EXTENDED RELEASE ORAL DAILY
Qty: 90 TABLET | Refills: 4 | Status: SHIPPED | OUTPATIENT
Start: 2024-07-10 | End: 2024-09-23

## 2024-07-10 RX ORDER — AMLODIPINE BESYLATE 5 MG/1
5 TABLET ORAL DAILY
Qty: 90 TABLET | Refills: 4 | Status: SHIPPED | OUTPATIENT
Start: 2024-07-10 | End: 2024-09-27

## 2024-07-10 RX ORDER — ATORVASTATIN CALCIUM 20 MG/1
20 TABLET, FILM COATED ORAL DAILY
Qty: 90 TABLET | Refills: 4 | Status: SHIPPED | OUTPATIENT
Start: 2024-07-10 | End: 2024-09-05

## 2024-07-10 ASSESSMENT — ASTHMA QUESTIONNAIRES
QUESTION_2 LAST FOUR WEEKS HOW OFTEN HAVE YOU HAD SHORTNESS OF BREATH: ONCE A DAY
QUESTION_5 LAST FOUR WEEKS HOW WOULD YOU RATE YOUR ASTHMA CONTROL: SOMEWHAT CONTROLLED
ACT_TOTALSCORE: 11
ACT_TOTALSCORE: 11
QUESTION_4 LAST FOUR WEEKS HOW OFTEN HAVE YOU USED YOUR RESCUE INHALER OR NEBULIZER MEDICATION (SUCH AS ALBUTEROL): THREE OR MORE TIMES PER DAY
QUESTION_3 LAST FOUR WEEKS HOW OFTEN DID YOUR ASTHMA SYMPTOMS (WHEEZING, COUGHING, SHORTNESS OF BREATH, CHEST TIGHTNESS OR PAIN) WAKE YOU UP AT NIGHT OR EARLIER THAN USUAL IN THE MORNING: TWO OR THREE NIGHTS A WEEK
EMERGENCY_ROOM_LAST_YEAR_TOTAL: THREE OR MORE
QUESTION_1 LAST FOUR WEEKS HOW MUCH OF THE TIME DID YOUR ASTHMA KEEP YOU FROM GETTING AS MUCH DONE AT WORK, SCHOOL OR AT HOME: SOME OF THE TIME

## 2024-07-10 ASSESSMENT — ENCOUNTER SYMPTOMS
WHEEZING: 1
HEADACHES: 1
SHORTNESS OF BREATH: 1

## 2024-07-10 ASSESSMENT — PAIN SCALES - GENERAL: PAINLEVEL: NO PAIN (0)

## 2024-07-10 NOTE — LETTER
July 10, 2024      Jose Elias Reaves  [address]  [place] [address]        To Whom It May Concern:    Jose Elias Reaves  was seen on 7/10/24.  Please excuse him until 8/13/24 until he meets with his neurologist.      Sincerely,        ANATOLY Chow CNP

## 2024-07-10 NOTE — NURSING NOTE
"Chief Complaint   Patient presents with    Emergency Department     Follow up 6/27/24 for Non-ruptured cerebral aneurysm        Initial /88 (BP Location: Right arm, Patient Position: Sitting, Cuff Size: Adult Large)   Pulse 60   Temp 97.9  F (36.6  C) (Temporal)   Resp 16   Wt 101.6 kg (224 lb)   SpO2 98%   BMI 31.24 kg/m   Estimated body mass index is 31.24 kg/m  as calculated from the following:    Height as of 6/27/24: 1.803 m (5' 11\").    Weight as of this encounter: 101.6 kg (224 lb).  Medication Review: complete    The next two questions are to help us understand your food security.  If you are feeling you need any assistance in this area, we have resources available to support you today.          10/9/2023   SDOH- Food Insecurity   Within the past 12 months, did you worry that your food would run out before you got money to buy more? NE   Within the past 12 months, did the food you bought just not last and you didn t have money to get more? NE            Health Care Directive:  Patient does not have a Health Care Directive or Living Will: Discussed advance care planning with patient; however, patient declined at this time.    Scarlet Cummisn LPN      "

## 2024-07-10 NOTE — PROGRESS NOTES
Assessment & Plan     ICD-10-CM    1. Essential hypertension  I10 amLODIPine (NORVASC) 5 MG tablet     losartan-hydrochlorothiazide (HYZAAR) 100-25 MG tablet      2. Mixed hyperlipidemia  E78.2 Lipid Panel     atorvastatin (LIPITOR) 20 MG tablet     Omega-3 Fish Oil 500 MG capsule     Lipid Panel      3. Prediabetes  R73.03 Hemoglobin A1c     Hemoglobin A1c      4. Personal history of tobacco use  Z87.891 SMOKING CESSATION COUNSELING 3-10 MIN     Prof fee: Shared Decision Making for Lung Cancer Screening     CT Chest Lung Cancer Scrn Low Dose wo      5. Chronic obstructive pulmonary disease, unspecified COPD type (H)  J44.9 tiotropium (SPIRIVA) 18 MCG inhaled capsule      6. Chronic pain syndrome  G89.4 naproxen sodium ER (NAPRELAN) 500 MG 24 hr tablet      7. Chronic obstructive pulmonary disease with acute lower respiratory infection (H)  J44.0 albuterol (PROVENTIL) (2.5 MG/3ML) 0.083% neb solution      8. Personality disorder (H)  F60.9 lamoTRIgine (LAMICTAL) 100 MG tablet      9. Routine general medical examination at a health care facility  Z00.00       10. Bilateral impacted cerumen  H61.23 REMOVE IMPACTED CERUMEN      11. Saccular aneurysm  I67.1     Large saccular 11 mm right cavernous ICA aneurysm. 6/2024  Small saccular 2 mm right PCA aneurysm. 6/2024         Saccular aneurysm: Recommend following up with neurosurgery as instructed.  He continues to have ongoing tinnitus.  He did have bilateral impacted cerumen, ear flush was provided today.  Anticipate possible improvement from this.  Emphasized the importance of blood pressure control, quitting smoking and decreasing caffeine use.      HYPERTENSION - Ongoing. Blood pressure is currently well controlled.  Medication side effects: None. Denies syncope or presyncope.  Continue current medications -amlodipine, losartan-HCTZ, propranolol.   Medication list reviewed/updated. Refills completed as needed.  Instructed to take medications regularly     MIXED  HYPERLIPIDEMIA.  Ongoing. LDL is at goal: No. Triglycerides are at goal: Yes.  Hopefully lifestyle modifications will improve cholesterol levels, otherwise will consider additional medication dose adjustments or medication changes.  Instructed to take medication consistently at nighttime-he has not been taking this consistently.  Overall levels look improved.  Medication side effects reported: None.   Continue current medications for now -atorvastatin 20 mg. Medication list reviewed/updated. Refills completed as needed.  Recent Labs   Lab Test 07/10/24  1108 10/09/23  1105   CHOL 168 167   HDL 43 37*   * 65   TRIG 104 325*        COPD - Patient has a longstanding history of COPD. Patient has been doing reasonably well overall noting WHEEZING and continues on  medication regimen without adverse reactions or side effects.  Encouraged consistent use of medications-refilled nebulizer.    Counseled patient today on smoking cessation.    History   Smoking Status    Every Day    Types: Cigarettes   Smokeless Tobacco    Never        reports that he has been smoking cigarettes. He started smoking about 42 years ago. He has a 42.5 pack-year smoking history. He has never used smokeless tobacco.  Patient is not ready to quit, open to education.    Provided information on quit partner website, discussed impact and health consequences of smoking.    Time spent: 3 minutes.        Total time: 45 minutes spent on the date of the encounter doing chart review, history and exam, documentation and further activities as noted above.        MED REC REQUIRED  Post Medication Reconciliation Status:     Nicotine/Tobacco Cessation  He reports that he has been smoking cigarettes. He started smoking about 42 years ago. He has a 42.5 pack-year smoking history. He has never used smokeless tobacco.  Nicotine/Tobacco Cessation Plan  Information offered: Patient not interested at this time      BMI  Estimated body mass index is 31.24 kg/m  as  "calculated from the following:    Height as of 6/27/24: 1.803 m (5' 11\").    Weight as of this encounter: 101.6 kg (224 lb).           Return for Routine preventive.      ANATOLY Chow [facility] [place]    Review of Systems   HENT:  Positive for tinnitus.    Respiratory:  Positive for shortness of breath (Chronic) and wheezing (Chronic, continues to smoke).    Cardiovascular:  Positive for chest pain (Left chest wall pain).   Neurological:  Positive for headaches.       Raúl Jackson is a 54 year old, presenting for the following health issues:    Patient presents to clinic for ER follow up.  He has been having ongoing tinnitus and migraines.  He presented to the emergency room and was found to have a large saccular right cavernous ICA and a small saccular right PCA aneurysm.  He does have an appointment coming up with neurology on 8/13/2024.  Continues to struggle with tinnitus and visual disturbances.  He has not been taking his medications on a regular basis.  He just restarted his blood pressure medications.        Emergency Department (Follow up 6/27/24 for Non-ruptured cerebral aneurysm ) and Ear Problem    History of Present Illness       Reason for visit:  General    He eats 0-1 servings of fruits and vegetables daily.He consumes 2 sweetened beverage(s) daily.He exercises with enough effort to increase his heart rate 10 to 19 minutes per day.  He exercises with enough effort to increase his heart rate 4 days per week. He is missing 1 dose(s) of medications per week.  He is not taking prescribed medications regularly due to Cost of medication.  Healthy Habits:     Getting at least 3 servings of Calcium per day:  Yes    Bi-annual eye exam:  Yes    Dental care twice a year:  NO    Sleep apnea or symptoms of sleep apnea:  None    Diet:  Regular (no restrictions)    Frequency of exercise:  2-3 days/week    Duration of exercise:  30-45 minutes    Taking medications regularly:  1    " Barriers to taking medications:  Cost of medication    Medication side effects:  None    Additional concerns today:  Yes       ED/UC Followup:    Facility:  [facility]  Date of visit: 6/27/2024  Reason for visit: Dizziness-ringing in ears-Non-ruptured cerebral aneurysm   Current Status: About the same.             Objective    /88 (BP Location: Right arm, Patient Position: Sitting, Cuff Size: Adult Large)   Pulse 60   Temp 97.9  F (36.6  C) (Temporal)   Resp 16   Wt 101.6 kg (224 lb)   SpO2 98%   BMI 31.24 kg/m    Body mass index is 31.24 kg/m .  Physical Exam  Vitals reviewed.   Constitutional:       General: He is not in acute distress.     Appearance: He is not toxic-appearing.   HENT:      Head: Normocephalic and atraumatic.      Right Ear: There is impacted cerumen.      Left Ear: There is impacted cerumen.      Ears:      Comments: Ear flush provided by nursing-recommended Debrox twice weekly  Eyes:      Pupils: Pupils are equal, round, and reactive to light.   Cardiovascular:      Rate and Rhythm: Normal rate and regular rhythm.      Pulses: Normal pulses.      Heart sounds: Normal heart sounds. No murmur heard.  Pulmonary:      Effort: Pulmonary effort is normal. No respiratory distress.      Breath sounds: Wheezing (scattered) present. No rhonchi.   Skin:     General: Skin is warm and dry.   Neurological:      Mental Status: He is alert.        Results for orders placed or performed in visit on 07/10/24   Lipid Panel     Status: Abnormal   Result Value Ref Range    Cholesterol 168 <200 mg/dL    Triglycerides 104 <150 mg/dL    Direct Measure HDL 43 >=40 mg/dL    LDL Cholesterol Calculated 104 (H) <=100 mg/dL    Non HDL Cholesterol 125 <130 mg/dL    Patient Fasting > 8hrs? Unknown     Narrative    Cholesterol  Desirable:  <200 mg/dL    Triglycerides  Normal:  Less than 150 mg/dL  Borderline High:  150-199 mg/dL  High:  200-499 mg/dL  Very High:  Greater than or equal to 500 mg/dL    Direct Measure  HDL  Female:  Greater than or equal to 50 mg/dL   Male:  Greater than or equal to 40 mg/dL    LDL Cholesterol  Desirable:  <100mg/dL  Above Desirable:  100-129 mg/dL   Borderline High:  130-159 mg/dL   High:  160-189 mg/dL   Very High:  >= 190 mg/dL    Non HDL Cholesterol  Desirable:  130 mg/dL  Above Desirable:  130-159 mg/dL  Borderline High:  160-189 mg/dL  High:  190-219 mg/dL  Very High:  Greater than or equal to 220 mg/dL   Hemoglobin A1c     Status: Normal   Result Value Ref Range    Hemoglobin A1C 5.6 4.0 - 6.2 %                   Signed Electronically by: ANATOLY Chow CNP  Lung Cancer Screening Shared Decision Making Visit     Jose Elias Reaves, a 54 year old male, is eligible for lung cancer screening    History   Smoking Status    Every Day    Types: Cigarettes   Smokeless Tobacco    Never       I have discussed with patient the risks and benefits of screening for lung cancer with low-dose CT.     The risks include:    radiation exposure: one low dose chest CT has as much ionizing radiation as about 15 chest x-rays, or 6 months of background radiation living in [place]      false positives: most findings/nodules are NOT cancer, but some might still require additional diagnostic evaluation, including biopsy    over-diagnosis: some slow growing cancers that might never have been clinically significant will be detected and treated unnecessarily     The benefit of early detection of lung cancer is contingent upon adherence to annual screening or more frequent follow up if indicated.     Furthermore, to benefit from screening, Jose Elias must be willing and able to undergo diagnostic procedures, if indicated. Although no specific guide is available for determining severity of comorbidities, it is reasonable to withhold screening in patients who have greater mortality risk from other diseases.     We did discuss that the best way to prevent lung cancer is to not smoke.    Some patients may value a numeric  estimation of lung cancer risk when evaluating if lung cancer screening is right for them, here is one calculator:    ShouldIScreen

## 2024-07-10 NOTE — PATIENT INSTRUCTIONS
Continue to check blood pressures-- goal is less than 130/80-- take your medications regularly     Lung Cancer Screening   Frequently Asked Questions  If you are at high-risk for lung cancer, getting screened with low-dose computed tomography (LDCT) every year can help save your life. This handout offers answers to some of the most common questions about lung cancer screening. If you have other questions, please call [phone] ([phone]).     What is it?  Lung cancer screening uses special X-ray technology to create an image of your lung tissue. The exam is quick and easy and takes less than 10 seconds. We don t give you any medicine or use any needles. You can eat before and after the exam. You don t need to change your clothes as long as the clothing on your chest doesn t contain metal. But, you do need to be able to hold your breath for at least 6 seconds during the exam.    What is the goal of lung cancer screening?  The goal of lung cancer screening is to save lives. Many times, lung cancer is not found until a person starts having physical symptoms. Lung cancer screening can help detect lung cancer in the earliest stages when it may be easier to treat.    Who should be screened for lung cancer?  We suggest lung cancer screening for anyone who is at high-risk for lung cancer. You are in the high-risk group if you:     are between the ages of 55 and 79, and   have smoked at least 1 pack of cigarettes a day for 20 or more years, and   still smoke or have quit within the past 15 years.    However, if you have a new cough or shortness of breath, you should talk to your doctor before being screened.    Why does it matter if I have symptoms?  Certain symptoms can be a sign that you have a condition in your lungs that should be checked and treated by your doctor. These symptoms include fever, chest pain, a new or changing cough, shortness of breath that you have never felt before, coughing up blood or  unexplained weight loss. Having any of these symptoms can greatly affect the results of lung cancer screening.       Should all smokers get an LDCT lung cancer screening exam?  It depends. Lung cancer screening is for a very specific group of men and women who have a history of heavy smoking over a long period of time (see  Who should be screened for lung cancer  above).  I am in the high-risk group, but have been diagnosed with cancer in the past. Is LDCT lung cancer screening right for me?  In some cases, you should not have LDCT lung screening, such as when your doctor is already following your cancer with CT scan studies. Your doctor will help you decide if LDCT lung screening is right for you.  Do I need to have a screening exam every year?  Yes. If you are in the high-risk group described earlier, you should get an LDCT lung cancer screening exam every year until you are 79, or are no longer willing or able to undergo screening and possible procedures to diagnose and treat lung cancer.  How effective is LDCT at preventing death from lung cancer?  Studies have shown that LDCT lung cancer screening can lower the risk of death from lung cancer by 20 percent in people who are at high-risk.  What are the risks?  There are some risks and limitations of LDCT lung cancer screening. We want to make sure you understand the risks and benefits, so please let us know if you have any questions. Your doctor may want to talk with you more about these risks.   Radiation exposure: As with any exam that uses radiation, there is a very small increased risk of cancer. The amount of radiation in LDCT is small--about the same amount a person would get from a mammogram. Your doctor orders the exam when he or she feels the potential benefits outweigh the risks.   False negatives: No test is perfect, including LDCT. It is possible that you may have a medical condition, including lung cancer, that is not found during your exam. This is  called a false negative result.   False positives and more testing: LDCT very often finds something in the lung that could be cancer, but in fact is not. This is called a false positive result. False positive tests often cause anxiety. To make sure these findings are not cancer, you may need to have more tests. These tests will be done only if you give us permission. Sometimes patients need a treatment that can have side effects, such as a biopsy. For more information on false positives, see  What can I expect from the results?    Findings not related to lung cancer: Your LDCT exam also takes pictures of areas of your body next to your lungs. In a very small number of cases, the CT scan will show an abnormal finding in one of these areas, such as your kidneys, adrenal glands, liver or thyroid. This finding may not be serious, but you may need more tests. Your doctor can help you decide what other tests you may need, if any.  What can I expect from the results?  About 1 out of 4 LDCT exams will find something that may need more tests. Most of the time, these findings are lung nodules. Lung nodules are very small collections of tissue in the lung. These nodules are very common, and the vast majority--more than 97 percent--are not cancer (benign). Most are normal lymph nodes or small areas of scarring from past infections.  But, if a small lung nodule is found to be cancer, the cancer can be cured more than 90 percent of the time. To know if the nodule is cancer, we may need to get more images before your next yearly screening exam. If the nodule has suspicious features (for example, it is large, has an odd shape or grows over time), we will refer you to a specialist for further testing.  Will my doctor also get the results?  Yes. Your doctor will get a copy of your results.  Is it okay to keep smoking now that there s a cancer screening exam?  No. Tobacco is one of the strongest cancer-causing agents. It causes not  only lung cancer, but other cancers and cardiovascular (heart) diseases as well. The damage caused by smoking builds over time. This means that the longer you smoke, the higher your risk of disease. While it is never too late to quit, the sooner you quit, the better.  Where can I find help to quit smoking?  The best way to prevent lung cancer is to stop smoking. If you have already quit smoking, congratulations and keep it up! For help on quitting smoking, please call [employer] at 8-080-QUITNOW (1-538.996.4760) or the American Cancer Society at 1-386.500.7976 to find local resources near you.  One-on-one health coaching:  If you d prefer to work individually with a health care provider on tobacco cessation, we offer:     Medication Therapy Management:  Our specially trained pharmacists work closely with you and your doctor to help you quit smoking.  Call [phone] or [phone] (toll free).    Patient Education   Preventive Care Advice   This is general advice given by our system to help you stay healthy. However, your care team may have specific advice just for you. Please talk to your care team about your preventive care needs.  Nutrition  Eat 5 or more servings of fruits and vegetables each day.  Try wheat bread, brown rice and whole grain pasta (instead of white bread, rice, and pasta).  Get enough calcium and vitamin D. Check the label on foods and aim for 100% of the RDA (recommended daily allowance).  Lifestyle  Exercise at least 150 minutes each week  (30 minutes a day, 5 days a week).  Do muscle strengthening activities 2 days a week. These help control your weight and prevent disease.  No smoking.  Wear sunscreen to prevent skin cancer.  Have a dental exam and cleaning every 6 months.  Yearly exams  See your health care team every year to talk about:  Any changes in your health.  Any medicines your care team has prescribed.  Preventive care, family planning, and ways to prevent chronic  diseases.  Shots (vaccines)   HPV shots (up to age 26), if you've never had them before.  Hepatitis B shots (up to age 59), if you've never had them before.  COVID-19 shot: Get this shot when it's due.  Flu shot: Get a flu shot every year.  Tetanus shot: Get a tetanus shot every 10 years.  Pneumococcal, hepatitis A, and RSV shots: Ask your care team if you need these based on your risk.  Shingles shot (for age 50 and up)  General health tests  Diabetes screening:  Starting at age 35, Get screened for diabetes at least every 3 years.  If you are younger than age 35, ask your care team if you should be screened for diabetes.  Cholesterol test: At age 39, start having a cholesterol test every 5 years, or more often if advised.  Bone density scan (DEXA): At age 50, ask your care team if you should have this scan for osteoporosis (brittle bones).  Hepatitis C: Get tested at least once in your life.  STIs (sexually transmitted infections)  Before age 24: Ask your care team if you should be screened for STIs.  After age 24: Get screened for STIs if you're at risk. You are at risk for STIs (including HIV) if:  You are sexually active with more than one person.  You don't use condoms every time.  You or a partner was diagnosed with a sexually transmitted infection.  If you are at risk for HIV, ask about PrEP medicine to prevent HIV.  Get tested for HIV at least once in your life, whether you are at risk for HIV or not.  Cancer screening tests  Cervical cancer screening: If you have a cervix, begin getting regular cervical cancer screening tests starting at age 21.  Breast cancer scan (mammogram): If you've ever had breasts, begin having regular mammograms starting at age 40. This is a scan to check for breast cancer.  Colon cancer screening: It is important to start screening for colon cancer at age 45.  Have a colonoscopy test every 10 years (or more often if you're at risk) Or, ask your provider about stool tests like a  FIT test every year or Cologuard test every 3 years.  To learn more about your testing options, visit:   .  For help making a decision, visit:   https://bit.ly/nn31523.  Prostate cancer screening test: If you have a prostate, ask your care team if a prostate cancer screening test (PSA) at age 55 is right for you.  Lung cancer screening: If you are a current or former smoker ages 50 to 80, ask your care team if ongoing lung cancer screenings are right for you.  For informational purposes only. Not to replace the advice of your health care provider. Copyright   2023 [employer]. All rights reserved. Clinically reviewed by the [facility] Transitions Program. [employer] [id number] - REV 01/24.

## 2024-07-10 NOTE — PROGRESS NOTES
Patient identified using two patient identifiers.  Ear exam showing wax occlusion completed by provider.  Solution: warm water was placed in the bilateral ear(s) via  Syringe and irrigation ear tip . Large amounts of cerumen removed from both ears. Patient tolerated procedure.     Scarlet Cummins LPN on 7/10/2024 at 11:31 AM    Answers submitted by the patient for this visit:  General Questionnaire (Submitted on 7/10/2024)  Chief Complaint: Chronic problems general questions HPI Form  What is the reason for your visit today? : general  How many servings of fruits and vegetables do you eat daily?: 0-1  On average, how many sweetened beverages do you drink each day (Examples: soda, juice, sweet tea, etc.  Do NOT count diet or artificially sweetened beverages)?: 2  How many minutes a day do you exercise enough to make your heart beat faster?: 10 to 19  How many days a week do you exercise enough to make your heart beat faster?: 4  How many days per week do you miss taking your medication?: 1

## 2024-07-12 LAB
ATRIAL RATE - MUSE: 81 BPM
DIASTOLIC BLOOD PRESSURE - MUSE: NORMAL MMHG
INTERPRETATION ECG - MUSE: NORMAL
P AXIS - MUSE: 47 DEGREES
PR INTERVAL - MUSE: 152 MS
QRS DURATION - MUSE: 102 MS
QT - MUSE: 380 MS
QTC - MUSE: 441 MS
R AXIS - MUSE: 32 DEGREES
SYSTOLIC BLOOD PRESSURE - MUSE: NORMAL MMHG
T AXIS - MUSE: 55 DEGREES
VENTRICULAR RATE- MUSE: 81 BPM

## 2024-08-01 NOTE — TELEPHONE ENCOUNTER
RECORDS RECEIVED FROM: Internal    REASON FOR VISIT: Nonruptured cerebral aneurysm   PROVIDER: Octavio Raza MD   DATE OF APPT: 8/13/24 @ 8:15 am    NOTES (FOR ALL VISITS) STATUS DETAILS   OFFICE NOTE from referring provider Internal 7/1/24 (Transcribed Order)  Lino Damon MD @[facility]     DISCHARGE REPORT from the ER Internal 6/27/24 Brianna Hernandez APRN CNP @[facility] ED     MEDICATION LIST Internal    IMAGING  (FOR ALL VISITS)     MRI (HEAD, NECK, SPINE) Internal [facility]  6/27/24 MRA Brain (COW)  6/27/24 MRA Neck (Carotid)  6/27/24 MR Brain     CT (HEAD, NECK, SPINE) Internal [facility]  6/27/24 CT Head

## 2024-08-13 ENCOUNTER — PRE VISIT (OUTPATIENT)
Dept: RADIOLOGY | Facility: CLINIC | Age: 54
End: 2024-08-13

## 2024-08-13 ENCOUNTER — VIRTUAL VISIT (OUTPATIENT)
Dept: NEUROSURGERY | Facility: CLINIC | Age: 54
End: 2024-08-13
Attending: PSYCHIATRY & NEUROLOGY

## 2024-08-13 DIAGNOSIS — I67.1 NONRUPTURED CEREBRAL ANEURYSM: ICD-10-CM

## 2024-08-13 PROCEDURE — 99204 OFFICE O/P NEW MOD 45 MIN: CPT | Mod: 95 | Performed by: RADIOLOGY

## 2024-08-13 NOTE — LETTER
August 13, 2024      Jose Elias Reaves  [address]  [place] [address]        To Whom It May Concern:    Jose Elias Reaves was seen in our clinic for the evaluation of an unruptured aneurysm in the skullbase. He may return to work without restrictions.      Sincerely,        Jimena Serrano MD  Endovascular Surgical Neuroradiology Fellow

## 2024-08-13 NOTE — PATIENT INSTRUCTIONS
Scheduling will contact you to make arrangements for your angiogram with intent to treat your aneurysm.. You will need a pre-op physical within 30 days of your procedure. You will stay overnight at the hospital for observation following your procedure. Do not eat for 8 hours prior to arrival time. You may drink clear liquids (includes water, Jell-O, clear broth, apple juice or any non-carbonated beverage that you can see through) until 2 hours prior to arrival time. You may take your medications with a sip of water. You will check in at 3C, Surgery Check-In, on the third floor of the [facility] 2 hours prior to your procedure. We will contact you to review instructions after your procedure has been scheduled.      Medications needed: we will prescribe 81 mg aspirin daily and Brilinta 60 mg twice daily beginning 5 days prior to your procedure.     If you have questions regarding your procedure, please contact me at [phone], option 1.    If you need to cancel, reschedule or have procedure scheduling related questions, please call Neuroendovascular IR Procedure Scheduling at [phone].    Thank you,  Adilia Atkinson RN & Kia Heath RN, CNRN, SCRN  Stroke & Endovascular Care Coordinator

## 2024-08-13 NOTE — PROGRESS NOTES
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 He has no personal history of aneurysm rupture. Two uncles reportedly passed in their sleep of aneurysm ruptures. He currently is trying to quit smoking. He is not using any prescription medications or nicotine supplements to attempt to quit due to cost. His hypertension is managed by his PCP. He is on three agents that he takes reliably, and typically his BP runs 130/80, but rarely when he is not feeling well, the systolic will be in the 190s. 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     Past Medical History:  Past Medical History:   Diagnosis Date    Cervical strain 01/03/2019    Closed head injury with concussion 12/12/2018    Concussion with brief LOC 12/12/2018    Concussion with brief LOC 12/12/2018    Hypertension     Impingement of right ulnar nerve 03/16/2020    Post concussion syndrome 03/26/2019    Post-concussion headache 12/26/2018    Vestibular dysfunction 12/26/2018    Vision disturbance 12/26/2018       Past Surgical History:  Past Surgical History:   Procedure Laterality Date    HERNIA REPAIR Left     KNEE SURGERY Left     ACL repair, patella graft    SEPTOPLASTY N/A 3/13/2018    Procedure: SEPTOPLASTY;  septoplasty, submucosal resection of the turbinates;  Surgeon: Piotr Quiroz MD;  Location:  OR       Social History:  Social History     Socioeconomic History    Marital status:      Spouse name: Not on file    Number of children: Not on file    Years of education: Not  on file    Highest education level: Not on file   Occupational History    Not on file   Tobacco Use    Smoking status: Every Day     Current packs/day: 1.00     Average packs/day: 1 pack/day for 42.6 years (42.6 ttl pk-yrs)     Types: Cigarettes     Start date: 1/1/1982    Smokeless tobacco: Never    Tobacco comments:     8 cigarettes per day    Vaping Use    Vaping status: Never Used   Substance and Sexual Activity    Alcohol use: Not Currently     Alcohol/week: 2.0 standard drinks of alcohol     Types: 2 Cans of beer per week     Comment: moderately    Drug use: No    Sexual activity: Yes     Partners: Female   Other Topics Concern    Parent/sibling w/ CABG, MI or angioplasty before 65F 55M? Not Asked   Social History Narrative    Not on file     Social Determinants of Health     Financial Resource Strain: Unknown (10/9/2023)    Financial Resource Strain     Within the past 12 months, have you or your family members you live with been unable to get utilities (heat, electricity) when it was really needed?: Patient refused   Food Insecurity: Unknown (10/9/2023)    Food Insecurity     Within the past 12 months, did you worry that your food would run out before you got money to buy more?: Patient refused     Within the past 12 months, did the food you bought just not last and you didn t have money to get more?: Patient refused   Transportation Needs: Unknown (10/9/2023)    Transportation Needs     Within the past 12 months, has lack of transportation kept you from medical appointments, getting your medicines, non-medical meetings or appointments, work, or from getting things that you need?: Patient refused   Physical Activity: Not on file   Stress: Not on file   Social Connections: Not on file   Interpersonal Safety: Low Risk  (2/19/2024)    Interpersonal Safety     Do you feel physically and emotionally safe where you currently live?: Yes     Within the past 12 months, have you been hit, slapped, kicked or otherwise  physically hurt by someone?: No     Within the past 12 months, have you been humiliated or emotionally abused in other ways by your partner or ex-partner?: No   Housing Stability: Unknown (10/9/2023)    Housing Stability     Do you have housing? : Patient refused     Are you worried about losing your housing?: Patient refused       Family History:  Family History   Problem Relation Age of Onset    Diabetes Mother     Myocardial Infarction Father        Home Medications:  Current Outpatient Medications   Medication Sig Dispense Refill    Acetaminophen (TYLENOL PO) Take 1,000 mg by mouth daily       albuterol (PROAIR HFA/PROVENTIL HFA/VENTOLIN HFA) 108 (90 Base) MCG/ACT inhaler Inhale 2 puffs into the lungs every 4 hours as needed for shortness of breath or wheezing 18 g 11    albuterol (PROVENTIL) (2.5 MG/3ML) 0.083% neb solution Take 1 vial (2.5 mg) by nebulization every 6 hours as needed for shortness of breath, wheezing or cough 90 mL 5    amLODIPine (NORVASC) 5 MG tablet Take 1 tablet (5 mg) by mouth daily 90 tablet 4    atorvastatin (LIPITOR) 20 MG tablet Take 1 tablet (20 mg) by mouth daily 90 tablet 4    fluticasone-salmeterol (ADVAIR) 500-50 MCG/ACT inhaler Inhale 1 puff into the lungs every 12 hours 60 each 11    lamoTRIgine (LAMICTAL) 100 MG tablet Take 0.5 tablets (50 mg) by mouth 2 times daily for 7 days, THEN 1 tablet (100 mg) 2 times daily for 90 days. - For Irritability / Anger 187 tablet 4    losartan-hydrochlorothiazide (HYZAAR) 100-25 MG tablet Take 1 tablet by mouth daily 90 tablet 4    naproxen (NAPROSYN) 500 MG tablet TAKE 1 TABLET(500 MG) BY MOUTH TWICE DAILY AS NEEDED FOR PAIN 90 tablet 2    naproxen sodium ER (NAPRELAN) 500 MG 24 hr tablet Take 1 tablet (500 mg) by mouth daily 90 tablet 4    Omega-3 Fish Oil 500 MG capsule Take 1 capsule (500 mg) by mouth daily 90 capsule 4    propranolol ER (INDERAL LA) 120 MG 24 hr capsule Take 1 capsule (120 mg) by mouth daily 90 capsule 4    tiotropium  (SPIRIVA) 18 MCG inhaled capsule Inhale 1 capsule (18 mcg) into the lungs daily 90 capsule 4    MEDS UNK - RECORDS REQUESTED For cholesterol (Patient not taking: Reported on 7/10/2024)      metaxalone (SKELAXIN) 800 MG tablet Take 1 tablet (800 mg) by mouth 3 times daily (Patient not taking: Reported on 7/10/2024) 90 tablet 4    methylPREDNISolone (MEDROL DOSEPAK) 4 MG tablet therapy pack Follow Package Directions (Patient not taking: Reported on 7/10/2024) 21 tablet 0    valACYclovir (VALTREX) 1000 mg tablet Take 1 tablet (1,000 mg) by mouth 3 times daily for 7 days 21 tablet 0     No current facility-administered medications for this visit.       Allergies:  Allergies   Allergen Reactions    Penicillins Anaphylaxis     Tolerated cefazolin on 09/16/2015.    Clindamycin Hives    Gabapentin      Mood disturbance        Physical Examination:  Vitals: There were no vitals taken for this virtual visit.  General: Adult male patient, seated on chair, NAD  HEENT: NC/AT, no icterus, op pink and moist  Chest: non-labored on RA  Skin: No visible rash or lesion   Psych: Mood pleasant, affect congruent  Neuro:  Mental status: Awake, alert, attentive, oriented to self, time, place, and circumstance. Language is fluent and coherent.  Cranial nerves: Conjugate gaze, EOMI, face symmetric, no dysarthria.   Motor: Symmetric antigravity strength of BUEs   Coordination: No ataxia of observed movements    Laboratory findings:  Reviewed    Imaging findings:  MRI and MRA brain and neck reviewed. Right ICA cavernous segment aneurysm does not appear based on this study to rise above the optic strut to make it intradural, but this cannot be determined definitively.    Impression:  Right internal carotid artery cavernous segment 11 mm aneurysm- risk of rupture is <1% over 5 years per ISUIA, and if rupture were to occur, intracranial hemorrhage is unlikely due to its location- carotid-cavernous fistula formation is more likely. That said, due  to his non-optimal aneurysm rupture risk factors (uncontrolled HTN, current smoking, positive family history of aneurysm rupture), it is prudent to perform further testing to determine if this is in fact a carotid cave aneurysm that could cause subarachnoid hemorrhage. Additionally, his personal preference is for treatment if feasible due to the emotional nature of his family history and restrictions imposed on his ability to work based on the presence of this aneurysm, despite its exceedingly low rupture risk.    Plan:  Cerebral angiogram with intent to treat this unruptured possible carotid cave vs cavernous carotid aneurysm with endovascular flow diversion.    Patient seen and discussed with the attending, Dr. Raza.    Jimena Serrano MD  Endovascular Surgical Neuroradiology Fellow, PGY-7  [phone]

## 2024-08-13 NOTE — LETTER
8/13/2024       RE: Jose Elias Reaves  [address]  [facility] [address]     Dear Colleague,    Thank you for referring your patient, Jose Elias Reaves, to the [facility] NEUROSURGERY [facility]INIC [place] at [facility]. Please see a copy of my visit note below.          [facility]UROS[facility]ERY [facility] [place]  [address]  3RD FLOOR  [facility] [address]  Phone: [phone]  Fax: [phone]    Neuroendovascular Clinic Note:    Reason for clinic visit: Unruptured aneurysm    History of present illness: Manuel Reaves is a 54 year old man with history of current cigarette smoking, HTN, HLD, and TBI who was incidentally found to have an unruptured right ICA cavernous segment 11 mm aneurysm. He has no personal history of aneurysm rupture. Two uncles reportedly passed in their sleep of aneurysm ruptures. He currently is trying to quit smoking. He is not using any prescription medications or nicotine supplements to attempt to quit due to cost. His hypertension is managed by his PCP. He is on three agents that he takes reliably, and typically his BP runs 130/80, but rarely when he is not feeling well, the systolic will be in the 190s. 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     Past Medical History:  Past Medical History:   Diagnosis Date     Cervical strain 01/03/2019     Closed head injury with concussion 12/12/2018     Concussion with brief LOC 12/12/2018     Concussion with brief LOC 12/12/2018     Hypertension      Impingement of right ulnar nerve 03/16/2020     Post concussion syndrome 03/26/2019     Post-concussion headache 12/26/2018     Vestibular dysfunction 12/26/2018     Vision disturbance 12/26/2018       Past Surgical History:  Past Surgical History:   Procedure Laterality Date     HERNIA REPAIR Left      KNEE SURGERY Left     ACL repair, patella graft      SEPTOPLASTY N/A 3/13/2018    Procedure: SEPTOPLASTY;  septoplasty, submucosal resection of the turbinates;  Surgeon: Piotr Quiroz MD;  Location:  OR       Social History:  Social History     Socioeconomic History     Marital status:      Spouse name: Not on file     Number of children: Not on file     Years of education: Not on file     Highest education level: Not on file   Occupational History     Not on file   Tobacco Use     Smoking status: Every Day     Current packs/day: 1.00     Average packs/day: 1 pack/day for 42.6 years (42.6 ttl pk-yrs)     Types: Cigarettes     Start date: 1/1/1982     Smokeless tobacco: Never     Tobacco comments:     8 cigarettes per day    Vaping Use     Vaping status: Never Used   Substance and Sexual Activity     Alcohol use: Not Currently     Alcohol/week: 2.0 standard drinks of alcohol     Types: 2 Cans of beer per week     Comment: moderately     Drug use: No     Sexual activity: Yes     Partners: Female   Other Topics Concern     Parent/sibling w/ CABG, MI or angioplasty before 65F 55M? Not Asked   Social History Narrative     Not on file     Social Determinants of Health     Financial Resource Strain: Unknown (10/9/2023)    Financial Resource Strain      Within the past 12 months, have you or your family members you live with been unable to get utilities (heat, electricity) when it was really needed?: Patient refused   Food Insecurity: Unknown (10/9/2023)    Food Insecurity      Within the past 12 months, did you worry that your food would run out before you got money to buy more?: Patient refused      Within the past 12 months, did the food you bought just not last and you didn t have money to get more?: Patient refused   Transportation Needs: Unknown (10/9/2023)    Transportation Needs      Within the past 12 months, has lack of transportation kept you from medical appointments, getting your medicines, non-medical meetings or appointments, work, or from getting  things that you need?: Patient refused   Physical Activity: Not on file   Stress: Not on file   Social Connections: Not on file   Interpersonal Safety: Low Risk  (2/19/2024)    Interpersonal Safety      Do you feel physically and emotionally safe where you currently live?: Yes      Within the past 12 months, have you been hit, slapped, kicked or otherwise physically hurt by someone?: No      Within the past 12 months, have you been humiliated or emotionally abused in other ways by your partner or ex-partner?: No   Housing Stability: Unknown (10/9/2023)    Housing Stability      Do you have housing? : Patient refused      Are you worried about losing your housing?: Patient refused       Family History:  Family History   Problem Relation Age of Onset     Diabetes Mother      Myocardial Infarction Father        Home Medications:  Current Outpatient Medications   Medication Sig Dispense Refill     Acetaminophen (TYLENOL PO) Take 1,000 mg by mouth daily        albuterol (PROAIR HFA/PROVENTIL HFA/VENTOLIN HFA) 108 (90 Base) MCG/ACT inhaler Inhale 2 puffs into the lungs every 4 hours as needed for shortness of breath or wheezing 18 g 11     albuterol (PROVENTIL) (2.5 MG/3ML) 0.083% neb solution Take 1 vial (2.5 mg) by nebulization every 6 hours as needed for shortness of breath, wheezing or cough 90 mL 5     amLODIPine (NORVASC) 5 MG tablet Take 1 tablet (5 mg) by mouth daily 90 tablet 4     atorvastatin (LIPITOR) 20 MG tablet Take 1 tablet (20 mg) by mouth daily 90 tablet 4     fluticasone-salmeterol (ADVAIR) 500-50 MCG/ACT inhaler Inhale 1 puff into the lungs every 12 hours 60 each 11     lamoTRIgine (LAMICTAL) 100 MG tablet Take 0.5 tablets (50 mg) by mouth 2 times daily for 7 days, THEN 1 tablet (100 mg) 2 times daily for 90 days. - For Irritability / Anger 187 tablet 4     losartan-hydrochlorothiazide (HYZAAR) 100-25 MG tablet Take 1 tablet by mouth daily 90 tablet 4     naproxen (NAPROSYN) 500 MG tablet TAKE 1  TABLET(500 MG) BY MOUTH TWICE DAILY AS NEEDED FOR PAIN 90 tablet 2     naproxen sodium ER (NAPRELAN) 500 MG 24 hr tablet Take 1 tablet (500 mg) by mouth daily 90 tablet 4     Omega-3 Fish Oil 500 MG capsule Take 1 capsule (500 mg) by mouth daily 90 capsule 4     propranolol ER (INDERAL LA) 120 MG 24 hr capsule Take 1 capsule (120 mg) by mouth daily 90 capsule 4     tiotropium (SPIRIVA) 18 MCG inhaled capsule Inhale 1 capsule (18 mcg) into the lungs daily 90 capsule 4     MEDS UNK - RECORDS REQUESTED For cholesterol (Patient not taking: Reported on 7/10/2024)       metaxalone (SKELAXIN) 800 MG tablet Take 1 tablet (800 mg) by mouth 3 times daily (Patient not taking: Reported on 7/10/2024) 90 tablet 4     methylPREDNISolone (MEDROL DOSEPAK) 4 MG tablet therapy pack Follow Package Directions (Patient not taking: Reported on 7/10/2024) 21 tablet 0     valACYclovir (VALTREX) 1000 mg tablet Take 1 tablet (1,000 mg) by mouth 3 times daily for 7 days 21 tablet 0     No current facility-administered medications for this visit.       Allergies:  Allergies   Allergen Reactions     Penicillins Anaphylaxis     Tolerated cefazolin on 09/16/2015.     Clindamycin Hives     Gabapentin      Mood disturbance        Physical Examination:  Vitals: There were no vitals taken for this virtual visit.  General: Adult male patient, seated on chair, NAD  HEENT: NC/AT, no icterus, op pink and moist  Chest: non-labored on RA  Skin: No visible rash or lesion   Psych: Mood pleasant, affect congruent  Neuro:  Mental status: Awake, alert, attentive, oriented to self, time, place, and circumstance. Language is fluent and coherent.  Cranial nerves: Conjugate gaze, EOMI, face symmetric, no dysarthria.   Motor: Symmetric antigravity strength of BUEs   Coordination: No ataxia of observed movements    Laboratory findings:  Reviewed    Imaging findings:  MRI and MRA brain and neck reviewed. Right ICA cavernous segment aneurysm does not appear based on  this study to rise above the optic strut to make it intradural, but this cannot be determined definitively.    Impression:  Right internal carotid artery cavernous segment 11 mm aneurysm- risk of rupture is <1% over 5 years per ISUIA, and if rupture were to occur, intracranial hemorrhage is unlikely due to its location- carotid-cavernous fistula formation is more likely. That said, due to his non-optimal aneurysm rupture risk factors (uncontrolled HTN, current smoking, positive family history of aneurysm rupture), it is prudent to perform further testing to determine if this is in fact a carotid cave aneurysm that could cause subarachnoid hemorrhage. Additionally, his personal preference is for treatment if feasible due to the emotional nature of his family history and restrictions imposed on his ability to work based on the presence of this aneurysm, despite its exceedingly low rupture risk.    Plan:  Cerebral angiogram with intent to treat this unruptured possible carotid cave vs cavernous carotid aneurysm with endovascular flow diversion.    Patient seen and discussed with the attending, Dr. Raza.    Jimena Serrano MD  Endovascular Surgical Neuroradiology Fellow, PGY-7  [phone]    Attestation signed by Octavio Raza MD at 8/14/2024  8:10 AM:  I have personally seen and evaluated the patient on August 13, 2024  and I agree with the note by Dr. Serrano              On August 14, 2024      Again, thank you for allowing me to participate in the care of your patient.      Sincerely,    Octavio Raza MD

## 2024-08-13 NOTE — NURSING NOTE
Current patient location: [address] [place] [address] [address]  [facility] [address]    Is the patient currently in the state of [place]? YES    Visit mode:VIDEO    If the visit is dropped, the patient can be reconnected by: TELEPHONE VISIT: Phone number:   Telephone Information:   Mobile [phone]       Will anyone else be joining the visit? NO  (If patient encounters technical issues they should call [phone])    How would you like to obtain your AVS? MyChart    Are changes needed to the allergy or medication list? Pt stated no med changes    Are refills needed on medications prescribed by this physician? NO    Rooming Documentation:  Not applicable      Reason for visit: Video Visit (nonruptured cerebral aneurysm )    Elif BAH

## 2024-08-13 NOTE — PROGRESS NOTES
"Virtual Visit Details    Type of service:  Video Visit   Video Start Time: {video visit start/end time for provider to select:363869}  Video End Time:{video visit start/end time for provider to select:228295}    Originating Location (pt. Location): {video visit patient location:222619::\"Home\"}  {PROVIDER LOCATION On-site should be selected for visits conducted from your clinic location or adjoining [facility] hospital, academic office, or other nearby [facility] building. Off-site should be selected for all other provider locations, including home:79[address]}  Distant Location (provider location):  {virtual location provider:952669}  Platform used for Video Visit: {Virtual Visit Platforms:928139::\"Endosense\"}  "

## 2024-08-21 ENCOUNTER — TELEPHONE (OUTPATIENT)
Dept: RADIOLOGY | Facility: CLINIC | Age: 54
End: 2024-08-21

## 2024-08-21 NOTE — TELEPHONE ENCOUNTER
I called patient  in regards to IR procedure with Dr. Raza    Procedure Date: 9/17/2024    Arrival: 1100    Loction: [facility]    Pre op: PCP    Anesthesia Type: General Anesthesia    Notes:           Caryl Loera on 8/21/2024 at 1:05 PM

## 2024-08-23 ENCOUNTER — MYC REFILL (OUTPATIENT)
Dept: INTERNAL MEDICINE | Facility: OTHER | Age: 54
End: 2024-08-23

## 2024-08-23 DIAGNOSIS — I10 ESSENTIAL HYPERTENSION: ICD-10-CM

## 2024-08-23 DIAGNOSIS — G89.4 CHRONIC PAIN SYNDROME: Primary | ICD-10-CM

## 2024-08-23 RX ORDER — PROPRANOLOL HYDROCHLORIDE 120 MG/1
120 CAPSULE, EXTENDED RELEASE ORAL DAILY
Qty: 90 CAPSULE | Refills: 0 | Status: SHIPPED | OUTPATIENT
Start: 2024-08-23 | End: 2024-09-05

## 2024-08-23 NOTE — TELEPHONE ENCOUNTER
Reason for call: Medication or medication refill    Have you contacted your pharmacy regarding this refill? Yes  Patient states [facility] is refusing to refill. States he was told by [facility] to call if he has trouble getting his medication.    Name of medication requested: Propranolol    How many days of medication do you have left? 0    What pharmacy do you use? [facility]    Preferred method for responding to this message: Telephone Call    Phone number patient can be reached at: Cell number on file:    Telephone Information:   Mobile [phone]       If we cannot reach you directly, may we leave a detailed response at the number you provided? Yes    Scarlet Monet on 8/23/2024 at 12:32 PM

## 2024-08-23 NOTE — TELEPHONE ENCOUNTER
Patient sent Rx request for the following:      Requested Prescriptions   Pending Prescriptions Disp Refills    propranolol ER (INDERAL LA) 120 MG 24 hr capsule 90 capsule 4     Sig: Take 1 capsule (120 mg) by mouth daily.       Beta-Blockers Protocol Passed - 8/23/2024 12:50 PM        Passed - Blood pressure under 140/90 in past 12 months     BP Readings from Last 3 Encounters:   07/10/24 134/88   06/27/24 (!) 148/61   02/19/24 109/75       No data recorded            Passed - Patient is age 6 or older        Passed - Medication is active on med list        Passed - Medication indicated for associated diagnosis     Medication is associated with one or more of the following diagnoses:     Hypertension (HTN)   Atrial fibrillation/flutter   Angina   ASCVD   Migraine   Heart Failure   Tremor   Anxiety   Ocular hypertension   Glaucoma   PTSD          Passed - Recent (12 mo) or future (90 days) visit within the authorizing provider's specialty     The patient must have completed an in-person or virtual visit within the past 12 months or has a future visit scheduled within the next 90 days with the authorizing provider s specialty.  Urgent care and e-visits do not quality as an office visit for this protocol.               Last Prescription Date:   8/14/23  Last Fill Qty/Refills:         90, R-4    Last Office Visit:              7/10/24   Future Office visit:           9/5/24  Next 5 appointments (look out 90 days)      Sep 05, 2024 10:40 AM  (Arrive by 10:25 AM)  Pre-Operative Physical with ANATOLY Chow [facility] ([facility] ) [address]  [place] [id number] [phone]           Unable to complete prescription refill per RN Medication Refill Policy. (BP readings).     Routing to provider for refill consideration.     Geraldo Cabrera RN on 8/23/2024 at 12:53 PM

## 2024-08-26 RX ORDER — PROPRANOLOL HYDROCHLORIDE 120 MG/1
120 CAPSULE, EXTENDED RELEASE ORAL DAILY
Qty: 90 CAPSULE | Refills: 4 | OUTPATIENT
Start: 2024-08-26

## 2024-08-26 RX ORDER — PROPRANOLOL HYDROCHLORIDE 120 MG/1
120 CAPSULE, EXTENDED RELEASE ORAL DAILY
Qty: 30 CAPSULE | Refills: 2 | Status: SHIPPED | OUTPATIENT
Start: 2024-08-26

## 2024-08-26 NOTE — TELEPHONE ENCOUNTER
Pt sent MyChart asking for this to go to [facility] [place], [place]. Pharmacy notified. Theresa Ireland RN .............. 8/26/2024  1:09 PM

## 2024-08-26 NOTE — TELEPHONE ENCOUNTER
propranolol ER (INDERAL LA) 120 MG 24 hr capsule 90 capsule 0 8/23/2024 -- No   Sig - Route: Take 1 capsule (120 mg) by mouth daily. - Oral     New prescription sent per above order, for #30 capsules and 2 additional refills. Theresa Ireland RN .............. 8/26/2024  1:11 PM

## 2024-08-27 RX ORDER — ACETAMINOPHEN 325 MG/1
1000 TABLET ORAL DAILY
Qty: 300 TABLET | Refills: 3 | Status: SHIPPED | OUTPATIENT
Start: 2024-08-27

## 2024-08-27 NOTE — TELEPHONE ENCOUNTER
Patient sent Rx request for the following:      Requested Prescriptions   Pending Prescriptions Disp Refills    Acetaminophen (TYLENOL PO)       Sig: Take 1,000 mg by mouth daily.       Analgesics (Non-Narcotic Tylenol and ASA Only) Failed - 8/27/2024  2:34 PM   Last Prescription Date:   patient reported  Last Fill Qty/Refills:         , R-    Last Office Visit:              7/10/24   Future Office visit:             Next 5 appointments (look out 90 days)      Sep 05, 2024 10:40 AM  (Arrive by 10:25 AM)  Pre-Operative Physical with ANATOLY Chow [facility] ([facility] ) [address]  [place] [id number] [phone]         Renee Erazo RN on 8/27/2024 at 2:37 PM

## 2024-08-29 ENCOUNTER — MYC MEDICAL ADVICE (OUTPATIENT)
Dept: INTERNAL MEDICINE | Facility: OTHER | Age: 54
End: 2024-08-29

## 2024-08-30 NOTE — TELEPHONE ENCOUNTER
Patient currently not taking BP meds due to financial issues.     Amlodipine 5mg daily  Losartan-hydrochlorothiazide 100-25 daily  Propranolol ER 120mg daily    Wondering about other ways to control BP without meds.     Education sent.      Nanette Royal RN on 8/30/2024 at 2:58 PM

## 2024-09-04 ENCOUNTER — MYC MEDICAL ADVICE (OUTPATIENT)
Dept: NEUROLOGY | Facility: CLINIC | Age: 54
End: 2024-09-04

## 2024-09-04 ENCOUNTER — PATIENT OUTREACH (OUTPATIENT)
Dept: NEUROSURGERY | Facility: CLINIC | Age: 54
End: 2024-09-04

## 2024-09-04 DIAGNOSIS — I67.1 NONRUPTURED CEREBRAL ANEURYSM: Primary | ICD-10-CM

## 2024-09-04 RX ORDER — ASPIRIN 81 MG/1
TABLET ORAL
Qty: 30 TABLET | Refills: 2 | Status: SHIPPED | OUTPATIENT
Start: 2024-09-04 | End: 2024-09-05 | Stop reason: DRUGHIGH

## 2024-09-04 NOTE — TELEPHONE ENCOUNTER
[facility] informing patient instructions sent via [employer]. Prescriptions needed for procedure sent to pharmacy. Encouraged to reach out with questions.    Kia Heath RN 9/4/2024 9:42 AM

## 2024-09-04 NOTE — TELEPHONE ENCOUNTER
Copied from 9/4/24 IntroNet message:    Ashleigh Conn LPN routed conversation to Stroke/Neurovascular Nurses1 hour ago (12:55 PM)     Cristela Conn routed conversation to Ump-Neurosurg-Adult-Csc3 hours ago (10:30 AM)     Benitez Connn3 hours ago (10:29 AM)     [facility] Call Center     Phone Message     May a detailed message be left on voicemail: yes      Reason for Call: Other: Pt is returning Kia's call Please call Pt back.      Action Taken: Message routed to:  [facility] ([facility]): Neurosurgery     Travel Screening: Not Applicable          Date of Service:

## 2024-09-04 NOTE — TELEPHONE ENCOUNTER
Spoke with patient. States he is taking over the counter aspirin 81 mg daily.     Brilinta is $435. Wondering if there is a less expensive alternative.     Also, states [facility] shows an 8:00 arrival time vs 11:00 on appointment desk.     Will clarify start time and check with Dr. Raza on alternative medication, and get back to patient.     Patient verbalized understanding.      Kia Heath RN 9/4/2024 3:23 PM     Addendum:  Per Dr. Raza prescribe Effient 30 mg load, then 5 mg daily. Will also need aspirin 325 mg daily. Will need to reschedule procedure. Patient notified via Thumbtack. Prescriptions sent to the pharmacy.     Kia Heath RN 9/5/2024 11:39 AM

## 2024-09-04 NOTE — TELEPHONE ENCOUNTER
[facility] Call Center    Phone Message    May a detailed message be left on voicemail: yes     Reason for Call: Other: Pt is returning Kia's call Please call Pt back.     Action Taken: Message routed to:  [facility] ([facility]): Neurosurgery    Travel Screening: Not Applicable     Date of Service:

## 2024-09-04 NOTE — PATIENT INSTRUCTIONS
You are scheduled for a cerebral angiogram with intention to treat, under general anesthesia, with Dr. Raza on 9/17/24. Arrival Time: 11:00 am. Procedure Time: 1:00 pm.    Please follow these instructions:    * You will need a pre-op physical within 30 days prior to your procedure. You may do this with your primary care provider or with the Pre-Operative Assessment Center (PAC), located at [facility] at [address] [place] [address]. The PAC phone number is [phone].    * Begin taking 81 mg aspirin daily and Brilinta 60 mg twice daily  on 9/12/24. You will remain on these medications for your procedure. Prescriptions have been sent to your pharmacy.     * Check in at the Surgery Admission Unit (3C), on the third floor, at the [facility]. The address is [address] [place] [address]. The phone number is [phone].     * Nothing to eat for 8 hours prior to arrival time. You may drink clear liquids (includes water, Jell-O, clear broth, apple juice or any non-carbonated beverage that you can see through) up until 2 hours prior to arrival time.     * You may take your medications, including aspirin and Brilinta, with a sip of water the morning of the procedure.     * If you are not treated, you will be discharged the same day. You must have a  home and someone that can stay with you through the night. If you are treated you will stay overnight at the hospital for observation after the procedure. We aim to discharge you by 11:00 AM the following day. Please have your ride available by 10:00 AM.     PLEASE NOTE our COVID-19 visitors policy: Adult surgical and procedural patients can have two visitors throughout the surgery process. The two visitors may include children of any age; please note, children cannot stay in the waiting room alone.    All discharge instructions will be given to the  or volunteer.  Documentation for the post-operative plan will be given to the patient and . Patients are required to have someone to stay with them for 24 hours after their procedure.    If you have questions regarding your procedure, please contact me at [phone], option 1.    If you need to cancel, reschedule or have procedure scheduling related questions, please call Neuroendovascular IR Procedure Scheduling at [phone].    Thank you,  Kia Heath, RN, CNRN, SCRN  Adilia Sepulveda, RN, BSN  Stroke & Neuroendovascular Care Coordinator

## 2024-09-05 ENCOUNTER — OFFICE VISIT (OUTPATIENT)
Dept: INTERNAL MEDICINE | Facility: OTHER | Age: 54
End: 2024-09-05

## 2024-09-05 VITALS
TEMPERATURE: 97 F | DIASTOLIC BLOOD PRESSURE: 89 MMHG | SYSTOLIC BLOOD PRESSURE: 129 MMHG | OXYGEN SATURATION: 99 % | RESPIRATION RATE: 16 BRPM | HEART RATE: 76 BPM | WEIGHT: 231.2 LBS | HEIGHT: 71 IN | BODY MASS INDEX: 32.37 KG/M2

## 2024-09-05 DIAGNOSIS — Z72.0 TOBACCO USE: ICD-10-CM

## 2024-09-05 DIAGNOSIS — R73.03 PREDIABETES: ICD-10-CM

## 2024-09-05 DIAGNOSIS — Z01.818 PREOP GENERAL PHYSICAL EXAM: Primary | ICD-10-CM

## 2024-09-05 DIAGNOSIS — J44.9 CHRONIC OBSTRUCTIVE PULMONARY DISEASE, UNSPECIFIED COPD TYPE (H): ICD-10-CM

## 2024-09-05 DIAGNOSIS — I10 ESSENTIAL HYPERTENSION: ICD-10-CM

## 2024-09-05 DIAGNOSIS — J42 CHRONIC BRONCHITIS, UNSPECIFIED CHRONIC BRONCHITIS TYPE (H): ICD-10-CM

## 2024-09-05 DIAGNOSIS — J45.20 MILD INTERMITTENT ASTHMA WITHOUT COMPLICATION: ICD-10-CM

## 2024-09-05 DIAGNOSIS — E78.2 MIXED HYPERLIPIDEMIA: ICD-10-CM

## 2024-09-05 DIAGNOSIS — F60.9 PERSONALITY DISORDER (H): ICD-10-CM

## 2024-09-05 DIAGNOSIS — I67.1 ANEURYSM OF CAVERNOUS PORTION OF RIGHT INTERNAL CAROTID ARTERY: ICD-10-CM

## 2024-09-05 DIAGNOSIS — L81.9 DISCOLORATION OF SKIN OF LOWER LEG: ICD-10-CM

## 2024-09-05 LAB
ALBUMIN SERPL BCG-MCNC: 4.9 G/DL (ref 3.5–5.2)
ALP SERPL-CCNC: 87 U/L (ref 40–150)
ALT SERPL W P-5'-P-CCNC: 17 U/L (ref 0–70)
ANION GAP SERPL CALCULATED.3IONS-SCNC: 10 MMOL/L (ref 7–15)
AST SERPL W P-5'-P-CCNC: 19 U/L (ref 0–45)
BASOPHILS # BLD AUTO: 0.1 10E3/UL (ref 0–0.2)
BASOPHILS NFR BLD AUTO: 1 %
BILIRUB SERPL-MCNC: 0.2 MG/DL
BUN SERPL-MCNC: 19.4 MG/DL (ref 6–20)
CALCIUM SERPL-MCNC: 9.8 MG/DL (ref 8.8–10.4)
CHLORIDE SERPL-SCNC: 100 MMOL/L (ref 98–107)
CHOLEST SERPL-MCNC: 170 MG/DL
CREAT SERPL-MCNC: 1.03 MG/DL (ref 0.67–1.17)
D DIMER PPP FEU-MCNC: 0.39 UG/ML FEU (ref 0–0.5)
EGFRCR SERPLBLD CKD-EPI 2021: 86 ML/MIN/1.73M2
EOSINOPHIL # BLD AUTO: 0.3 10E3/UL (ref 0–0.7)
EOSINOPHIL NFR BLD AUTO: 4 %
ERYTHROCYTE [DISTWIDTH] IN BLOOD BY AUTOMATED COUNT: 13.2 % (ref 10–15)
FASTING STATUS PATIENT QL REPORTED: YES
FASTING STATUS PATIENT QL REPORTED: YES
GLUCOSE SERPL-MCNC: 119 MG/DL (ref 70–99)
HCO3 SERPL-SCNC: 31 MMOL/L (ref 22–29)
HCT VFR BLD AUTO: 50.4 % (ref 40–53)
HDLC SERPL-MCNC: 46 MG/DL
HGB BLD-MCNC: 16.5 G/DL (ref 13.3–17.7)
IMM GRANULOCYTES # BLD: 0 10E3/UL
IMM GRANULOCYTES NFR BLD: 0 %
LDLC SERPL CALC-MCNC: 96 MG/DL
LYMPHOCYTES # BLD AUTO: 2 10E3/UL (ref 0.8–5.3)
LYMPHOCYTES NFR BLD AUTO: 26 %
MCH RBC QN AUTO: 29.3 PG (ref 26.5–33)
MCHC RBC AUTO-ENTMCNC: 32.7 G/DL (ref 31.5–36.5)
MCV RBC AUTO: 89 FL (ref 78–100)
MONOCYTES # BLD AUTO: 0.8 10E3/UL (ref 0–1.3)
MONOCYTES NFR BLD AUTO: 10 %
NEUTROPHILS # BLD AUTO: 4.6 10E3/UL (ref 1.6–8.3)
NEUTROPHILS NFR BLD AUTO: 59 %
NONHDLC SERPL-MCNC: 124 MG/DL
NRBC # BLD AUTO: 0 10E3/UL
NRBC BLD AUTO-RTO: 0 /100
PLATELET # BLD AUTO: 423 10E3/UL (ref 150–450)
POTASSIUM SERPL-SCNC: 4 MMOL/L (ref 3.4–5.3)
PROT SERPL-MCNC: 8.1 G/DL (ref 6.4–8.3)
RBC # BLD AUTO: 5.64 10E6/UL (ref 4.4–5.9)
SODIUM SERPL-SCNC: 141 MMOL/L (ref 135–145)
TRIGL SERPL-MCNC: 138 MG/DL
WBC # BLD AUTO: 7.8 10E3/UL (ref 4–11)

## 2024-09-05 PROCEDURE — 99214 OFFICE O/P EST MOD 30 MIN: CPT

## 2024-09-05 PROCEDURE — 85025 COMPLETE CBC W/AUTO DIFF WBC: CPT | Mod: ZL

## 2024-09-05 PROCEDURE — 82040 ASSAY OF SERUM ALBUMIN: CPT | Mod: ZL

## 2024-09-05 PROCEDURE — G2211 COMPLEX E/M VISIT ADD ON: HCPCS

## 2024-09-05 PROCEDURE — 36415 COLL VENOUS BLD VENIPUNCTURE: CPT | Mod: ZL

## 2024-09-05 PROCEDURE — 80061 LIPID PANEL: CPT | Mod: ZL

## 2024-09-05 PROCEDURE — 85379 FIBRIN DEGRADATION QUANT: CPT | Mod: ZL

## 2024-09-05 RX ORDER — PRASUGREL 5 MG/1
TABLET, FILM COATED ORAL
Qty: 30 TABLET | Refills: 2 | Status: ON HOLD | OUTPATIENT
Start: 2024-09-05 | End: 2024-09-18

## 2024-09-05 RX ORDER — ASPIRIN 325 MG
TABLET ORAL
Qty: 30 TABLET | Refills: 2 | Status: SHIPPED | OUTPATIENT
Start: 2024-09-05 | End: 2024-09-09

## 2024-09-05 RX ORDER — ATORVASTATIN CALCIUM 40 MG/1
40 TABLET, FILM COATED ORAL DAILY
Qty: 90 TABLET | Refills: 4 | Status: SHIPPED | OUTPATIENT
Start: 2024-09-05

## 2024-09-05 ASSESSMENT — PAIN SCALES - GENERAL: PAINLEVEL: NO PAIN (0)

## 2024-09-05 NOTE — NURSING NOTE
"Chief Complaint   Patient presents with    Pre-Op Exam     [facility] NEUROLOGIST ON 9/17/2024       Initial There were no vitals taken for this visit. Estimated body mass index is 31.24 kg/m  as calculated from the following:    Height as of 6/27/24: 1.803 m (5' 11\").    Weight as of 7/10/24: 101.6 kg (224 lb).  Medication Review: complete    The next two questions are to help us understand your food security.  If you are feeling you need any assistance in this area, we have resources available to support you today.          10/9/2023   SDOH- Food Insecurity   Within the past 12 months, did you worry that your food would run out before you got money to buy more? AL   Within the past 12 months, did the food you bought just not last and you didn t have money to get more? AL            Health Care Directive:  Patient does not have a Health Care Directive or Living Will: Discussed advance care planning with patient; however, patient declined at this time.    Emily Eduardo CNA      "

## 2024-09-05 NOTE — PROGRESS NOTES
Preoperative Evaluation  [facility]  [address]  [place] [address]  Phone: [phone]  Fax: [phone]  Primary Provider: ANATOLY Chow CNP  Pre-op Performing Provider: ANATOLY Chow CNP  Sep 5, 2024             9/5/2024   Surgical Information   What procedure is being done? perop   Facility or Hospital where procedure/surgery [facility]   Who is doing the procedure / surgery? Dr. Raza   Date of surgery / procedure: 09/17/24   Time of surgery / procedure: 11   Where do you plan to recover after surgery? at home with family        Fax number for surgical facility: Note does not need to be faxed, will be available electronically in Epic.    Assessment & Plan     The proposed surgical procedure is considered INTERMEDIATE risk.      ICD-10-CM    1. Preop general physical exam  Z01.818 CBC and Differential     Comprehensive Metabolic Panel     D dimer quantitative     Lipid Panel     CBC and Differential     Comprehensive Metabolic Panel     D dimer quantitative     Lipid Panel      2. 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 Chronic obstructive pulmonary disease, unspecified COPD type (H)  J44.9       4. Chronic bronchitis, unspecified chronic bronchitis type (H)  J42       5. Discoloration of skin of lower leg  L81.9 US MARISA with PPG wo Exercise Bilateral      6. Mixed hyperlipidemia  E78.2 atorvastatin (LIPITOR) 40 MG tablet      7. Essential hypertension  I10       8. Prediabetes  R73.03       9. Personality disorder (H)  F60.9       10. Tobacco use  Z72.0       11. Mild intermittent asthma without complication  J45.20            Risks and Recommendations  The patient has the following additional risks and recommendations for perioperative complications:  Pulmonary:    - Incentive spirometry post-op   - Active nicotine user, advised smoking cessation   - COPD    Preoperative Medication  Instructions  Antiplatelet or Anticoagulation Medication Instructions   - aspirin and blood thinners: take as directed by neurologist    Additional Medication Instructions  Take all scheduled medications on the day of surgery    Recommendation  Approval given to proceed with proposed procedure, without further diagnostic evaluation.    - Ongoing discoloration of right lower leg: He has risk factors for PAD- will proceed with MARISA testing.     Raúl Jackson is a 54 year old, presenting for the following:  Pre-Op Exam ([facility] NEUROLOGIST ON 9/17/2024)        HPI related to upcoming procedure: Patient presents to clinic for preoperative evaluation for  cerebral angiogram with intent to treat unruptured possible carotid cave versus cavernous carotid aneurysm with endovascular flow diversion.  He has been following with endovascular surgical neuroradiology in regards to this. Patient denies having any difficulties with anesthesia in the past, and is able to meet > 4 METS.         9/5/2024   Pre-Op Questionnaire   Have you ever had a heart attack or stroke? No   Have you ever had surgery on your heart or blood vessels, such as a stent placement, a coronary artery bypass, or surgery on an artery in your head, neck, heart, or legs? No   Do you have chest pain with activity? No   Do you have a history of heart failure? No   Do you currently have a cold, bronchitis or symptoms of other infection? No   Do you have a cough, shortness of breath, or wheezing? (!) YES chronic cough and shortness of breath-every day smoker-recent PFT shows moderate obstructive airway disease   Do you or anyone in your family have previous history of blood clots? (!) UNKNOWN    Do you or does anyone in your family have a serious bleeding problem such as prolonged bleeding following surgeries or cuts? (!) UNKNOWN    Have you ever had problems with anemia or been told to take iron pills? No   Have you had any abnormal blood loss such as black, tarry  or bloody stools? No   Have you ever had a blood transfusion? No   Are you willing to have a blood transfusion if it is medically needed before, during, or after your surgery? Yes   Have you or any of your relatives ever had problems with anesthesia? No   Do you have sleep apnea, excessive snoring or daytime drowsiness? No   Do you have any artifical heart valves or other implanted medical devices like a pacemaker, defibrillator, or continuous glucose monitor? No   Do you have artificial joints? No   Are you allergic to latex? No        Health Care Directive  Patient does not have a Health Care Directive or Living Will: Discussed advance care planning with patient; however, patient declined at this time.    Preoperative Review of    reviewed - no record of controlled substances prescribed.      Status of Chronic Conditions:  See problem list for active medical problems.  Problems all longstanding and stable, except as noted/documented.  See ROS for pertinent symptoms related to these conditions.    HYPERLIPIDEMIA - Patient has a long history of significant Hyperlipidemia requiring medication for treatment with recent fair control. Patient reports no problems or side effects with the medication. Will increase atorvastatin to 40 mg for goal LDL less than 70.    HYPERTENSION - Patient has longstanding history of HTN , currently denies any symptoms referable to elevated blood pressure. Specifically denies chest pain, palpitations, dyspnea, orthopnea, PND or peripheral edema. Blood pressure readings have been in normal range. Current medication regimen is as listed below. Patient denies any side effects of medication.     Patient Active Problem List    Diagnosis Date Noted    Saccular aneurysm 07/10/2024     Priority: Medium     Large saccular 11 mm right cavernous ICA aneurysm. 6/2024  Small saccular 2 mm right PCA aneurysm. 6/2024      Prediabetes 08/22/2023     Priority: Medium    Cigarette smoker 03/20/2019      Priority: Medium    Cervicalgia 12/26/2018     Priority: Medium    Other chronic rhinitis 02/20/2018     Priority: Medium    Mild intermittent asthma 02/20/2018     Priority: Medium    Deviated nasal septum 02/20/2018     Priority: Medium    Tobacco abuse 02/20/2018     Priority: Medium    Mixed hyperlipidemia 04/18/2017     Priority: Medium    Chronic pain syndrome - Neck 04/18/2017     Priority: Medium    Essential hypertension 03/21/2017     Priority: Medium    Family history of ischemic heart disease 03/21/2017     Priority: Medium    Family history of diabetes mellitus 03/21/2017     Priority: Medium    Personality disorder (H) 05/05/2012     Priority: Medium      Past Medical History:   Diagnosis Date    Cervical strain 01/03/2019    Closed head injury with concussion 12/12/2018    Concussion with brief LOC 12/12/2018    Concussion with brief LOC 12/12/2018    Hypertension     Impingement of right ulnar nerve 03/16/2020    Post concussion syndrome 03/26/2019    Post-concussion headache 12/26/2018    Vestibular dysfunction 12/26/2018    Vision disturbance 12/26/2018     Past Surgical History:   Procedure Laterality Date    HERNIA REPAIR Left     KNEE SURGERY Left     ACL repair, patella graft    SEPTOPLASTY N/A 3/13/2018    Procedure: SEPTOPLASTY;  septoplasty, submucosal resection of the turbinates;  Surgeon: Piotr Quiroz MD;  Location: [facility] OR     Current Outpatient Medications   Medication Sig Dispense Refill    acetaminophen (TYLENOL) 325 MG tablet Take 3 tablets (975 mg) by mouth daily. 300 tablet 3    albuterol (PROAIR HFA/PROVENTIL HFA/VENTOLIN HFA) 108 (90 Base) MCG/ACT inhaler Inhale 2 puffs into the lungs every 4 hours as needed for shortness of breath or wheezing 18 g 11    albuterol (PROVENTIL) (2.5 MG/3ML) 0.083% neb solution Take 1 vial (2.5 mg) by nebulization every 6 hours as needed for shortness of breath, wheezing or cough 90 mL 5    amLODIPine (NORVASC) 5 MG tablet Take 1 tablet (5 mg) by  mouth daily 90 tablet 4    atorvastatin (LIPITOR) 40 MG tablet Take 1 tablet (40 mg) by mouth daily. 90 tablet 4    fluticasone-salmeterol (ADVAIR) 500-50 MCG/ACT inhaler Inhale 1 puff into the lungs every 12 hours 60 each 11    lamoTRIgine (LAMICTAL) 100 MG tablet Take 0.5 tablets (50 mg) by mouth 2 times daily for 7 days, THEN 1 tablet (100 mg) 2 times daily for 90 days. - For Irritability / Anger 187 tablet 4    losartan-hydrochlorothiazide (HYZAAR) 100-25 MG tablet Take 1 tablet by mouth daily 90 tablet 4    MEDS UNK - RECORDS REQUESTED For cholesterol      metaxalone (SKELAXIN) 800 MG tablet Take 1 tablet (800 mg) by mouth 3 times daily 90 tablet 4    methylPREDNISolone (MEDROL DOSEPAK) 4 MG tablet therapy pack Follow Package Directions 21 tablet 0    naproxen (NAPROSYN) 500 MG tablet TAKE 1 TABLET(500 MG) BY MOUTH TWICE DAILY AS NEEDED FOR PAIN 90 tablet 2    naproxen sodium ER (NAPRELAN) 500 MG 24 hr tablet Take 1 tablet (500 mg) by mouth daily 90 tablet 4    Omega-3 Fish Oil 500 MG capsule Take 1 capsule (500 mg) by mouth daily 90 capsule 4    propranolol ER (INDERAL LA) 120 MG 24 hr capsule Take 1 capsule (120 mg) by mouth daily. 30 capsule 2    tiotropium (SPIRIVA) 18 MCG inhaled capsule Inhale 1 capsule (18 mcg) into the lungs daily 90 capsule 4    aspirin (ASA) 325 MG tablet Begin taking 1 tab daily 5 days prior to your procedure 30 tablet 2    prasugrel (EFFIENT) 5 MG TABS tablet Take 6 tabs (30 mg) once, 5 days prior to your procedure, then take 1 tab daily thereafter. 30 tablet 2    valACYclovir (VALTREX) 1000 mg tablet Take 1 tablet (1,000 mg) by mouth 3 times daily for 7 days 21 tablet 0       Allergies   Allergen Reactions    Penicillins Anaphylaxis     Tolerated cefazolin on 09/16/2015.    Clindamycin Hives    Gabapentin      Mood disturbance         Social History     Tobacco Use    Smoking status: Every Day     Current packs/day: 1.00     Average packs/day: 1 pack/day for 42.7 years (42.7 ttl  "pk-yrs)     Types: Cigarettes     Start date: 1/1/1982    Smokeless tobacco: Never    Tobacco comments:     2-3 cigarettes per day    Substance Use Topics    Alcohol use: Not Currently     Alcohol/week: 2.0 standard drinks of alcohol     Types: 2 Cans of beer per week     Comment: moderately       History   Drug Use No             Review of Systems  CONSTITUTIONAL: NEGATIVE for fever, chills, change in weight  ENT/MOUTH: NEGATIVE for ear, mouth and throat problems  RESP: NEGATIVE for significant cough or SOB  RESP:Hx COPD and smoking, chronic shortness of breath/cough  CV: NEGATIVE for chest pain, palpitations or peripheral edema    Objective    /89   Pulse 76   Temp 97  F (36.1  C) (Temporal)   Resp 16   Ht 1.803 m (5' 11\")   Wt 104.9 kg (231 lb 3.2 oz)   SpO2 99%   BMI 32.25 kg/m     Estimated body mass index is 32.25 kg/m  as calculated from the following:    Height as of this encounter: 1.803 m (5' 11\").    Weight as of this encounter: 104.9 kg (231 lb 3.2 oz).  Physical Exam  Constitutional:       General: He is not in acute distress.     Appearance: He is well-developed.   HENT:      Head: Normocephalic and atraumatic.      Nose: Nose normal.      Mouth/Throat:      Pharynx: No oropharyngeal exudate.   Eyes:      General: No scleral icterus.     Conjunctiva/sclera: Conjunctivae normal.      Pupils: Pupils are equal, round, and reactive to light.   Neck:      Thyroid: No thyromegaly.   Cardiovascular:      Rate and Rhythm: Normal rate and regular rhythm.      Heart sounds: No murmur heard.  Pulmonary:      Effort: Pulmonary effort is normal. No respiratory distress.      Breath sounds: Wheezing (scattered) present.   Musculoskeletal:         General: No tenderness or deformity. Normal range of motion.      Cervical back: Normal range of motion and neck supple.   Skin:     General: Skin is warm and dry.      Findings: No rash.   Neurological:      Mental Status: He is alert and oriented to person, " place, and time.      Cranial Nerves: No cranial nerve deficit.      Coordination: Coordination normal.   Psychiatric:         Behavior: Behavior normal.         Thought Content: Thought content normal.         Judgment: Judgment normal.         Diagnostics  Recent Results (from the past 24 hour(s))   Comprehensive Metabolic Panel    Collection Time: 09/05/24 11:19 AM   Result Value Ref Range    Sodium 141 135 - 145 mmol/L    Potassium 4.0 3.4 - 5.3 mmol/L    Carbon Dioxide (CO2) 31 (H) 22 - 29 mmol/L    Anion Gap 10 7 - 15 mmol/L    Urea Nitrogen 19.4 6.0 - 20.0 mg/dL    Creatinine 1.03 0.67 - 1.17 mg/dL    GFR Estimate 86 >60 mL/min/1.73m2    Calcium 9.8 8.8 - 10.4 mg/dL    Chloride 100 98 - 107 mmol/L    Glucose 119 (H) 70 - 99 mg/dL    Alkaline Phosphatase 87 40 - 150 U/L    AST 19 0 - 45 U/L    ALT 17 0 - 70 U/L    Protein Total 8.1 6.4 - 8.3 g/dL    Albumin 4.9 3.5 - 5.2 g/dL    Bilirubin Total 0.2 <=1.2 mg/dL    Patient Fasting > 8hrs? Yes    D dimer quantitative    Collection Time: 09/05/24 11:19 AM   Result Value Ref Range    D-Dimer Quantitative 0.39 0.00 - 0.50 ug/mL FEU   Lipid Panel    Collection Time: 09/05/24 11:19 AM   Result Value Ref Range    Cholesterol 170 <200 mg/dL    Triglycerides 138 <150 mg/dL    Direct Measure HDL 46 >=40 mg/dL    LDL Cholesterol Calculated 96 <=100 mg/dL    Non HDL Cholesterol 124 <130 mg/dL    Patient Fasting > 8hrs? Yes    CBC with platelets and differential    Collection Time: 09/05/24 11:19 AM   Result Value Ref Range    WBC Count 7.8 4.0 - 11.0 10e3/uL    RBC Count 5.64 4.40 - 5.90 10e6/uL    Hemoglobin 16.5 13.3 - 17.7 g/dL    Hematocrit 50.4 40.0 - 53.0 %    MCV 89 78 - 100 fL    MCH 29.3 26.5 - 33.0 pg    MCHC 32.7 31.5 - 36.5 g/dL    RDW 13.2 10.0 - 15.0 %    Platelet Count 423 150 - 450 10e3/uL    % Neutrophils 59 %    % Lymphocytes 26 %    % Monocytes 10 %    % Eosinophils 4 %    % Basophils 1 %    % Immature Granulocytes 0 %    NRBCs per 100 WBC 0 <1 /100     Absolute Neutrophils 4.6 1.6 - 8.3 10e3/uL    Absolute Lymphocytes 2.0 0.8 - 5.3 10e3/uL    Absolute Monocytes 0.8 0.0 - 1.3 10e3/uL    Absolute Eosinophils 0.3 0.0 - 0.7 10e3/uL    Absolute Basophils 0.1 0.0 - 0.2 10e3/uL    Absolute Immature Granulocytes 0.0 <=0.4 10e3/uL    Absolute NRBCs 0.0 10e3/uL      No EKG this visit, completed in the last 90 days.    Revised Cardiac Risk Index (RCRI)  The patient has the following serious cardiovascular risks for perioperative complications:   - No serious cardiac risks = 0 points     RCRI Interpretation: 0 points: Class I (very low risk - 0.4% complication rate)       The longitudinal plan of care for the diagnosis(es)/condition(s) as documented were addressed during this visit. Due to the added complexity in care, I will continue to support Manuel in the subsequent management and with ongoing continuity of care.   Signed Electronically by: ANATOLY Chow CNP  A copy of this evaluation report is provided to the requesting physician.

## 2024-09-05 NOTE — PATIENT INSTRUCTIONS
How to Take Your Medication Before Surgery  Preoperative Medication Instructions   Antiplatelet or Anticoagulation Medication Instructions   - aspirin: take as directed by neurologist    Additional Medication Instructions  Take all scheduled medications on the day of surgery       Patient Education   Preparing for Your Surgery  Getting started  A nurse will call you to review your health history and instructions. They will give you an arrival time based on your scheduled surgery time. Please be ready to share:  Your doctor's clinic name and phone number  Your medical, surgical, and anesthesia history  A list of allergies and sensitivities  A list of medicines, including herbal treatments and over-the-counter drugs  Whether the patient has a legal guardian (ask how to send us the papers in advance)  Please tell us if you're pregnant--or if there's any chance you might be pregnant. Some surgeries may injure a fetus (unborn baby), so they require a pregnancy test. Surgeries that are safe for a fetus don't always need a test, and you can choose whether to have one.   If you have a child who's having surgery, please ask for a copy of Preparing for Your Child's Surgery.    Preparing for surgery  Within 10 to 30 days of surgery: Have a pre-op exam (sometimes called an H&P, or History and Physical). This can be done at a clinic or pre-operative center.  If you're having a , you may not need this exam. Talk to your care team.  At your pre-op exam, talk to your care team about all medicines you take. If you need to stop any medicines before surgery, ask when to start taking them again.  We do this for your safety. Many medicines can make you bleed too much during surgery. Some change how well surgery (anesthesia) drugs work.  Call your insurance company to let them know you're having surgery. (If you don't have insurance, call [phone].)  Call your clinic if there's any change in your health. This includes signs  of a cold or flu (sore throat, runny nose, cough, rash, fever). It also includes a scrape or scratch near the surgery site.  If you have questions on the day of surgery, call your hospital or surgery center.  Eating and drinking guidelines  For your safety: Unless your surgeon tells you otherwise, follow the guidelines below.  Eat and drink as usual until 8 hours before you arrive for surgery. After that, no food or milk.  Drink clear liquids until 2 hours before you arrive. These are liquids you can see through, like water, Gatorade, and Propel Water. They also include plain black coffee and tea (no cream or milk), candy, and breath mints. You can spit out gum when you arrive.  If you drink alcohol: Stop drinking it the night before surgery.  If your care team tells you to take medicine on the morning of surgery, it's okay to take it with a sip of water.  Preventing infection  Shower or bathe the night before and morning of your surgery. Follow the instructions your clinic gave you. (If no instructions, use regular soap.)  Don't shave or clip hair near your surgery site. We'll remove the hair if needed.  Don't smoke or vape the morning of surgery. You may chew nicotine gum up to 2 hours before surgery. A nicotine patch is okay.  Note: Some surgeries require you to completely quit smoking and nicotine. Check with your surgeon.  Your care team will make every effort to keep you safe from infection. We will:  Clean our hands often with soap and water (or an alcohol-based hand rub).  Clean the skin at your surgery site with a special soap that kills germs.  Give you a special gown to keep you warm. (Cold raises the risk of infection.)  Wear special hair covers, masks, gowns and gloves during surgery.  Give antibiotic medicine, if prescribed. Not all surgeries need antibiotics.  What to bring on the day of surgery  Photo ID and insurance card  Copy of your health care directive, if you have one  Glasses and hearing aids  (bring cases)  You can't wear contacts during surgery  Inhaler and eye drops, if you use them (tell us about these when you arrive)  CPAP machine or breathing device, if you use them  A few personal items, if spending the night  If you have . . .  A pacemaker, ICD (cardiac defibrillator) or other implant: Bring the ID card.  An implanted stimulator: Bring the remote control.  A legal guardian: Bring a copy of the certified (court-stamped) guardianship papers.  Please remove any jewelry, including body piercings. Leave jewelry and other valuables at home.  If you're going home the day of surgery  You must have a responsible adult drive you home. They should stay with you overnight as well.  If you don't have someone to stay with you, and you aren't safe to go home alone, we may keep you overnight. Insurance often won't pay for this.  After surgery  If it's hard to control your pain or you need more pain medicine, please call your surgeon's office.  Questions?   If you have any questions for your care team, list them here: _________________________________________________________________________________________________________________________________________________________________________ ____________________________________ ____________________________________ ____________________________________  For informational purposes only. Not to replace the advice of your health care provider. Copyright   2003, 2019 [facility]. All rights reserved. Clinically reviewed by Juju Lloyd MD. [employer] [id number] - REV 12/22.

## 2024-09-06 ENCOUNTER — PATIENT OUTREACH (OUTPATIENT)
Dept: NEUROSURGERY | Facility: CLINIC | Age: 54
End: 2024-09-06

## 2024-09-06 NOTE — PATIENT INSTRUCTIONS
You are scheduled for a cerebral angiogram with  intention to treat , under general anesthesia, with Dr. Raza on 9/17/24. Arrival Time: 11:00 am. Procedure Time: 1:00 pm.    Please follow these instructions:    * You will need a pre-op physical within 30 days prior to your procedure. You may do this with your primary care provider or with the Pre-Operative Assessment Center (PAC), located at [facility] at [address] [place] [address]. The PAC phone number is [phone].    * On 9/12/24 start medications as follows:  Begin taking 325 mg aspirin daily   Take prasugrel 30 mg once on 9/12/24, then 5 mg daily thereafter.    * You will remain on these medications for your procedure.     * Check in at the Surgery Admission Unit (3C), on the third floor, at the [facility]. The address is [address] [place] [address]. The phone number is [phone].     * Nothing to eat for 8 hours prior to arrival time. You may drink clear liquids (includes water, Jell-O, clear broth, apple juice or any non-carbonated beverage that you can see through) up until 2 hours prior to arrival time.     * You may take your medications, including aspirin and prasugrel, with a sip of water the morning of the procedure.     * If you are not treated, you will be discharged the same day. You must have a  home and someone that can stay with you through the night. If you are treated you will stay overnight at the hospital for observation after the procedure. We aim to discharge you by 11:00 AM the following day. Please have your ride available by 10:00 AM.     PLEASE NOTE our COVID-19 visitors policy: Adult surgical and procedural patients can have two visitors throughout the surgery process. The two visitors may include children of any age; please note, children cannot stay in the waiting room alone.    All discharge instructions will be given to the   or volunteer. Documentation for the post-operative plan will be given to the patient and . Patients are required to have someone to stay with them for 24 hours after their procedure.    If you have questions regarding your procedure, please contact me at [phone], option 1.    If you need to cancel, reschedule or have procedure scheduling related questions, please call [facility] Procedure Scheduling at [phone].    Thank you,  Kia Heath, RN, CNRN, SCRN  Adilia Sepulveda, RN, BSN  [facility] Care Coordinator

## 2024-09-06 NOTE — TELEPHONE ENCOUNTER
I called patient  in regards to IR procedure with Dr. Raza    Procedure Date: 9/17/2024    Arrival: 1100    Loction: [facility]    Pre op: PCP completed on 9/5    Anesthesia Type: General Anesthesia    Notes:         Caryl Loera on 9/6/2024 at 3:45 PM

## 2024-09-06 NOTE — PROGRESS NOTES
Spoke with patient. Confirmed procedure date/time.     Discussed prasugrel - states it is $175    Also, states he is taking 6348-3958 mg naproxen daily. He has been prescribed 500 mg tabs once daily. States he takes it 3-4 times times daily as his primary care provider reportedly stated that would be ok.     Spoke with patients pharmacy. They have no insurance on file - confirmed $175 which is a coupon price.     Plavix is $114 without coupon     Will message primary care provider regarding naproxen use, and Dr. Raza regarding medication cost.     Kia Heath RN 9/6/2024 4:33 PM     Addendum: Per Dr. Raza if patient is taking that much naproxen he does not need to start/take aspirin.     Spoke with patient. Confirmed that he does not have insurance. Confirmed that he is taking naproxen daily. Informed of above. Discussed that he will need to continue to take naproxen daily given we are not prescribing aspirin. Patient verbalized understanding.      Patient states cost of prasugrel is not an issue. He will contact the pharmacy today and start prasugrel on 9/12. Will send [employer] message with information on aspirin/naproxen/prasugrel. Patient verbalized understanding and in agreement.     Called pharmacy and canceled prescription for aspirin 81 mg and 325 mg.     Patient has my contact information and was encouraged to call with questions/concerns.     Kia Heath RN 9/9/2024 10:38 AM

## 2024-09-11 ENCOUNTER — HOSPITAL ENCOUNTER (OUTPATIENT)
Age: 54
DRG: 027 | End: 2024-09-11
Attending: RADIOLOGY

## 2024-09-15 RX ORDER — SODIUM CHLORIDE 9 MG/ML
INJECTION, SOLUTION INTRAVENOUS CONTINUOUS
Status: CANCELLED | OUTPATIENT
Start: 2024-09-15

## 2024-09-15 RX ORDER — NALOXONE HYDROCHLORIDE 0.4 MG/ML
0.2 INJECTION, SOLUTION INTRAMUSCULAR; INTRAVENOUS; SUBCUTANEOUS
Status: CANCELLED | OUTPATIENT
Start: 2024-09-15

## 2024-09-15 RX ORDER — NALOXONE HYDROCHLORIDE 0.4 MG/ML
0.4 INJECTION, SOLUTION INTRAMUSCULAR; INTRAVENOUS; SUBCUTANEOUS
Status: CANCELLED | OUTPATIENT
Start: 2024-09-15

## 2024-09-15 RX ORDER — HEPARIN SODIUM 200 [USP'U]/100ML
1 INJECTION, SOLUTION INTRAVENOUS CONTINUOUS PRN
Status: CANCELLED | OUTPATIENT
Start: 2024-09-15

## 2024-09-15 RX ORDER — FLUMAZENIL 0.1 MG/ML
0.2 INJECTION, SOLUTION INTRAVENOUS
Status: CANCELLED | OUTPATIENT
Start: 2024-09-15

## 2024-09-15 RX ORDER — LIDOCAINE 40 MG/G
CREAM TOPICAL
Status: CANCELLED | OUTPATIENT
Start: 2024-09-15

## 2024-09-15 RX ORDER — HEPARIN SODIUM 200 [USP'U]/100ML
1 INJECTION, SOLUTION INTRAVENOUS EVERY 5 MIN PRN
Status: CANCELLED | OUTPATIENT
Start: 2024-09-15

## 2024-09-15 RX ORDER — FENTANYL CITRATE 50 UG/ML
25-50 INJECTION, SOLUTION INTRAMUSCULAR; INTRAVENOUS EVERY 5 MIN PRN
Status: CANCELLED | OUTPATIENT
Start: 2024-09-15

## 2024-09-16 ENCOUNTER — ANESTHESIA EVENT (OUTPATIENT)
Dept: SURGERY | Facility: CLINIC | Age: 54
DRG: 027 | End: 2024-09-16

## 2024-09-17 ENCOUNTER — ANESTHESIA (OUTPATIENT)
Dept: SURGERY | Facility: CLINIC | Age: 54
DRG: 027 | End: 2024-09-17

## 2024-09-17 ENCOUNTER — HOSPITAL ENCOUNTER (INPATIENT)
Facility: CLINIC | Age: 54
LOS: 1 days | Discharge: HOME | DRG: 027 | End: 2024-09-18
Attending: RADIOLOGY | Admitting: RADIOLOGY

## 2024-09-17 ENCOUNTER — APPOINTMENT (OUTPATIENT)
Dept: INTERVENTIONAL RADIOLOGY/VASCULAR | Facility: CLINIC | Age: 54
DRG: 027 | End: 2024-09-17
Attending: RADIOLOGY

## 2024-09-17 ENCOUNTER — APPOINTMENT (OUTPATIENT)
Dept: CT IMAGING | Facility: CLINIC | Age: 54
DRG: 027 | End: 2024-09-17
Attending: INTERNAL MEDICINE

## 2024-09-17 DIAGNOSIS — I67.1 NONRUPTURED CEREBRAL ANEURYSM: ICD-10-CM

## 2024-09-17 LAB
ABO/RH(D): NORMAL
ANION GAP SERPL CALCULATED.3IONS-SCNC: 13 MMOL/L (ref 7–15)
ANTIBODY SCREEN: NEGATIVE
APTT PPP: 33 SECONDS (ref 22–38)
BUN SERPL-MCNC: 25.6 MG/DL (ref 6–20)
CALCIUM SERPL-MCNC: 9.5 MG/DL (ref 8.8–10.4)
CHLORIDE SERPL-SCNC: 99 MMOL/L (ref 98–107)
CREAT SERPL-MCNC: 1.33 MG/DL (ref 0.67–1.17)
EGFRCR SERPLBLD CKD-EPI 2021: 63 ML/MIN/1.73M2
ERYTHROCYTE [DISTWIDTH] IN BLOOD BY AUTOMATED COUNT: 13.1 % (ref 10–15)
GLUCOSE BLDC GLUCOMTR-MCNC: 84 MG/DL (ref 70–99)
GLUCOSE SERPL-MCNC: 107 MG/DL (ref 70–99)
HCO3 SERPL-SCNC: 25 MMOL/L (ref 22–29)
HCT VFR BLD AUTO: 48.7 % (ref 40–53)
HGB BLD-MCNC: 16.3 G/DL (ref 13.3–17.7)
HOLD SPECIMEN: NORMAL
HOLD SPECIMEN: NORMAL
INR PPP: 0.9 (ref 0.85–1.15)
MCH RBC QN AUTO: 29.1 PG (ref 26.5–33)
MCHC RBC AUTO-ENTMCNC: 33.5 G/DL (ref 31.5–36.5)
MCV RBC AUTO: 87 FL (ref 78–100)
PA ADP BLD-ACNC: 159 PRU
PLATELET # BLD AUTO: 445 10E3/UL (ref 150–450)
POTASSIUM SERPL-SCNC: 4 MMOL/L (ref 3.4–5.3)
RBC # BLD AUTO: 5.61 10E6/UL (ref 4.4–5.9)
SODIUM SERPL-SCNC: 137 MMOL/L (ref 135–145)
SPECIMEN EXPIRATION DATE: NORMAL
WBC # BLD AUTO: 8.2 10E3/UL (ref 4–11)

## 2024-09-17 PROCEDURE — 85730 THROMBOPLASTIN TIME PARTIAL: CPT | Performed by: INTERNAL MEDICINE

## 2024-09-17 PROCEDURE — 75898 FOLLOW-UP ANGIOGRAPHY: CPT

## 2024-09-17 PROCEDURE — C1769 GUIDE WIRE: HCPCS

## 2024-09-17 PROCEDURE — C1773 RET DEV, INSERTABLE: HCPCS

## 2024-09-17 PROCEDURE — 258N000003 HC RX IP 258 OP 636: Performed by: RADIOLOGY

## 2024-09-17 PROCEDURE — C1876 STENT, NON-COA/NON-COV W/DEL: HCPCS

## 2024-09-17 PROCEDURE — C1887 CATHETER, GUIDING: HCPCS

## 2024-09-17 PROCEDURE — C1760 CLOSURE DEV, VASC: HCPCS

## 2024-09-17 PROCEDURE — 258N000003 HC RX IP 258 OP 636: Performed by: INTERNAL MEDICINE

## 2024-09-17 PROCEDURE — 250N000009 HC RX 250: Performed by: INTERNAL MEDICINE

## 2024-09-17 PROCEDURE — 86901 BLOOD TYPING SEROLOGIC RH(D): CPT | Performed by: INTERNAL MEDICINE

## 2024-09-17 PROCEDURE — 70450 CT HEAD/BRAIN W/O DYE: CPT | Mod: 26 | Performed by: RADIOLOGY

## 2024-09-17 PROCEDURE — 36224 PLACE CATH CAROTD ART: CPT | Mod: XS

## 2024-09-17 PROCEDURE — 250N000009 HC RX 250: Performed by: ANESTHESIOLOGY

## 2024-09-17 PROCEDURE — 70450 CT HEAD/BRAIN W/O DYE: CPT

## 2024-09-17 PROCEDURE — 037G3DZ DILATION OF INTRACRANIAL ARTERY WITH INTRALUMINAL DEVICE, PERCUTANEOUS APPROACH: ICD-10-PCS | Performed by: RADIOLOGY

## 2024-09-17 PROCEDURE — 86900 BLOOD TYPING SEROLOGIC ABO: CPT | Performed by: INTERNAL MEDICINE

## 2024-09-17 PROCEDURE — 999N000141 HC STATISTIC PRE-PROCEDURE NURSING ASSESSMENT

## 2024-09-17 PROCEDURE — 250N000011 HC RX IP 250 OP 636: Performed by: ANESTHESIOLOGY

## 2024-09-17 PROCEDURE — 250N000013 HC RX MED GY IP 250 OP 250 PS 637: Performed by: INTERNAL MEDICINE

## 2024-09-17 PROCEDURE — 250N000025 HC SEVOFLURANE, PER MIN

## 2024-09-17 PROCEDURE — 370N000017 HC ANESTHESIA TECHNICAL FEE, PER MIN

## 2024-09-17 PROCEDURE — 61635 INTRACRAN ANGIOPLSTY W/STENT: CPT | Mod: GC | Performed by: RADIOLOGY

## 2024-09-17 PROCEDURE — 250N000011 HC RX IP 250 OP 636: Performed by: RADIOLOGY

## 2024-09-17 PROCEDURE — 85576 BLOOD PLATELET AGGREGATION: CPT

## 2024-09-17 PROCEDURE — 272N000116 HC CATH CR1

## 2024-09-17 PROCEDURE — 710N000010 HC RECOVERY PHASE 1, LEVEL 2, PER MIN

## 2024-09-17 PROCEDURE — 82947 ASSAY GLUCOSE BLOOD QUANT: CPT | Performed by: INTERNAL MEDICINE

## 2024-09-17 PROCEDURE — 250N000011 HC RX IP 250 OP 636: Performed by: INTERNAL MEDICINE

## 2024-09-17 PROCEDURE — 250N000013 HC RX MED GY IP 250 OP 250 PS 637: Performed by: ANESTHESIOLOGY

## 2024-09-17 PROCEDURE — 76937 US GUIDE VASCULAR ACCESS: CPT | Mod: GC | Performed by: RADIOLOGY

## 2024-09-17 PROCEDURE — 258N000003 HC RX IP 258 OP 636: Performed by: ANESTHESIOLOGY

## 2024-09-17 PROCEDURE — 03VG3HZ RESTRICTION OF INTRACRANIAL ARTERY WITH INTRALUMINAL DEVICE, FLOW DIVERTER, PERCUTANEOUS APPROACH: ICD-10-PCS | Performed by: RADIOLOGY

## 2024-09-17 PROCEDURE — 36415 COLL VENOUS BLD VENIPUNCTURE: CPT | Performed by: RADIOLOGY

## 2024-09-17 PROCEDURE — 85610 PROTHROMBIN TIME: CPT | Performed by: INTERNAL MEDICINE

## 2024-09-17 PROCEDURE — 75894 X-RAYS TRANSCATH THERAPY: CPT

## 2024-09-17 PROCEDURE — 61624 TCAT PERM OCCLS/EMBOLJ CNS: CPT

## 2024-09-17 PROCEDURE — C1889 IMPLANT/INSERT DEVICE, NOC: HCPCS

## 2024-09-17 PROCEDURE — C2628 CATHETER, OCCLUSION: HCPCS

## 2024-09-17 PROCEDURE — 61635 INTRACRAN ANGIOPLSTY W/STENT: CPT | Performed by: ANESTHESIOLOGY

## 2024-09-17 PROCEDURE — 200N000002 HC R&B ICU UMMC

## 2024-09-17 PROCEDURE — C9460 INJECTION, CANGRELOR: HCPCS | Performed by: RADIOLOGY

## 2024-09-17 PROCEDURE — 85014 HEMATOCRIT: CPT | Performed by: INTERNAL MEDICINE

## 2024-09-17 PROCEDURE — 61635 INTRACRAN ANGIOPLSTY W/STENT: CPT

## 2024-09-17 PROCEDURE — 272N000192 HC ACCESSORY CR2

## 2024-09-17 PROCEDURE — 272N000190 HC ACCESSORY CR14

## 2024-09-17 PROCEDURE — 76377 3D RENDER W/INTRP POSTPROCES: CPT | Mod: XS

## 2024-09-17 PROCEDURE — 255N000002 HC RX 255 OP 636: Performed by: RADIOLOGY

## 2024-09-17 PROCEDURE — 272N000572 HC SHEATH CR9

## 2024-09-17 PROCEDURE — B31RYZZ FLUOROSCOPY OF INTRACRANIAL ARTERIES USING OTHER CONTRAST: ICD-10-PCS | Performed by: RADIOLOGY

## 2024-09-17 PROCEDURE — 80048 BASIC METABOLIC PNL TOTAL CA: CPT | Performed by: INTERNAL MEDICINE

## 2024-09-17 RX ORDER — LOSARTAN POTASSIUM AND HYDROCHLOROTHIAZIDE 25; 100 MG/1; MG/1
1 TABLET ORAL DAILY
Status: DISCONTINUED | OUTPATIENT
Start: 2024-09-17 | End: 2024-09-17

## 2024-09-17 RX ORDER — SODIUM CHLORIDE, SODIUM LACTATE, POTASSIUM CHLORIDE, CALCIUM CHLORIDE 600; 310; 30; 20 MG/100ML; MG/100ML; MG/100ML; MG/100ML
INJECTION, SOLUTION INTRAVENOUS CONTINUOUS
Status: DISCONTINUED | OUTPATIENT
Start: 2024-09-17 | End: 2024-09-17 | Stop reason: HOSPADM

## 2024-09-17 RX ORDER — HEPARIN SODIUM 1000 [USP'U]/ML
INJECTION, SOLUTION INTRAVENOUS; SUBCUTANEOUS PRN
Status: DISCONTINUED | OUTPATIENT
Start: 2024-09-17 | End: 2024-09-17

## 2024-09-17 RX ORDER — FLUTICASONE FUROATE AND VILANTEROL 100; 25 UG/1; UG/1
1 POWDER RESPIRATORY (INHALATION) DAILY
Status: DISCONTINUED | OUTPATIENT
Start: 2024-09-18 | End: 2024-09-18 | Stop reason: HOSPADM

## 2024-09-17 RX ORDER — ONDANSETRON 4 MG/1
4 TABLET, ORALLY DISINTEGRATING ORAL EVERY 30 MIN PRN
Status: DISCONTINUED | OUTPATIENT
Start: 2024-09-17 | End: 2024-09-17 | Stop reason: HOSPADM

## 2024-09-17 RX ORDER — METAXALONE 800 MG/1
800 TABLET ORAL 3 TIMES DAILY
Status: DISCONTINUED | OUTPATIENT
Start: 2024-09-18 | End: 2024-09-18 | Stop reason: DRUGHIGH

## 2024-09-17 RX ORDER — SODIUM CHLORIDE 9 MG/ML
INJECTION, SOLUTION INTRAVENOUS CONTINUOUS
Status: DISCONTINUED | OUTPATIENT
Start: 2024-09-17 | End: 2024-09-17 | Stop reason: HOSPADM

## 2024-09-17 RX ORDER — PROPRANOLOL HYDROCHLORIDE 120 MG/1
120 CAPSULE, EXTENDED RELEASE ORAL DAILY
Status: DISCONTINUED | OUTPATIENT
Start: 2024-09-18 | End: 2024-09-18 | Stop reason: HOSPADM

## 2024-09-17 RX ORDER — ONDANSETRON 2 MG/ML
4 INJECTION INTRAMUSCULAR; INTRAVENOUS EVERY 30 MIN PRN
Status: DISCONTINUED | OUTPATIENT
Start: 2024-09-17 | End: 2024-09-17 | Stop reason: HOSPADM

## 2024-09-17 RX ORDER — LIDOCAINE HYDROCHLORIDE 20 MG/ML
INJECTION, SOLUTION INFILTRATION; PERINEURAL PRN
Status: DISCONTINUED | OUTPATIENT
Start: 2024-09-17 | End: 2024-09-17

## 2024-09-17 RX ORDER — NALOXONE HYDROCHLORIDE 0.4 MG/ML
0.2 INJECTION, SOLUTION INTRAMUSCULAR; INTRAVENOUS; SUBCUTANEOUS
Status: DISCONTINUED | OUTPATIENT
Start: 2024-09-17 | End: 2024-09-18 | Stop reason: HOSPADM

## 2024-09-17 RX ORDER — NALOXONE HYDROCHLORIDE 0.4 MG/ML
0.2 INJECTION, SOLUTION INTRAMUSCULAR; INTRAVENOUS; SUBCUTANEOUS
Status: DISCONTINUED | OUTPATIENT
Start: 2024-09-17 | End: 2024-09-17 | Stop reason: HOSPADM

## 2024-09-17 RX ORDER — HYDROCHLOROTHIAZIDE 25 MG/1
25 TABLET ORAL DAILY
Status: DISCONTINUED | OUTPATIENT
Start: 2024-09-18 | End: 2024-09-18 | Stop reason: HOSPADM

## 2024-09-17 RX ORDER — HEPARIN SODIUM 200 [USP'U]/100ML
1 INJECTION, SOLUTION INTRAVENOUS CONTINUOUS PRN
Status: DISCONTINUED | OUTPATIENT
Start: 2024-09-17 | End: 2024-09-17 | Stop reason: HOSPADM

## 2024-09-17 RX ORDER — IODIXANOL 320 MG/ML
150 INJECTION, SOLUTION INTRAVASCULAR ONCE
Status: COMPLETED | OUTPATIENT
Start: 2024-09-17 | End: 2024-09-17

## 2024-09-17 RX ORDER — EPHEDRINE SULFATE 50 MG/ML
INJECTION, SOLUTION INTRAMUSCULAR; INTRAVENOUS; SUBCUTANEOUS PRN
Status: DISCONTINUED | OUTPATIENT
Start: 2024-09-17 | End: 2024-09-17

## 2024-09-17 RX ORDER — SODIUM CHLORIDE, SODIUM LACTATE, POTASSIUM CHLORIDE, CALCIUM CHLORIDE 600; 310; 30; 20 MG/100ML; MG/100ML; MG/100ML; MG/100ML
INJECTION, SOLUTION INTRAVENOUS CONTINUOUS PRN
Status: DISCONTINUED | OUTPATIENT
Start: 2024-09-17 | End: 2024-09-17

## 2024-09-17 RX ORDER — NAPROXEN SODIUM 500 MG/1
500 TABLET, FILM COATED, EXTENDED RELEASE ORAL DAILY
Status: DISCONTINUED | OUTPATIENT
Start: 2024-09-17 | End: 2024-09-17

## 2024-09-17 RX ORDER — AMLODIPINE BESYLATE 5 MG/1
5 TABLET ORAL DAILY
Status: DISCONTINUED | OUTPATIENT
Start: 2024-09-17 | End: 2024-09-18 | Stop reason: HOSPADM

## 2024-09-17 RX ORDER — GLYCOPYRROLATE 0.2 MG/ML
INJECTION, SOLUTION INTRAMUSCULAR; INTRAVENOUS PRN
Status: DISCONTINUED | OUTPATIENT
Start: 2024-09-17 | End: 2024-09-17

## 2024-09-17 RX ORDER — NALOXONE HYDROCHLORIDE 0.4 MG/ML
0.4 INJECTION, SOLUTION INTRAMUSCULAR; INTRAVENOUS; SUBCUTANEOUS
Status: DISCONTINUED | OUTPATIENT
Start: 2024-09-17 | End: 2024-09-18 | Stop reason: HOSPADM

## 2024-09-17 RX ORDER — ACETAMINOPHEN 500 MG
1000 TABLET ORAL 4 TIMES DAILY
Status: DISCONTINUED | OUTPATIENT
Start: 2024-09-18 | End: 2024-09-18 | Stop reason: HOSPADM

## 2024-09-17 RX ORDER — ONDANSETRON 2 MG/ML
INJECTION INTRAMUSCULAR; INTRAVENOUS PRN
Status: DISCONTINUED | OUTPATIENT
Start: 2024-09-17 | End: 2024-09-17

## 2024-09-17 RX ORDER — LIDOCAINE 40 MG/G
CREAM TOPICAL
Status: DISCONTINUED | OUTPATIENT
Start: 2024-09-17 | End: 2024-09-18 | Stop reason: HOSPADM

## 2024-09-17 RX ORDER — AMOXICILLIN 250 MG
2 CAPSULE ORAL 2 TIMES DAILY PRN
Status: DISCONTINUED | OUTPATIENT
Start: 2024-09-17 | End: 2024-09-18 | Stop reason: HOSPADM

## 2024-09-17 RX ORDER — ALBUTEROL SULFATE 0.83 MG/ML
2.5 SOLUTION RESPIRATORY (INHALATION) EVERY 6 HOURS PRN
Status: DISCONTINUED | OUTPATIENT
Start: 2024-09-17 | End: 2024-09-18

## 2024-09-17 RX ORDER — HYDROMORPHONE HCL IN WATER/PF 6 MG/30 ML
0.2 PATIENT CONTROLLED ANALGESIA SYRINGE INTRAVENOUS EVERY 5 MIN PRN
Status: DISCONTINUED | OUTPATIENT
Start: 2024-09-17 | End: 2024-09-17 | Stop reason: HOSPADM

## 2024-09-17 RX ORDER — PHENYLEPHRINE HCL IN 0.9% NACL 50MG/250ML
.5-1.25 PLASTIC BAG, INJECTION (ML) INTRAVENOUS CONTINUOUS
Status: DISCONTINUED | OUTPATIENT
Start: 2024-09-17 | End: 2024-09-18

## 2024-09-17 RX ORDER — ATORVASTATIN CALCIUM 40 MG/1
40 TABLET, FILM COATED ORAL DAILY
Status: DISCONTINUED | OUTPATIENT
Start: 2024-09-18 | End: 2024-09-18 | Stop reason: HOSPADM

## 2024-09-17 RX ORDER — PROPOFOL 10 MG/ML
INJECTION, EMULSION INTRAVENOUS PRN
Status: DISCONTINUED | OUTPATIENT
Start: 2024-09-17 | End: 2024-09-17

## 2024-09-17 RX ORDER — NALOXONE HYDROCHLORIDE 0.4 MG/ML
0.4 INJECTION, SOLUTION INTRAMUSCULAR; INTRAVENOUS; SUBCUTANEOUS
Status: DISCONTINUED | OUTPATIENT
Start: 2024-09-17 | End: 2024-09-17 | Stop reason: HOSPADM

## 2024-09-17 RX ORDER — AMOXICILLIN 250 MG
1 CAPSULE ORAL 2 TIMES DAILY PRN
Status: DISCONTINUED | OUTPATIENT
Start: 2024-09-17 | End: 2024-09-18 | Stop reason: HOSPADM

## 2024-09-17 RX ORDER — NAPROXEN 500 MG/1
500 TABLET ORAL
Status: DISCONTINUED | OUTPATIENT
Start: 2024-09-17 | End: 2024-09-17

## 2024-09-17 RX ORDER — OXYCODONE HYDROCHLORIDE 5 MG/1
5 TABLET ORAL EVERY 4 HOURS PRN
Status: DISCONTINUED | OUTPATIENT
Start: 2024-09-17 | End: 2024-09-18 | Stop reason: HOSPADM

## 2024-09-17 RX ORDER — HEPARIN SODIUM 200 [USP'U]/100ML
1 INJECTION, SOLUTION INTRAVENOUS EVERY 5 MIN PRN
Status: DISCONTINUED | OUTPATIENT
Start: 2024-09-17 | End: 2024-09-17 | Stop reason: HOSPADM

## 2024-09-17 RX ORDER — LOSARTAN POTASSIUM 25 MG/1
100 TABLET ORAL DAILY
Status: DISCONTINUED | OUTPATIENT
Start: 2024-09-18 | End: 2024-09-18 | Stop reason: HOSPADM

## 2024-09-17 RX ORDER — CALCIUM CARBONATE 500 MG/1
1000 TABLET, CHEWABLE ORAL 4 TIMES DAILY PRN
Status: DISCONTINUED | OUTPATIENT
Start: 2024-09-17 | End: 2024-09-18 | Stop reason: HOSPADM

## 2024-09-17 RX ORDER — LIDOCAINE 4 G/G
1 PATCH TOPICAL
Status: DISCONTINUED | OUTPATIENT
Start: 2024-09-18 | End: 2024-09-18 | Stop reason: HOSPADM

## 2024-09-17 RX ORDER — FENTANYL CITRATE 50 UG/ML
25 INJECTION, SOLUTION INTRAMUSCULAR; INTRAVENOUS EVERY 5 MIN PRN
Status: DISCONTINUED | OUTPATIENT
Start: 2024-09-17 | End: 2024-09-17 | Stop reason: HOSPADM

## 2024-09-17 RX ORDER — LIDOCAINE 40 MG/G
CREAM TOPICAL
Status: DISCONTINUED | OUTPATIENT
Start: 2024-09-17 | End: 2024-09-17 | Stop reason: HOSPADM

## 2024-09-17 RX ORDER — SODIUM CHLORIDE 9 MG/ML
INJECTION, SOLUTION INTRAVENOUS CONTINUOUS
Status: DISCONTINUED | OUTPATIENT
Start: 2024-09-17 | End: 2024-09-18 | Stop reason: HOSPADM

## 2024-09-17 RX ORDER — PRASUGREL 5 MG/1
5 TABLET, FILM COATED ORAL DAILY
Status: DISCONTINUED | OUTPATIENT
Start: 2024-09-17 | End: 2024-09-18

## 2024-09-17 RX ORDER — LAMOTRIGINE 100 MG/1
100 TABLET ORAL 2 TIMES DAILY
Status: DISCONTINUED | OUTPATIENT
Start: 2024-09-17 | End: 2024-09-18 | Stop reason: HOSPADM

## 2024-09-17 RX ORDER — FENTANYL CITRATE 50 UG/ML
INJECTION, SOLUTION INTRAMUSCULAR; INTRAVENOUS PRN
Status: DISCONTINUED | OUTPATIENT
Start: 2024-09-17 | End: 2024-09-17

## 2024-09-17 RX ORDER — FLUMAZENIL 0.1 MG/ML
0.2 INJECTION, SOLUTION INTRAVENOUS
Status: DISCONTINUED | OUTPATIENT
Start: 2024-09-17 | End: 2024-09-17 | Stop reason: HOSPADM

## 2024-09-17 RX ORDER — OXYCODONE HYDROCHLORIDE 5 MG/1
5 TABLET ORAL EVERY 4 HOURS PRN
Status: DISCONTINUED | OUTPATIENT
Start: 2024-09-17 | End: 2024-09-17 | Stop reason: DRUGHIGH

## 2024-09-17 RX ORDER — FENTANYL CITRATE 50 UG/ML
25-50 INJECTION, SOLUTION INTRAMUSCULAR; INTRAVENOUS EVERY 5 MIN PRN
Status: DISCONTINUED | OUTPATIENT
Start: 2024-09-17 | End: 2024-09-17 | Stop reason: HOSPADM

## 2024-09-17 RX ORDER — ALBUTEROL SULFATE 90 UG/1
AEROSOL, METERED RESPIRATORY (INHALATION) PRN
Status: DISCONTINUED | OUTPATIENT
Start: 2024-09-17 | End: 2024-09-17

## 2024-09-17 RX ORDER — ALBUTEROL SULFATE 90 UG/1
2 AEROSOL, METERED RESPIRATORY (INHALATION) EVERY 4 HOURS PRN
Status: DISCONTINUED | OUTPATIENT
Start: 2024-09-17 | End: 2024-09-18 | Stop reason: HOSPADM

## 2024-09-17 RX ORDER — VALACYCLOVIR HYDROCHLORIDE 1 G/1
1000 TABLET, FILM COATED ORAL 3 TIMES DAILY
Status: DISCONTINUED | OUTPATIENT
Start: 2024-09-17 | End: 2024-09-18

## 2024-09-17 RX ORDER — NALOXONE HYDROCHLORIDE 0.4 MG/ML
0.1 INJECTION, SOLUTION INTRAMUSCULAR; INTRAVENOUS; SUBCUTANEOUS
Status: DISCONTINUED | OUTPATIENT
Start: 2024-09-17 | End: 2024-09-17 | Stop reason: HOSPADM

## 2024-09-17 RX ADMIN — SODIUM CHLORIDE, POTASSIUM CHLORIDE, SODIUM LACTATE AND CALCIUM CHLORIDE: 600; 310; 30; 20 INJECTION, SOLUTION INTRAVENOUS at 13:56

## 2024-09-17 RX ADMIN — PHENYLEPHRINE HYDROCHLORIDE 50 MCG: 10 INJECTION INTRAVENOUS at 16:00

## 2024-09-17 RX ADMIN — FENTANYL CITRATE 50 MCG: 50 INJECTION INTRAMUSCULAR; INTRAVENOUS at 16:38

## 2024-09-17 RX ADMIN — MIDAZOLAM 2 MG: 1 INJECTION INTRAMUSCULAR; INTRAVENOUS at 13:54

## 2024-09-17 RX ADMIN — Medication 70 MG: at 14:13

## 2024-09-17 RX ADMIN — HEPARIN SODIUM 1000 UNITS: 1000 INJECTION INTRAVENOUS; SUBCUTANEOUS at 16:40

## 2024-09-17 RX ADMIN — OXYCODONE HYDROCHLORIDE 5 MG: 5 TABLET ORAL at 20:34

## 2024-09-17 RX ADMIN — NOREPINEPHRINE BITARTRATE 6.4 MCG: 1 INJECTION, SOLUTION, CONCENTRATE INTRAVENOUS at 18:28

## 2024-09-17 RX ADMIN — Medication 20 MG: at 18:28

## 2024-09-17 RX ADMIN — PROPOFOL 40 MG: 10 INJECTION, EMULSION INTRAVENOUS at 16:38

## 2024-09-17 RX ADMIN — EPHEDRINE SULFATE 5 MG: 5 INJECTION INTRAVENOUS at 18:15

## 2024-09-17 RX ADMIN — EPHEDRINE SULFATE 10 MG: 5 INJECTION INTRAVENOUS at 17:04

## 2024-09-17 RX ADMIN — PROPOFOL 80 MG: 10 INJECTION, EMULSION INTRAVENOUS at 14:12

## 2024-09-17 RX ADMIN — EPHEDRINE SULFATE 5 MG: 5 INJECTION INTRAVENOUS at 16:51

## 2024-09-17 RX ADMIN — Medication 10 MG: at 15:38

## 2024-09-17 RX ADMIN — HEPARIN SODIUM 7 BAG: 200 INJECTION, SOLUTION INTRAVENOUS at 14:37

## 2024-09-17 RX ADMIN — LIDOCAINE HYDROCHLORIDE 7 ML: 10 INJECTION, SOLUTION EPIDURAL; INFILTRATION; INTRACAUDAL; PERINEURAL at 14:38

## 2024-09-17 RX ADMIN — SUGAMMADEX 200 MG: 100 INJECTION, SOLUTION INTRAVENOUS at 19:05

## 2024-09-17 RX ADMIN — SODIUM CHLORIDE, POTASSIUM CHLORIDE, SODIUM LACTATE AND CALCIUM CHLORIDE: 600; 310; 30; 20 INJECTION, SOLUTION INTRAVENOUS at 18:30

## 2024-09-17 RX ADMIN — EPHEDRINE SULFATE 10 MG: 5 INJECTION INTRAVENOUS at 16:13

## 2024-09-17 RX ADMIN — PHENYLEPHRINE HYDROCHLORIDE 100 MCG: 10 INJECTION INTRAVENOUS at 15:13

## 2024-09-17 RX ADMIN — ONDANSETRON 4 MG: 2 INJECTION INTRAMUSCULAR; INTRAVENOUS at 18:58

## 2024-09-17 RX ADMIN — Medication 20 MG: at 17:53

## 2024-09-17 RX ADMIN — EPHEDRINE SULFATE 5 MG: 5 INJECTION INTRAVENOUS at 16:57

## 2024-09-17 RX ADMIN — EPHEDRINE SULFATE 5 MG: 5 INJECTION INTRAVENOUS at 16:00

## 2024-09-17 RX ADMIN — HEPARIN SODIUM 1000 UNITS: 1000 INJECTION INTRAVENOUS; SUBCUTANEOUS at 15:43

## 2024-09-17 RX ADMIN — Medication 10 MG: at 16:35

## 2024-09-17 RX ADMIN — GLYCOPYRROLATE 0.1 MG: 0.2 INJECTION, SOLUTION INTRAMUSCULAR; INTRAVENOUS at 15:13

## 2024-09-17 RX ADMIN — IODIXANOL 100 ML: 320 INJECTION, SOLUTION INTRAVASCULAR at 18:46

## 2024-09-17 RX ADMIN — SODIUM CHLORIDE: 0.9 INJECTION, SOLUTION INTRAVENOUS at 21:50

## 2024-09-17 RX ADMIN — ALBUTEROL SULFATE 6 PUFF: 108 INHALANT RESPIRATORY (INHALATION) at 19:02

## 2024-09-17 RX ADMIN — EPHEDRINE SULFATE 10 MG: 5 INJECTION INTRAVENOUS at 14:18

## 2024-09-17 RX ADMIN — EPHEDRINE SULFATE 5 MG: 5 INJECTION INTRAVENOUS at 17:25

## 2024-09-17 RX ADMIN — PHENYLEPHRINE HYDROCHLORIDE 50 MCG: 10 INJECTION INTRAVENOUS at 18:15

## 2024-09-17 RX ADMIN — EPHEDRINE SULFATE 10 MG: 5 INJECTION INTRAVENOUS at 14:43

## 2024-09-17 RX ADMIN — EPHEDRINE SULFATE 5 MG: 5 INJECTION INTRAVENOUS at 17:51

## 2024-09-17 RX ADMIN — EPHEDRINE SULFATE 5 MG: 5 INJECTION INTRAVENOUS at 14:31

## 2024-09-17 RX ADMIN — NOREPINEPHRINE BITARTRATE 6.4 MCG: 1 INJECTION, SOLUTION, CONCENTRATE INTRAVENOUS at 18:40

## 2024-09-17 RX ADMIN — HEPARIN SODIUM 10000 UNITS: 1000 INJECTION INTRAVENOUS; SUBCUTANEOUS at 14:43

## 2024-09-17 RX ADMIN — Medication 2 MCG/KG/MIN: at 17:23

## 2024-09-17 RX ADMIN — LIDOCAINE HYDROCHLORIDE 100 MG: 20 INJECTION, SOLUTION INFILTRATION; PERINEURAL at 14:12

## 2024-09-17 RX ADMIN — EPHEDRINE SULFATE 5 MG: 5 INJECTION INTRAVENOUS at 16:29

## 2024-09-17 RX ADMIN — CANGRELOR 2 MCG/KG/MIN: 50 INJECTION, POWDER, LYOPHILIZED, FOR SOLUTION INTRAVENOUS at 22:05

## 2024-09-17 RX ADMIN — FENTANYL CITRATE 50 MCG: 50 INJECTION INTRAMUSCULAR; INTRAVENOUS at 14:43

## 2024-09-17 RX ADMIN — EPHEDRINE SULFATE 10 MG: 5 INJECTION INTRAVENOUS at 15:38

## 2024-09-17 RX ADMIN — SODIUM CHLORIDE 100 ML: 9 INJECTION, SOLUTION INTRAVENOUS at 20:33

## 2024-09-17 RX ADMIN — HEPARIN SODIUM 1000 UNITS: 1000 INJECTION INTRAVENOUS; SUBCUTANEOUS at 17:42

## 2024-09-17 RX ADMIN — GLYCOPYRROLATE 0.2 MG: 0.2 INJECTION, SOLUTION INTRAMUSCULAR; INTRAVENOUS at 18:53

## 2024-09-17 RX ADMIN — Medication 20 MG: at 16:38

## 2024-09-17 RX ADMIN — EPHEDRINE SULFATE 10 MG: 5 INJECTION INTRAVENOUS at 15:22

## 2024-09-17 ASSESSMENT — VISUAL ACUITY
OU: NORMAL ACUITY;GLASSES

## 2024-09-17 ASSESSMENT — ACTIVITIES OF DAILY LIVING (ADL)
ADLS_ACUITY_SCORE: 31
ADLS_ACUITY_SCORE: 31
ADLS_ACUITY_SCORE: 32
ADLS_ACUITY_SCORE: 31
ADLS_ACUITY_SCORE: 32
ADLS_ACUITY_SCORE: 31
ADLS_ACUITY_SCORE: 29
ADLS_ACUITY_SCORE: 31
ADLS_ACUITY_SCORE: 32
ADLS_ACUITY_SCORE: 31

## 2024-09-17 ASSESSMENT — LIFESTYLE VARIABLES: TOBACCO_USE: 1

## 2024-09-17 NOTE — ANESTHESIA PREPROCEDURE EVALUATION
Anesthesia Pre-Procedure Evaluation    Patient: Jose Elias Reaves   MRN: [id number] : 1970        Procedure : Procedure(s):  ANESTHESIA, IN NON-OPERATING ROOM SETTING FOR Angiogram with intent to treat @1300          Past Medical History:   Diagnosis Date    Cervical strain 2019    Closed head injury with concussion 2018    Concussion with brief LOC 2018    Concussion with brief LOC 2018    Hypertension     Impingement of right ulnar nerve 2020    Post concussion syndrome 2019    Post-concussion headache 2018    Vestibular dysfunction 2018    Vision disturbance 2018      Past Surgical History:   Procedure Laterality Date    HERNIA REPAIR Left     KNEE SURGERY Left     ACL repair, patella graft    SEPTOPLASTY N/A 3/13/2018    Procedure: SEPTOPLASTY;  septoplasty, submucosal resection of the turbinates;  Surgeon: Piotr Quiroz MD;  Location: [facility] OR      Allergies   Allergen Reactions    Penicillins Anaphylaxis     Tolerated cefazolin on 2015.    Clindamycin Hives    Gabapentin      Mood disturbance       Social History     Tobacco Use    Smoking status: Every Day     Current packs/day: 1.00     Average packs/day: 1 pack/day for 42.7 years (42.7 ttl pk-yrs)     Types: Cigarettes     Start date: 1982    Smokeless tobacco: Never    Tobacco comments:     2-3 cigarettes per day    Substance Use Topics    Alcohol use: Yes     Alcohol/week: 2.0 standard drinks of alcohol     Types: 2 Cans of beer per week     Comment: 2-4 drinks a week      Wt Readings from Last 1 Encounters:   24 104.9 kg (231 lb 3.2 oz)        Anesthesia Evaluation   Pt has had prior anesthetic. Type: General and MAC.    History of anesthetic complications       ROS/MED HX  ENT/Pulmonary:     (+)                tobacco use,     Intermittent, asthma                  Neurologic:       Cardiovascular:     (+) Dyslipidemia hypertension- -   -  - -                                     "  METS/Exercise Tolerance:     Hematologic:       Musculoskeletal:       GI/Hepatic:       Renal/Genitourinary:       Endo:       Psychiatric/Substance Use:       Infectious Disease:       Malignancy:       Other:      (+)  , H/O Chronic Pain,         Physical Exam    Airway        Mallampati: II   TM distance: > 3 FB   Neck ROM: full   Mouth opening: > 3 cm    Respiratory Devices and Support         Dental       (+) Minor Abnormalities - some fillings, tiny chips      Cardiovascular             Pulmonary                   OUTSIDE LABS:  CBC:   Lab Results   Component Value Date    WBC 7.8 09/05/2024    WBC 10.3 06/27/2024    HGB 16.5 09/05/2024    HGB 15.7 06/27/2024    HCT 50.4 09/05/2024    HCT 47.5 06/27/2024     09/05/2024     (H) 06/27/2024     BMP:   Lab Results   Component Value Date     09/05/2024     06/27/2024    POTASSIUM 4.0 09/05/2024    POTASSIUM 4.3 06/27/2024    CHLORIDE 100 09/05/2024    CHLORIDE 100 06/27/2024    CO2 31 (H) 09/05/2024    CO2 26 06/27/2024    BUN 19.4 09/05/2024    BUN 15.6 06/27/2024    CR 1.03 09/05/2024    CR 1.12 06/27/2024     (H) 09/05/2024     (H) 06/27/2024     COAGS:   Lab Results   Component Value Date    PTT 31 06/27/2024    INR 0.90 06/27/2024     POC: No results found for: \"BGM\", \"HCG\", \"HCGS\"  HEPATIC:   Lab Results   Component Value Date    ALBUMIN 4.9 09/05/2024    PROTTOTAL 8.1 09/05/2024    ALT 17 09/05/2024    AST 19 09/05/2024    ALKPHOS 87 09/05/2024    BILITOTAL 0.2 09/05/2024     OTHER:   Lab Results   Component Value Date    LACT 1.5 10/26/2023    A1C 5.6 07/10/2024    LINDSEY 9.8 09/05/2024    MAG 2.1 06/27/2024    TSH 1.46 03/27/2023       Anesthesia Plan    ASA Status:  3       Anesthesia Type: General.     - Airway: ETT   Induction: Propofol, Intravenous.   Maintenance: Balanced.   Techniques and Equipment:     - Airway: Video-Laryngoscope     - Lines/Monitors: 2nd IV, Arterial Line     Consents    Anesthesia Plan(s) " "and associated risks, benefits, and realistic alternatives discussed. Questions answered and patient/representative(s) expressed understanding.     - Discussed: Risks, Benefits and Alternatives for the PROCEDURE were discussed     - Discussed with:  Patient      - Extended Intubation/Ventilatory Support Discussed: Yes.      - Patient is DNR/DNI Status: No     Use of blood products discussed: Yes.     - Discussed with: Patient.     - Consented: consented to blood products     Postoperative Care    Pain management: IV analgesics, Multi-modal analgesia, Oral pain medications.   PONV prophylaxis: Dexamethasone or Solumedrol, Ondansetron (or other 5HT-3)     Comments:               Hermelindo Lopez MD    I have reviewed the pertinent notes and labs in the chart from the past 30 days and (re)examined the patient.  Any updates or changes from those notes are reflected in this note.             # Drug Induced Platelet Defect: home medication list includes an antiplatelet medication  # Obesity: Estimated body mass index is 32.25 kg/m  as calculated from the following:    Height as of 9/5/24: 1.803 m (5' 11\").    Weight as of 9/5/24: 104.9 kg (231 lb 3.2 oz).      "

## 2024-09-17 NOTE — PROGRESS NOTES
[facility]     Endovascular Surgical Neuroradiology Pre-Procedure Note      HPI:  Jose Elias Reaves is a 54 year old male with vascular risk factors of HTN, HLD and TBI. 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 He does have family history of family members with ruptured brain aneurysms passing away during sleep. Also has active tobacco smoking history. From MRA review imaging, it is hard to know if this was a carotid cave aneurysm or simply a cavernous aneurysm. We are planning on performing a diagnostic angiogram first and study the aneurysm, then go ahead and treat with flow diverter.    Medical History:  Reviewed    Surgical History:  Reviewed    Family History:  Reviewed    Social History:  Reviewed    Allergies:  Reviewed    Is there a contrast allergy?  No    Medications:  I have reviewed this patient's current medications.    ROS:  The 10 point Review of Systems is negative other than noted in the HPI or here.     PHYSICAL EXAMINATION  Vital Signs:  B/P: 124/89,  T: 98.1,  P: 65,  R: 16    Cardio:  RRR  Pulmonary:  no respiratory distress  Abdomen:  soft, non-tender, non-distended    Neurologic  Mental Status:  fully alert, attentive and oriented, follows commands  Cranial Nerves:  visual fields intact, PERRL, EOMI with normal smooth pursuit, facial sensation intact and symmetric  Motor:  no abnormal movements, normal tone throughout, normal muscle bulk, no pronator drift  Sensory:    Coordination:  FNF and HS intact without dysmetria    Pre-procedure National Institutes of Health Stroke Scale:   Not applicable    LABS  (most recent Cr, BUN, GFR, PLT, INR, PTT within the past 7 days):  No lab results found in last 7 days.     Platelet Function P2Y12 (PRU):  Not applicable      ASSESSMENT:    PLAN: Diagnostic cerebral angiogram with intention to stent right cavernous enlarging aneurysm        PRE-PROCEDURE SEDATION ASSESSMENT      Pre-Procedure Sedation Assessment done at 1145    Expected Level:  Moderate Sedation    Indication:  Sedation is required to allow for neurointerventional procedure.    Consent obtained from patient after discussing the risks, benefits and alternatives.     PO Intake:   Fasting since midnight but had been chewing gum this morning    ASA Class:  Class 2 - MILD SYSTEMIC DISEASE, NO ACUTE PROBLEMS, NO FUNCTIONAL LIMITATIONS.    Mallampati:  Grade 2:  Soft palate, base of uvula, tonsillar pillars, and portion of posterior pharyngeal wall visible    History and physical reviewed and no updates needed. I have reviewed the lab findings, diagnostic data, medications, and the plan for sedation. I have determined this patient to be an appropriate candidate for the planned sedation and procedure and have reassessed the patient IMMEDIATELY PRIOR to sedation and procedure.    Patient was discussed with the Attending, Dr. Raza, who agrees with the plan.    Kell Arroyo MD   Pager: [phone]

## 2024-09-17 NOTE — IR NOTE
Patient Name: Jose Elias Reaves  Medical Record Number: [id number]  Today's Date: 9/17/2024    Procedure: cerebral angiogram with intention to treat unruptured aneurysm via flow diverter  Proceduralist: Dr JAUN Anthony, Dr KEVIN Arroyo, Dr BERONICA Raza    Sedation Notes: general anesthesia     Procedure start time: 1436  Procedure end time: 1900    Report given to: PACU RN  : none    Other Notes: Pt arrived to IR room 3 from . Consent reviewed, pt confirmed. Pt denies any questions or concerns regarding procedure. Pt positioned supine and monitored per protocol. Right groin accessed. Angioseal closure device deployed at 1900. 2 hours flat bedrest, Ambulation time 2100. Right groin site cleansed and dressed per protocol. Pt tolerated procedure without any noted complications. Pt transferred back to PACU with CRNA.

## 2024-09-17 NOTE — ANESTHESIA PROCEDURE NOTES
Airway       Patient location during procedure: OR       Procedure Start/Stop Times: 9/17/2024 2:14 PM  Staff -        CRNA: Meryl Pratt APRN CRNA       Performed By: CRNA  Consent for Airway        Urgency: elective  Indications and Patient Condition       Indications for airway management: miles-procedural       Induction type:intravenous       Mask difficulty assessment: 1 - vent by mask    Final Airway Details       Final airway type: endotracheal airway       Successful airway: ETT - single  Endotracheal Airway Details        ETT size (mm): 8.0       Cuffed: yes       Successful intubation technique: video laryngoscopy       VL Blade Size: Glidescope 4       Grade View of Cords: 1       Adjucts: stylet       Position: Right       Measured from: lips       Secured at (cm): 24       Bite block used: None    Post intubation assessment        Placement verified by: capnometry, equal breath sounds and chest rise        Number of attempts at approach: 1       Secured with: pink tape       Ease of procedure: easy       Dentition: Intact and Unchanged    Medication(s) Administered   Medication Administration Time: 9/17/2024 2:14 PM

## 2024-09-17 NOTE — OR NURSING
"Page sent to surgeon: \"Manuel Reaves: per Dr. Lopez, patient needs to be delayed 3 hours d/t chewing gum. Could you please call/come bedside to discuss [facility]? Thanks, Jair [phone]\"  "

## 2024-09-18 ENCOUNTER — APPOINTMENT (OUTPATIENT)
Dept: GENERAL RADIOLOGY | Facility: CLINIC | Age: 54
DRG: 027 | End: 2024-09-18
Attending: RADIOLOGY

## 2024-09-18 VITALS
RESPIRATION RATE: 18 BRPM | SYSTOLIC BLOOD PRESSURE: 82 MMHG | TEMPERATURE: 97.4 F | WEIGHT: 223.55 LBS | OXYGEN SATURATION: 95 % | HEIGHT: 71 IN | BODY MASS INDEX: 31.3 KG/M2 | DIASTOLIC BLOOD PRESSURE: 60 MMHG | HEART RATE: 69 BPM

## 2024-09-18 LAB
ALBUMIN SERPL BCG-MCNC: 3.6 G/DL (ref 3.5–5.2)
ALP SERPL-CCNC: 62 U/L (ref 40–150)
ALT SERPL W P-5'-P-CCNC: 8 U/L (ref 0–70)
ANION GAP SERPL CALCULATED.3IONS-SCNC: 8 MMOL/L (ref 7–15)
AST SERPL W P-5'-P-CCNC: 14 U/L (ref 0–45)
BILIRUB SERPL-MCNC: 0.5 MG/DL
BUN SERPL-MCNC: 13.9 MG/DL (ref 6–20)
CALCIUM SERPL-MCNC: 8.3 MG/DL (ref 8.8–10.4)
CHLORIDE SERPL-SCNC: 104 MMOL/L (ref 98–107)
CREAT SERPL-MCNC: 0.94 MG/DL (ref 0.67–1.17)
EGFRCR SERPLBLD CKD-EPI 2021: >90 ML/MIN/1.73M2
GLUCOSE SERPL-MCNC: 99 MG/DL (ref 70–99)
HCO3 SERPL-SCNC: 26 MMOL/L (ref 22–29)
HOLD SPECIMEN: NORMAL
POTASSIUM SERPL-SCNC: 3.9 MMOL/L (ref 3.4–5.3)
PROT SERPL-MCNC: 5.7 G/DL (ref 6.4–8.3)
SODIUM SERPL-SCNC: 138 MMOL/L (ref 135–145)

## 2024-09-18 PROCEDURE — 250N000011 HC RX IP 250 OP 636: Mod: JZ | Performed by: RADIOLOGY

## 2024-09-18 PROCEDURE — 71045 X-RAY EXAM CHEST 1 VIEW: CPT | Mod: 26 | Performed by: STUDENT IN AN ORGANIZED HEALTH CARE EDUCATION/TRAINING PROGRAM

## 2024-09-18 PROCEDURE — 71045 X-RAY EXAM CHEST 1 VIEW: CPT

## 2024-09-18 PROCEDURE — 250N000013 HC RX MED GY IP 250 OP 250 PS 637: Performed by: RADIOLOGY

## 2024-09-18 PROCEDURE — 80053 COMPREHEN METABOLIC PANEL: CPT | Performed by: RADIOLOGY

## 2024-09-18 PROCEDURE — 250N000013 HC RX MED GY IP 250 OP 250 PS 637: Performed by: INTERNAL MEDICINE

## 2024-09-18 PROCEDURE — C9460 INJECTION, CANGRELOR: HCPCS | Mod: JZ | Performed by: RADIOLOGY

## 2024-09-18 PROCEDURE — 258N000003 HC RX IP 258 OP 636: Performed by: RADIOLOGY

## 2024-09-18 PROCEDURE — 36415 COLL VENOUS BLD VENIPUNCTURE: CPT | Performed by: RADIOLOGY

## 2024-09-18 RX ORDER — METAXALONE 800 MG/1
800 TABLET ORAL 3 TIMES DAILY
Status: DISCONTINUED | OUTPATIENT
Start: 2024-09-18 | End: 2024-09-18 | Stop reason: HOSPADM

## 2024-09-18 RX ORDER — ALBUTEROL SULFATE 0.83 MG/ML
2.5 SOLUTION RESPIRATORY (INHALATION)
Status: DISCONTINUED | OUTPATIENT
Start: 2024-09-18 | End: 2024-09-18 | Stop reason: HOSPADM

## 2024-09-18 RX ORDER — PRASUGREL 5 MG/1
5 TABLET, FILM COATED ORAL DAILY
Status: DISCONTINUED | OUTPATIENT
Start: 2024-09-18 | End: 2024-09-18 | Stop reason: HOSPADM

## 2024-09-18 RX ORDER — NAPROXEN 250 MG/1
250 TABLET ORAL 2 TIMES DAILY WITH MEALS
Status: DISCONTINUED | OUTPATIENT
Start: 2024-09-18 | End: 2024-09-18 | Stop reason: HOSPADM

## 2024-09-18 RX ORDER — PRASUGREL 5 MG/1
TABLET, FILM COATED ORAL
Qty: 30 TABLET | Refills: 2 | Status: SHIPPED | OUTPATIENT
Start: 2024-09-18 | End: 2024-09-27

## 2024-09-18 RX ADMIN — CANGRELOR 2 MCG/KG/MIN: 50 INJECTION, POWDER, LYOPHILIZED, FOR SOLUTION INTRAVENOUS at 06:16

## 2024-09-18 RX ADMIN — OXYCODONE HYDROCHLORIDE 5 MG: 5 TABLET ORAL at 14:30

## 2024-09-18 RX ADMIN — ACETAMINOPHEN 1000 MG: 500 TABLET ORAL at 08:15

## 2024-09-18 RX ADMIN — UMECLIDINIUM 1 PUFF: 62.5 AEROSOL, POWDER ORAL at 08:17

## 2024-09-18 RX ADMIN — HYDROCHLOROTHIAZIDE 25 MG: 25 TABLET ORAL at 08:17

## 2024-09-18 RX ADMIN — PRASUGREL 5 MG: 5 TABLET, FILM COATED ORAL at 10:05

## 2024-09-18 RX ADMIN — NAPROXEN 250 MG: 250 TABLET ORAL at 10:31

## 2024-09-18 RX ADMIN — METAXALONE 800 MG: 800 TABLET ORAL at 01:40

## 2024-09-18 RX ADMIN — AMLODIPINE BESYLATE 5 MG: 5 TABLET ORAL at 08:15

## 2024-09-18 RX ADMIN — FLUTICASONE FUROATE AND VILANTEROL TRIFENATATE 1 PUFF: 100; 25 POWDER RESPIRATORY (INHALATION) at 08:17

## 2024-09-18 RX ADMIN — OXYCODONE HYDROCHLORIDE 5 MG: 5 TABLET ORAL at 05:50

## 2024-09-18 RX ADMIN — OXYCODONE HYDROCHLORIDE 5 MG: 5 TABLET ORAL at 10:06

## 2024-09-18 RX ADMIN — OXYCODONE HYDROCHLORIDE 5 MG: 5 TABLET ORAL at 00:22

## 2024-09-18 RX ADMIN — LAMOTRIGINE 100 MG: 100 TABLET ORAL at 01:20

## 2024-09-18 RX ADMIN — ATORVASTATIN CALCIUM 40 MG: 40 TABLET, FILM COATED ORAL at 08:15

## 2024-09-18 RX ADMIN — LAMOTRIGINE 100 MG: 100 TABLET ORAL at 08:17

## 2024-09-18 RX ADMIN — ALBUTEROL SULFATE 2 PUFF: 90 AEROSOL, METERED RESPIRATORY (INHALATION) at 10:11

## 2024-09-18 RX ADMIN — LOSARTAN POTASSIUM 100 MG: 25 TABLET, FILM COATED ORAL at 01:19

## 2024-09-18 RX ADMIN — AMLODIPINE BESYLATE 5 MG: 5 TABLET ORAL at 01:19

## 2024-09-18 RX ADMIN — CANGRELOR 2 MCG/KG/MIN: 50 INJECTION, POWDER, LYOPHILIZED, FOR SOLUTION INTRAVENOUS at 02:16

## 2024-09-18 RX ADMIN — LOSARTAN POTASSIUM 100 MG: 25 TABLET, FILM COATED ORAL at 08:15

## 2024-09-18 RX ADMIN — METAXALONE 800 MG: 800 TABLET ORAL at 08:17

## 2024-09-18 RX ADMIN — ACETAMINOPHEN 1000 MG: 500 TABLET ORAL at 12:27

## 2024-09-18 RX ADMIN — HYDROCHLOROTHIAZIDE 25 MG: 25 TABLET ORAL at 01:20

## 2024-09-18 RX ADMIN — PROPRANOLOL HYDROCHLORIDE 120 MG: 120 CAPSULE, EXTENDED RELEASE ORAL at 08:17

## 2024-09-18 ASSESSMENT — ACTIVITIES OF DAILY LIVING (ADL)
ADLS_ACUITY_SCORE: 24
ADLS_ACUITY_SCORE: 24
ADLS_ACUITY_SCORE: 22
ADLS_ACUITY_SCORE: 24
ADLS_ACUITY_SCORE: 24
ADLS_ACUITY_SCORE: 23
ADLS_ACUITY_SCORE: 24
ADLS_ACUITY_SCORE: 23
ADLS_ACUITY_SCORE: 24
ADLS_ACUITY_SCORE: 23
ADLS_ACUITY_SCORE: 23
ADLS_ACUITY_SCORE: 24
ADLS_ACUITY_SCORE: 24

## 2024-09-18 NOTE — PROGRESS NOTES
Provider Kell Arroyo contacted by writer @ 20:12 to report vision changes per pt. Pt stated that he was seeing a dark spot out of the corner of his right lower eye. Provider ordered a bolus to be given. Bolus and pain medication was given with no improvement. Writer contacted provider again @ 2135 to report pt was now seeing floaters from both eyes and still seeing the black spot in the corner of right eye. Provider stated that she would order a stat head CT and come bedside to assess pt. Provider reported bedside and performed an assessment. Provider stated that she wanted to activate and stroke code, but then decided against it. Resource flyers took pt to CT and up to 4C ICU. Writer gave beside report to nurse and updated pt's significant other on status.

## 2024-09-18 NOTE — PROGRESS NOTES
"Progress note:    Writer RN answered call light to assist patient. Patient informed writer that he would like to leave AMA prior to MRI because his ride from significant other was coming soon. When writer asked for more details, patient was unwilling to explain more and offer details. Writer educated on importance of follow up MRI and pt verbalized understanding and said \"Sometimes you got to do what you got to do.\" Pt oriented x4, denies new changes in vision. Dr. Arroyo called and informed of situation. Writer was informed that medical team was scrubbed into procedure in the OR. Dr. Arroyo instructed writer to delay patient as long as possible. Writer told Dr. Arroyo that nursing cannot hold patient and he can leave AMA if it is his desire. Writer offered twice to call the patient's significant attempt to delay him leaving and obtain the MRI prior to him leaving. Patient declined to have nursing attempt to contact significant other with both offers. MRI called and notified that patient is refusing to go down for scan, patient acknowledges he is refusing scan and that the scan will be cancelled. MRI was cancelled. AMA paperwork signed by patient and placed in chart. Copy of AMA signed AMA statement handed to patient by Writer.   "

## 2024-09-18 NOTE — PROCEDURES
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 Eventually FREDx flow diverter was better positioned with good but incomplete contrast stasis and good arterial filling. Left ICA has good collateral flow through the anterior communicating artery    Plan:  -Continue cangrelor drip only for now and hold other antiplatelets, keep holding Naproxen. Can give oxycodone and other pain medication for spinal pain.  -2 hour bed rest  -Will evaluate in the morning and then switch back to Praseguel and naproxen if needed  -Planning on a second future outpatient treatment if indicated depending on further follow up imaging  -If stable and can be switched to DAPT, can discharge tomorrow        Primary Surgeon: Dr. Raza  Secondary Surgeon:  Not applicable  Secondary Surgeon Review:  None  Fellow:  Kat  Additional Assistants:  none    Prior to the start of the procedure and with procedural staff participation, I verbally confirmed: the patient s identity using two indicators, relevant allergies, that the procedure was appropriate and matched the consent or emergent situation, and that the correct equipment/implants were available. Immediately prior to starting the procedure I conducted the Time Out with the procedural staff and re-confirmed the patient s name, procedure, and site/side. (The Joint  Commission universal protocol was followed.)  Yes    PRU value: Not applicable    Anesthesia:  Performed by Anesthesia  Medications:  Performed by GA  Puncture site:  Right Femoral Artery    Fluoroscopy time (minutes):  153.1  Radiation dose (mGy):  28179  Contrast amount (mL):  100    Estimated blood loss (mL):  25-30    Closure:  Device 6 F angioseal    Disposition:  Will be followed in hospital by the Neuro Critical Care/Stroke team.        Sedation Post-Procedure Summary    Sedatives: Performed by GA    Vital signs and pulse oximetry were monitored and remained stable throughout the procedure, and sedation was maintained until the procedure was complete.  The patient was monitored by staff until sedation discharge criteria were met.    Patient tolerance:  Patient tolerated the procedure well with no immediate complications.        Kell Arroyo MD  Pager:  [phone]

## 2024-09-18 NOTE — PLAN OF CARE
"  Admitted/transferred from: PACU   Reason for admission/transfer: q1h neurologic assessments    Patient status upon admission/transfer: stable; new onset of \"tingling\" in all extremities, team notified  Interventions: hourly neuro assessments completed  Plan: discharge today  2 RN skin assessment: completed by Ciara Rich RN and Indira Carty RN  Sexual Orientation and Gender Identity (SOGI) smartfom completed:Not Done  Result of skin assessment and interventions/actions: intact, R femoral groin site CDI   Height, weight, drug calc weight: Not Done  Patient belongings (see Flowsheet - Adult Profile for details): partial sent home with friend; glasses and underwear with patient upon arrival  MDRO education (if applicable): N/A        ICU End of Shift Summary. See flowsheets for vital signs and detailed assessment.    Changes this shift:   Neuro - A&Ox4, strength strong and equal throughout, R eye with floaters and L eye with blurred vision, tingling noted in all extremities and team notified (informed continue to monitor)  CV - NSR, patient intermittently bradycardia in 50s upon sleeping, MAP >65  Pulm - on room air, SpO2 maintained >90%   GI - bowel movement PTA    - mcallister in place upon arrival from PACU, removed around midnight and voiding adequate clear yellow urine  Skin - intact  Pain - oxycodone 5mg given PRN  Lines - bilateral PIVs  Drips - NS 75mL/hr and cangrelor infusing at continuous rate of 2mcgs/kg/min       Plan: discharge today pending medical stability     Goal Outcome Evaluation:      Plan of Care Reviewed With: patient    Overall Patient Progress: improvingOverall Patient Progress: improving    Outcome Evaluation: Neuro status intact.      "

## 2024-09-18 NOTE — ANESTHESIA POSTPROCEDURE EVALUATION
Patient: Jose Elias Reaves    Procedure: Procedure(s):  ANESTHESIA, IN NON-OPERATING ROOM SETTING FOR Angiogram with intent to treat @1300       Anesthesia Type:  General    Note:  Disposition: ICU            ICU Sign Out: Anesthesiologist/ICU physician sign out WAS performed   Postop Pain Control: Uneventful            Sign Out: Well controlled pain   PONV: No   Neuro/Psych:             Sign Out: pt had a stroke code called.   Airway/Respiratory: Uneventful            Sign Out: Acceptable/Baseline resp. status   CV/Hemodynamics: Uneventful            Sign Out: Acceptable CV status; No obvious hypovolemia; No obvious fluid overload   Other NRE: NONE   DID A NON-ROUTINE EVENT OCCUR? YES    Event details/Postop Comments:  Pt had a stroke code called due to visual changes. He was taken down to get a head CT and subsequently going to ICU afterwards.            Last vitals:  Vitals Value Taken Time   /83 09/17/24 2130   Temp 36.1  C (96.9  F) 09/17/24 2100   Pulse 70 09/17/24 2211   Resp 25 09/17/24 2211   SpO2 94 % 09/17/24 2211   Vitals shown include unfiled device data.    Electronically Signed By: Milton Fan MD  September 17, 2024  10:11 PM

## 2024-09-18 NOTE — PLAN OF CARE
ICU End of Shift Summary. See flowsheets for vital signs and detailed assessment.    Changes this shift: no new neurological changes. Pt. Wishing to leave AMA this afternoon, please see note. IV's removed and pt. Awaiting ride to leave AMA.    Plan:  Home with significant other, AMA paperwork signed.

## 2024-09-18 NOTE — PROGRESS NOTES
Neuro Interventional Progress note      24 hour events:  Right eye right inferior quadrantanopia persistent  Discontinuing cangrelor and starting Praseguel and naproxen      HPI:    Jose Elias Reaves is a 54 year old male with vascular risk factors of HTN, HLD and TBI. 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 He underwent FREDx flow diverter placement yesterday, with angioplasty, failed additional stenting attempts and retrieval of that flow diverter which was found to be better positioned with incomplete stasis afterwards.  He starting experiencing inconsistent black spots in different visual fields overnight, hence CT head was obtained showing possibility of small air embolus. This morning he complained of a persistent black spot in the right eye right inferior quadrantanopia. We switched his cangrelor dip to praseguel and resumed home dose of naproxen ( instead of aspirin).       Physical exam:  Alert and oriented x 4, intact cranial nerves except for right eye right inferior quadrantanopia where he is complaining of a black spot, intact motor and sensory exam, no ataxia or cerebellar signs present    Imaging Review:    CT head post procedure possible pneumocephalus      Plan:  -Start Praseguel and naproxen now, stop cangrelor drip in an hour. Due to large daily dose intake of naproxen, no nee dof raspirin  -Obtain brain MRI  -Start PT OT  -Neuro check q 4 hours  -Ok to transfer to floor for 1 more day of observation      Kell Arroyo MD   ESN Fellow  Pager: [phone]

## 2024-09-18 NOTE — DISCHARGE SUMMARY
[facility]    Endovascular Surgical Neuroradiology Discharge Summary    Date of Admission: 9/17/2024  Date of Discharge: 09/18/2024    Disposition: Discharged to home  Primary Care Physician: Dinah Michel      Admission Diagnosis:   Right carotid cave aneurysm    Discharge Diagnosis:   Treatment of carotid cave aneurysm        Please see H&P dated 9/17/2024 for further details about presentation.    HPI:  Jose Elias Reaves is a 54 year old male with vascular risk factors of HTN, HLD and TBI. 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 He underwent FREDx flow diverter placement yesterday, with angioplasty, failed additional stenting attempts and retrieval of that flow diverter which was found to be better positioned with incomplete stasis afterwards.  He starting experiencing inconsistent black spots in different visual fields overnight, hence CT head was obtained showing possibility of small air embolus. This morning he complained of a persistent black spot in the right eye right inferior quadrantanopia. We switched his cangrelor dip to praseguel and resumed home dose of naproxen ( instead of aspirin).   Patient had been ordered a brain MRI for visual loss and PT and OT. However, patient suddenly decided to leave AMA and refused doing brain MRI or have his medications delivered at bedside. Especially his Praseguel which he absolutely  needs to continue taking regularly due to risk of flow diverter thrombosis. Attempted calling patient and patient answered the phone and apologized saying that he had a fight with the charge nurse and did not want to stay. I explained to patient the need of being imaged to understand the visual symptoms further and the need to take his antiplatelet therapy which I sent to his [facility] pharmacy. I asked patient multiple times to be seen and evaluate din ED for his symptoms. Will also arrange outpatient follow up  ( can be virtual) with Dr. Raza clinic in the coming 3 weeks.        PHYSICAL EXAMINATION  Vital Signs:  B/P: 82/60, T: 97.4, P: 69, R: 18    Mentation: Awake, alert & oriented x3  Speech: Normal. No dysarthria or aphasia  Cranial nerve exam: Pupils equal, right eye inferior quadrantanopia in the right eye extraocular movements intact, Facial sensations intact, face symmetric, hearing normal B/L, Shoulder shrug strong B/L, tongue movements intact  Motor: Strength 5/5 throughout  Sensations: Intact B/L to light touch  Coordination: Finger to nose test intact B/L  Gait: Deferred        Medications    Current Discharge Medication List        CONTINUE these medications which have CHANGED    Details   prasugrel (EFFIENT) 5 MG TABS tablet Take 6 tabs (30 mg) once, 5 days prior to your procedure, then take 1 tab daily thereafter.  Qty: 30 tablet, Refills: 2    Associated Diagnoses: Nonruptured cerebral aneurysm           CONTINUE these medications which have NOT CHANGED    Details   acetaminophen (TYLENOL) 325 MG tablet Take 3 tablets (975 mg) by mouth daily.  Qty: 300 tablet, Refills: 3    Associated Diagnoses: Chronic pain syndrome      albuterol (PROAIR HFA/PROVENTIL HFA/VENTOLIN HFA) 108 (90 Base) MCG/ACT inhaler Inhale 2 puffs into the lungs every 4 hours as needed for shortness of breath or wheezing  Qty: 18 g, Refills: 11    Comments: Pharmacy may dispense brand covered by insurance (Proair, or proventil or ventolin or generic albuterol inhaler)  Associated Diagnoses: Uncomplicated asthma, unspecified asthma severity, unspecified whether persistent      albuterol (PROVENTIL) (2.5 MG/3ML) 0.083% neb solution Take 1 vial (2.5 mg) by nebulization every 6 hours as needed for shortness of breath, wheezing or cough  Qty: 90 mL, Refills: 5    Associated Diagnoses: Chronic obstructive pulmonary disease with acute lower respiratory infection (H)      amLODIPine (NORVASC) 5 MG tablet Take 1 tablet (5 mg) by mouth  daily  Qty: 90 tablet, Refills: 4    Associated Diagnoses: Essential hypertension      atorvastatin (LIPITOR) 40 MG tablet Take 1 tablet (40 mg) by mouth daily.  Qty: 90 tablet, Refills: 4    Associated Diagnoses: Mixed hyperlipidemia      fluticasone-salmeterol (ADVAIR) 500-50 MCG/ACT inhaler Inhale 1 puff into the lungs every 12 hours  Qty: 60 each, Refills: 11    Associated Diagnoses: Chronic obstructive pulmonary disease, unspecified COPD type (H)      lamoTRIgine (LAMICTAL) 100 MG tablet Take 0.5 tablets (50 mg) by mouth 2 times daily for 7 days, THEN 1 tablet (100 mg) 2 times daily for 90 days. - For Irritability / Anger  Qty: 187 tablet, Refills: 4    Associated Diagnoses: Personality disorder (H)      losartan-hydrochlorothiazide (HYZAAR) 100-25 MG tablet Take 1 tablet by mouth daily  Qty: 90 tablet, Refills: 4    Associated Diagnoses: Essential hypertension      metaxalone (SKELAXIN) 800 MG tablet Take 1 tablet (800 mg) by mouth 3 times daily  Qty: 90 tablet, Refills: 4    Associated Diagnoses: Chronic pain syndrome      methylPREDNISolone (MEDROL DOSEPAK) 4 MG tablet therapy pack Follow Package Directions  Qty: 21 tablet, Refills: 0    Comments: Workers comp      naproxen (NAPROSYN) 500 MG tablet TAKE 1 TABLET(500 MG) BY MOUTH TWICE DAILY AS NEEDED FOR PAIN  Qty: 90 tablet, Refills: 2    Associated Diagnoses: Chronic pain syndrome      naproxen sodium ER (NAPRELAN) 500 MG 24 hr tablet Take 1 tablet (500 mg) by mouth daily  Qty: 90 tablet, Refills: 4    Associated Diagnoses: Chronic pain syndrome      Omega-3 Fish Oil 500 MG capsule Take 1 capsule (500 mg) by mouth daily  Qty: 90 capsule, Refills: 4    Associated Diagnoses: Mixed hyperlipidemia      propranolol ER (INDERAL LA) 120 MG 24 hr capsule Take 1 capsule (120 mg) by mouth daily.  Qty: 30 capsule, Refills: 2    Associated Diagnoses: Essential hypertension      tiotropium (SPIRIVA) 18 MCG inhaled capsule Inhale 1 capsule (18 mcg) into the lungs  daily  Qty: 90 capsule, Refills: 4    Associated Diagnoses: Chronic obstructive pulmonary disease, unspecified COPD type (H)      MEDS UNK - RECORDS REQUESTED For cholesterol      valACYclovir (VALTREX) 1000 mg tablet Take 1 tablet (1,000 mg) by mouth 3 times daily for 7 days  Qty: 21 tablet, Refills: 0             Additional recommendations and follow up:       Activity    Your activity upon discharge: activity as tolerated     Follow Up ([facility] [place]/[facility])    Follow up with primary care provider, Dinah Michel, within 7 days to evaluate after surgery.  No follow up labs or test are needed.      Appointments on [place] and/or [facility] (with [facility] [place] or [facility] provider or service). Call [phone] if you haven't heard regarding these appointments within 7 days of discharge.     Reason for your hospital stay    Treatment of brain aneurysm     Activity    Your activity upon discharge: activity as tolerated     Diet    Follow this diet upon discharge: Regular     Diet    Follow this diet upon discharge: Current Diet:Orders Placed This Encounter      Regular Diet Adult      Diet       Patient was discussed with the attending physician, Dr. Ry Arroyo MD   ESN Fellow  Pager: [phone]

## 2024-09-19 ENCOUNTER — PATIENT OUTREACH (OUTPATIENT)
Dept: NEUROSURGERY | Facility: CLINIC | Age: 54
End: 2024-09-19

## 2024-09-19 ENCOUNTER — PATIENT OUTREACH (OUTPATIENT)
Dept: CARE COORDINATION | Facility: CLINIC | Age: 54
End: 2024-09-19

## 2024-09-19 ENCOUNTER — NURSE TRIAGE (OUTPATIENT)
Dept: INTERNAL MEDICINE | Facility: OTHER | Age: 54
End: 2024-09-19

## 2024-09-19 ENCOUNTER — ANCILLARY PROCEDURE (OUTPATIENT)
Dept: RADIOLOGY | Facility: CLINIC | Age: 54
End: 2024-09-19
Payer: MEDICAID

## 2024-09-19 DIAGNOSIS — Z71.89 COUNSELING AND COORDINATION OF CARE: Primary | ICD-10-CM

## 2024-09-19 NOTE — TELEPHONE ENCOUNTER
S-(situation): Diagnosis of Nonruptured cerebral aneurysm. Stent placement at [facility] on Tuesday. Has been lightheaded, blurry vision, big black spot in right eye vision, headache, excruciating pain in back of his neck/head. Difficulty with word search. Some tingling and numbness if he sits too long, ringing in ears.    B-(background): Patient had stent placement at [facility] on Tuesday that they weren't sure had sealed yet before he left AMA after an argument/fight with charge nurse. Drove himself home last night.    A-(assessment): Patient notified [facility] that he had the dark spot yesterday in right eye. It has gotten bigger. Feels like he's moving when he closes his eyes. Sees streamers with the big black spot in right eye. All new since Tuesday. Is nauseated. Patient doesn't know if his stent was sealed before leaving. Before the fight a CT scan was ordered but not completed because he left AMA. They had anticipated he stay another 2-3 days. States he is supposed to be on an anticoagulant because of the new stent but he had not been started on that yet because they weren't sure if his stent sealed yet.  Patient is concerned about clotting risk.    R-(recommendations): Patient advised to come in to the ER for evaluation. Writer offered to call for an ambulance for patient. He refused. He does not have anyone there to help him. Patient will drive himself to the ER. Advised patient to not drive and to call friends and see if they can bring him in. Triage care advice given. Routing to his PCP.  Sue Pyle RN on 9/19/2024 at 12:46 PM    Reason for Disposition   Blurred vision or visual changes and present now and sudden onset or new (e.g., minutes, hours, days)  (Exception: Seeing floaters / black specks OR previously diagnosed migraine headaches with same symptoms.)    Additional Information   Negative: Weakness of the face, arm or leg on one side of the body   Negative: Followed getting substance in the eye   Negative:  "Foreign body stuck in the eye   Negative: Followed an eye injury   Negative: Followed sun lamp or sun exposure (UV keratitis)   Negative: Yellow or green discharge (pus) in the eye   Negative: Pregnant   Negative: Complete loss of vision in one or both eyes   Negative: SEVERE eye pain   Negative: SEVERE headache   Negative: Double vision    Answer Assessment - Initial Assessment Questions  1. DESCRIPTION: \"How has your vision changed?\" (e.g., complete vision loss, blurred vision, double vision, floaters, etc.)        Blurred vision, dark spot in right eye, has like streamers in his vision.    2. LOCATION: \"One or both eyes?\" If one, ask: \"Which eye?\"        Both eyes    3. SEVERITY: \"Can you see anything?\" If Yes, ask: \"What can you see?\" (e.g., fine print)        Yes but vision is blurry with streamers and has a big black spot in his right eye vision. Driving himself home last night thought taillights on cars were burnt out. Realized it was his own vision. Feels like he is moving when he closes his eyes.    4. ONSET: \"When did this begin?\" \"Did it start suddenly or has this been gradual?\"        Started since Tuesday, but worse yesterday and today.    5. PATTERN: \"Does this come and go, or has it been constant since it started?\"        Constant    6. PAIN: \"Is there any pain in your eye(s)?\"  (Scale 1-10; or mild, moderate, severe)    - NONE (0): No pain.    - MILD (1-3): Doesn't interfere with normal activities.    - MODERATE (4-7): Interferes with normal activities or awakens from sleep.     - SEVERE (8-10): Excruciating pain, unable to do any normal activities.        Yes Rates his pain 9/10.    7. CONTACTS-GLASSES: \"Do you wear contacts or glasses?\"        Glasses but not now.    8. CAUSE: \"What do you think is causing this visual problem?\"        Doesn't know. Had a stent placed Tuesday at [facility], they weren't sure the stent was sealed. He is having 9/10 in back of neck and head. If he tries to concentrate he " "gets excruciating pain in his head. Has ringing in his ears and the headache intensifies. He is finding that he is struggling to find words.     9. OTHER SYMPTOMS: \"Do you have any other symptoms?\" (e.g., confusion, headache, arm or leg weakness, speech problems)        Speech problems. Difficulty with word searching., headache, ears ringing, feels some confusion, lightheaded.    Protocols used: Vision Loss or Change-A-OH    "

## 2024-09-19 NOTE — DISCHARGE INSTRUCTIONS
[facility] Interventional Radiology  Dose Notification Information      Dear Jose Elias Reaves,           Thank you for allowing us to participate in your care in Interventional Radiology at [facility]. For your safety we monitor radiation exposure during exams. Based on the total radiation exposure you received during your procedure, a follow-up appointment with your primary care physician may be indicated. Although the chance of complications is low, follow-up monitoring is important to ensure your safety and health. If you are experiencing any of the skin changes below, make an appointment with your primary care doctor for evaluation.   Changes you should watch for on your chest, back or under your armpits are:  Warmth  Redness  Pain or ulceration of the skin  Your skin will have the appearance like a sunburn  There is a minimal chance of hair loss associated with procedures concentrated on the head and neck areas.      Again, thank you for choosing [facility] Interventional Radiology Department  for your healthcare needs. Your safety and well-being are important components of our commitment to your care.  Please do not hesitate to contact us with any questions or concerns you may have.    Sincerely,    Interventional Radiology, [facility]

## 2024-09-19 NOTE — PROGRESS NOTES
Social Work Intervention  [facility]    Data/Intervention:    Patient Name:  Jose Elias Reaves  /Age:  1970 (54 year old)    Visit Type: telephone  Referral Source: Kia Heath RN neuro surgery  Reason for Referral:  no insurance and needs critical medications    Collaborated With:    -Manuel Dooley RN care coord [facility] [phone]    Psychosocial Information/Concerns:  Pt left AMA from [facility] and without needed medications. Nurses have advised him to go to the ED due to his symptoms. When I talked with Manuel, he indicated that he was staying with a friend and had $5 and 1/4 tank of gas and wouldn't have enough to get home. He discussed that he doesn't have insurance and was told because he made over the [place] health [place] programs [place] last year, he wouldn't be eligible this year and that he has made far less income this year and in fact in the past few months hasn't had any income. He doesn't have any funds for medications and believes he can't get insurance although it doesn't sound like he or anyone has helped him apply.   He describes that he can't go back to certain hospitals due to particular beliefs- such as there is an outstanding bill from his son from many years ago, or that they told him he can't go back there.    Intervention/Education/Resources Provided:  Focused on the priority which was to find a way to get him to a hospital for care and then to assist with insurance/medications.   I was able to reach out to JASEN Dooley thru his primary care clinic at [facility] and she indicated she would contact him and provide financial resource info (gas gift cards possible) and discuss with him getting to an appropriate ED and will follow up with connection to people who can help him apply for insurance thru [place] sure and connection to their pharmacy assistance program.     Assessment/Plan:  Pt with some fixed beliefs with obvious need to keep communications  calm and clear when talking with him. Sanjana's assistance was vital to hopefully achieving a successful outcome.   She is aware of how to reach neuro surg RNs if needed.     Provided patient/family with contact information and availability.    DESIREE Stewart, [facility]    [facility] Surgery [id number]  [place] [address]    [phone]

## 2024-09-19 NOTE — PROGRESS NOTES
Endovascular Surgical Neuroradiology - Post Discharge Call    Admit: 9/17/24    Discharge: 9/18/24 AMA    Facility: [facility]    MD/Service: Dr. Box for Dr. Raza/TIFFANY    Procedure Diagnosis:  Right internal carotid artery cave artery aneurysm     Procedure(s):   Diagnostic cervicocerebral angiography     Findings:  Successful deployment of FREDx flow diverter in the cavernous right ICA segment with initial insufficient stasis in the aneurysm, followed by several attempts for angioplasty with a 4 x 15 Scepter C balloon and attempts to do stent retrieval with and without suction combination, in addition to attempts to deploy LVIS blue intracranial stent without success. Eventually FREDx flow diverter was better positioned with good but incomplete contrast stasis and good arterial filling. Left ICA has good collateral flow through the anterior communicating artery.    Hospital Course: patient underwent FREDx flow diverter placement yesterday, with angioplasty, failed additional stenting attempts and retrieval of that flow diverter which was found to be better positioned with incomplete stasis afterwards.He starting experiencing inconsistent black spots in different visual fields overnight, hence CT head was obtained showing possibility of small air embolus. This morning he complained of a persistent black spot in the right eye right inferior quadrantanopia. We switched his cangrelor dip to praseguel and resumed home dose of naproxen ( instead of aspirin). Patient had been ordered a brain MRI for visual loss and PT and OT. However, patient suddenly decided to leave AMA and refused doing brain MRI or have his medications delivered at bedside. Especially his Praseguel which he absolutely needs to continue taking regularly due to risk of flow diverter thrombosis. Attempted calling patient and patient answered the phone and apologized saying that he had a fight with the charge nurse and did not want to stay. I explained  "to patient the need of being imaged to understand the visual symptoms further and the need to take his antiplatelet therapy which I sent to his [facility] pharmacy. I asked patient multiple times to be seen and evaluate din ED for his symptoms. Will also arrange outpatient follow up ( can be virtual) with Dr. Raza clinic in the coming 3 weeks.      Plan:  [] Continue taking prasugrel, and naproxen (in place of aspirin)  [] Follow-up with Dr. Raza in 2-3 weeks.     Spoke with patient. States he is having the same dark spot in his vision- worse than it was yesterday - sees little spots on things that are not there - on the way home last night he thought that taillights in the car in front of him were off however they were on. \"Big black spot on corner of right eye - look like little beetles that go from black to blue to gray.\"    Has a \"pounding headache\", 6/10 pain - worse than yesterday. Headache has intensified since discharge. Afraid to say anything due to the charge nurse telling him \"he is not allowed to come back.\"    Later in the conversation patient states \"only thing that has worsened is ringing in ears.\"    Informed patient that he needs to go to the ED. States charge nurse told him \"he is not allowed to come back to [facility].\"     Informed patient that he can go to any hospital he would like however he should go to [facility] as it is closest to his home. States \"he can't afford to go to ER, has no insurance. Girlfriend left and took everything.\"    Asked patient if he is taking the prasugrel. States \"charge nurse took everything that was prescribed away from him and told patient he couldn't have it. It wasn't the patients, it was the hospitals.\"     Asked patient if he has the prasugrel that was prescribed prior to procedure. States he didn't think he needed to be on it after surgery. Informed him that he absolutely does need to take the medication to prevent stent thrombosis, stroke. States coffee got " "dumped on the home supply and ruined them. Only has 3 left.     States he is also not taking the naproxen - \"ran out yesterday when he got home\"    Offered to have SW reach out to assist with obtaining medications. States \"he does not reside in city, so nothing you can do. Made too much money last year to qualify for anything.\"    Again discussed SW reaching out to assist with obtaining medications - states \"as long as prescriptions come through [facility] which has to be prescribed by MD at [facility].\"    Writer strongly encouraged patient numerous times to present to the ED immediately. Patient continued to state the charge nurse told him he is not allowed back. Also, states \"he doesn't have the funds. It's a marks, welcome to my world. Girlfriend already told me it's my fault and charge nurse said tough luck - chare nurse told him in a calm, cool voice, and looked him dead in the eye.\"     At end of call patient agreed to the following:  [] Patient would go to [facility] ED \"when he can.\" Decline ambulance. States he will \"call around for a ride.\"  [] Patient will take prasugrel 5 mg now and once daily for the next 2 days (states he only has 3 tabs left)  [] RN will have SW reach out to see if they can assist with obtaining medications.   [] Phone appointment held with Dr. Raza for 10/1/24 at 8:15. Message sent to scheduling.     Kia Heath RN 9/19/2024 12:06 PM     Time spent on phone and coordinating care: 45 minutes.       "

## 2024-09-19 NOTE — PROGRESS NOTES
Clinic Care Coordination Contact  Care Team Conversations    Situation: Patient is currently in [place] and was advised by [facility], [facility] and [facility] triage to go into ED.    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     Assessment:  Patient is still having significant symptoms, see the triage note dated today. He was to have a scan before he left to investigate further.      Plan:  After much discussion, patient agreed to go to [facility] ED for further evaluation.  We will assist him to get MA and his prescriptions through our pharmacy program. He agreed that I should send a Mychart letter with my contact information and we can try work on his goals.   Theresa Dooley RN on 9/19/2024 at 4:45 PM

## 2024-09-20 ENCOUNTER — ANCILLARY PROCEDURE (OUTPATIENT)
Dept: RADIOLOGY | Facility: CLINIC | Age: 54
End: 2024-09-20
Payer: MEDICAID

## 2024-09-22 ENCOUNTER — NURSE TRIAGE (OUTPATIENT)
Dept: NURSING | Facility: CLINIC | Age: 54
End: 2024-09-22

## 2024-09-22 NOTE — TELEPHONE ENCOUNTER
Patient calling reporting he was recently hospitalized for a collage or strokes with surgery done to place a sent to address an aneurysm. Patient reports a pounding headache with lightheadedness when moving his head and standing. Reports a stiff neck that worsens the headache when moved. Denies weakness, loss of speech and passing out. Advised to go to the ER with patient agreeable to the plan.     April Hdez RN 09/22/24 11:52 AM   [facility] Triage Nurse Advisor    Reason for Disposition   Stiff neck (can't touch chin to chest)    Additional Information   Negative: Difficult to awaken or acting confused (e.g., disoriented, slurred speech)   Negative: [1] Weakness of the face, arm or leg on one side of the body AND [2] new-onset   Negative: [1] Numbness of the face, arm or leg on one side of the body AND [2] new-onset   Negative: [1] Loss of speech or garbled speech AND [2] new-onset   Negative: Passed out (i.e., lost consciousness, collapsed and was not responding)   Negative: Sounds like a life-threatening emergency to the triager   Negative: Unable to walk, or can only walk with assistance (e.g., requires support)    Protocols used: Headache-A-AH

## 2024-09-23 ENCOUNTER — NURSE TRIAGE (OUTPATIENT)
Dept: INTERNAL MEDICINE | Facility: OTHER | Age: 54
End: 2024-09-23

## 2024-09-23 ENCOUNTER — APPOINTMENT (OUTPATIENT)
Dept: CT IMAGING | Facility: OTHER | Age: 54
End: 2024-09-23
Attending: FAMILY MEDICINE
Payer: MEDICAID

## 2024-09-23 ENCOUNTER — APPOINTMENT (OUTPATIENT)
Dept: MRI IMAGING | Facility: OTHER | Age: 54
End: 2024-09-23
Attending: FAMILY MEDICINE
Payer: MEDICAID

## 2024-09-23 ENCOUNTER — HOSPITAL ENCOUNTER (EMERGENCY)
Facility: OTHER | Age: 54
Discharge: HOME | End: 2024-09-23
Attending: FAMILY MEDICINE
Payer: MEDICAID

## 2024-09-23 VITALS
DIASTOLIC BLOOD PRESSURE: 86 MMHG | HEART RATE: 54 BPM | SYSTOLIC BLOOD PRESSURE: 130 MMHG | BODY MASS INDEX: 31.22 KG/M2 | WEIGHT: 223 LBS | RESPIRATION RATE: 40 BRPM | TEMPERATURE: 97.3 F | HEIGHT: 71 IN | OXYGEN SATURATION: 98 %

## 2024-09-23 DIAGNOSIS — T82.856A STENOSIS OF PERIPHERAL VASCULAR STENT, INITIAL ENCOUNTER (H): ICD-10-CM

## 2024-09-23 DIAGNOSIS — I67.1 NONRUPTURED CEREBRAL ANEURYSM: ICD-10-CM

## 2024-09-23 DIAGNOSIS — H53.9 VISION CHANGES: ICD-10-CM

## 2024-09-23 DIAGNOSIS — I67.1 SACCULAR ANEURYSM: ICD-10-CM

## 2024-09-23 LAB
ANION GAP SERPL CALCULATED.3IONS-SCNC: 10 MMOL/L (ref 7–15)
APTT PPP: 33 SECONDS (ref 22–38)
BASOPHILS # BLD AUTO: 0.1 10E3/UL (ref 0–0.2)
BASOPHILS NFR BLD AUTO: 1 %
BUN SERPL-MCNC: 11.2 MG/DL (ref 6–20)
CALCIUM SERPL-MCNC: 9.2 MG/DL (ref 8.8–10.4)
CHLORIDE SERPL-SCNC: 101 MMOL/L (ref 98–107)
CREAT SERPL-MCNC: 1.07 MG/DL (ref 0.67–1.17)
EGFRCR SERPLBLD CKD-EPI 2021: 82 ML/MIN/1.73M2
EOSINOPHIL # BLD AUTO: 0.5 10E3/UL (ref 0–0.7)
EOSINOPHIL NFR BLD AUTO: 5 %
ERYTHROCYTE [DISTWIDTH] IN BLOOD BY AUTOMATED COUNT: 13.4 % (ref 10–15)
GLUCOSE BLDC GLUCOMTR-MCNC: 129 MG/DL (ref 70–99)
GLUCOSE SERPL-MCNC: 139 MG/DL (ref 70–99)
HCO3 SERPL-SCNC: 26 MMOL/L (ref 22–29)
HCT VFR BLD AUTO: 41 % (ref 40–53)
HGB BLD-MCNC: 13.5 G/DL (ref 13.3–17.7)
HOLD SPECIMEN: NORMAL
IMM GRANULOCYTES # BLD: 0.1 10E3/UL
IMM GRANULOCYTES NFR BLD: 1 %
INR PPP: 0.95 (ref 0.85–1.15)
LYMPHOCYTES # BLD AUTO: 2.4 10E3/UL (ref 0.8–5.3)
LYMPHOCYTES NFR BLD AUTO: 23 %
MCH RBC QN AUTO: 29.7 PG (ref 26.5–33)
MCHC RBC AUTO-ENTMCNC: 32.9 G/DL (ref 31.5–36.5)
MCV RBC AUTO: 90 FL (ref 78–100)
MONOCYTES # BLD AUTO: 0.8 10E3/UL (ref 0–1.3)
MONOCYTES NFR BLD AUTO: 8 %
NEUTROPHILS # BLD AUTO: 6.4 10E3/UL (ref 1.6–8.3)
NEUTROPHILS NFR BLD AUTO: 63 %
NRBC # BLD AUTO: 0 10E3/UL
NRBC BLD AUTO-RTO: 0 /100
PLATELET # BLD AUTO: 465 10E3/UL (ref 150–450)
POTASSIUM SERPL-SCNC: 4.5 MMOL/L (ref 3.4–5.3)
RBC # BLD AUTO: 4.55 10E6/UL (ref 4.4–5.9)
SODIUM SERPL-SCNC: 137 MMOL/L (ref 135–145)
TROPONIN T SERPL HS-MCNC: 16 NG/L
WBC # BLD AUTO: 10.2 10E3/UL (ref 4–11)

## 2024-09-23 PROCEDURE — 96374 THER/PROPH/DIAG INJ IV PUSH: CPT | Mod: XU | Performed by: FAMILY MEDICINE

## 2024-09-23 PROCEDURE — 93005 ELECTROCARDIOGRAM TRACING: CPT | Performed by: FAMILY MEDICINE

## 2024-09-23 PROCEDURE — 36415 COLL VENOUS BLD VENIPUNCTURE: CPT | Performed by: FAMILY MEDICINE

## 2024-09-23 PROCEDURE — 70450 CT HEAD/BRAIN W/O DYE: CPT

## 2024-09-23 PROCEDURE — 250N000011 HC RX IP 250 OP 636: Performed by: FAMILY MEDICINE

## 2024-09-23 PROCEDURE — 99285 EMERGENCY DEPT VISIT HI MDM: CPT | Performed by: FAMILY MEDICINE

## 2024-09-23 PROCEDURE — 250N000009 HC RX 250: Performed by: FAMILY MEDICINE

## 2024-09-23 PROCEDURE — 70498 CT ANGIOGRAPHY NECK: CPT

## 2024-09-23 PROCEDURE — 85025 COMPLETE CBC W/AUTO DIFF WBC: CPT | Performed by: FAMILY MEDICINE

## 2024-09-23 PROCEDURE — 255N000002 HC RX 255 OP 636: Performed by: FAMILY MEDICINE

## 2024-09-23 PROCEDURE — 70496 CT ANGIOGRAPHY HEAD: CPT

## 2024-09-23 PROCEDURE — 93010 ELECTROCARDIOGRAM REPORT: CPT | Performed by: STUDENT IN AN ORGANIZED HEALTH CARE EDUCATION/TRAINING PROGRAM

## 2024-09-23 PROCEDURE — 70553 MRI BRAIN STEM W/O & W/DYE: CPT

## 2024-09-23 PROCEDURE — 85730 THROMBOPLASTIN TIME PARTIAL: CPT | Performed by: FAMILY MEDICINE

## 2024-09-23 PROCEDURE — 80048 BASIC METABOLIC PNL TOTAL CA: CPT | Performed by: FAMILY MEDICINE

## 2024-09-23 PROCEDURE — 99291 CRITICAL CARE FIRST HOUR: CPT | Mod: 25 | Performed by: FAMILY MEDICINE

## 2024-09-23 PROCEDURE — 0042T CT HEAD PERFUSION W CONTRAST: CPT

## 2024-09-23 PROCEDURE — 84484 ASSAY OF TROPONIN QUANT: CPT | Performed by: FAMILY MEDICINE

## 2024-09-23 PROCEDURE — 96376 TX/PRO/DX INJ SAME DRUG ADON: CPT | Mod: XU | Performed by: FAMILY MEDICINE

## 2024-09-23 PROCEDURE — 99207 PR NO CHARGE LOS: CPT

## 2024-09-23 PROCEDURE — 258N000003 HC RX IP 258 OP 636: Performed by: FAMILY MEDICINE

## 2024-09-23 PROCEDURE — A9575 INJ GADOTERATE MEGLUMI 0.1ML: HCPCS | Performed by: FAMILY MEDICINE

## 2024-09-23 PROCEDURE — 96361 HYDRATE IV INFUSION ADD-ON: CPT | Performed by: FAMILY MEDICINE

## 2024-09-23 PROCEDURE — 82962 GLUCOSE BLOOD TEST: CPT

## 2024-09-23 PROCEDURE — 85610 PROTHROMBIN TIME: CPT | Performed by: FAMILY MEDICINE

## 2024-09-23 RX ORDER — IOPAMIDOL 755 MG/ML
117 INJECTION, SOLUTION INTRAVASCULAR ONCE
Status: COMPLETED | OUTPATIENT
Start: 2024-09-23 | End: 2024-09-23

## 2024-09-23 RX ORDER — HYDROMORPHONE HYDROCHLORIDE 1 MG/ML
0.5 INJECTION, SOLUTION INTRAMUSCULAR; INTRAVENOUS; SUBCUTANEOUS EVERY 30 MIN PRN
Status: DISCONTINUED | OUTPATIENT
Start: 2024-09-23 | End: 2024-09-24 | Stop reason: HOSPADM

## 2024-09-23 RX ORDER — GADOTERATE MEGLUMINE 376.9 MG/ML
20 INJECTION INTRAVENOUS ONCE
Status: COMPLETED | OUTPATIENT
Start: 2024-09-23 | End: 2024-09-23

## 2024-09-23 RX ORDER — ASPIRIN 81 MG/1
81 TABLET, CHEWABLE ORAL DAILY
COMMUNITY
Start: 2024-09-22

## 2024-09-23 RX ADMIN — HYDROMORPHONE HYDROCHLORIDE 0.5 MG: 1 INJECTION, SOLUTION INTRAMUSCULAR; INTRAVENOUS; SUBCUTANEOUS at 17:36

## 2024-09-23 RX ADMIN — HYDROMORPHONE HYDROCHLORIDE 0.5 MG: 1 INJECTION, SOLUTION INTRAMUSCULAR; INTRAVENOUS; SUBCUTANEOUS at 20:37

## 2024-09-23 RX ADMIN — GADOTERATE MEGLUMINE 20 ML: 376.9 INJECTION INTRAVENOUS at 20:19

## 2024-09-23 RX ADMIN — HYDROMORPHONE HYDROCHLORIDE 0.5 MG: 1 INJECTION, SOLUTION INTRAMUSCULAR; INTRAVENOUS; SUBCUTANEOUS at 15:18

## 2024-09-23 RX ADMIN — SODIUM CHLORIDE 140 ML: 9 INJECTION, SOLUTION INTRAVENOUS at 14:08

## 2024-09-23 RX ADMIN — SODIUM CHLORIDE 1000 ML: 9 INJECTION, SOLUTION INTRAVENOUS at 18:03

## 2024-09-23 RX ADMIN — IOPAMIDOL 115 ML: 755 INJECTION, SOLUTION INTRAVENOUS at 14:07

## 2024-09-23 ASSESSMENT — ACTIVITIES OF DAILY LIVING (ADL)
ADLS_ACUITY_SCORE: 35

## 2024-09-23 ASSESSMENT — ENCOUNTER SYMPTOMS: HEADACHES: 1

## 2024-09-23 ASSESSMENT — COLUMBIA-SUICIDE SEVERITY RATING SCALE - C-SSRS
1. IN THE PAST MONTH, HAVE YOU WISHED YOU WERE DEAD OR WISHED YOU COULD GO TO SLEEP AND NOT WAKE UP?: NO
2. HAVE YOU ACTUALLY HAD ANY THOUGHTS OF KILLING YOURSELF IN THE PAST MONTH?: NO
6. HAVE YOU EVER DONE ANYTHING, STARTED TO DO ANYTHING, OR PREPARED TO DO ANYTHING TO END YOUR LIFE?: NO

## 2024-09-23 NOTE — TELEPHONE ENCOUNTER
"Patient states he will present to ED. He has an appointment with PCP today.       Reason for Disposition   SEVERE headache, states 'worst headache' of life    Additional Information   Negative: Passed out (i.e., fainted, collapsed and was not responding)   Negative: Weakness of the face, arm or leg on one side of the body and new-onset   Negative: Difficult to awaken or acting confused (e.g., disoriented, slurred speech)   Negative: Numbness of the face, arm or leg on one side of the body and new-onset   Negative: Loss of speech or garbled speech and new-onset    Answer Assessment - Initial Assessment Questions  1. LOCATION: \"Where does it hurt?\"       Temple and right side of head  2. ONSET: \"When did the headache start?\" (Minutes, hours or days)       Consistent for days and started getting worse yesterday and worse today. This morning say \"flashes\" and ringing in his ears. Vision is blurred states starting to get a little better.   3. PATTERN: \"Does the pain come and go, or has it been constant since it started?\"      Constant for days.   4. SEVERITY: \"How bad is the pain?\" and \"What does it keep you from doing?\"  (e.g., Scale 1-10; mild, moderate, or severe)    - MILD (1-3): doesn't interfere with normal activities     - MODERATE (4-7): interferes with normal activities or awakens from sleep     - SEVERE (8-10): excruciating pain, unable to do any normal activities         8/10. States was at a 10/10 earlier.   5. RECURRENT SYMPTOM: \"Have you ever had headaches before?\" If Yes, ask: \"When was the last time?\" and \"What happened that time?\"       Yes. States a collage of strokes.   6. CAUSE: \"What do you think is causing the headache?\"      \"Collage\" of strokes.   7. MIGRAINE: \"Have you been diagnosed with migraine headaches?\" If Yes, ask: \"Is this headache similar?\"       No. States he has had migraines when he was younger and hasn't had any in over 25 years.   8. HEAD INJURY: \"Has there been any recent injury to " "the head?\"       No. States \"whatever' is happening might be the \"micro\" stroke. States he remembers the pain and when he did have a headache injury.   9. OTHER SYMPTOMS: \"Do you have any other symptoms?\" (fever, stiff neck, eye pain, sore throat, cold symptoms)      Denies. States neck is extremely pain on right side. Feels like it needs to \"pop\". States if he cough it hurts.   10. PREGNANCY: \"Is there any chance you are pregnant?\" \"When was your last menstrual period?\"        Denies.    Protocols used: Headache-A-OH    "

## 2024-09-23 NOTE — TELEPHONE ENCOUNTER
Provider CHANTELL would like patient triaged and sent to ER if still having headaches.  Cate Ring LPN on 9/23/2024 at 9:50 AM  EXT. [phone]

## 2024-09-23 NOTE — ED PROVIDER NOTES
History     Chief Complaint   Patient presents with    Cerebrovascular Accident     The history is provided by the patient and medical records.     Jose Elias Reaves is a 54 year old male here with headache, platt of light in his visual fields, unsteady gait and tinnitus.      He was seen Sept 17 at the [facility] for an intracranial stent and aneurysm treatment. They could not get the right ICA stent in place and in the process apparently he had a stroke. He left [facility] AMA and went to [facility] in [place]. They agreed that he had a stroke and he was treated and discharged on Sept 21, two days ago.  At that time his vision had stabilized with spots and dark areas in his visual fields. Today he had some new symptoms.     He states that he is taking his Eliquis and aspirin, last dose this morning.    Allergies:  Allergies   Allergen Reactions    Penicillins Anaphylaxis     Tolerated cefazolin on 09/16/2015.    Clindamycin Hives    Gabapentin      Mood disturbance        Problem List:    Patient Active Problem List    Diagnosis Date Noted    Nonruptured cerebral aneurysm 09/17/2024     Priority: Medium    Saccular aneurysm 07/10/2024     Priority: Medium     Large saccular 11 mm right cavernous ICA aneurysm. 6/2024  Small saccular 2 mm right PCA aneurysm. 6/2024      Prediabetes 08/22/2023     Priority: Medium    Cigarette smoker 03/20/2019     Priority: Medium    Cervicalgia 12/26/2018     Priority: Medium    Other chronic rhinitis 02/20/2018     Priority: Medium    Mild intermittent asthma 02/20/2018     Priority: Medium    Deviated nasal septum 02/20/2018     Priority: Medium    Tobacco abuse 02/20/2018     Priority: Medium    Mixed hyperlipidemia 04/18/2017     Priority: Medium    Chronic pain syndrome - Neck 04/18/2017     Priority: Medium    Essential hypertension 03/21/2017     Priority: Medium    Family history of ischemic heart disease 03/21/2017     Priority: Medium    Family history of diabetes mellitus  03/21/2017     Priority: Medium    Personality disorder (H) 05/05/2012     Priority: Medium        Past Medical History:    Past Medical History:   Diagnosis Date    Cervical strain 01/03/2019    Closed head injury with concussion 12/12/2018    Concussion with brief LOC 12/12/2018    Concussion with brief LOC 12/12/2018    Hypertension     Impingement of right ulnar nerve 03/16/2020    Post concussion syndrome 03/26/2019    Post-concussion headache 12/26/2018    Vestibular dysfunction 12/26/2018    Vision disturbance 12/26/2018       Past Surgical History:    Past Surgical History:   Procedure Laterality Date    HERNIA REPAIR Left     KNEE SURGERY Left     ACL repair, patella graft    SEPTOPLASTY N/A 3/13/2018    Procedure: SEPTOPLASTY;  septoplasty, submucosal resection of the turbinates;  Surgeon: Piotr Quiroz MD;  Location:  OR       Family History:    Family History   Problem Relation Age of Onset    Diabetes Mother     Myocardial Infarction Father        Social History:  Marital Status:   [4]  Social History     Tobacco Use    Smoking status: Every Day     Current packs/day: 1.00     Average packs/day: 1 pack/day for 42.7 years (42.7 ttl pk-yrs)     Types: Cigarettes     Start date: 1/1/1982    Smokeless tobacco: Never    Tobacco comments:     2-3 cigarettes per day    Vaping Use    Vaping status: Never Used   Substance Use Topics    Alcohol use: Yes     Alcohol/week: 2.0 standard drinks of alcohol     Types: 2 Cans of beer per week     Comment: 2-4 drinks a week    Drug use: No        Medications:    apixaban ANTICOAGULANT (ELIQUIS) 5 MG tablet  aspirin (ASA) 81 MG chewable tablet  naproxen (NAPROSYN) 500 MG tablet  acetaminophen (TYLENOL) 325 MG tablet  albuterol (PROAIR HFA/PROVENTIL HFA/VENTOLIN HFA) 108 (90 Base) MCG/ACT inhaler  albuterol (PROVENTIL) (2.5 MG/3ML) 0.083% neb solution  amLODIPine (NORVASC) 5 MG tablet  atorvastatin (LIPITOR) 40 MG tablet  fluticasone-salmeterol (ADVAIR)  "500-50 MCG/ACT inhaler  lamoTRIgine (LAMICTAL) 100 MG tablet  losartan-hydrochlorothiazide (HYZAAR) 100-25 MG tablet  MEDS UNK - RECORDS REQUESTED  methylPREDNISolone (MEDROL DOSEPAK) 4 MG tablet therapy pack  Omega-3 Fish Oil 500 MG capsule  prasugrel (EFFIENT) 5 MG TABS tablet  propranolol ER (INDERAL LA) 120 MG 24 hr capsule  tiotropium (SPIRIVA) 18 MCG inhaled capsule  valACYclovir (VALTREX) 1000 mg tablet      Review of Systems   Eyes:  Positive for visual disturbance.   Musculoskeletal:  Positive for gait problem.   Neurological:  Positive for headaches.   All other systems reviewed and are negative.      Physical Exam   BP: (!) 153/92  Pulse: 66  Temp: 97.3  F (36.3  C)  Resp: 20  Height: 180.3 cm (5' 11\")  Weight: 101.2 kg (223 lb)  SpO2: 100 %      Physical Exam  Vitals and nursing note reviewed.   Constitutional:       General: He is not in acute distress.     Appearance: He is not ill-appearing.   Eyes:      Extraocular Movements: Extraocular movements intact.      Pupils: Pupils are equal, round, and reactive to light.      Comments: Direct vision is blurry. Peripheral vision normal.    Cardiovascular:      Rate and Rhythm: Normal rate and regular rhythm.      Pulses: Normal pulses.      Heart sounds: Normal heart sounds.   Pulmonary:      Effort: Pulmonary effort is normal.      Breath sounds: Normal breath sounds.   Abdominal:      General: Bowel sounds are normal.      Palpations: Abdomen is soft.   Skin:     General: Skin is warm and dry.   Neurological:      Mental Status: He is alert and oriented to person, place, and time.      Sensory: No sensory deficit.      Motor: No weakness.      Comments: He has normal strength of bilateral UE and LE.         Results for orders placed or performed during the hospital encounter of 09/23/24 (from the past 24 hour(s))   Glucose by meter   Result Value Ref Range    GLUCOSE BY METER POCT 129 (H) 70 - 99 mg/dL   CBC with Platelets & Differential    Narrative    " The following orders were created for panel order CBC with Platelets & Differential.  Procedure                               Abnormality         Status                     ---------                               -----------         ------                     CBC with platelets and d...[[id number]]  Abnormal            Final result                 Please view results for these tests on the individual orders.   Basic metabolic panel   Result Value Ref Range    Sodium 137 135 - 145 mmol/L    Potassium 4.5 3.4 - 5.3 mmol/L    Chloride 101 98 - 107 mmol/L    Carbon Dioxide (CO2) 26 22 - 29 mmol/L    Anion Gap 10 7 - 15 mmol/L    Urea Nitrogen 11.2 6.0 - 20.0 mg/dL    Creatinine 1.07 0.67 - 1.17 mg/dL    GFR Estimate 82 >60 mL/min/1.73m2    Calcium 9.2 8.8 - 10.4 mg/dL    Glucose 139 (H) 70 - 99 mg/dL   INR   Result Value Ref Range    INR 0.95 0.85 - 1.15   Partial thromboplastin time   Result Value Ref Range    aPTT 33 22 - 38 Seconds   Troponin T, High Sensitivity   Result Value Ref Range    Troponin T, High Sensitivity 16 <=22 ng/L   CBC with platelets and differential   Result Value Ref Range    WBC Count 10.2 4.0 - 11.0 10e3/uL    RBC Count 4.55 4.40 - 5.90 10e6/uL    Hemoglobin 13.5 13.3 - 17.7 g/dL    Hematocrit 41.0 40.0 - 53.0 %    MCV 90 78 - 100 fL    MCH 29.7 26.5 - 33.0 pg    MCHC 32.9 31.5 - 36.5 g/dL    RDW 13.4 10.0 - 15.0 %    Platelet Count 465 (H) 150 - 450 10e3/uL    % Neutrophils 63 %    % Lymphocytes 23 %    % Monocytes 8 %    % Eosinophils 5 %    % Basophils 1 %    % Immature Granulocytes 1 %    NRBCs per 100 WBC 0 <1 /100    Absolute Neutrophils 6.4 1.6 - 8.3 10e3/uL    Absolute Lymphocytes 2.4 0.8 - 5.3 10e3/uL    Absolute Monocytes 0.8 0.0 - 1.3 10e3/uL    Absolute Eosinophils 0.5 0.0 - 0.7 10e3/uL    Absolute Basophils 0.1 0.0 - 0.2 10e3/uL    Absolute Immature Granulocytes 0.1 <=0.4 10e3/uL    Absolute NRBCs 0.0 10e3/uL   Extra Tube ([place] Draw)    Narrative    The following orders were  created for panel order Extra Tube ([place] Draw).  Procedure                               Abnormality         Status                     ---------                               -----------         ------                     Extra Red Top Tube[[id number]]                               Final result                 Please view results for these tests on the individual orders.   Extra Red Top Tube   Result Value Ref Range    Hold Specimen [facility]    CT Head w/o Contrast    Narrative    EXAM: CT HEAD W/O CONTRAST, 9/23/2024 2:08 PM    HISTORY: Code Stroke to evaluate for potential thrombolysis and  thrombectomy. PLEASE READ IMMEDIATELY. Tinnitus, headache, nausea;  right eye vision changes    COMPARISON: CT head 9/17/2024    TECHNIQUE:   Imaging protocol: Multiplanar CT images of the head without  intravenous contrast.   Acquisition: This CT exam was performed using one or more the  following dose reduction techniques: automated exposure control,  adjustment of the mA and/or kV according to patient size, and/or  iterative reconstruction technique.    FINDINGS:  Stable posttreatment changes of the right cavernous ICA. No  intracranial hemorrhage, mass effect, or midline shift. The ventricles  are proportionate to the cerebral sulci. No acute loss of gray-white  matter differentiation.    No acute osseous abnormality. No paranasal sinus mucosal thickening.  Mastoid air cells are clear. The orbits are grossly unremarkable.       Impression    IMPRESSION:  No acute intracranial abnormality.    Stable posttreatment changes of the right cavernous ICA.    [Result: No acute abnormality on CT head]    Provider AC NORIEGA discussed results over the phone with  Dr. Carpenter at 9/23/2024 2:35 PM and verbalized understanding of the  result.    LEROY CARPENTER MD         SYSTEM ID:  [id number]   CTA Head Neck with Contrast    Narrative    EXAM: CTA HEAD NECK W CONTRAST, 9/23/2024 2:19 PM    HISTORY: Code Stroke to evaluate for  potential thrombolysis and  thrombectomy. PLEASE READ IMMEDIATELY. Vision change    COMPARISON: Cerebral angiogram 9/17/2024; CT head 6/27/2024; MRA head  and neck 6/27/2024    TECHNIQUE:   Imaging protocol:    CTA Head and Neck: Following the administration of intravenous  contrast, thin helical CT angiography images of the brain were  obtained.  Postcontrast helical thin CT angiography images of the neck  were obtained.  NASCET criteria were applied. Source, multiplanar and  MIP reformatted images were reviewed.    Meds given: Isovue 370, 75.  Acquisition: This CT exam was performed using one or more the  following dose reduction techniques: automated exposure control,  adjustment of the mA and/or kV according to patient size, and/or  iterative reconstruction technique.  Processing: 3D rendering on independent workstation using Maximum  Intensity Projection (MIP) was performed and archived to PACS. 3D  reconstructions are interpreted and reported by supervising  radiologist.    FINDINGS:    CTA Brain:    Head CTA demonstrates no vascular malformation. Right cavernous ICA  posttreatment changes with possible in-stent stenosis. Intraluminal  filling is difficult to characterize due to artifact. Minimal filling  of treated right cavernous ICA aneurysm neck measuring up to 3 x 2 mm.  Irregular saccular aneurysm of the left cavernous ICA measuring 3 x  1.5 mm (series 5 images 228-229). Left terminal ICA P-comm aneurysm  versus infundibulum measuring 3 x 2 mm (series 5 image 215).    The A1 anterior cerebral arteries are patent. Suspected fenestrated  anterior communicating artery. The anterior communicating artery is  within normal limits. The distal anterior cerebral arteries are within  normal limits. The left and right middle cerebral arteries are patent  and demonstrate no significant focal stenosis.     The posterior communicating arteries are patent.    The basilar and vertebral arteries are within normal limits.  The PCA  branches demonstrate no focal abnormalities.    CTA Neck:    Visualized aorta is nondilated and demonstrates no acute abnormality.  The origins of the great vessels from the aortic arch are patent. The  innominate and bilateral subclavian arteries are grossly patent.    The common carotid arteries demonstrate no hemodynamically significant  stenosis. Calcified atherosclerotic plaque at the carotid bifurcations  without hemodynamically significant stenosis. The distal bilateral  internal carotid arteries demonstrate no evidence of flow-limiting  stenosis or occlusion.    Neither vertebral artery is dominant. There is no evidence of  flow-limiting stenosis or occlusion of the vertebral arteries.    No mass or pneumothorax is seen at the apices. Moderate centrilobular  emphysematous changes. Multilevel degenerative changes are seen in the  cervical spine.      Impression    IMPRESSION:     CTA head: No acute flow-limiting stenosis of the major intracranial  arteries. Right cavernous ICA posttreatment changes with possible  in-stent stenosis. Minimal filling of treated right cavernous ICA  aneurysm neck. Stable left ICA aneurysm(s).    CTA neck: No acute flow-limiting stenosis of the major cervical  arteries.    [Result: No acute abnormality on CTA head and neck; posttreatment  changes of the right ICA flow diversion with possible in-stent  stenosis, recommend neuro IR consult]    Provider AC NORIEGA discussed results over the phone with  Dr. Carpenter at 9/23/2024 2:35 PM and verbalized understanding of the  result.    LEROY CARPENTER MD         SYSTEM ID:  [id number]   CT Head Perfusion w Contrast - For Tier 2 Stroke    Narrative    EXAM: CT HEAD PERFUSION W CONTRAST 9/23/2024 2:33 PM    PROVIDED HISTORY: Code Stroke to evaluate for potential thrombolysis  and thrombectomy. Evaluate mismatch between penumbra and core infarct.  READ IMMEDIATELY. Large vessel occlusion    COMPARISON: None    TECHNIQUE:   CT  BRAIN PERFUSION: Dynamic perfusion CT of the brain was performed at  multiple levels after administration of intravenous contrast; Image  processing was performed by RAPID AI for the generation of color rCBF,  rCBV, Tmax, and MTT.     CONTRAST: 40 ml isovue 370    FINDINGS:     CT Perfusion:   Images documenting relative cerebral blood volume, cerebral blood flow  and mean transit time demonstrate no evidence of ischemia or acute  infarction. There is normal perfusion of the brain at the visualized  levels.      Impression    IMPRESSION:   No evidence of ischemia or acute infarction on the CT Perfusion  examination.    LEROY OLVERA MD         SYSTEM ID:  [id number]       Medications   HYDROmorphone (PF) (DILAUDID) injection 0.5 mg (0.5 mg Intravenous $Given 9/23/24 1550)   gadoterate meglumine (DOTAREM) injection 20 mL (has no administration in time range)   iopamidol (ISOVUE-370) solution 117 mL (115 mLs Intravenous $Given 9/23/24 1407)     And   sodium chloride 0.9 % bag for CT scan flush use (140 mLs As instructed $Given 9/23/24 1408)   sodium chloride 0.9% BOLUS 1,000 mL (1,000 mLs Intravenous $New Bag 9/23/24 1808)       Assessments & Plan (with Medical Decision Making)  Jose Elias Reaves is a 54 year old male here with headache, platt of light in his visual fields, unsteady gait and tinnitus.    He was seen Sept 17 at the [facility] for an intracranial stent and aneurysm treatment. They could not get the right ICA stent in place and in the process apparently he had a stroke. [place] [facility] [place] [facility] in [place]. They agreed that he had a stroke and he was treated and discharged on Sept 21, two days ago.  At that time his vision had stabilized with spots and dark areas in his visual fields. Today he had some new symptoms.   He states that he is taking his Eliquis and aspirin, last dose this morning.  VS in the ED Patient Vitals for the past 24 hrs:   BP Temp Temp src Pulse Resp SpO2 Height Weight  "  09/23/24 1800 105/80 -- -- 58 -- 98 % -- --   09/23/24 1745 (!) 79/53 -- -- 60 -- 98 % -- --   09/23/24 1730 97/65 -- -- 53 -- (!) 84 % -- --   09/23/24 1715 94/70 -- -- 64 -- 98 % -- --   09/23/24 1700 99/71 -- -- 66 -- 98 % -- --   09/23/24 1645 104/75 -- -- 70 -- 98 % -- --   09/23/24 1630 100/78 -- -- 67 -- -- -- --   09/23/24 1615 113/76 -- -- 68 -- -- -- --   09/23/24 1600 107/81 -- -- 59 -- 99 % -- --   09/23/24 1545 118/86 -- -- 64 -- 98 % -- --   09/23/24 1530 115/78 -- -- 71 -- 97 % -- --   09/23/24 1500 -- -- -- 76 (!) 40 (!) 86 % -- --   09/23/24 1445 (!) 116/93 -- -- 66 22 100 % -- --   09/23/24 1430 (!) 153/109 -- -- 66 15 100 % -- --   09/23/24 1415 (!) 138/93 -- -- 63 18 98 % -- --   09/23/24 1400 (!) 160/101 -- -- 63 23 100 % -- --   09/23/24 1347 (!) 153/92 97.3  F (36.3  C) Tympanic 66 20 100 % 1.803 m (5' 11\") 101.2 kg (223 lb)     Exam shows abnormal vision, which the patient tells me is stable from his prior two hospitalizations.   We gave IVF and Dilaudid.  Labs show CBC okay, BMP okay, INR normal, pTT normal, troponin 16.   CT head stable.  CTA head: No acute flow-limiting stenosis of the major intracranial arteries. Right cavernous ICA posttreatment changes with possible in-stent stenosis. Minimal filling of treated right cavernous ICA aneurysm neck. Stable left ICA aneurysm(s).   CTA neck: No acute flow-limiting stenosis of the major cervical arteries.  CT Perfusion: No evidence of ischemia or acute infarction on the CT Perfusion examination.  2:32 PM  Dr Carpenter, radiology, called: right ICA around the area of the stent is abnormal with some likely stenosis. Treated aneurysm has minimal filling and other aneurysms noted.  He recommends Neuro IR look at this.   3:00 PM  Stroke Neuro feels there is in-stent stenosis and Neuro IR has not commented yet.    3:21 PM  Stroke Neuro has de-escalated the Code Stroke.   8 PM  I am signing out his care to Dr Garza at change of shift.  Jose Elias is " in the MRI.         I have reviewed the nursing notes.    I have reviewed the findings, diagnosis, plan and need for follow up with the patient.  Medical Decision Making  The patient's presentation was of moderate complexity (an undiagnosed new problem with uncertain prognosis).    The patient's evaluation involved:  an assessment requiring an independent historian (see separate area of note for details)  review of external note(s) from 3+ sources (see separate area of note for details)  review of 3+ test result(s) ordered prior to this encounter (see separate area of note for details)  ordering and/or review of 3+ test(s) in this encounter (see separate area of note for details)    The patient's management necessitated further care after sign-out to Dr Garza (see their note for further management).      Final diagnoses:   Stenosis of peripheral vascular stent, initial encounter (H24) - right ICA   Nonruptured cerebral aneurysm   Saccular aneurysm   Vision changes       9/23/2024   [facility] AND [facility], Galileo Bedolla MD  09/23/24 1931

## 2024-09-23 NOTE — ED TRIAGE NOTES
"ED Nursing Triage Note (General)   ________________________________    Jose Elias MARCOS Reaves is a 54 year old Male that presents to triage via private vehicle with complaints of tinnitus, headache,and nausea.  When asked when sx started, patient states, \"yesterday when I called the triage nurse and they told me to come in\".  Patient states he called the clinic this morning to get an apt and was told to come to the ER. Patient was admitted in [facility] on the 20th with confirmed strokes.  Patient states, \"I joão fibbed to them so they'd let me out of residential but I've been getting worse.  Its like residential there, I dont want to go back there, I want you to take care of me here\".  Patient continues to have vision changes in the R) eye as well.   Significant symptoms had onset 24 hour(s) ago.  Vital signs:  Temp: 97.3  F (36.3  C) Temp src: Tympanic BP: (!) 153/92 Pulse: 66   Resp: 20 SpO2: 100 %     Height: 180.3 cm (5' 11\") Weight: 101.2 kg (223 lb)  Estimated body mass index is 31.1 kg/m  as calculated from the following:    Height as of this encounter: 1.803 m (5' 11\").    Weight as of this encounter: 101.2 kg (223 lb).  PRE HOSPITAL PRIOR LIVING SITUATION Alone      Triage Assessment (Adult)       Row Name 09/23/24 7619          Triage Assessment    Airway WDL WDL        Respiratory WDL    Respiratory WDL WDL        Skin Circulation/Temperature WDL    Skin Circulation/Temperature WDL WDL        Cardiac WDL    Cardiac WDL WDL     Cardiac Rhythm NSR        Peripheral/Neurovascular WDL    Peripheral Neurovascular WDL WDL        Cognitive/Neuro/Behavioral WDL    Cognitive/Neuro/Behavioral WDL WDL                     "

## 2024-09-23 NOTE — CONSULTS
[facility]    Stroke Telephone Note    I was called by Dr. Galileo Conn on 09/23/24 regarding patient Jose Elias Reaves. The patient is a 54 year old male with a PMH significant for hypertension, hyperlipidemia, personality disorder, alcohol abuse, hypertension, tobacco use, TBI, recent (9/17) right cavernous 11 mm aneurysm FREDx flow diverter placement with angioplasty, failed additional stenting attempts and retrieval of that flow diverter.  History obtained from ED provider as well as from chart review.    Post aneurysm procedure patient reported new black spots in different visual fields and repeat CT head revealed possibility of small air embolus. The next morning (9/18) he reported persistent black spot/visual deficit of the right eye in the right inferior quadrant. MRI brain imaging, PT, OT was recommended but patient left AGAINST MEDICAL ADVICE.  Neuroendovascular team recommended prasugrel and naproxen. Prescription was sent to his local pharmacy which he did not  due to cost.      The next day on 9/19 he presented to an outside hospital due to persistent symptoms of vision loss and posterior headache. MRI brain revealed multiple tiny areas of acute infarct within the right cerebral hemisphere consistent with embolic shower, no associated hemorrhage.  He was placed on Eliquis 5 mg twice daily and aspirin. Prasugrel was discontinued.    Manuel then called nursing line and reported continued headache.  Today Manuel presented with persistent headache, tinnitus, unsteady gait which has been present for several days.  He also notes new platt of light in his vision and right-sided neck pain. On ED providers examination, no extremity weakness appreciated, no speech changes, but patient reports central vision is blurred.  Presenting blood pressure was 153/72.  He reportedly took aspirin and Eliquis this morning. He reportedly has been compliant with his medications.     Vitals  BP: (!)  153/92   Pulse: 66   Resp: 20   Temp: 97.3  F (36.3  C)   Weight: 101.2 kg (223 lb)    Stroke Code Data (for stroke code without tele)  Stroke code activated 09/23/24  1354   Stroke provider first response 09/23/24  1359   Last known normal       Unknown   Time of discovery (or onset of symptoms)  (Unknown)      Head CT read by Stroke Neuro Provider 09/23/24  140   Was stroke code de-escalated? Yes  09/23/24        Imaging Findings  CT head: No evidence of hemorrhage.   CTA head: No acute flow-limiting stenosis of the major intracranial arteries. Right cavernous ICA posttreatment changes with possible in-stent stenosis. Minimal filling of treated right cavernous ICA aneurysm neck. Stable left ICA aneurysm(s).  CTA neck: No acute flow-limiting stenosis of the major cervical arteries.  CT Perfusion: No perfusion deficit.     Intravenous Thrombolysis  Not given due to:   - head trauma/stroke within the past 3 months  - DOAC dose within 48 hours or INR > 1.7  - unclear or unfavorable risk-benefit profile for extended window thrombolysis beyond the conventional 4.5 hour time window    Endovascular Treatment  Not initiated due to absence of proximal vessel occlusion    Impression  53 yo male with recent right cavernous 11 mm aneurysm FREDx flow diverter placement with angioplasty, failed additional stenting attempts and retrieval of that flow diverter and recent scattered right hemispheric infarcts.  Now presenting with persistent blurred central vision, spark/light changes in his vision, tinnitus and headache that has been ongoing > 48 hours.    Recommendations   - brain MRI with and without contrast; please page stroke neurology once completed for further recommendations  - Continue PTA Eliquis and Aspirin  - Discussed patient with neuro endovascular team, likely will need sooner follow-up with neuro IR  - Recommend symptomatic management of headache per primary team     Case discussed with vascular neurology attending  "Dr. Tovar.    My recommendations are based on the information provided over the phone by Jose Elias Reaves's in-person providers. They are not intended to replace the clinical judgment of his in-person providers. I was not requested to personally see or examine the patient at this time.     Cristela Vizcaino PA-C  Vascular Neurology    To page me or covering stroke neurology team member, click here: AMCOM  Choose \"On Call\" tab at top, then select \"NEUROLOGY/ALL SITES\" from middle drop-down box, press Enter, then look for \"stroke\" or \"telestroke\" for your site.   "

## 2024-09-24 NOTE — PROGRESS NOTES
"Vascular neurology note     MRI brain is done and showed multiple punctate subacute stroke in the R ICA territory. The number of lesions is less than what was detected on prior MRI done 4 days ago and all lesions detected on today's exam were noted on prior exam. No intracranial hemorrhage.     No concern for a new or acute process.   Suspect post-angiography headache.   Recommend symptomatic treatment.   Ok to discharge from ED.   Follow up outpatient in a couple of weeks as planned.     Junaa Allen MD, Msc, FAHA, FAAN   of Neurology  [facility]     09/23/2024 9:42 PM  To page me or covering stroke neurology team member, click here: AMCOM  Choose \"On Call\" tab at top, then search dropdown box for \"Neurology Adult\" & press Enter, look for Neuro ICU/Stroke  "

## 2024-09-24 NOTE — ED NOTES
Patient discharged to home. Required much education about stroke sx and when to come back to ED. Able to teach back information to RN.

## 2024-09-24 NOTE — ED PROVIDER NOTES
[facility]  Emergency Department Sign Out Note      Transfer of care from Dr. Conn. See separate Emergency Department note.    Assessment and Plan:  The patient was evaluated by the previous provider for possible stroke.  MRI was negative for new or worsening ischemic stroke or SAH.  I spoke with Dr. Juana Allen who reviewed the case from stroke Neurology.   He felt that he can discharge home as there are no acute changes on his imaging and his symptoms are most likely from post-procedure lingering symptoms.    ED Course as of 09/23/24 2200   Mon Sep 23, 2024   1521 1521 -De-escalate per stroke neurology   2039 MRI showing no new infarct, awaiting review by Neuro IR, Stroke Neuro       Diagnosis  1. Stenosis of peripheral vascular stent, initial encounter (H24)    2. Nonruptured cerebral aneurysm    3. Saccular aneurysm    4. Vision changes        Disposition:  Discharge  MD Kayla Edmond Melissa, MD  09/23/24 [id number]

## 2024-09-24 NOTE — DISCHARGE INSTRUCTIONS
If new symptoms develop please return to the emergency department.  Continue to work on smoking cessation, anti-inflammatory diet and gradual return to normal mental stimulation to minimized side effects from your procedure.

## 2024-09-27 ENCOUNTER — MYC MEDICAL ADVICE (OUTPATIENT)
Dept: INTERNAL MEDICINE | Facility: OTHER | Age: 54
End: 2024-09-27

## 2024-09-27 ENCOUNTER — OFFICE VISIT (OUTPATIENT)
Dept: INTERNAL MEDICINE | Facility: OTHER | Age: 54
End: 2024-09-27
Payer: MEDICAID

## 2024-09-27 VITALS
TEMPERATURE: 97.4 F | HEART RATE: 60 BPM | OXYGEN SATURATION: 100 % | SYSTOLIC BLOOD PRESSURE: 136 MMHG | BODY MASS INDEX: 31.58 KG/M2 | RESPIRATION RATE: 16 BRPM | DIASTOLIC BLOOD PRESSURE: 82 MMHG | WEIGHT: 226.4 LBS

## 2024-09-27 DIAGNOSIS — M54.2 CERVICALGIA: ICD-10-CM

## 2024-09-27 DIAGNOSIS — Z72.0 TOBACCO ABUSE: ICD-10-CM

## 2024-09-27 DIAGNOSIS — F17.200 NICOTINE DEPENDENCE, UNCOMPLICATED, UNSPECIFIED NICOTINE PRODUCT TYPE: ICD-10-CM

## 2024-09-27 DIAGNOSIS — I10 ESSENTIAL HYPERTENSION: ICD-10-CM

## 2024-09-27 DIAGNOSIS — Z91.199 PERSONAL HISTORY OF NONCOMPLIANCE WITH MEDICAL TREATMENT, PRESENTING HAZARDS TO HEALTH: Primary | ICD-10-CM

## 2024-09-27 DIAGNOSIS — I67.1 ANEURYSM OF CAVERNOUS PORTION OF RIGHT INTERNAL CAROTID ARTERY: ICD-10-CM

## 2024-09-27 DIAGNOSIS — F60.9 PERSONALITY DISORDER (H): ICD-10-CM

## 2024-09-27 DIAGNOSIS — I63.9 RIGHT SIDED CEREBRAL HEMISPHERE CEREBROVASCULAR ACCIDENT (CVA) (H): ICD-10-CM

## 2024-09-27 DIAGNOSIS — G89.4 CHRONIC PAIN SYNDROME: ICD-10-CM

## 2024-09-27 LAB
ATRIAL RATE - MUSE: 65 BPM
DIASTOLIC BLOOD PRESSURE - MUSE: NORMAL MMHG
INTERPRETATION ECG - MUSE: NORMAL
P AXIS - MUSE: NORMAL DEGREES
PR INTERVAL - MUSE: NORMAL MS
QRS DURATION - MUSE: 102 MS
QT - MUSE: 422 MS
QTC - MUSE: 435 MS
R AXIS - MUSE: 73 DEGREES
SYSTOLIC BLOOD PRESSURE - MUSE: NORMAL MMHG
T AXIS - MUSE: 60 DEGREES
VENTRICULAR RATE- MUSE: 64 BPM

## 2024-09-27 RX ORDER — METAXALONE 800 MG/1
800 TABLET ORAL 3 TIMES DAILY
Qty: 90 TABLET | Refills: 4 | Status: SHIPPED | OUTPATIENT
Start: 2024-09-27

## 2024-09-27 RX ORDER — AMLODIPINE BESYLATE 5 MG/1
5 TABLET ORAL DAILY
Qty: 90 TABLET | Refills: 4 | Status: SHIPPED | OUTPATIENT
Start: 2024-09-27

## 2024-09-27 RX ORDER — LOSARTAN POTASSIUM AND HYDROCHLOROTHIAZIDE 25; 100 MG/1; MG/1
1 TABLET ORAL DAILY
Qty: 90 TABLET | Refills: 4 | Status: SHIPPED | OUTPATIENT
Start: 2024-09-27

## 2024-09-27 ASSESSMENT — ENCOUNTER SYMPTOMS
NECK PAIN: 1
HEADACHES: 1

## 2024-09-27 ASSESSMENT — PAIN SCALES - GENERAL: PAINLEVEL: EXTREME PAIN (9)

## 2024-09-27 NOTE — NURSING NOTE
"Patient presents to the clinic today for a follow up for stenosis of peripheral vascular stent    Initial There were no vitals taken for this visit. Estimated body mass index is 31.1 kg/m  as calculated from the following:    Height as of 9/23/24: 1.803 m (5' 11\").    Weight as of 9/23/24: 101.2 kg (223 lb).  Meds Reconciled: complete      Cate Ring LPN,LPN on 9/27/2024 at 9:06 AM  Ext. [phone]        Cate Ring LPN  "

## 2024-09-27 NOTE — TELEPHONE ENCOUNTER
Forwarded to clinic .      Patient is meeting with financial advocates at [facility] on 9/30.     Nanette Royal RN on 9/27/2024 at 2:41 PM

## 2024-09-27 NOTE — PATIENT INSTRUCTIONS
Nicotine Chewing Gum (NICOTINE GUM - BUCCAL)  For quitting smoking.  Brand Name(s): Nicorelief, Nicorette, Thrive  Generic Name: Nicotine  Instructions  Chew the gum when you feel the urge to smoke. Chew very slowly until it tingles, then move it to the space between your cheek and gum. Keep it there until it stops tingling. When the tingle is gone, begin chewing the gum again until the tingle returns. Most of the nicotine will be gone after 30 minutes.  Do not eat or drink for 15 minutes before or during use of the gum.  Store at room temperature away from heat, light, and moisture. Do not keep in the bathroom.  Tell your doctor and pharmacist about all your medicines. Include prescription and over-the-counter medicines, vitamins, and herbal medicines.  Keep using this medicine for the full number of days that it is prescribed. Do not stop the medicine even if you start to feel better.  This medicine contains sodium. If you are on a low sodium diet, consider this as part of your total sodium diet.  If you have been told to follow a low sodium diet, speak to your doctor before using this medicine.  Do not take the medicine more than 24 times during 24 hours.  Cautions  Tell your doctor and pharmacist if you ever had an allergic reaction to a medicine.  Do not use the medication any more than instructed.  Avoid smoking while on this medicine. Smoking may increase your risk for stroke, heart attack, blood clots, high blood pressure, and other diseases of the heart and blood vessels.  Tell the doctor or pharmacist if you are pregnant, planning to be pregnant, or breastfeeding.  Please tell all your doctors and dentists that you are on this medicine before they provide care.  Side Effects  The following is a list of some common side effects from this medicine. Please speak with your doctor about what you should do if you experience these or other side effects.  jaw pain  irritation of mouth and gum tissue  If you have  any of the following side effects, you may be getting too much medicine. Please contact your doctor to let them know about these side effects.  diarrhea  dizziness  rapid heartbeat  nausea and vomiting  weakness  A few people may have an allergic reaction to this medicine. Symptoms can include difficulty breathing, skin rash, itching, swelling, or severe dizziness. If you notice any of these symptoms, seek medical help quickly.  Please speak with your doctor, nurse, or pharmacist if you have any questions about this medicine.  IMPORTANT NOTE: This document tells you briefly how to take your medicine, but it does not tell you all there is to know about it. Your doctor or pharmacist may give you other documents about your medicine. Please talk to them if you have any questions. Always follow their advice.  There is a more complete description of this medicine available in English. Scan this code on your smartphone or tablet or use the web address below. You can also ask your pharmacist for a printout. If you have any questions, please ask your pharmacist.  The display and use of this drug information is subject to Terms of Use.  More information about NICOTINE GUM - BUCCAL      Copyright(c) 2024 [employer].  Selected from data included with permission and copyright by [employer]. This copyrighted material has been downloaded from a licensed data provider and is not for distribution, except as may be authorized by the applicable terms of use.  Conditions of Use: The information in this database is intended to supplement, not substitute for the expertise and judgment of healthcare professionals. The information is not intended to cover all possible uses, directions, precautions, drug interactions or adverse effects nor should it be construed to indicate that use of a particular drug is safe, appropriate or effective for you or anyone else. A healthcare professional should be consulted before taking any  drug, changing any diet or commencing or discontinuing any course of treatment. The display and use of this drug information is subject to express Terms of Use.  Care instructions adapted under license by your healthcare professional. If you have questions about a medical condition or this instruction, always ask your healthcare professional. [employer] disclaims any warranty or liability for your use of this information.

## 2024-09-27 NOTE — PROGRESS NOTES
Preoperative Evaluation  [facility]  [address]  [place] [address]  Phone: [phone]  Fax: [phone]  Primary Provider: ANATOLY Chow CNP  Pre-op Performing Provider: ANATOLY Chow CNP  Sep 27, 2024   {Provider  Link to PREOP SmartSet  REQUIRED to apply standard patient instructions and medication directions to the AVS :937476}  {ROOMER review and update patient entered surgical information if needed :821202}  { After Pre-op is completed, use lists to pull documentation into note Link to complete Pre-Op    :473743}  {Pull Surgical Information into note after flowsheet completed:070148}  Fax number for surgical facility: {:195578}    {Provider Charting Preference for Preop :139928}    Subjective   Manuel is a 54 year old, presenting for the following:  Pre-Op Exam (Stenosis of peripheral vascular stent )          9/27/2024     9:05 AM   Additional Questions   Roomed by Cate CARTER     HPI related to upcoming procedure: ***  {Pull Pre-Op Questionnaire into note after flowsheet completed:828210}  Health Care Directive  Patient does not have a Health Care Directive or Living Will: {ADVANCE_DIRECTIVE_STATUS:369682}    Preoperative Review of   {Mnpmpreport:900919}  {Review MNPMP for all patients per ICSI MNPMP Profile:688489}    {Chronic problem details (Optional) :826594}    Patient Active Problem List    Diagnosis Date Noted    Nonruptured cerebral aneurysm 09/17/2024     Priority: Medium    Saccular aneurysm 07/10/2024     Priority: Medium     Large saccular 11 mm right cavernous ICA aneurysm. 6/2024  Small saccular 2 mm right PCA aneurysm. 6/2024      Prediabetes 08/22/2023     Priority: Medium    Cigarette smoker 03/20/2019     Priority: Medium    Cervicalgia 12/26/2018     Priority: Medium    Other chronic rhinitis 02/20/2018     Priority: Medium    Mild intermittent asthma 02/20/2018     Priority: Medium    Deviated nasal septum 02/20/2018     Priority:  Medium    Tobacco abuse 02/20/2018     Priority: Medium    Mixed hyperlipidemia 04/18/2017     Priority: Medium    Chronic pain syndrome - Neck 04/18/2017     Priority: Medium    Essential hypertension 03/21/2017     Priority: Medium    Family history of ischemic heart disease 03/21/2017     Priority: Medium    Family history of diabetes mellitus 03/21/2017     Priority: Medium    Personality disorder (H) 05/05/2012     Priority: Medium      Past Medical History:   Diagnosis Date    Cervical strain 01/03/2019    Closed head injury with concussion 12/12/2018    Concussion with brief LOC 12/12/2018    Concussion with brief LOC 12/12/2018    Hypertension     Impingement of right ulnar nerve 03/16/2020    Post concussion syndrome 03/26/2019    Post-concussion headache 12/26/2018    Vestibular dysfunction 12/26/2018    Vision disturbance 12/26/2018     Past Surgical History:   Procedure Laterality Date    HERNIA REPAIR Left     IR CAROTID CEREBRAL ANGIOGRAM BILATERAL  9/17/2024    KNEE SURGERY Left     ACL repair, patella graft    SEPTOPLASTY N/A 3/13/2018    Procedure: SEPTOPLASTY;  septoplasty, submucosal resection of the turbinates;  Surgeon: Piotr Quiroz MD;  Location:  OR     Current Outpatient Medications   Medication Sig Dispense Refill    acetaminophen (TYLENOL) 325 MG tablet Take 3 tablets (975 mg) by mouth daily. 300 tablet 3    albuterol (PROAIR HFA/PROVENTIL HFA/VENTOLIN HFA) 108 (90 Base) MCG/ACT inhaler Inhale 2 puffs into the lungs every 4 hours as needed for shortness of breath or wheezing 18 g 11    albuterol (PROVENTIL) (2.5 MG/3ML) 0.083% neb solution Take 1 vial (2.5 mg) by nebulization every 6 hours as needed for shortness of breath, wheezing or cough 90 mL 5    amLODIPine (NORVASC) 5 MG tablet Take 1 tablet (5 mg) by mouth daily 90 tablet 4    apixaban ANTICOAGULANT (ELIQUIS) 5 MG tablet Take 5 mg by mouth 2 times daily.      aspirin (ASA) 81 MG chewable tablet Take 81 mg by mouth daily.       atorvastatin (LIPITOR) 40 MG tablet Take 1 tablet (40 mg) by mouth daily. 90 tablet 4    fluticasone-salmeterol (ADVAIR) 500-50 MCG/ACT inhaler Inhale 1 puff into the lungs every 12 hours 60 each 11    lamoTRIgine (LAMICTAL) 100 MG tablet Take 0.5 tablets (50 mg) by mouth 2 times daily for 7 days, THEN 1 tablet (100 mg) 2 times daily for 90 days. - For Irritability / Anger 187 tablet 4    losartan-hydrochlorothiazide (HYZAAR) 100-25 MG tablet Take 1 tablet by mouth daily 90 tablet 4    MEDS UNK - RECORDS REQUESTED For cholesterol      methylPREDNISolone (MEDROL DOSEPAK) 4 MG tablet therapy pack Follow Package Directions 21 tablet 0    naproxen (NAPROSYN) 500 MG tablet TAKE 1 TABLET(500 MG) BY MOUTH TWICE DAILY AS NEEDED FOR PAIN 90 tablet 2    Omega-3 Fish Oil 500 MG capsule Take 1 capsule (500 mg) by mouth daily 90 capsule 4    prasugrel (EFFIENT) 5 MG TABS tablet Take 6 tabs (30 mg) once, 5 days prior to your procedure, then take 1 tab daily thereafter. 30 tablet 2    propranolol ER (INDERAL LA) 120 MG 24 hr capsule Take 1 capsule (120 mg) by mouth daily. 30 capsule 2    tiotropium (SPIRIVA) 18 MCG inhaled capsule Inhale 1 capsule (18 mcg) into the lungs daily 90 capsule 4    valACYclovir (VALTREX) 1000 mg tablet Take 1 tablet (1,000 mg) by mouth 3 times daily for 7 days 21 tablet 0       Allergies   Allergen Reactions    Penicillins Anaphylaxis     Tolerated cefazolin on 09/16/2015.    Clindamycin Hives    Gabapentin      Mood disturbance         Social History     Tobacco Use    Smoking status: Every Day     Current packs/day: 1.00     Average packs/day: 1 pack/day for 42.7 years (42.7 ttl pk-yrs)     Types: Cigarettes     Start date: 1/1/1982    Smokeless tobacco: Never    Tobacco comments:     2-3 cigarettes per day    Substance Use Topics    Alcohol use: Yes     Alcohol/week: 2.0 standard drinks of alcohol     Types: 2 Cans of beer per week     Comment: 2-4 drinks a week     {FAMILY HISTORY  "(Optional):826620273}  History   Drug Use No           {ROS Picklists (Optional):408277}    Objective    There were no vitals taken for this visit.   Estimated body mass index is 31.1 kg/m  as calculated from the following:    Height as of 24: 1.803 m (5' 11\").    Weight as of 24: 101.2 kg (223 lb).  Physical Exam  {Exam List :733666}    Recent Labs   Lab Test 24  1402 24  0533 24  1230 24  1119 07/10/24  1108   HGB 13.5  --  16.3   < >  --    *  --  445   < >  --    INR 0.95  --  0.90  --   --     138 137   < >  --    POTASSIUM 4.5 3.9 4.0   < >  --    CR 1.07 0.94 1.33*   < >  --    A1C  --   --   --   --  5.6    < > = values in this interval not displayed.        Diagnostics  {LABS:290920}   {EK}    Revised Cardiac Risk Index (RCRI)  The patient has the following serious cardiovascular risks for perioperative complications:  {PREOP REVISED CARDIAC RISK INDEX (RCRI) :277719}     RCRI Interpretation: {REVISED CARDIAC RISK INTERPRETATION :055786}         Signed Electronically by: ANATOLY Chow CNP  A copy of this evaluation report is provided to the requesting physician.    {Provider Resources  Preop [facility] Preop Guidelines  Revised Cardiac Risk Index :281827}   {Email feedback regarding this note to [email or web address]   :044271}  "

## 2024-09-27 NOTE — PROGRESS NOTES
Assessment & Plan     ICD-10-CM    1. Personal history of noncompliance with medical treatment, presenting hazards to health  Z91.199       2. 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 Right sided cerebral hemisphere cerebrovascular accident (CVA) (H)  I63.9       4. Tobacco abuse  Z72.0 SMOKING CESSATION COUNSELING, 3-10 MIN      5. Essential hypertension  I10 amLODIPine (NORVASC) 5 MG tablet     losartan-hydrochlorothiazide (HYZAAR) 100-25 MG tablet      6. Chronic pain syndrome  G89.4 metaxalone (SKELAXIN) 800 MG tablet      7. Cervicalgia  M54.2 metaxalone (SKELAXIN) 800 MG tablet      8. Nicotine dependence, uncomplicated, unspecified nicotine product type  F17.200 [place] Quit Partner Referral     nicotine polacrilex (NICORETTE) 4 MG gum     SMOKING CESSATION COUNSELING, 3-10 MIN      9. Personality disorder (H)  F60.9          S/P treatment of carotid cave aneurysm: Suffered CVA after leaving [place]- treated through . Continues to have postprocedural lingering visual changes- this has been stable. No new symptoms today. I emphasized the importance of continuing to follow up with specialists and follow medication adherence as recommended. He verbalized understanding of this.     HYPERTENSION - Ongoing. Blood pressure is currently well controlled.  Medication side effects: None. Denies syncope or presyncope.  Continue current medications - amlodipine, propranolol, hyzaar.   Medication list reviewed/updated. Refills completed as needed. Emphasized blood pressure goal is less than 130/80.     Personal history of noncompliance with medical treatment resulting in health hazards: We had a long conversation regarding this. Patient states he is motivated to get on the right track and follow recommendations. He would like to continue receiving care through [facility]. I spoke with social work today who will be reaching out to patient to help coordinate his care.  "Patient agreeable to this.     Counseled patient today on smoking cessation.    History   Smoking Status    Every Day    Types: Cigarettes   Smokeless Tobacco    Never        reports that he has been smoking cigarettes. He started smoking about 42 years ago. He has a 42.7 pack-year smoking history. He has never used smokeless tobacco.  Patient is ready to quit, open to education.    Provided information on quit partner website, discussed impact and health consequences of smoking.    Time spent: 3 minutes.     Total time: 45 minutes spent on the date of the encounter doing chart review, history and exam, documentation and further activities as noted above.    The longitudinal plan of care for the diagnosis(es)/condition(s) as documented were addressed during this visit. Due to the added complexity in care, I will continue to support Manuel in the subsequent management and with ongoing continuity of care.             MED REC REQUIRED  Post Medication Reconciliation Status:     BMI  Estimated body mass index is 31.58 kg/m  as calculated from the following:    Height as of 9/23/24: 1.803 m (5' 11\").    Weight as of this encounter: 102.7 kg (226 lb 6.4 oz).           No follow-ups on file.      ANATOLY Chow CNP  [facility]    Review of Systems   HENT:  Positive for tinnitus.    Eyes:  Positive for visual disturbance.   Musculoskeletal:  Positive for neck pain (chronic).   Neurological:  Positive for headaches (chronic,unchanged).   All other systems reviewed and are negative.          Subjective   Manuel is a 54 year old, presenting for the following health issues:    Patient presents to clinic for hospital/ER follow-up.  He is status post right cavernous carotid stent placement which was performed on 9/17/2024 at the [facility].  During his hospitalization the following day he started to develop visual changes and ended up leaving AMA before getting proper imaging to follow up on " new postprocedural visual changes.  He proceeded to have visual changes and ended up presenting to the [facility] ED within 24 hours of leaving [facility] and also did not  his Prasugrel in the meantime.  He was hypertensive and tachycardic in the ED and proceeded with a CT angio head/neck which confirmed multiple tiny areas of restricted diffusion throughout the right cerebral hemisphere consistent with embolic infarcts.  He was admitted overnight and started on aspirin/prasugel. He was discharged in stable condition and later presented to the ED on 9/23/24 with headache, platt of light in visual fields, unsteady gait and tinnitus. He had repeat imaging done and a consult was made to neurology without any new acute changes stating his imaging was likely from post-procedure lingering symptoms.     Patient continues to have similar symptoms without any changes. He would like to keep his care within the [facility] system and would like to make sure his appointments will continue with [facility]. He has many questions today, but does not have any new concerns.         RECHECK (Stenosis of peripheral vascular stent)    HPI       ED/ Followup:    Facility:  [facility]  Date of visit: 09/23/24  Reason for visit: ER follow up  Current Status:         Review of Systems        Objective    /82 (BP Location: Right arm, Patient Position: Sitting, Cuff Size: Adult Large)   Pulse 60   Temp 97.4  F (36.3  C) (Temporal)   Resp 16   Wt 102.7 kg (226 lb 6.4 oz)   SpO2 100%   BMI 31.58 kg/m    Body mass index is 31.58 kg/m .  Physical Exam  Vitals reviewed.   Constitutional:       General: He is not in acute distress.     Appearance: He is not ill-appearing or toxic-appearing.   Eyes:      Conjunctiva/sclera: Conjunctivae normal.      Pupils: Pupils are equal, round, and reactive to light.   Pulmonary:      Effort: Pulmonary effort is normal.   Neurological:      General: No focal deficit present.      Mental  Status: He is alert and oriented to person, place, and time. Mental status is at baseline.      Cranial Nerves: No cranial nerve deficit.      Motor: No weakness.      Coordination: Coordination normal.                    Signed Electronically by: ANATOLY Chow CNP

## 2024-10-01 ENCOUNTER — VIRTUAL VISIT (OUTPATIENT)
Dept: NEUROSURGERY | Facility: CLINIC | Age: 54
End: 2024-10-01
Payer: MEDICAID

## 2024-10-01 ENCOUNTER — TELEPHONE (OUTPATIENT)
Dept: NEUROSURGERY | Facility: CLINIC | Age: 54
End: 2024-10-01

## 2024-10-01 DIAGNOSIS — R51.9 ACUTE INTRACTABLE HEADACHE, UNSPECIFIED HEADACHE TYPE: Primary | ICD-10-CM

## 2024-10-01 DIAGNOSIS — R41.89 COGNITIVE CHANGE: ICD-10-CM

## 2024-10-01 PROCEDURE — 99441 PR PHYSICIAN TELEPHONE EVALUATION 5-10 MIN: CPT | Mod: 93 | Performed by: RADIOLOGY

## 2024-10-01 NOTE — TELEPHONE ENCOUNTER
Sent [employer] (1st Attempt) for the patient to call back and schedule the following:    Appointment type: Return Vascular Neurosurg  Provider:    Return date: around 10/29/24  Specialty phone number: [phone]  Additional appointment(s) needed: n/a  Additonal Notes: WQ appointment request

## 2024-10-01 NOTE — LETTER
10/1/2024       RE: Jose Elias Reaves  [address]  [place] [address]     Dear Colleague,    Thank you for referring your patient, Jose Elias Reaves, to the [facility] [place] at [facility]. Please see a copy of my visit note below.          [facility] [place]  [address]  [address]RD FLO[address]  [place] [facility] [address]  Phone: [phone]  Fax: [phone]    Neuroendovascular Clinic Note:    Reason for clinic visit: Unruptured aneurysm    History of present illness: Manuel Reaves is a 54 year old man with history of current cigarette smoking, HTN, HLD, and TBI who was incidentally found to have an unruptured right ICA cavernous segment 11 mm aneurysm. He has no personal history of aneurysm rupture. Two uncles reportedly passed in their sleep of aneurysm ruptures. He currently is trying to quit smoking. He is not using any prescription medications or nicotine supplements to attempt to quit due to cost. His hypertension is managed by his PCP. He is on three agents that he takes reliably, and typically his BP runs 130/80, but rarely when he is not feeling well, the systolic will be in the 190s. 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       Interval History:  Patient underwent attempted SHAUN X deployment however there was complication of deployment resulting in the SHAUN X being attempted to be retrieved and becoming situated within the internal carotid artery.  This was attempted to be retrieved within LVIS  stent but was not successful and he underwent angioplasty.  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    He has black and grey spots, and is getting headaches daily and having lightheadedness. The right eye dark grey spot is left lower corner it is the same, white grey spots  everywhere. He will sometimes have sparkly lights all over goes from left to right. He will have worsening of ringing in ears and headache that occurs afterwards that gain intensity quickly. The headaches haven't changed in morphology since onset after the surgery.   Right side is weaker.     He works as a  and has difficulty with seeing street signs.       Past Medical History:  Past Medical History:   Diagnosis Date     Cervical strain 01/03/2019     Closed head injury with concussion 12/12/2018     Concussion with brief LOC 12/12/2018     Concussion with brief LOC 12/12/2018     Hypertension      Impingement of right ulnar nerve 03/16/2020     Post concussion syndrome 03/26/2019     Post-concussion headache 12/26/2018     Vestibular dysfunction 12/26/2018     Vision disturbance 12/26/2018       Past Surgical History:  Past Surgical History:   Procedure Laterality Date     HERNIA REPAIR Left      IR CAROTID CEREBRAL ANGIOGRAM BILATERAL  9/17/2024     KNEE SURGERY Left     ACL repair, patella graft     SEPTOPLASTY N/A 3/13/2018    Procedure: SEPTOPLASTY;  septoplasty, submucosal resection of the turbinates;  Surgeon: Piotr Quiroz MD;  Location:  OR       Social History:  Social History     Socioeconomic History     Marital status:      Spouse name: Not on file     Number of children: Not on file     Years of education: Not on file     Highest education level: Not on file   Occupational History     Not on file   Tobacco Use     Smoking status: Every Day     Current packs/day: 1.00     Average packs/day: 1 pack/day for 42.7 years (42.7 ttl pk-yrs)     Types: Cigarettes     Start date: 1/1/1982     Smokeless tobacco: Never     Tobacco comments:     2-3 cigarettes per day    Vaping Use     Vaping status: Never Used   Substance and Sexual Activity     Alcohol use: Yes     Alcohol/week: 2.0 standard drinks of alcohol     Types: 2 Cans of beer per week     Comment: 2-4 drinks a week     Drug use: No      Sexual activity: Yes     Partners: Female   Other Topics Concern     Parent/sibling w/ CABG, MI or angioplasty before 65F 55M? Not Asked   Social History Narrative     Not on file     Social Determinants of Health     Financial Resource Strain: Unknown (9/17/2024)    Financial Resource Strain      Within the past 12 months, have you or your family members you live with been unable to get utilities (heat, electricity) when it was really needed?: Patient declined   Food Insecurity: Patient Declined (9/20/2024)    [facility]    Hunger Vital Sign      Worried About Running Out of Food in the Last Year: Patient declined      Ran Out of Food in the Last Year: Patient declined   Transportation Needs: Patient Declined (9/20/2024)    Received from [facility]    PRAPARE - Transportation      Lack of Transportation (Medical): Patient declined      Lack of Transportation (Non-Medical): Patient declined   Physical Activity: Not on file   Stress: Not on file   Social Connections: Not on file   Interpersonal Safety: Not At Risk (9/19/2024)    Received from [facility]      Do you feel UNSAFE in any of your personal relationships with your family members or any other acquaintances?: No   Housing Stability: Unknown (9/17/2024)    Housing Stability      Do you have housing? : Patient declined      Are you worried about losing your housing?: Patient declined       Family History:  Family History   Problem Relation Age of Onset     Diabetes Mother      Myocardial Infarction Father        Home Medications:  Current Outpatient Medications   Medication Sig Dispense Refill     acetaminophen (TYLENOL) 325 MG tablet Take 3 tablets (975 mg) by mouth daily. 300 tablet 3     albuterol (PROAIR HFA/PROVENTIL HFA/VENTOLIN HFA) 108 (90 Base) MCG/ACT inhaler Inhale 2 puffs into the lungs every 4 hours as  needed for shortness of breath or wheezing 18 g 11     albuterol (PROVENTIL) (2.5 MG/3ML) 0.083% neb solution Take 1 vial (2.5 mg) by nebulization every 6 hours as needed for shortness of breath, wheezing or cough 90 mL 5     amLODIPine (NORVASC) 5 MG tablet Take 1 tablet (5 mg) by mouth daily. 90 tablet 4     apixaban ANTICOAGULANT (ELIQUIS) 5 MG tablet Take 5 mg by mouth 2 times daily.       aspirin (ASA) 81 MG chewable tablet Take 81 mg by mouth daily.       atorvastatin (LIPITOR) 40 MG tablet Take 1 tablet (40 mg) by mouth daily. 90 tablet 4     fluticasone-salmeterol (ADVAIR) 500-50 MCG/ACT inhaler Inhale 1 puff into the lungs every 12 hours 60 each 11     lamoTRIgine (LAMICTAL) 100 MG tablet Take 0.5 tablets (50 mg) by mouth 2 times daily for 7 days, THEN 1 tablet (100 mg) 2 times daily for 90 days. - For Irritability / Anger 187 tablet 4     losartan-hydrochlorothiazide (HYZAAR) 100-25 MG tablet Take 1 tablet by mouth daily. 90 tablet 4     MEDS UNK - RECORDS REQUESTED For cholesterol       metaxalone (SKELAXIN) 800 MG tablet Take 1 tablet (800 mg) by mouth 3 times daily. 90 tablet 4     naproxen (NAPROSYN) 500 MG tablet TAKE 1 TABLET(500 MG) BY MOUTH TWICE DAILY AS NEEDED FOR PAIN 90 tablet 2     nicotine polacrilex (NICORETTE) 4 MG gum Place 1 each (4 mg) inside cheek every hour as needed for nicotine withdrawal symptoms. 240 each 1     Omega-3 Fish Oil 500 MG capsule Take 1 capsule (500 mg) by mouth daily 90 capsule 4     propranolol ER (INDERAL LA) 120 MG 24 hr capsule Take 1 capsule (120 mg) by mouth daily. 30 capsule 2     tiotropium (SPIRIVA) 18 MCG inhaled capsule Inhale 1 capsule (18 mcg) into the lungs daily 90 capsule 4     valACYclovir (VALTREX) 1000 mg tablet Take 1 tablet (1,000 mg) by mouth 3 times daily for 7 days 21 tablet 0     No current facility-administered medications for this visit.       Allergies:  Allergies   Allergen Reactions     Penicillins Anaphylaxis     Tolerated cefazolin on  09/16/2015.     Clindamycin Hives     Gabapentin      Mood disturbance        Physical Examination:  Telephone visit: Physical is limited due to telephone visit, patient is awake, conversational and has had no additional complaints or concerns.       Laboratory findings:  Reviewed    Imaging findings:  MRI and MRA brain and neck reviewed. Right ICA cavernous segment aneurysm does not appear based on this study to rise above the optic strut to make it intradural, but this cannot be determined definitively.    Impression:  Right internal carotid artery cavernous segment 11 mm aneurysm- risk of rupture is <1% over 5 years per ISUIA, and if rupture were to occur, intracranial hemorrhage is unlikely due to its location- carotid-cavernous fistula formation is more likely. That said, due to his non-optimal aneurysm rupture risk factors (uncontrolled HTN, current smoking, positive family history of aneurysm rupture), it is prudent to perform further testing to determine if this is in fact a carotid cave aneurysm that could cause subarachnoid hemorrhage. Additionally, his personal preference is for treatment if feasible due to the emotional nature of his family history and restrictions imposed on his ability to work based on the presence of this aneurysm, despite its exceedingly low rupture risk.  He underwent flow diverter placement which was complicated by inadequate placement which was attempted to be retrieved however failed, and LVIS Blue stent was then attempted to be placed but was not able to be placed correctly so angioplasty was performed instead.  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   He has noted headaches that worsens with activity.     Plan:  -Continue aspirin and Eliquis and repeat CTA head and neck in 6 months  -Return to clinic after repeat imaging  -Neurology referral to headache specialist for headaches   -Follow-up in 1 month   -Recommend continued PT/OT/ Speech    evaluation      Radha Anthony MD  Endovascular Surgical Neuroradiology Fellow  [facility]  [phone]    Endovascular Surgical Neuroradiology staff is Dr. Raza        Attestation signed by Octavio Raza MD at 10/8/2024  9:56 AM:  I agree with the above note by Dr. Anthony     on  10/01/24      Again, thank you for allowing me to participate in the care of your patient.      Sincerely,    Octavio Raza MD

## 2024-10-01 NOTE — LETTER
October 1, 2024    Jose Elias Reaves  [address]  [place] [address]      To whom it may concern:    Jose Elias Reaves is followed by Dr. Raza and it has been determined that he is unable to work at this time due to ongoing symptoms. Due to patients vision issues, lightheadedness, ringing in ears, headaches and right sided weakness he is unable to safely return to work at this time. Please do not hesitate to contact us with any questions.     Adilia Sepulveda RN  for Octavio Raza MD    Department of Radiology, Neurosurgery and Neurology  [facility]

## 2024-10-01 NOTE — NURSING NOTE
Current patient location: [address]  [place] [facility] [place] [address]    Is the patient currently in the state of [place]? YES    Visit mode:TELEPHONE    If the visit is dropped, the patient can be reconnected by: TELEPHONE VISIT: Phone number:   Telephone Information:   Mobile [phone]       Will anyone else be joining the visit? NO  (If patient encounters technical issues they should call [phone])    Are changes needed to the allergy or medication list? Pt stated no med changes    Are refills needed on medications prescribed by this physician? NO    Rooming Documentation:  Not applicable    Reason for visit: Telephone (1 month follow-up angio )    Elif BAH

## 2024-10-01 NOTE — PROGRESS NOTES
"Virtual Visit Details    Type of service:  Telephone Visit   Phone call duration: *** minutes   Originating Location (pt. Location): {patient location:523348::\"Home\"}  {PROVIDER LOCATION On-site should be selected for visits conducted from your clinic location or adjoining [facility] hospital, academic office, or other nearby [facility] building. Off-site should be selected for all other provider locations, including home:[id number]}  Distant Location (provider location):  {virtual location provider:[id number]}  "

## 2024-10-01 NOTE — PROGRESS NOTES
[facility] NEUROSURGERY CLINIC [address]  3RD FLOOR  [facility] [address]  Phone: [phone]  Fax: [phone]    Neuroendovascular Clinic Note:    Reason for clinic visit: Unruptured aneurysm    History of present illness: Manuel Reaves is a 54 year old man with history of current cigarette smoking, HTN, HLD, and TBI who was incidentally found to have an unruptured right ICA cavernous segment 11 mm aneurysm. He has no personal history of aneurysm rupture. Two uncles reportedly passed in their sleep of aneurysm ruptures. He currently is trying to quit smoking. He is not using any prescription medications or nicotine supplements to attempt to quit due to cost. His hypertension is managed by his PCP. He is on three agents that he takes reliably, and typically his BP runs 130/80, but rarely when he is not feeling well, the systolic will be in the 190s. 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       Interval History:  Patient underwent attempted SHAUN X deployment however there was complication of deployment resulting in the SHAUN X being attempted to be retrieved and becoming situated within the internal carotid artery.  This was attempted to be retrieved within LVIS  stent but was not successful and he underwent angioplasty.  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    He has black and grey spots, and is getting headaches daily and having lightheadedness. The right eye dark grey spot is left lower corner it is the same, white grey spots everywhere. He will sometimes have sparkly lights all over goes from left to right. He will have worsening of ringing in ears and headache that occurs afterwards that gain intensity quickly. The headaches haven't changed in morphology since onset after the surgery.   Right side is weaker.     He works as a   and has difficulty with seeing street signs.       Past Medical History:  Past Medical History:   Diagnosis Date    Cervical strain 01/03/2019    Closed head injury with concussion 12/12/2018    Concussion with brief LOC 12/12/2018    Concussion with brief LOC 12/12/2018    Hypertension     Impingement of right ulnar nerve 03/16/2020    Post concussion syndrome 03/26/2019    Post-concussion headache 12/26/2018    Vestibular dysfunction 12/26/2018    Vision disturbance 12/26/2018       Past Surgical History:  Past Surgical History:   Procedure Laterality Date    HERNIA REPAIR Left     IR CAROTID CEREBRAL ANGIOGRAM BILATERAL  9/17/2024    KNEE SURGERY Left     ACL repair, patella graft    SEPTOPLASTY N/A 3/13/2018    Procedure: SEPTOPLASTY;  septoplasty, submucosal resection of the turbinates;  Surgeon: Piotr Quiroz MD;  Location:  OR       Social History:  Social History     Socioeconomic History    Marital status:      Spouse name: Not on file    Number of children: Not on file    Years of education: Not on file    Highest education level: Not on file   Occupational History    Not on file   Tobacco Use    Smoking status: Every Day     Current packs/day: 1.00     Average packs/day: 1 pack/day for 42.7 years (42.7 ttl pk-yrs)     Types: Cigarettes     Start date: 1/1/1982    Smokeless tobacco: Never    Tobacco comments:     2-3 cigarettes per day    Vaping Use    Vaping status: Never Used   Substance and Sexual Activity    Alcohol use: Yes     Alcohol/week: 2.0 standard drinks of alcohol     Types: 2 Cans of beer per week     Comment: 2-4 drinks a week    Drug use: No    Sexual activity: Yes     Partners: Female   Other Topics Concern    Parent/sibling w/ CABG, MI or angioplasty before 65F 55M? Not Asked   Social History Narrative    Not on file     Social Determinants of Health     Financial Resource Strain: Unknown (9/17/2024)    Financial Resource Strain     Within the past 12 months, have you  or your family members you live with been unable to get utilities (heat, electricity) when it was really needed?: Patient declined   Food Insecurity: Patient Declined (9/20/2024)    [facility]    Hunger Vital Sign     Worried About Running Out of Food in the Last Year: Patient declined     Ran Out of Food in the Last Year: Patient declined   Transportation Needs: Patient Declined (9/20/2024)    [facility]    PRAPARE - Transportation     Lack of Transportation (Medical): Patient declined     Lack of Transportation (Non-Medical): Patient declined   Physical Activity: Not on file   Stress: Not on file   Social Connections: Not on file   Interpersonal Safety: Not At Risk (9/19/2024)    [facility]    EH IP Custom IPV     Do you feel UNSAFE in any of your personal relationships with your family members or any other acquaintances?: No   Housing Stability: Unknown (9/17/2024)    Housing Stability     Do you have housing? : Patient declined     Are you worried about losing your housing?: Patient declined       Family History:  Family History   Problem Relation Age of Onset    Diabetes Mother     Myocardial Infarction Father        Home Medications:  Current Outpatient Medications   Medication Sig Dispense Refill    acetaminophen (TYLENOL) 325 MG tablet Take 3 tablets (975 mg) by mouth daily. 300 tablet 3    albuterol (PROAIR HFA/PROVENTIL HFA/VENTOLIN HFA) 108 (90 Base) MCG/ACT inhaler Inhale 2 puffs into the lungs every 4 hours as needed for shortness of breath or wheezing 18 g 11    albuterol (PROVENTIL) (2.5 MG/3ML) 0.083% neb solution Take 1 vial (2.5 mg) by nebulization every 6 hours as needed for shortness of breath, wheezing or cough 90 mL 5    amLODIPine (NORVASC) 5 MG tablet Take 1 tablet (5 mg) by mouth daily. 90 tablet 4    apixaban ANTICOAGULANT (ELIQUIS) 5 MG  tablet Take 5 mg by mouth 2 times daily.      aspirin (ASA) 81 MG chewable tablet Take 81 mg by mouth daily.      atorvastatin (LIPITOR) 40 MG tablet Take 1 tablet (40 mg) by mouth daily. 90 tablet 4    fluticasone-salmeterol (ADVAIR) 500-50 MCG/ACT inhaler Inhale 1 puff into the lungs every 12 hours 60 each 11    lamoTRIgine (LAMICTAL) 100 MG tablet Take 0.5 tablets (50 mg) by mouth 2 times daily for 7 days, THEN 1 tablet (100 mg) 2 times daily for 90 days. - For Irritability / Anger 187 tablet 4    losartan-hydrochlorothiazide (HYZAAR) 100-25 MG tablet Take 1 tablet by mouth daily. 90 tablet 4    MEDS UNK - RECORDS REQUESTED For cholesterol      metaxalone (SKELAXIN) 800 MG tablet Take 1 tablet (800 mg) by mouth 3 times daily. 90 tablet 4    naproxen (NAPROSYN) 500 MG tablet TAKE 1 TABLET(500 MG) BY MOUTH TWICE DAILY AS NEEDED FOR PAIN 90 tablet 2    nicotine polacrilex (NICORETTE) 4 MG gum Place 1 each (4 mg) inside cheek every hour as needed for nicotine withdrawal symptoms. 240 each 1    Omega-3 Fish Oil 500 MG capsule Take 1 capsule (500 mg) by mouth daily 90 capsule 4    propranolol ER (INDERAL LA) 120 MG 24 hr capsule Take 1 capsule (120 mg) by mouth daily. 30 capsule 2    tiotropium (SPIRIVA) 18 MCG inhaled capsule Inhale 1 capsule (18 mcg) into the lungs daily 90 capsule 4    valACYclovir (VALTREX) 1000 mg tablet Take 1 tablet (1,000 mg) by mouth 3 times daily for 7 days 21 tablet 0     No current facility-administered medications for this visit.       Allergies:  Allergies   Allergen Reactions    Penicillins Anaphylaxis     Tolerated cefazolin on 09/16/2015.    Clindamycin Hives    Gabapentin      Mood disturbance        Physical Examination:  Telephone visit: Physical is limited due to telephone visit, patient is awake, conversational and has had no additional complaints or concerns.       Laboratory findings:  Reviewed    Imaging findings:  MRI and MRA brain and neck reviewed. Right ICA cavernous  segment aneurysm does not appear based on this study to rise above the optic strut to make it intradural, but this cannot be determined definitively.    Impression:  Right internal carotid artery cavernous segment 11 mm aneurysm- risk of rupture is <1% over 5 years per ISUIA, and if rupture were to occur, intracranial hemorrhage is unlikely due to its location- carotid-cavernous fistula formation is more likely. That said, due to his non-optimal aneurysm rupture risk factors (uncontrolled HTN, current smoking, positive family history of aneurysm rupture), it is prudent to perform further testing to determine if this is in fact a carotid cave aneurysm that could cause subarachnoid hemorrhage. Additionally, his personal preference is for treatment if feasible due to the emotional nature of his family history and restrictions imposed on his ability to work based on the presence of this aneurysm, despite its exceedingly low rupture risk.  He underwent flow diverter placement which was complicated by inadequate placement which was attempted to be retrieved however failed, and LVIS Blue stent was then attempted to be placed but was not able to be placed correctly so angioplasty was performed instead.  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   He has noted headaches that worsens with activity.     Plan:  -Continue aspirin and Eliquis and repeat CTA head and neck in 6 months  -Return to clinic after repeat imaging  -Neurology referral to headache specialist for headaches   -Follow-up in 1 month   -Recommend continued PT/OT/ Speech   evaluation      Radha Anthony MD  Endovascular Surgical Neuroradiology Fellow  [facility]  [phone]    Endovascular Surgical Neuroradiology staff is Dr. Raza

## 2024-10-01 NOTE — PATIENT INSTRUCTIONS
Please follow-up with Dr. Raza in 1 month. We have entered speech therapy and neurology (headache) referrals. Please continue taking aspirin and Eliquis. We will send a letter via [employer] that you should not return to work.     Stroke & Endovascular RN Care Coordinators:    Adilia Sepulveda, RN, BSN  Kia Heath, RN, CNRN, SCRN    If you have any questions please contact the RN Care Coordinators at [phone], option 1.    After business hours call the  at [phone] and have the Neuro-Interventional Fellow paged.    Thank you for choosing [facility] for your health care needs.

## 2024-10-02 ENCOUNTER — PATIENT OUTREACH (OUTPATIENT)
Dept: CARE COORDINATION | Facility: CLINIC | Age: 54
End: 2024-10-02
Payer: MEDICAID

## 2024-10-02 DIAGNOSIS — I63.9 RIGHT SIDED CEREBRAL HEMISPHERE CEREBROVASCULAR ACCIDENT (CVA) (H): Primary | ICD-10-CM

## 2024-10-02 NOTE — PROGRESS NOTES
"Clinic Care Coordination Contact  Follow Up Progress Note      Assessment: Patient has headaches daily. Currently at a \"low roar\", worse when he tries to use his brain. He finds his brain tires easily. He says some of this reminds him of his previous TBI in 2017.     Care Gaps:    Health Maintenance Due   Topic Date Due    COLORECTAL CANCER SCREENING  Never done    ZOSTER IMMUNIZATION (1 of 2) Never done    HEPATITIS B IMMUNIZATION (1 of 3 - 19+ 3-dose series) Never done    LUNG CANCER SCREENING  Never done    INFLUENZA VACCINE (1) 09/01/2024    Pneumococcal Vaccine: Pediatrics (0 to 5 Years) and At-Risk Patients (6 to 64 Years) (3 of 3 - PCV) 09/11/2024     He is part of the [facility], but not staying there or receiving services from there. He lives 35 minutes from town with a friend. Does not have access to [facility] medical transportation or coverage at the moment. He would accept a gas card to help get his friend to transport.     We will offer trinity for his medication expenses including Eliquis which still costs more after he has money for.     Intervention/Education provided during outreach:    He apologized for leaving [place], and states it was a poor decision and he wasn't in his right mind.       Plan:    He has set an appointment October 8th at 10am with . We will seek orders for PT/OT from his PCP.  He already has orders for speech therapy and was scheduled. He would like assistance with transportation.     Care Coordinator will continue to follow.   Theresa Dooley RN on 10/2/2024 at 2:47 PM    "

## 2024-10-10 ENCOUNTER — MYC MEDICAL ADVICE (OUTPATIENT)
Dept: INTERNAL MEDICINE | Facility: OTHER | Age: 54
End: 2024-10-10
Payer: MEDICAID

## 2024-10-10 NOTE — TELEPHONE ENCOUNTER
See encounter that was already sent to Dinah Michel with this request.  He had requested a CT of his neck but my recommendations included cervical x-ray, then PT, and then MRI if no improvement with 6-8 weeks of conservative treatment (insurance generally will not approve an MRI without conservative treatment first done).     Nanette Royal RN on 10/10/2024 at 9:52 AM

## 2024-10-10 NOTE — TELEPHONE ENCOUNTER
See tandem MyChart encounter from today that was responded to with same questions.     Nanette Royal RN on 10/10/2024 at 10:02 AM

## 2024-10-21 ENCOUNTER — THERAPY VISIT (OUTPATIENT)
Dept: SPEECH THERAPY | Facility: OTHER | Age: 54
End: 2024-10-21
Attending: STUDENT IN AN ORGANIZED HEALTH CARE EDUCATION/TRAINING PROGRAM
Payer: MEDICAID

## 2024-10-21 ENCOUNTER — MYC REFILL (OUTPATIENT)
Dept: INTERNAL MEDICINE | Facility: OTHER | Age: 54
End: 2024-10-21

## 2024-10-21 ENCOUNTER — MYC REFILL (OUTPATIENT)
Dept: INTERNAL MEDICINE | Facility: OTHER | Age: 54
End: 2024-10-21
Payer: MEDICAID

## 2024-10-21 DIAGNOSIS — G89.4 CHRONIC PAIN SYNDROME: ICD-10-CM

## 2024-10-21 DIAGNOSIS — R41.89 COGNITIVE CHANGE: ICD-10-CM

## 2024-10-21 DIAGNOSIS — R41.841 COGNITIVE COMMUNICATION DEFICIT: Primary | ICD-10-CM

## 2024-10-21 DIAGNOSIS — E78.2 MIXED HYPERLIPIDEMIA: ICD-10-CM

## 2024-10-21 DIAGNOSIS — J45.909 UNCOMPLICATED ASTHMA, UNSPECIFIED ASTHMA SEVERITY, UNSPECIFIED WHETHER PERSISTENT: ICD-10-CM

## 2024-10-21 PROCEDURE — 96125 COGNITIVE TEST BY HC PRO: CPT | Mod: GN

## 2024-10-21 RX ORDER — ALBUTEROL SULFATE 90 UG/1
2 INHALANT RESPIRATORY (INHALATION) EVERY 4 HOURS PRN
Qty: 54 G | Refills: 1 | Status: SHIPPED | OUTPATIENT
Start: 2024-10-21

## 2024-10-21 RX ORDER — ACETAMINOPHEN 325 MG/1
1000 TABLET ORAL DAILY
Qty: 300 TABLET | Refills: 3 | Status: CANCELLED | OUTPATIENT
Start: 2024-10-21

## 2024-10-21 RX ORDER — ATORVASTATIN CALCIUM 40 MG/1
40 TABLET, FILM COATED ORAL DAILY
Qty: 90 TABLET | Refills: 4 | Status: CANCELLED | OUTPATIENT
Start: 2024-10-21

## 2024-10-21 NOTE — PROGRESS NOTES
"Speech Language Pathology     Cognitive Linguistic Quick Test Plus (CLQT+)    SUMMARY OF TEST:    The CLQT+ assesses visual attention and perception, working memory and language output skills, as well as auditory memory and comprehension.  Non-linguistic tasks can help assess planning, and self-monitoring, visual discrimination and analysis, as well as creativity and mental flexibility.   This test reports in general linguistic/aphasia concerns.   Together, these subtests assess the cognitive domains of attention, memory, executive function, language, and visuospatial skills using a severity rating of either WNL (within normal limits), Mild, Moderate or Severe.    RESULTS OF TESTING:   Composite Severity Rating    Score: 3.4    Severity Rating: Mild    Attention    Score: 182    Severity Rating: WNL    Memory    Score: 138    Severity Rating: Moderate   Executive Functions    Score: 24    Severity Rating: WNL    Language    Score: 30    Severity Rating: WNL **  Visuospatial Skills    Score: 80     Severity Rating: Mild (likely negatively influenced by vision changes)   Clock Drawing     Score: 9    Severity Rating: Moderate   Non-Linguistic Cognition    Score: 34    Severity Rating: Mild     Linguistic/Aphasia    Score: 48    Severity Rating: Mild **  INTERPRETATION OF TEST RESULTS: Patient demonstrating an overall change in functioning with reports of short term memory difficulties and moderate impairment in memory domain found today.  Additionally, patient reporting difficulty thinking about information to completion and formal testing found mild difficulties with non-linguistic cognition (numerical concepts:  time, money, numbers).    **Although the CLQT+ found mild to WNL functioning with domains of linguistic and language, patient demonstrating moderate-severe difficulty with formulating words and sentences and frequently gives up while talking due to these problems, with comments of \"hacking my language\" in " regard to utilizing simple words as his typical high level language word usage is impacted by this brain insult.  Patient's language will be formally explored with aphasia testing to determine area of breakdown and to guide treatment.  TIME ADMINISTERING TEST: 74  TIME FOR INTERPRETATION AND PREPARATION OF REPORT: 47  TOTAL TIME: 121  Reference:  Thuy Phillips, CCC-SLP, (2001) [employer]

## 2024-10-21 NOTE — TELEPHONE ENCOUNTER
acetaminophen (TYLENOL) 325 MG tablet 300 tablet 3 8/27/2024 -- No   Sig - Route: Take 3 tablets (975 mg) by mouth daily. - Oral   Sent to pharmacy as: Acetaminophen 325 MG Oral Tablet (Tylenol)   Class: E-Prescribe   Order: [id number]   E-Prescribing Status: Receipt confirmed by pharmacy (8/27/2024  3:55 PM CDT)     [id number] - [place], [place] - [address]     Pt informed of prescription via [employer]. Theresa Ireland RN .............. 10/21/2024  1:40 PM

## 2024-10-21 NOTE — TELEPHONE ENCOUNTER
atorvastatin (LIPITOR) 40 MG tablet 90 tablet 4 9/5/2024 -- No   Sig - Route: Take 1 tablet (40 mg) by mouth daily. - Oral   Sent to pharmacy as: Atorvastatin Calcium 40 MG Oral Tablet (LIPITOR)   Class: E-Prescribe   Order: [id number]   E-Prescribing Status: Receipt confirmed by pharmacy (9/5/2024  5:18 PM CDT)     [id number] - [place], [place] - [address]     Pt informed of prescription via [employer].     Requested Prescriptions   Pending Prescriptions Disp Refills    albuterol (PROAIR HFA/PROVENTIL HFA/VENTOLIN HFA) 108 (90 Base) MCG/ACT inhaler 18 g 11     Sig: Inhale 2 puffs into the lungs every 4 hours as needed for shortness of breath or wheezing.   Last Prescription Date:   9/11/23  Last Fill Qty/Refills:         18 g, R-11      Short-Acting Beta Agonist Inhalers Protocol  Failed - 10/21/2024  1:38 PM        Failed - Asthma control assessment score within normal limits in last 6 months     Please review ACT score.      Last Office Visit:              9/27/24   Future Office visit:           None     Unable to complete prescription refill per RN Medication Refill Policy. Routing to covering provider for refill consideration, as PCP/provider is out of clinic >48 hours or Pt is completely out of medication and provider is out of the clinic today.  Theresa Ireland RN .............. 10/21/2024  1:42 PM

## 2024-10-31 ENCOUNTER — HOSPITAL ENCOUNTER (OUTPATIENT)
Dept: CT IMAGING | Facility: OTHER | Age: 54
Discharge: HOME | End: 2024-10-31
Payer: MEDICAID

## 2024-10-31 ENCOUNTER — HOSPITAL ENCOUNTER (OUTPATIENT)
Dept: ULTRASOUND IMAGING | Facility: OTHER | Age: 54
Discharge: HOME | End: 2024-10-31
Payer: MEDICAID

## 2024-10-31 DIAGNOSIS — L81.9 DISCOLORATION OF SKIN OF LOWER LEG: ICD-10-CM

## 2024-10-31 DIAGNOSIS — Z87.891 PERSONAL HISTORY OF TOBACCO USE: ICD-10-CM

## 2024-10-31 PROCEDURE — 93922 UPR/L XTREMITY ART 2 LEVELS: CPT

## 2024-10-31 PROCEDURE — 71271 CT THORAX LUNG CANCER SCR C-: CPT

## 2024-11-04 ENCOUNTER — THERAPY VISIT (OUTPATIENT)
Dept: SPEECH THERAPY | Facility: OTHER | Age: 54
End: 2024-11-04
Attending: STUDENT IN AN ORGANIZED HEALTH CARE EDUCATION/TRAINING PROGRAM
Payer: MEDICAID

## 2024-11-04 DIAGNOSIS — I69.322 DYSARTHRIA FOLLOWING CEREBRAL INFARCTION: ICD-10-CM

## 2024-11-04 DIAGNOSIS — R41.841 COGNITIVE COMMUNICATION DEFICIT: Primary | ICD-10-CM

## 2024-11-04 PROCEDURE — 92522 EVALUATE SPEECH PRODUCTION: CPT | Mod: GN

## 2024-11-04 PROCEDURE — 92523 SPEECH SOUND LANG COMPREHEN: CPT | Mod: GN

## 2024-11-12 ENCOUNTER — THERAPY VISIT (OUTPATIENT)
Dept: SPEECH THERAPY | Facility: OTHER | Age: 54
End: 2024-11-12
Attending: STUDENT IN AN ORGANIZED HEALTH CARE EDUCATION/TRAINING PROGRAM
Payer: MEDICAID

## 2024-11-12 DIAGNOSIS — R41.841 COGNITIVE COMMUNICATION DEFICIT: Primary | ICD-10-CM

## 2024-11-12 DIAGNOSIS — I69.322 DYSARTHRIA FOLLOWING CEREBRAL INFARCTION: ICD-10-CM

## 2024-11-12 DIAGNOSIS — I69.320 APHASIA FOLLOWING CEREBRAL INFARCTION: ICD-10-CM

## 2024-11-12 PROCEDURE — 92507 TX SP LANG VOICE COMM INDIV: CPT | Mod: GN

## 2024-11-18 ENCOUNTER — THERAPY VISIT (OUTPATIENT)
Dept: SPEECH THERAPY | Facility: OTHER | Age: 54
End: 2024-11-18
Attending: STUDENT IN AN ORGANIZED HEALTH CARE EDUCATION/TRAINING PROGRAM
Payer: MEDICAID

## 2024-11-18 DIAGNOSIS — I69.322 DYSARTHRIA FOLLOWING CEREBRAL INFARCTION: ICD-10-CM

## 2024-11-18 DIAGNOSIS — R41.841 COGNITIVE COMMUNICATION DEFICIT: Primary | ICD-10-CM

## 2024-11-18 DIAGNOSIS — I69.320 APHASIA FOLLOWING CEREBRAL INFARCTION: ICD-10-CM

## 2024-11-18 PROCEDURE — 92507 TX SP LANG VOICE COMM INDIV: CPT | Mod: GN

## 2024-11-18 NOTE — PROGRESS NOTES
SPEECH LANGUAGE PATHOLOGY EVALUATION    Subjective        Presenting condition or subjective complaint:  complexities impacting daily function following multiple CVAs impacting speech production, word finding, and short term memory.  Patient reporting difficulty keeping up with conversation and constant need to find replacement words due to retrieval and pronunciation difficulties, along with forgetting topic of conversation.  Date of onset: 09/17/24    Relevant medical history:   Patient had a surgical repair of nonruptured cerebral aneurysm  with complexities of multiple CVAs occurring after.  Dates & types of surgery:  9/17/24 surgical repair of blockage    Prior diagnostic imaging/testing results:    No prior SLP services   Prior therapy history for the same diagnosis, illness or injury: (Patient-Rptd) No      Living Environment  Social support: (Patient-Rptd) Alone   Help at home: (Patient-Rptd) None  Equipment owned:   N/A    Employment: Patient is a , currently unable to perform job due to vision difficulties from CVA.      Patient goals for therapy:  Communicate at prior level and retain memory.    Pain assessment: Pain denied     Objective     AUDITORY COMPREHENSION (understanding of spoken language)  Multi-step commands: 75-90% accuracy  Biographical/personal yes/no questions: intact  Simple/factual yes/no questions: intact  Complex yes/no questions: 75-90% accuracy, achieved with repetition  Object identification - field of 4: intact  Object identification - multiple objects: intact  Picture identification - field of 4: intact  Picture identification - multiple objects: intact  Comprehension of sentences: 75-90% accuracy, achieved with repetition  Comprehension of paragraph: 50-74% accuracy, achieved with repetition, achieved with cues  Auditory comprehension level of impairment: minimal impairment     VERBAL EXPRESSION (use of spoken language to express information)  Automatic  Speech Tasks: counting: intact  days of the week: intact  months of the year: minimal impairment   Confrontational Naming: pictures: minimal impairment   Responsive Naming: semantic/function: moderate impairment   phonemic: moderate impairment   Word Finding Skills: generative naming: moderate impairment  category naming/convergent naming: minimal impairment  category item naming/divergent naming: minimal impairment  describing differences: moderate impairment  describing similarities: moderate impairment   Repetition Skills: sounds minimal impairment  words: minimal impairment   phrases: moderate impairment  sentences: moderate impairment   Narrative Speech: storytelling/retelling: moderate impairment  picture description: moderate impairment   Conversational Speech: connected speech: moderate impairment    Verbal expression level of impairment: moderate impairment    Note:  informal language testing implemented as (a) patient unable to participate in standardized testing due to vision impairment and (b) patient's impairments are unique as he is able to name most items but experiencing significant delay in word retrieval which is interfering with communicative effectiveness.    [place] Naming Test (BNT):  48/60 accurate naming, 83% delay in word retrieval during testing of accurate word retrievals.  Define:  70% accuracy in retrieval of semantic features.  Narratives:  70% accurate but incomplete containing mostly content phrase level, code switching by replacement of content words during 30% of sentences for easier to retrieve (and pronounce) words.  Communication Effectiveness Survey (JF) rating scale completed by patient and clinician with close performance of 31/70 (44%) patient perception, 40% efficient by clinician      READING COMPREHENSION (understanding written language)  Reading comprehension: word level mild difficulties.  Higher levels not evaluated due to vision difficulties.   Reading comprehension  level of impairment:  Unknown, impacted by vision impairment.    WRITTEN EXPRESSION (use of writing skills to express information)  Copying ability: Negatively impacted by vision impairment   Written expression level of impairment:  Unknown, impacted by vision impairment.    PRAGMATICS (the social or functional use of language)  Nonverbal skills: WNL   Verbal Skills: WNL   Pragmatics level of impairment: no impairment    COGNITIVE STATUS  Visual impairments: Visuospatial skills impacted, unclear if this is secondary to vision impairment.  Short term memory: impaired   Reasoning: impaired   Organization: impaired   Executive function: impaired mental flexibility, unable to self-monitor   Cognition level of impairment: mild impairment  Additional cognitive evaluation: completed; see separate report for results, See Cognitive Linguistic Quick Test Plus (CLQT+) results (below) with testing performed 10/21/24.    Cognitive Linguistic Quick Test Plus (CLQT+)     SUMMARY OF TEST:    The CLQT+ assesses visual attention and perception, working memory and language output skills, as well as auditory memory and comprehension.  Non-linguistic tasks can help assess planning, and self-monitoring, visual discrimination and analysis, as well as creativity and mental flexibility.   This test reports in general linguistic/aphasia concerns.  Together, these subtests assess the cognitive domains of attention, memory, executive function, language, and visuospatial skills using a severity rating of either WNL (within normal limits), Mild, Moderate or Severe.      RESULTS OF TESTING:                 Composite Severity Rating                                Score: 3.4                                Severity Rating: Mild                 Attention                              Score: 182                                Severity Rating: WNL                  Memory                              Score: 138                                Severity  "Rating: Moderate                 Executive Functions                              Score: 24                                Severity Rating: WNL                  Language                              Score: 30                                Severity Rating: WNL **  Visuospatial Skills                                Score: 80                                             Severity Rating: Mild (likely negatively influenced by vision changes)                 Clock Drawing                               Score: 9                                Severity Rating: Moderate                 Non-Linguistic Cognition                                Score: 34                                Severity Rating: Mild      Linguistic/Aphasia                                Score: 48                                Severity Rating: Mild **    INTERPRETATION OF TEST RESULTS: Patient demonstrating an overall change in functioning with reports of short term memory difficulties and moderate impairment in memory domain found today.  Additionally, patient reporting difficulty thinking about information to completion and formal testing found mild difficulties with non-linguistic cognition (numerical concepts:  time, money, numbers).      **Although the CLQT+ found mild to WNL functioning with domains of linguistic and language, patient demonstrating moderate-severe difficulty with formulating words and sentences and frequently gives up while talking due to these problems, with comments of \"hacking my language\" in regard to utilizing simple words as his typical high level language word usage is impacted by this brain insult.  Patient's language will be formally explored with aphasia testing to determine area of breakdown and to guide treatment.    ORAL MOTOR FUNCTION:  Oral labial function: impaired coordination on left, impaired coordination on right  Lingual function: impaired coordination    MOTOR SPEECH:  Speech Intelligibility:     Word level " "speech intelligibility: minimal impairment      Phrase/sentence level speech intelligibility: moderate impairment      Conversation level speech intelligibility: moderate impairment   Respiration Observations: WNL   Phonation: monoloudness, harsh quality   Maximum phonation time (seconds): 12  S/Z ratio (___ sec/ ___sec): 0.5  (>1.4 is suggestive of VF pathology)   Pitch glide: adequate pitch control   Resonance: WNL  Rate/prosody: irregular pauses, slow overall rate   Articulation: errors with increasing word length, reduction of syllables, frequent pauses for reattempts; self-report of structures moving slow like \"peanut butter\" in mouth.    Articulatory rate of movement (number of repetitions in 5 seconds): slow, especially /p,k/, imprecise especially /p,k/.  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 The following significant findings have been identified: impaired cognition and impaired communication, characterized by frequent dysfluent pauses in conversation attempts due to word finding difficulties, with false starts and hesitations and imprecise speech due to mixed " dysarthria and apraxia, difficulty maintaining conversation due to memory deficits. Identified deficits interfere with their ability to communicate within the home or community and function independently as compared to previous level of function.    PLAN OF CARE  Treatment Interventions: Speech, Language , Cognitive skills    Prognosis to achieve stated therapy goals is excellent   Rehab potential is impacted by: patient awareness of deficits, prior level of function, high level of motivation.    Long Term Goals:   SLP Goal 1  Goal Identifier: LTG 1 - Cognition  Goal Description: Patient will demonstrate intact cognition to return function to prior level as demonstrated with full ability to participate in conversations and self-reports regarding ability to participate in complex contexts.  Rationale: To maximize safety and independence with cognitive function within the home or community  Goal Progress: Goal initiated.  CLQT+ overall mild (3.4) composite severity rating, moderate memory difficulty, moderate clock draw, mild visuospatial skills (negatively impacted by vision changes), mild non-linguistic cognition (numerical contexts), mild linguistic/aphasia.  Target Date: 01/19/25  SLP Goal 2  Goal Identifier: STG 1 - Cognition  Goal Description: Patient will independently utilize internal memory strategies to recall complete information within structured and unstructured context with minimal prompts to improve memory within conversational interactions.  Rationale: To maximize safety and independence with cognitive function within the home or community  Goal Progress: Goal intiated.  Minimal strategies (auditory rehearsal but patient reporting that he forgets what was rehearsed).  Target Date: 12/30/24  SLP Goal 3  Goal Identifier: LTG 2 - Speech  Goal Description: Patient will demonstrate intact articulatory precision and at the conversational level with fluent productions and with advanced word use to return  prior level of an effective speaker as rated by patient and clinician on the Communication Effectiveness Survey (JF) to remove communication barriers.  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  Rationale: To maximize functional communication within the home or community;To maximize the ability to communicate wants and needs within the home or community  Goal Progress: Goal initiated, word level intelligibility 80% with decline in function with higher complexity of speech tasks, dysfluent speech due to frequent pauses and restarts.  Target Date: 01/19/25  SLP Goal 5  Goal Identifier: LTG 3 - Language  Goal Description: Patient will demonstrate fluent communication with advanced language use to communicate complex thoughts across topics and return communication to prior intact level as self-reported and rated by clinician on the JF.  Rationale: To maximize functional communication within the home or community  Goal Progress: Goal initiated.  Incomplete narratives during structured task, delay  during code switching with need to replace 30% of sentences with simplier to formulate words, 83% delay in naming, disruption to conversational interaction with JF 31/70 (44% effective).  Target Date: 01/19/25  SLP Goal 6  Goal Identifier: STG 3 - Lang - Abstract Naming  Goal Description: Patient will demonstrate 90% accuracy with ongoing abstract generative naming tasks with minimal supports under time restraints of one minute to improve word retrieval and processing duration.  Rationale: To maximize the ability to communicate wants and needs within the home or community  Goal Progress: Goal initiated, 60% accuracy basic generative naming task mild supports within one minute.  Target Date: 01/19/25  SLP Goal 7  Goal Identifier: STG 4 - Lang - Narratives  Goal Description: Patient will demonstrate intact narratives at an independent level to communicate complex thoughts and return language function to prior level.  Rationale: To maximize the ability to communicate wants and needs within the home or community  Goal Progress: Goal initiated.  Narratives incomplete with 70% accuracy and mostly containing content phrases and incomplete syntactic structures with mild summarization supports.  Target Date: 01/19/25  SLP Goal 8  Goal Identifier: STG 5 - Lang - Define  Goal Description: Patient will demonstrate 90% accuracy across features of semantic feature analysis with min supports to improve language retrieval skills.  Rationale: To maximize the ability to communicate wants and needs within the home or community  Goal Progress: Goal initiated.  70% accuracy due to incomplete structures with minimal supports.  Target Date: 01/19/25      Frequency of Treatment: 16 sessions (Plan to taper sessions with progress.)  Duration of Treatment: 12 weeks     Education Assessment:        Risks and benefits of evaluation/treatment have been explained.   Patient/Family/caregiver agrees with Plan of Care.     Evaluation Time:     Please note that evaluations occurred across sessions to capture complexity of current function:  Cognition 10/21/24, Motor Speech 11/4/24, Language 11/12/24.       Signing Clinician: LUIS Paul      [facility]                                                                                   OUTPATIENT SPEECH LANGUAGE PATHOLOGY      PLAN OF TREATMENT FOR OUTPATIENT REHABILITATION   Patient's Last Name, First Name, Jose Elias Winter YOB: 1970   Provider's Name   [facility]   Medical Record No.  [id number]     Onset Date: 09/17/24 Start of Care Date: 10/21/24     Medical Diagnosis:  Cognitive Change      SLP Treatment Diagnosis: Cognitive-Linguistic Impairment  Plan of Treatment  Frequency/Duration: 16 sessions (Plan to taper sessions with progress.)  / 12 weeks     Certification date from 10/21/24   To 01/19/25          See note for plan of treatment details and functional goals     Lisette Casey, LUIS                         I CERTIFY THE NEED FOR THESE SERVICES FURNISHED UNDER        THIS PLAN OF TREATMENT AND WHILE UNDER MY CARE .             Physician Signature               Date    X_____________________________________________________                  Referring Provider:  Radha Anthony MD    Fax [phone]    Initial Assessment  See Epic Evaluation- 10/21/24

## 2024-11-19 ENCOUNTER — VIRTUAL VISIT (OUTPATIENT)
Dept: NEUROSURGERY | Facility: CLINIC | Age: 54
End: 2024-11-19
Attending: RADIOLOGY
Payer: MEDICAID

## 2024-11-19 DIAGNOSIS — I67.1 NONRUPTURED CEREBRAL ANEURYSM: Primary | ICD-10-CM

## 2024-11-19 RX ORDER — MAGNESIUM OXIDE 400 MG/1
400 TABLET ORAL DAILY
Qty: 30 TABLET | Refills: 1 | Status: SHIPPED | OUTPATIENT
Start: 2024-11-19

## 2024-11-19 NOTE — NURSING NOTE
Current patient location: [facility] [address]  [facility] [address]    Is the patient currently in the state of [place]? YES    Visit mode:VIDEO    If the visit is dropped, the patient can be reconnected by:TELEPHONE VISIT: Phone number:   Telephone Information:   Mobile [phone]       Will anyone else be joining the visit? NO  (If patient encounters technical issues they should call [phone]    Are changes needed to the allergy or medication list? Pt stated no med changes    Are refills needed on medications prescribed by this physician? NO    Rooming Documentation:  Not applicable    Reason for visit: DOROTHY BUCKNERF

## 2024-11-19 NOTE — PATIENT INSTRUCTIONS
Please follow-up with Dr. Raza with CTA head/neck in 4-6 months. Continue taking Aspirin and Eliquis Please start taking Magnesium Oxide once daily- if it helps please have this refilled by your PCP. We have also entered a referral for you to see a headache specialist.     [facility]roke [facility] RN Care Coordinators:    Adilia Sepulveda, RN, BSN  Kia Heath, RN, CNRN, SCRN    If you have any questions please contact the RN Care Coordinators at [phone], option 1.    After business hours call the  at [phone] and have the Neuro-Interventional Fellow paged.    Thank you for choosing [facility] for your health care needs.

## 2024-11-19 NOTE — LETTER
11/19/2024       RE: Jose Elias Reaves  [address]  [facility] [address]     Dear Colleague,    Thank you for referring your patient, Jose Elias Reaves, to the [facility]UROS[facility] [place] at [facility]. Please see a copy of my visit note below.    Neuroendovascular Clinic Note:     Reason for clinic visit: Unruptured aneurysm     History of present illness: Manuel Reaves is a 54 year old man with history of current cigarette smoking, HTN, HLD, and TBI who was incidentally found to have an unruptured right ICA cavernous segment 11 mm aneurysm. He has no personal history of aneurysm rupture. Two uncles reportedly passed in their sleep of aneurysm ruptures. He currently is trying to quit smoking. He is not using any prescription medications or nicotine supplements to attempt to quit due to cost. His hypertension is managed by his PCP. He is on three agents that he takes reliably, and typically his BP runs 130/80, but rarely when he is not feeling well, the systolic will be in the 190s. 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      Interval History:  Patient underwent attempted SHAUN X deployment however there was complication of deployment resulting in the SHAUN X being attempted to be retrieved and becoming situated within the internal carotid artery.  This was attempted to be retrieved within LVIS  stent but was not successful and he underwent angioplasty.  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     He has black and grey spots, and is getting headaches daily and having lightheadedness. The right eye dark grey spot is left lower corner it is the same, white grey spots everywhere. He will sometimes have sparkly lights all over goes from left to right. He will have worsening of ringing in ears and headache that occurs  "afterwards that gain intensity quickly. The headaches haven't changed in morphology since onset after the surgery. He states that he saw optometry and is waiting for new glasses to arrive. He is attending therapies and notes that it is helping. He notes a lot of headaches, he describes pounding, but they are improved after he breathes and tries to relax. Takes tylenol twice a day but \"doesn't touch it.\"   Right side is weaker.      He works as a  and has difficulty with seeing street signs.         Past Medical History:  Past Medical History           Past Medical History:   Diagnosis Date    Cervical strain 01/03/2019    Closed head injury with concussion 12/12/2018    Concussion with brief LOC 12/12/2018    Concussion with brief LOC 12/12/2018    Hypertension      Impingement of right ulnar nerve 03/16/2020    Post concussion syndrome 03/26/2019    Post-concussion headache 12/26/2018    Vestibular dysfunction 12/26/2018    Vision disturbance 12/26/2018            Past Surgical History:  Past Surgical History             Past Surgical History:   Procedure Laterality Date    HERNIA REPAIR Left      IR CAROTID CEREBRAL ANGIOGRAM BILATERAL   9/17/2024    KNEE SURGERY Left       ACL repair, patella graft    SEPTOPLASTY N/A 3/13/2018     Procedure: SEPTOPLASTY;  septoplasty, submucosal resection of the turbinates;  Surgeon: Piotr Quiroz MD;  Location: [facility] OR            Social History:  Social History   Social History                Socioeconomic History    Marital status:        Spouse name: Not on file    Number of children: Not on file    Years of education: Not on file    Highest education level: Not on file   Occupational History    Not on file   Tobacco Use    Smoking status: Every Day       Current packs/day: 1.00       Average packs/day: 1 pack/day for 42.7 years (42.7 ttl pk-yrs)       Types: Cigarettes       Start date: 1/1/1982    Smokeless tobacco: Never    Tobacco comments:       2-3 " cigarettes per day    Vaping Use    Vaping status: Never Used   Substance and Sexual Activity    Alcohol use: Yes       Alcohol/week: 2.0 standard drinks of alcohol       Types: 2 Cans of beer per week       Comment: 2-4 drinks a week    Drug use: No    Sexual activity: Yes       Partners: Female   Other Topics Concern    Parent/sibling w/ CABG, MI or angioplasty before 65F 55M? Not Asked   Social History Narrative    Not on file      Social Determinants of Health              Financial Resource Strain: Unknown (9/17/2024)     Financial Resource Strain      Within the past 12 months, have you or your family members you live with been unable to get utilities (heat, electricity) when it was really needed?: Patient declined   Food Insecurity: Patient Declined (9/20/2024)     [facility] [employer]     Hunger Vital Sign      Worried About Running Out of Food in the Last Year: Patient declined      Ran Out of Food in the Last Year: Patient declined   Transportation Needs: Patient Declined (9/20/2024)     [facility]     PRAPARE - Transportation      Lack of Transportation (Medical): Patient declined      Lack of Transportation (Non-Medical): Patient declined   Physical Activity: Not on file   Stress: Not on file   Social Connections: Not on file   Interpersonal Safety: Not At Risk (9/19/2024)     [facility]     EH IP Custom IPV      Do you feel UNSAFE in any of your personal relationships with your family members or any other acquaintances?: No   Housing Stability: Unknown (9/17/2024)     Housing Stability      Do you have housing? : Patient declined      Are you worried about losing your housing?: Patient declined            Family History:  Family History             Family History   Problem Relation Age of Onset    Diabetes Mother      Myocardial Infarction Father              Home  Medications:  Current Facility-Administered Medications               Current Outpatient Medications   Medication Sig Dispense Refill    acetaminophen (TYLENOL) 325 MG tablet Take 3 tablets (975 mg) by mouth daily. 300 tablet 3    albuterol (PROAIR HFA/PROVENTIL HFA/VENTOLIN HFA) 108 (90 Base) MCG/ACT inhaler Inhale 2 puffs into the lungs every 4 hours as needed for shortness of breath or wheezing 18 g 11    albuterol (PROVENTIL) (2.5 MG/3ML) 0.083% neb solution Take 1 vial (2.5 mg) by nebulization every 6 hours as needed for shortness of breath, wheezing or cough 90 mL 5    amLODIPine (NORVASC) 5 MG tablet Take 1 tablet (5 mg) by mouth daily. 90 tablet 4    apixaban ANTICOAGULANT (ELIQUIS) 5 MG tablet Take 5 mg by mouth 2 times daily.        aspirin (ASA) 81 MG chewable tablet Take 81 mg by mouth daily.        atorvastatin (LIPITOR) 40 MG tablet Take 1 tablet (40 mg) by mouth daily. 90 tablet 4    fluticasone-salmeterol (ADVAIR) 500-50 MCG/ACT inhaler Inhale 1 puff into the lungs every 12 hours 60 each 11    lamoTRIgine (LAMICTAL) 100 MG tablet Take 0.5 tablets (50 mg) by mouth 2 times daily for 7 days, THEN 1 tablet (100 mg) 2 times daily for 90 days. - For Irritability / Anger 187 tablet 4    losartan-hydrochlorothiazide (HYZAAR) 100-25 MG tablet Take 1 tablet by mouth daily. 90 tablet 4    MEDS UNK - RECORDS REQUESTED For cholesterol        metaxalone (SKELAXIN) 800 MG tablet Take 1 tablet (800 mg) by mouth 3 times daily. 90 tablet 4    naproxen (NAPROSYN) 500 MG tablet TAKE 1 TABLET(500 MG) BY MOUTH TWICE DAILY AS NEEDED FOR PAIN 90 tablet 2    nicotine polacrilex (NICORETTE) 4 MG gum Place 1 each (4 mg) inside cheek every hour as needed for nicotine withdrawal symptoms. 240 each 1    Omega-3 Fish Oil 500 MG capsule Take 1 capsule (500 mg) by mouth daily 90 capsule 4    propranolol ER (INDERAL LA) 120 MG 24 hr capsule Take 1 capsule (120 mg) by mouth daily. 30 capsule 2    tiotropium (SPIRIVA) 18 MCG inhaled  capsule Inhale 1 capsule (18 mcg) into the lungs daily 90 capsule 4    valACYclovir (VALTREX) 1000 mg tablet Take 1 tablet (1,000 mg) by mouth 3 times daily for 7 days 21 tablet 0      No current facility-administered medications for this visit.            Allergies:  Allergies             Allergies   Allergen Reactions    Penicillins Anaphylaxis       Tolerated cefazolin on 09/16/2015.    Clindamycin Hives    Gabapentin         Mood disturbance             Physical Examination:  Telephone visit: Physical is limited due to telephone visit, patient is awake, conversational and has had no additional complaints or concerns.         Laboratory findings:  Reviewed     Imaging findings:  MRI and MRA brain and neck reviewed. Right ICA cavernous segment aneurysm does not appear based on this study to rise above the optic strut to make it intradural, but this cannot be determined definitively.     Impression:  Right internal carotid artery cavernous segment 11 mm aneurysm- risk of rupture is <1% over 5 years per ISUIA, and if rupture were to occur, intracranial hemorrhage is unlikely due to its location- carotid-cavernous fistula formation is more likely. That said, due to his non-optimal aneurysm rupture risk factors (uncontrolled HTN, current smoking, positive family history of aneurysm rupture), it is prudent to perform further testing to determine if this is in fact a carotid cave aneurysm that could cause subarachnoid hemorrhage. Additionally, his personal preference is for treatment if feasible due to the emotional nature of his family history and restrictions imposed on his ability to work based on the presence of this aneurysm, despite its exceedingly low rupture risk.  He underwent flow diverter placement which was complicated by inadequate placement which was attempted to be retrieved however failed, and LVIS Blue stent was then attempted to be placed but was not able to be placed correctly so angioplasty was  performed instead.  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   He has noted headaches that worsens with activity with possible component of medication overuse (daily use of tylenol).     Plan:  -Continue aspirin and Eliquis and repeat CTA head and neck in 4-6 months; patient would like to get it done closer to home  -Return to clinic after repeat imaging  -Neurology referral to headache specialist for headaches. He notes that he is struggling financially and therefore has not followed up with neurology at this point. We discussed limiting tylenol intake to 2-3x/week and trial of magnesium oxide 400mg 1-2x daily to help for headache ppx (can be refilled by PCP if it helps).  -Continue therapies as before         Again, thank you for allowing me to participate in the care of your patient.      Sincerely,    Octavio Raza MD

## 2024-11-19 NOTE — PROGRESS NOTES
Neuroendovascular Clinic Note:     Reason for clinic visit: Unruptured aneurysm     History of present illness: Manuel Reaves is a 54 year old man with history of current cigarette smoking, HTN, HLD, and TBI who was incidentally found to have an unruptured right ICA cavernous segment 11 mm aneurysm. He has no personal history of aneurysm rupture. Two uncles reportedly passed in their sleep of aneurysm ruptures. He currently is trying to quit smoking. He is not using any prescription medications or nicotine supplements to attempt to quit due to cost. His hypertension is managed by his PCP. He is on three agents that he takes reliably, and typically his BP runs 130/80, but rarely when he is not feeling well, the systolic will be in the 190s. 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      Interval History:  Patient underwent attempted SHAUN X deployment however there was complication of deployment resulting in the SHAUN X being attempted to be retrieved and becoming situated within the internal carotid artery.  This was attempted to be retrieved within LVIS  stent but was not successful and he underwent angioplasty.  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     He has black and grey spots, and is getting headaches daily and having lightheadedness. The right eye dark grey spot is left lower corner it is the same, white grey spots everywhere. He will sometimes have sparkly lights all over goes from left to right. He will have worsening of ringing in ears and headache that occurs afterwards that gain intensity quickly. The headaches haven't changed in morphology since onset after the surgery. He states that he saw optometry and is waiting for new glasses to arrive. He is attending therapies and notes that it is helping. He notes a lot of headaches, he describes pounding, but they are improved  "after he breathes and tries to relax. Takes tylenol twice a day but \"doesn't touch it.\"   Right side is weaker.      He works as a  and has difficulty with seeing street signs.         Past Medical History:  Past Medical History        Past Medical History:   Diagnosis Date    Cervical strain 01/03/2019    Closed head injury with concussion 12/12/2018    Concussion with brief LOC 12/12/2018    Concussion with brief LOC 12/12/2018    Hypertension      Impingement of right ulnar nerve 03/16/2020    Post concussion syndrome 03/26/2019    Post-concussion headache 12/26/2018    Vestibular dysfunction 12/26/2018    Vision disturbance 12/26/2018            Past Surgical History:  Past Surgical History         Past Surgical History:   Procedure Laterality Date    HERNIA REPAIR Left      IR CAROTID CEREBRAL ANGIOGRAM BILATERAL   9/17/2024    KNEE SURGERY Left       ACL repair, patella graft    SEPTOPLASTY N/A 3/13/2018     Procedure: SEPTOPLASTY;  septoplasty, submucosal resection of the turbinates;  Surgeon: Piotr Quiroz MD;  Location: [facility] OR            Social History:  Social History   Social History            Socioeconomic History    Marital status:        Spouse name: Not on file    Number of children: Not on file    Years of education: Not on file    Highest education level: Not on file   Occupational History    Not on file   Tobacco Use    Smoking status: Every Day       Current packs/day: 1.00       Average packs/day: 1 pack/day for 42.7 years (42.7 ttl pk-yrs)       Types: Cigarettes       Start date: 1/1/1982    Smokeless tobacco: Never    Tobacco comments:       2-3 cigarettes per day    Vaping Use    Vaping status: Never Used   Substance and Sexual Activity    Alcohol use: Yes       Alcohol/week: 2.0 standard drinks of alcohol       Types: 2 Cans of beer per week       Comment: 2-4 drinks a week    Drug use: No    Sexual activity: Yes       Partners: Female   Other Topics Concern    " Parent/sibling w/ CABG, MI or angioplasty before 65F 55M? Not Asked   Social History Narrative    Not on file      Social Determinants of Health           Financial Resource Strain: Unknown (9/17/2024)     Financial Resource Strain      Within the past 12 months, have you or your family members you live with been unable to get utilities (heat, electricity) when it was really needed?: Patient declined   Food Insecurity: Patient Declined (9/20/2024)     [facility]     Hunger Vital Sign      Worried About Running Out of Food in the Last Year: Patient declined      Ran Out of Food in the Last Year: Patient declined   Transportation Needs: Patient Declined (9/20/2024)     [facility] [facility]     PRAPARE - Transportation      Lack of Transportation (Medical): Patient declined      Lack of Transportation (Non-Medical): Patient declined   Physical Activity: Not on file   Stress: Not on file   Social Connections: Not on file   Interpersonal Safety: Not At Risk (9/19/2024)     Received from [facility]     EH IP Custom IPV      Do you feel UNSAFE in any of your personal relationships with your family members or any other acquaintances?: No   Housing Stability: Unknown (9/17/2024)     Housing Stability      Do you have housing? : Patient declined      Are you worried about losing your housing?: Patient declined            Family History:  Family History         Family History   Problem Relation Age of Onset    Diabetes Mother      Myocardial Infarction Father              Home Medications:  Current Facility-Administered Medications          Current Outpatient Medications   Medication Sig Dispense Refill    acetaminophen (TYLENOL) 325 MG tablet Take 3 tablets (975 mg) by mouth daily. 300 tablet 3    albuterol (PROAIR HFA/PROVENTIL HFA/VENTOLIN HFA) 108 (90 Base) MCG/ACT inhaler Inhale 2 puffs into the  lungs every 4 hours as needed for shortness of breath or wheezing 18 g 11    albuterol (PROVENTIL) (2.5 MG/3ML) 0.083% neb solution Take 1 vial (2.5 mg) by nebulization every 6 hours as needed for shortness of breath, wheezing or cough 90 mL 5    amLODIPine (NORVASC) 5 MG tablet Take 1 tablet (5 mg) by mouth daily. 90 tablet 4    apixaban ANTICOAGULANT (ELIQUIS) 5 MG tablet Take 5 mg by mouth 2 times daily.        aspirin (ASA) 81 MG chewable tablet Take 81 mg by mouth daily.        atorvastatin (LIPITOR) 40 MG tablet Take 1 tablet (40 mg) by mouth daily. 90 tablet 4    fluticasone-salmeterol (ADVAIR) 500-50 MCG/ACT inhaler Inhale 1 puff into the lungs every 12 hours 60 each 11    lamoTRIgine (LAMICTAL) 100 MG tablet Take 0.5 tablets (50 mg) by mouth 2 times daily for 7 days, THEN 1 tablet (100 mg) 2 times daily for 90 days. - For Irritability / Anger 187 tablet 4    losartan-hydrochlorothiazide (HYZAAR) 100-25 MG tablet Take 1 tablet by mouth daily. 90 tablet 4    MEDS UNK - RECORDS REQUESTED For cholesterol        metaxalone (SKELAXIN) 800 MG tablet Take 1 tablet (800 mg) by mouth 3 times daily. 90 tablet 4    naproxen (NAPROSYN) 500 MG tablet TAKE 1 TABLET(500 MG) BY MOUTH TWICE DAILY AS NEEDED FOR PAIN 90 tablet 2    nicotine polacrilex (NICORETTE) 4 MG gum Place 1 each (4 mg) inside cheek every hour as needed for nicotine withdrawal symptoms. 240 each 1    Omega-3 Fish Oil 500 MG capsule Take 1 capsule (500 mg) by mouth daily 90 capsule 4    propranolol ER (INDERAL LA) 120 MG 24 hr capsule Take 1 capsule (120 mg) by mouth daily. 30 capsule 2    tiotropium (SPIRIVA) 18 MCG inhaled capsule Inhale 1 capsule (18 mcg) into the lungs daily 90 capsule 4    valACYclovir (VALTREX) 1000 mg tablet Take 1 tablet (1,000 mg) by mouth 3 times daily for 7 days 21 tablet 0      No current facility-administered medications for this visit.            Allergies:  Allergies         Allergies   Allergen Reactions    Penicillins  Anaphylaxis       Tolerated cefazolin on 09/16/2015.    Clindamycin Hives    Gabapentin         Mood disturbance             Physical Examination:  Telephone visit: Physical is limited due to telephone visit, patient is awake, conversational and has had no additional complaints or concerns.         Laboratory findings:  Reviewed     Imaging findings:  MRI and MRA brain and neck reviewed. Right ICA cavernous segment aneurysm does not appear based on this study to rise above the optic strut to make it intradural, but this cannot be determined definitively.     Impression:  Right internal carotid artery cavernous segment 11 mm aneurysm- risk of rupture is <1% over 5 years per ISUIA, and if rupture were to occur, intracranial hemorrhage is unlikely due to its location- carotid-cavernous fistula formation is more likely. That said, due to his non-optimal aneurysm rupture risk factors (uncontrolled HTN, current smoking, positive family history of aneurysm rupture), it is prudent to perform further testing to determine if this is in fact a carotid cave aneurysm that could cause subarachnoid hemorrhage. Additionally, his personal preference is for treatment if feasible due to the emotional nature of his family history and restrictions imposed on his ability to work based on the presence of this aneurysm, despite its exceedingly low rupture risk.  He underwent flow diverter placement which was complicated by inadequate placement which was attempted to be retrieved however failed, and LVIS Blue stent was then attempted to be placed but was not able to be placed correctly so angioplasty was performed instead.  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   He has noted headaches that worsens with activity with possible component of medication overuse (daily use of tylenol).     Plan:  -Continue aspirin and Eliquis and repeat CTA head and neck in 4-6 months; patient would like to get it done  closer to home  -Return to clinic after repeat imaging  -Neurology referral to headache specialist for headaches. He notes that he is struggling financially and therefore has not followed up with neurology at this point. We discussed limiting tylenol intake to 2-3x/week and trial of magnesium oxide 400mg 1-2x daily to help for headache ppx (can be refilled by PCP if it helps).  -Continue therapies as before    Patient discussed with Dr Raza.    Laura Diallo MD  Fellow, Endovascular Surgical Neuroradiology

## 2024-11-19 NOTE — PROGRESS NOTES
"Virtual Visit Details    Type of service:  Video Visit   Video Start Time: {video visit start/end time for provider to select:842600}  Video End Time:{video visit start/end time for provider to select:609216}    Originating Location (pt. Location): {video visit patient location:251403::\"Home\"}  {PROVIDER LOCATION On-site should be selected for visits conducted from your clinic location or adjoining [facility] hospital, academic office, or other nearby [facility] building. Off-site should be selected for all other provider locations, including home:114804}  Distant Location (provider location):  {virtual location provider:331047}  Platform used for Video Visit: {Virtual Visit Platforms:127660::\"OneShield\"}  "

## 2024-11-20 ENCOUNTER — THERAPY VISIT (OUTPATIENT)
Dept: SPEECH THERAPY | Facility: OTHER | Age: 54
End: 2024-11-20
Attending: STUDENT IN AN ORGANIZED HEALTH CARE EDUCATION/TRAINING PROGRAM
Payer: MEDICAID

## 2024-11-20 DIAGNOSIS — I63.9 RIGHT SIDED CEREBRAL HEMISPHERE CEREBROVASCULAR ACCIDENT (CVA) (H): Primary | ICD-10-CM

## 2024-11-20 DIAGNOSIS — I69.320 APHASIA FOLLOWING CEREBRAL INFARCTION: ICD-10-CM

## 2024-11-20 DIAGNOSIS — I69.322 DYSARTHRIA FOLLOWING CEREBRAL INFARCTION: ICD-10-CM

## 2024-11-20 DIAGNOSIS — R41.841 COGNITIVE COMMUNICATION DEFICIT: Primary | ICD-10-CM

## 2024-11-20 DIAGNOSIS — R41.89 COGNITIVE CHANGE: ICD-10-CM

## 2024-11-20 PROCEDURE — 92507 TX SP LANG VOICE COMM INDIV: CPT | Mod: GN

## 2024-11-25 ENCOUNTER — THERAPY VISIT (OUTPATIENT)
Dept: SPEECH THERAPY | Facility: OTHER | Age: 54
End: 2024-11-25
Attending: STUDENT IN AN ORGANIZED HEALTH CARE EDUCATION/TRAINING PROGRAM
Payer: MEDICAID

## 2024-11-25 DIAGNOSIS — R41.841 COGNITIVE COMMUNICATION DEFICIT: Primary | ICD-10-CM

## 2024-11-25 DIAGNOSIS — I69.322 DYSARTHRIA FOLLOWING CEREBRAL INFARCTION: ICD-10-CM

## 2024-11-25 PROCEDURE — 92507 TX SP LANG VOICE COMM INDIV: CPT | Mod: GN

## 2024-11-26 ENCOUNTER — THERAPY VISIT (OUTPATIENT)
Dept: PHYSICAL THERAPY | Facility: OTHER | Age: 54
End: 2024-11-26
Payer: MEDICAID

## 2024-11-26 DIAGNOSIS — G89.4 CHRONIC PAIN SYNDROME: ICD-10-CM

## 2024-11-26 DIAGNOSIS — M54.2 CERVICALGIA: ICD-10-CM

## 2024-11-26 PROCEDURE — 97162 PT EVAL MOD COMPLEX 30 MIN: CPT | Mod: GP,PO

## 2024-11-26 PROCEDURE — 97112 NEUROMUSCULAR REEDUCATION: CPT | Mod: GP,PO

## 2024-11-26 NOTE — PROGRESS NOTES
PHYSICAL THERAPY EVALUATION  Type of Visit: Evaluation       Fall Risk Screen:  Fall screen completed by: PT  Have you fallen 2 or more times in the past year?: Yes  Have you fallen and had an injury in the past year?: No  Is patient a fall risk?: Department fall risk interventions implemented; Yes  Fall screen comments: Decreased balance reactions since recent CVA.    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  Patient reports he continues to experience weakness in the right upper and lower extremities.  Patient has noted a decline in his balance with a couple recent falls.  Patient reports he continues to experience symptoms of headaches, visual disturbances with black and gray spots as well as symptoms of lightheadedness and ringing in his ears.  He reports the symptoms typically worsen when he is concentrating hard on activities.  Patient is currently receiving speech therapy.  He has orders for occupational therapy is working to set up these appointments.  He is motivated to begin physical therapy.  Patient recently had a follow-up with [facility].  Review of notes reveals recommendations for neurology to further assess headaches.  Patient reports he has also been dealing with ongoing neck pain for the past 3 years.  He is hoping physical therapy can help with the symptoms.  Date of onset: 09/17/24    Relevant medical history:   Reviewed medical chart  Dates & types of surgery:  Reviewed medical chart    Prior diagnostic imaging/testing results:     Reviewed medical chart  Prior therapy history for the same diagnosis, illness or injury: No      Prior Level of Function  No function limitations employed  previously as an over the road     Living Environment  Social support: Alone   Type of home: 1 level   Stairs to enter the home: Yes   Is there a railing: No    Stairs inside the home: No       Help at home: None  Employment: Not Applicable      Patient goals for therapy: Decrease neck pain, improve strength, improve balance       Objective   Blood pressure taken at the beginning of the evaluation: 146/102.  Heart rate: 76  Blood pressure taken at the end of the evaluation: 151/99.  Heart rate 76     Cognitive Status Examination  Orientation: Oriented to person, place and time     POSTURE: Rounded shoulder and forward head posture noted  PALPATION: Tightness and tenderness to palpation over the bilateral upper trapezius and levator scapulae muscles.  RANGE OF MOTION:   Cervical range of motion:  Flexion: 40 degrees with pain reported over the right posterior neck.  Increased radicular symptoms into the right hand noticed tingling in fingers 3 through 5  Extension: 30 degrees with pain over the right posterior neck. Increased radicular symptoms into the right hand noticed tingling in fingers 3 through 5  Left sidebendin degrees-pain on right  Right sidebendin degrees-pain on right  Left rotation: 45 degrees-pain on right  Right rotation: 45 degrees-pain on right    STRENGTH:   Shoulder flexion: 5/5 bilaterally  Shoulder abduction: 5/5 bilaterally  Bicep: Right 5 -/5.  Left 5/5  Tricep: 5/5 bilaterally   strength measured in position 2 with a hand-held dynamometer: Right: 35 pounds.  Left: 72 pounds    TRANSFERS: Independent with all transfers    GAIT: Slightly slowed velocity of gait for age.  Independent with ambulation no loss of balance noted when turning corners.    BALANCE:   Standing on floor with feet together and eyes open: Mild increased postural sway  Standing on floor with feet together and eyes closed: Moderate increase in postural sway  Tandem standing with eyes open: Loss of  balance within 10 seconds in each foot position.  Single-leg stand: Loss of balance within 5 seconds bilaterally.    COORDINATION: No specific muscle coordination issues noted.      Assessment & Plan   CLINICAL IMPRESSIONS  Medical Diagnosis: Right sided cerebral hemisphere cerebrovascular accident (CVA), Cervicalgia, Chronic pain syndrome - Neck    Treatment Diagnosis:     Impression/Assessment: Patient is a 54 year old male with status post CVA affecting right upper extremity and lower extremity as well as balance.  History of neck pain with worsening headaches following CVA.  Patient is currently receiving speech therapy and will be starting occupational therapy soon.  The following significant findings have been identified: Pain, Decreased ROM/flexibility, Decreased strength, Impaired balance, Impaired gait, Impaired muscle performance, Decreased activity tolerance, Impaired posture, and Impaired vision. These impairments interfere with their ability to perform self care tasks, work tasks, recreational activities, household chores, driving , household mobility, and community mobility as compared to previous level of function.     Clinical Decision Making (Complexity):  Clinical Presentation: Evolving/Changing  Clinical Presentation Rationale: based on medical and personal factors listed in PT evaluation  Clinical Decision Making (Complexity): Moderate complexity    PLAN OF CARE  Treatment Interventions:  Interventions: Gait Training, Manual Therapy, Neuromuscular Re-education, Therapeutic Activity, Therapeutic Exercise    Long Term Goals     PT Goal 1  Goal Identifier: Neck pain  Goal Description: Patient will report a 50% or greater reduction in neck pain to allow performance of lifting and carrying groceries with less difficulty.  Rationale: to maximize safety and independence with performance of ADLs and functional tasks;to maximize safety and independence within the community  Target Date: 01/22/25  PT Goal  2  Goal Identifier: Weakness  Goal Description: Patient will report increased functional strength in right lower extremity to allow performance of ascending and descending stairs without difficulty.  Rationale: to maximize safety and independence with performance of ADLs and functional tasks;to maximize safety and independence within the home;to maximize safety and independence within the community  Target Date: 01/22/25  PT Goal 3  Goal Identifier: Balance  Goal Description: Patient will report no episodes of falls or near falls when ambulating on indoor and outdoor surfaces.  Rationale: to maximize safety and independence with performance of ADLs and functional tasks;to maximize safety and independence within the home;to maximize safety and independence within the community  Target Date: 01/22/25      Frequency of Treatment: 16 visits  Duration of Treatment: 8 weeks      Education Assessment:   Learner/Method: Patient;Listening;Reading;Demonstration;Pictures/Video    Risks and benefits of evaluation/treatment have been explained.   Patient/Family/caregiver agrees with Plan of Care.     Evaluation Time:     PT Eval, Moderate Complexity Minutes (69075): 30       Signing Clinician: Nagi Madrigal PT        [facility]                                                                                   OUTPATIENT PHYSICAL THERAPY      PLAN OF TREATMENT FOR OUTPATIENT REHABILITATION   Patient's Last Name, First Name, Jose Elias Winter YOB: 1970   Provider's Name   [facility]   Medical Record No.  [id number]     Onset Date: 09/17/24  Start of Care Date: 11/26/24     Medical Diagnosis:  Right sided cerebral hemisphere cerebrovascular accident (CVA), Cervicalgia, Chronic pain syndrome - Neck      PT Treatment Diagnosis:    Plan of Treatment  Frequency/Duration: 16 visits/ 8 weeks    Certification date from 11/26/24 to 01/22/25         See note for  plan of treatment details and functional goals     Nagi Madrigal, PT                         I CERTIFY THE NEED FOR THESE SERVICES FURNISHED UNDER        THIS PLAN OF TREATMENT AND WHILE UNDER MY CARE     (Physician attestation of this document indicates review and certification of the therapy plan).              Referring Provider:  Dinah Michel    Initial Assessment  See Epic Evaluation- Start of Care Date: 11/26/24

## 2024-12-05 ENCOUNTER — THERAPY VISIT (OUTPATIENT)
Dept: PHYSICAL THERAPY | Facility: OTHER | Age: 54
End: 2024-12-05
Attending: STUDENT IN AN ORGANIZED HEALTH CARE EDUCATION/TRAINING PROGRAM
Payer: MEDICAID

## 2024-12-05 ENCOUNTER — THERAPY VISIT (OUTPATIENT)
Dept: OCCUPATIONAL THERAPY | Facility: OTHER | Age: 54
End: 2024-12-05
Attending: STUDENT IN AN ORGANIZED HEALTH CARE EDUCATION/TRAINING PROGRAM
Payer: MEDICAID

## 2024-12-05 DIAGNOSIS — I63.9 RIGHT SIDED CEREBRAL HEMISPHERE CEREBROVASCULAR ACCIDENT (CVA) (H): ICD-10-CM

## 2024-12-05 DIAGNOSIS — I63.9 RIGHT SIDED CEREBRAL HEMISPHERE CEREBROVASCULAR ACCIDENT (CVA) (H): Primary | ICD-10-CM

## 2024-12-05 DIAGNOSIS — M54.2 CERVICALGIA: ICD-10-CM

## 2024-12-05 DIAGNOSIS — R41.89 COGNITIVE CHANGE: ICD-10-CM

## 2024-12-05 DIAGNOSIS — G89.4 CHRONIC PAIN SYNDROME: Primary | ICD-10-CM

## 2024-12-05 PROCEDURE — 97166 OT EVAL MOD COMPLEX 45 MIN: CPT | Mod: GO | Performed by: OCCUPATIONAL THERAPIST

## 2024-12-05 PROCEDURE — 97112 NEUROMUSCULAR REEDUCATION: CPT | Mod: GO | Performed by: OCCUPATIONAL THERAPIST

## 2024-12-05 PROCEDURE — 97140 MANUAL THERAPY 1/> REGIONS: CPT | Mod: GP,PO

## 2024-12-05 NOTE — PROGRESS NOTES
OCCUPATIONAL THERAPY EVALUATION  Type of Visit: Evaluation        Fall Risk Screen:  Fall screen completed by: PT  Have you fallen 2 or more times in the past year?: Yes  Have you fallen and had an injury in the past year?: No  Is patient a fall risk?: Department fall risk interventions implemented; Yes  Fall screen comments: Decreased balance reactions since recent CVA.    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 Patient underwent testing by ophthalmology and was noted to have cross eyed vision, decreased vision in both peripheries, and left partial outer superior nasal deficiency. She reports objects jump around a lot and he has difficulty focusing on objects. He reports double vision and blurriness, and spots all over. He reports increase in ringing in the ear and head pressure with increase visual strain.        Presenting condition or subjective complaint: Neck pain, recent CVA following surgical procedure for right internal carotid unruptured aneurysm  Date of onset: 09/17/23    Relevant medical history:     Dates & types of surgery:      Prior diagnostic imaging/testing results:       Prior therapy history for the same diagnosis, illness or injury: No      Prior Level of Function  Transfers: Independent  Ambulation: Independent  ADL: Independent  IADL: Driving, Finances, Housekeeping, Laundry, Meal preparation, Medication management, Work    Living Environment  Social support: Alone   Type of home: 1 level   Stairs to enter the home: Yes   Is there a railing: No     Ramp:     Stairs inside the home: No       Help at home: None  Equipment owned:       Employment: Not Applicable    Hobbies/Interests:      Patient goals for therapy: Decrease neck pain, improve strength, improve balance    Pain assessment: Pain present  See objective evaluation for additional pain details     Objective        Assessment & Plan   CLINICAL IMPRESSIONS  Medical Diagnosis: Right sided cerebral hemisphere cerebrovascular accident (CVA) (H) (I63.9)  - Primary    Cognitive change (R41.89)    Treatment Diagnosis: impaired work and home mangement    Impression/Assessment: Pt is a 54 year old male presenting to Occupational Therapy due to visual deficits.  The following significant findings have been identified: Impaired visual perception.  These identified deficits interfere with their ability to perform work tasks and driving  as compared to previous level of function.   Patient's limitations or Problem List includes: Pain and decreased vision  which interferes with the patient's ability to perform Work Tasks and Driving  as compared to previous level of function.    Clinical Decision Making (Complexity):  Assessment of Occupational Performance: 1-3 Performance Deficits  Occupational Performance Limitations: work  Clinical Decision Making (Complexity): Moderate complexity    PLAN OF CARE  Treatment Interventions:  Interventions: Self-Care/Home Management, Therapeutic Activity, Neuromuscular Re-education    Long Term Goals   OT Goal 1  Goal Identifier: Home programming  Goal Description: Patient will demonstrate good technique with home exercises to be able to carry out independently at home  Rationale: In order to maximize safety and independence with performance of self-care activities  Target Date: 12/19/24  OT Goal 2  Goal Identifier: Visual tracking  Goal Description: Patient will be able to visual track from left to right, right to left without losing focus solid object to increase ability to read  Rationale: In order to maximize safety and independence with performance of self-care activities  Target Date: 01/16/25  OT Goal 3  Goal Identifier: DynaVision  Goal Description: Patient will improve DynaVision score to 30 light activations and 60 seconds to increase scanning accuracy while working  Rationale: In order to  maximize safety and independence with performance of self-care activities  Target Date: 02/20/25      Frequency of Treatment: 1-2x/week  Duration of Treatment: 16 weeks     Recommended Referrals to Other Professionals:   Education Assessment: Learner/Method: Patient;Listening;Reading;Demonstration;Pictures/Video     Risks and benefits of evaluation/treatment have been explained.   Patient/Family/caregiver agrees with Plan of Care.     Evaluation Time:    OT Dianeal, Moderate Complexity Minutes (89719): 20       Signing Clinician: LATA Peña [facility]                                                                                   OUTPATIENT OCCUPATIONAL THERAPY      PLAN OF TREATMENT FOR OUTPATIENT REHABILITATION   Patient's Last Name, First Name, MJose Elias Juilan YOB: 1970   Provider's Name   [facility]   Medical Record No.  [id number]     Onset Date: 09/17/23 Start of Care Date: 12/05/24     Medical Diagnosis:  Right sided cerebral hemisphere cerebrovascular accident (CVA) (H) (I63.9)  - Primary    Cognitive change (R41.89)      OT Treatment Diagnosis:  impaired work and home mangement Plan of Treatment  Frequency/Duration:1-2x/week/16 weeks    Certification date from 12/05/24   To 03/27/25        See note for plan of treatment details and functional goals     Nanette Zaidi OT                         I CERTIFY THE NEED FOR THESE SERVICES FURNISHED UNDER        THIS PLAN OF TREATMENT AND WHILE UNDER MY CARE     (Physician attestation of this document indicates review and certification of the therapy plan).              Referring Provider:  Radha Anthony/ Dinah Michel PCP    Initial Assessment  See Epic Evaluation- 12/05/24

## 2024-12-09 ENCOUNTER — MYC MEDICAL ADVICE (OUTPATIENT)
Dept: INTERNAL MEDICINE | Facility: OTHER | Age: 54
End: 2024-12-09
Payer: MEDICAID

## 2024-12-09 DIAGNOSIS — G89.4 CHRONIC PAIN SYNDROME: ICD-10-CM

## 2024-12-09 DIAGNOSIS — J44.9 CHRONIC OBSTRUCTIVE PULMONARY DISEASE, UNSPECIFIED COPD TYPE (H): ICD-10-CM

## 2024-12-09 DIAGNOSIS — M54.2 CERVICALGIA: ICD-10-CM

## 2024-12-09 DIAGNOSIS — M54.2 CERVICALGIA: Primary | ICD-10-CM

## 2024-12-09 DIAGNOSIS — J45.909 UNCOMPLICATED ASTHMA, UNSPECIFIED ASTHMA SEVERITY, UNSPECIFIED WHETHER PERSISTENT: ICD-10-CM

## 2024-12-09 RX ORDER — FLUTICASONE PROPIONATE AND SALMETEROL 500; 50 UG/1; UG/1
1 POWDER RESPIRATORY (INHALATION) EVERY 12 HOURS
Qty: 60 EACH | Refills: 11 | Status: SHIPPED | OUTPATIENT
Start: 2024-12-09

## 2024-12-09 RX ORDER — ALBUTEROL SULFATE 90 UG/1
2 INHALANT RESPIRATORY (INHALATION) EVERY 4 HOURS PRN
Qty: 54 G | Refills: 1 | Status: SHIPPED | OUTPATIENT
Start: 2024-12-09

## 2024-12-09 NOTE — TELEPHONE ENCOUNTER
Pt is needing refills for his inhalers      Requested Prescriptions   Pending Prescriptions Disp Refills    fluticasone-salmeterol (ADVAIR) 500-50 MCG/ACT inhaler 60 each 11     Sig: Inhale 1 puff into the lungs every 12 hours.       Inhaled Steroids Protocol Failed - 12/9/2024 10:26 AM        Failed - Asthma control assessment score within normal limits in last 6 months     Please review ACT score.      Last Prescription Date:   10/9/23  Last Fill Qty/Refills:         60, R-11         albuterol (PROAIR HFA/PROVENTIL HFA/VENTOLIN HFA) 108 (90 Base) MCG/ACT inhaler 54 g 1     Sig: Inhale 2 puffs into the lungs every 4 hours as needed for shortness of breath or wheezing.       Short-Acting Beta Agonist Inhalers Protocol  Failed - 12/9/2024 10:26 AM        Failed - Asthma control assessment score within normal limits in last 6 months     Please review ACT score.      Last Prescription Date:   10/21/24  Last Fill Qty/Refills:         54 g, R-1          Last Office Visit:              9/27/24   Future Office visit:           none    Routing to provider to review and respond.  Wesly Hester RN on 12/9/2024 at 10:27 AM

## 2024-12-10 RX ORDER — METAXALONE 800 MG/1
800 TABLET ORAL 3 TIMES DAILY
Qty: 90 TABLET | Refills: 4 | OUTPATIENT
Start: 2024-12-10

## 2024-12-10 RX ORDER — FLUTICASONE PROPIONATE AND SALMETEROL 250; 50 UG/1; UG/1
1 POWDER RESPIRATORY (INHALATION) EVERY 12 HOURS
OUTPATIENT
Start: 2024-12-10

## 2024-12-10 RX ORDER — TIOTROPIUM BROMIDE 18 UG/1
CAPSULE ORAL; RESPIRATORY (INHALATION)
Qty: 90 CAPSULE | Refills: 4 | OUTPATIENT
Start: 2024-12-10

## 2024-12-10 NOTE — TELEPHONE ENCOUNTER
[id number] of [place] sent Rx request for the following:      fluticasone-salmeterol (ADVAIR) 500-50 MCG/ACT inhaler 60 each 11 12/9/2024 -- No   Sig - Route: Inhale 1 puff into the lungs every 12 hours. - Inhalation   Sent to pharmacy as: Fluticasone-Salmeterol 500-50 MCG/ACT Inhalation Aerosol Powder Breath Activated (ADVAIR)   Class: E-Prescribe   Order: [id number]   E-Prescribing Status: Receipt confirmed by pharmacy (12/9/2024 10:48 AM CST)     tiotropium (SPIRIVA) 18 MCG inhaled capsule 90 capsule 4 7/10/2024 -- No   Sig - Route: Inhale 1 capsule (18 mcg) into the lungs daily - Inhalation   Sent to pharmacy as: Tiotropium Bromide Monohydrate 18 MCG Inhalation Capsule (SPIRIVA)   Class: E-Prescribe   Order: [id number]   E-Prescribing Status: Receipt confirmed by pharmacy (7/10/2024 10:27 AM CDT)     metaxalone (SKELAXIN) 800 MG tablet 90 tablet 4 9/27/2024 -- No   Sig - Route: Take 1 tablet (800 mg) by mouth 3 times daily. - Oral   Sent to pharmacy as: Metaxalone 800 MG Oral Tablet (SKELAXIN)   Class: E-Prescribe   Notes to Pharmacy: ** [address] [facility] **   Order: [id number]   E-Prescribing Status: Receipt confirmed by pharmacy (9/27/2024 10:02 AM CDT)     [id number] - [place], [place] - [address]     Theresa Ireland RN .............. 12/10/2024  11:14 AM

## 2024-12-10 NOTE — TELEPHONE ENCOUNTER
"On 9/27 Dinah Mihcel ordered Metaxalone.    Not covered by insurance.    I called pharmacy and formulary alternatives are Cyclobenzaprine 10mg, Tizanidine 4mg, or Methocarbamol 750mg tablet.     He's been on Tizanidine 4mg before so I saul'd this up.     ________________________________________________________________    Called [facility].     His Skalaxin was transferred out to [facility].      Called [facility] in  to look in to alternative for Metaxalone (Skelaxin).      \"Transferred to a different [facility]\".   They couldn't tell me which one.     Called [facility] in [place].      Formulary alteratives for Metaxalone:  Cyclobenzaprine 10mg, Tizanidine 4mg, or Methocarbamol 750mg tablet.      Nanette Royal RN on 12/10/2024 at 11:38 AM    "

## 2024-12-16 ENCOUNTER — PATIENT OUTREACH (OUTPATIENT)
Dept: CARE COORDINATION | Facility: CLINIC | Age: 54
End: 2024-12-16
Payer: MEDICAID

## 2024-12-19 NOTE — PROGRESS NOTES
Clinic Care Coordination Contact  Follow Up Progress Note      Assessment: He states things are going okay with the therapies. He has missed some appointments due to difficulty with ride. He feels that speech therapy has been beneficial though he finds it difficult to get there.     Care Gaps:    Health Maintenance Due   Topic Date Due    COLORECTAL CANCER SCREENING  Never done    ZOSTER IMMUNIZATION (1 of 2) Never done    HEPATITIS B IMMUNIZATION (1 of 3 - 19+ 3-dose series) Never done    INFLUENZA VACCINE (1) 09/01/2024    Pneumococcal Vaccine: 50+ Years (3 of 3 - PCV) 09/11/2024       He is living in a friend's camper. Has significant costs filling the propane tank to keep it warm enough. He has applied for disability. Has some cash assistance from the [facility], and is now on [place]. Being transportation is not available easily right now, he is asking if he might have speech therapy virtually if possible.     [place] I offered to help ensure cost can be covered to travel, and he said that would make it possible.       Intervention/Education provided during outreach: Discussed follow up needs, resources for housing and transportation     Plan:   Patient is in agreement that I should talk with his therapy team to problem solve. [facility] to [facility] at [facility] if he can get help to cover the costs he can probably find someone to drive him. I will check if imaging is needed.   Care Coordinator will continue to support.   Theresa Dooley RN on 12/19/2024 at 4:11 PM

## 2024-12-30 ENCOUNTER — MYC MEDICAL ADVICE (OUTPATIENT)
Dept: INTERNAL MEDICINE | Facility: OTHER | Age: 54
End: 2024-12-30
Payer: MEDICAID

## 2025-01-10 ENCOUNTER — APPOINTMENT (OUTPATIENT)
Dept: CT IMAGING | Facility: OTHER | Age: 55
End: 2025-01-10
Attending: EMERGENCY MEDICINE
Payer: COMMERCIAL

## 2025-01-10 ENCOUNTER — HOSPITAL ENCOUNTER (EMERGENCY)
Facility: OTHER | Age: 55
Discharge: HOME | End: 2025-01-10
Attending: EMERGENCY MEDICINE
Payer: COMMERCIAL

## 2025-01-10 VITALS
HEIGHT: 71 IN | TEMPERATURE: 97 F | DIASTOLIC BLOOD PRESSURE: 89 MMHG | HEART RATE: 86 BPM | RESPIRATION RATE: 19 BRPM | BODY MASS INDEX: 31.5 KG/M2 | SYSTOLIC BLOOD PRESSURE: 122 MMHG | WEIGHT: 225 LBS | OXYGEN SATURATION: 93 %

## 2025-01-10 DIAGNOSIS — R42 VERTIGO: ICD-10-CM

## 2025-01-10 LAB
ANION GAP SERPL CALCULATED.3IONS-SCNC: 13 MMOL/L (ref 7–15)
BASOPHILS # BLD AUTO: 0 10E3/UL (ref 0–0.2)
BASOPHILS NFR BLD AUTO: 0 %
BUN SERPL-MCNC: 13.5 MG/DL (ref 6–20)
CALCIUM SERPL-MCNC: 9.5 MG/DL (ref 8.8–10.4)
CHLORIDE SERPL-SCNC: 98 MMOL/L (ref 98–107)
CREAT SERPL-MCNC: 1.02 MG/DL (ref 0.67–1.17)
D DIMER PPP FEU-MCNC: 0.65 UG/ML FEU (ref 0–0.5)
EGFRCR SERPLBLD CKD-EPI 2021: 87 ML/MIN/1.73M2
EOSINOPHIL # BLD AUTO: 0.3 10E3/UL (ref 0–0.7)
EOSINOPHIL NFR BLD AUTO: 2 %
ERYTHROCYTE [DISTWIDTH] IN BLOOD BY AUTOMATED COUNT: 13 % (ref 10–15)
GLUCOSE SERPL-MCNC: 103 MG/DL (ref 70–99)
HCO3 SERPL-SCNC: 27 MMOL/L (ref 22–29)
HCT VFR BLD AUTO: 44.3 % (ref 40–53)
HGB BLD-MCNC: 14.9 G/DL (ref 13.3–17.7)
HOLD SPECIMEN: NORMAL
IMM GRANULOCYTES # BLD: 0 10E3/UL
IMM GRANULOCYTES NFR BLD: 0 %
LYMPHOCYTES # BLD AUTO: 2.5 10E3/UL (ref 0.8–5.3)
LYMPHOCYTES NFR BLD AUTO: 24 %
MCH RBC QN AUTO: 28.1 PG (ref 26.5–33)
MCHC RBC AUTO-ENTMCNC: 33.6 G/DL (ref 31.5–36.5)
MCV RBC AUTO: 84 FL (ref 78–100)
MONOCYTES # BLD AUTO: 0.9 10E3/UL (ref 0–1.3)
MONOCYTES NFR BLD AUTO: 8 %
NEUTROPHILS # BLD AUTO: 7 10E3/UL (ref 1.6–8.3)
NEUTROPHILS NFR BLD AUTO: 65 %
NRBC # BLD AUTO: 0 10E3/UL
NRBC BLD AUTO-RTO: 0 /100
PLATELET # BLD AUTO: 479 10E3/UL (ref 150–450)
POTASSIUM SERPL-SCNC: 3.4 MMOL/L (ref 3.4–5.3)
RBC # BLD AUTO: 5.3 10E6/UL (ref 4.4–5.9)
SODIUM SERPL-SCNC: 138 MMOL/L (ref 135–145)
TROPONIN T SERPL HS-MCNC: 17 NG/L
TROPONIN T SERPL HS-MCNC: 17 NG/L
WBC # BLD AUTO: 10.7 10E3/UL (ref 4–11)

## 2025-01-10 PROCEDURE — 71275 CT ANGIOGRAPHY CHEST: CPT

## 2025-01-10 PROCEDURE — 250N000013 HC RX MED GY IP 250 OP 250 PS 637: Performed by: EMERGENCY MEDICINE

## 2025-01-10 PROCEDURE — 84484 ASSAY OF TROPONIN QUANT: CPT | Performed by: EMERGENCY MEDICINE

## 2025-01-10 PROCEDURE — 250N000011 HC RX IP 250 OP 636: Performed by: EMERGENCY MEDICINE

## 2025-01-10 PROCEDURE — 99284 EMERGENCY DEPT VISIT MOD MDM: CPT | Performed by: EMERGENCY MEDICINE

## 2025-01-10 PROCEDURE — 85004 AUTOMATED DIFF WBC COUNT: CPT | Performed by: EMERGENCY MEDICINE

## 2025-01-10 PROCEDURE — 99285 EMERGENCY DEPT VISIT HI MDM: CPT | Mod: 25

## 2025-01-10 PROCEDURE — 85018 HEMOGLOBIN: CPT | Performed by: EMERGENCY MEDICINE

## 2025-01-10 PROCEDURE — 70496 CT ANGIOGRAPHY HEAD: CPT

## 2025-01-10 PROCEDURE — 250N000009 HC RX 250: Performed by: EMERGENCY MEDICINE

## 2025-01-10 PROCEDURE — 82435 ASSAY OF BLOOD CHLORIDE: CPT | Performed by: EMERGENCY MEDICINE

## 2025-01-10 PROCEDURE — 36415 COLL VENOUS BLD VENIPUNCTURE: CPT | Performed by: EMERGENCY MEDICINE

## 2025-01-10 PROCEDURE — 85048 AUTOMATED LEUKOCYTE COUNT: CPT | Performed by: EMERGENCY MEDICINE

## 2025-01-10 PROCEDURE — 84295 ASSAY OF SERUM SODIUM: CPT | Performed by: EMERGENCY MEDICINE

## 2025-01-10 PROCEDURE — 80048 BASIC METABOLIC PNL TOTAL CA: CPT | Performed by: EMERGENCY MEDICINE

## 2025-01-10 PROCEDURE — 93010 ELECTROCARDIOGRAM REPORT: CPT | Performed by: INTERNAL MEDICINE

## 2025-01-10 PROCEDURE — 70450 CT HEAD/BRAIN W/O DYE: CPT

## 2025-01-10 PROCEDURE — 93005 ELECTROCARDIOGRAM TRACING: CPT

## 2025-01-10 PROCEDURE — 85379 FIBRIN DEGRADATION QUANT: CPT | Performed by: EMERGENCY MEDICINE

## 2025-01-10 RX ORDER — MECLIZINE HYDROCHLORIDE 25 MG/1
25 TABLET ORAL 3 TIMES DAILY PRN
Qty: 6 TABLET | Refills: 0 | Status: SHIPPED | OUTPATIENT
Start: 2025-01-10 | End: 2025-01-15

## 2025-01-10 RX ORDER — IOPAMIDOL 755 MG/ML
75 INJECTION, SOLUTION INTRAVASCULAR ONCE
Status: COMPLETED | OUTPATIENT
Start: 2025-01-10 | End: 2025-01-10

## 2025-01-10 RX ORDER — OXYCODONE HYDROCHLORIDE 5 MG/1
5 TABLET ORAL ONCE
Status: COMPLETED | OUTPATIENT
Start: 2025-01-10 | End: 2025-01-10

## 2025-01-10 RX ORDER — IOPAMIDOL 755 MG/ML
90 INJECTION, SOLUTION INTRAVASCULAR ONCE
Status: COMPLETED | OUTPATIENT
Start: 2025-01-10 | End: 2025-01-10

## 2025-01-10 RX ORDER — MECLIZINE HCL 12.5 MG 12.5 MG/1
25 TABLET ORAL ONCE
Status: COMPLETED | OUTPATIENT
Start: 2025-01-10 | End: 2025-01-10

## 2025-01-10 RX ORDER — ACETAMINOPHEN 500 MG
1000 TABLET ORAL ONCE
Status: COMPLETED | OUTPATIENT
Start: 2025-01-10 | End: 2025-01-10

## 2025-01-10 RX ADMIN — SODIUM CHLORIDE 70 ML: 9 INJECTION, SOLUTION INTRAVENOUS at 16:48

## 2025-01-10 RX ADMIN — MECLIZINE 25 MG: 12.5 TABLET ORAL at 18:20

## 2025-01-10 RX ADMIN — SODIUM CHLORIDE 80 ML: 9 INJECTION, SOLUTION INTRAVENOUS at 15:39

## 2025-01-10 RX ADMIN — ACETAMINOPHEN 1000 MG: 500 TABLET, FILM COATED ORAL at 14:00

## 2025-01-10 RX ADMIN — MECLIZINE HCL 12.5 MG 25 MG: 12.5 TABLET ORAL at 14:00

## 2025-01-10 RX ADMIN — APIXABAN 5 MG: 5 TABLET, FILM COATED ORAL at 16:38

## 2025-01-10 RX ADMIN — IOPAMIDOL 90 ML: 755 INJECTION, SOLUTION INTRAVENOUS at 15:38

## 2025-01-10 RX ADMIN — IOPAMIDOL 75 ML: 755 INJECTION, SOLUTION INTRAVENOUS at 16:48

## 2025-01-10 RX ADMIN — OXYCODONE HYDROCHLORIDE 5 MG: 5 TABLET ORAL at 16:38

## 2025-01-10 ASSESSMENT — ACTIVITIES OF DAILY LIVING (ADL)
ADLS_ACUITY_SCORE: 54

## 2025-01-10 ASSESSMENT — COLUMBIA-SUICIDE SEVERITY RATING SCALE - C-SSRS
1. IN THE PAST MONTH, HAVE YOU WISHED YOU WERE DEAD OR WISHED YOU COULD GO TO SLEEP AND NOT WAKE UP?: NO
6. HAVE YOU EVER DONE ANYTHING, STARTED TO DO ANYTHING, OR PREPARED TO DO ANYTHING TO END YOUR LIFE?: NO
2. HAVE YOU ACTUALLY HAD ANY THOUGHTS OF KILLING YOURSELF IN THE PAST MONTH?: NO

## 2025-01-10 NOTE — PROGRESS NOTES
Pt is being bolused with IV contrast again within 24 hours, scanned chest 2hours ago, which goes against normal protocol. Discussed with radiologist. TORB to proceed verified. akd

## 2025-01-10 NOTE — ED TRIAGE NOTES
"Pt arrived with a c/o nausea and dizziness that has been affecting his balance and vision. He also has been having stabbing pains to his chest that is followed with pressure that started for 2 weeks. Pt takes Elequis due to a Stroke he had on sept 17th, 2024.   Vital signs:  Temp: 97  F (36.1  C)   BP: 122/89 Pulse: 86   Resp: 19 SpO2: 93 % O2 Device: None (Room air)   Height: 180.3 cm (5' 11\") Weight: 102.1 kg (225 lb)  Estimated body mass index is 31.38 kg/m  as calculated from the following:    Height as of this encounter: 1.803 m (5' 11\").    Weight as of this encounter: 102.1 kg (225 lb).          Triage Assessment (Adult)       Row Name 01/10/25 1332          Triage Assessment    Airway WDL WDL        Respiratory WDL    Respiratory WDL WDL        Skin Circulation/Temperature WDL    Skin Circulation/Temperature WDL WDL        Cardiac WDL    Cardiac WDL WDL        Peripheral/Neurovascular WDL    Peripheral Neurovascular WDL WDL     Capillary Refill, General less than/equal to 3 secs        Cognitive/Neuro/Behavioral WDL    Cognitive/Neuro/Behavioral WDL WDL        Duane Coma Scale    Best Eye Response 4-->(E4) spontaneous     Best Verbal Response 5-->(V5) oriented                     "

## 2025-01-10 NOTE — ED PROVIDER NOTES
History     Chief Complaint   Patient presents with    Chest Pain    Dizziness     HPI  Jose Elias Reaves is a 54 year old male who presents with vertigo and left-sided chest pain.  He reports he has had the symptoms for 3 or more weeks.  He states he gets vertigo when he looks up, or if he is walking around.  He has chronic vision changes and ringing in his ears, but nothing that is recently changed.  No headache, numbness/tingling or weakness in the extremities.  He endorses some nausea when he has the vertigo but no vomiting..  Left-sided chest pain is described as a stabbing pain that comes on periodically.  No changes in shortness of breath, the patient states it is no different than his usual COPD.  He does periodically forget to take his anticoagulation medication.    Allergies:  Allergies   Allergen Reactions    Penicillins Anaphylaxis     Tolerated cefazolin on 09/16/2015.    Clindamycin Hives    Gabapentin      Mood disturbance        Problem List:    Patient Active Problem List    Diagnosis Date Noted    Aphasia following cerebral infarction 11/12/2024     Priority: Medium    Dysarthria following cerebral infarction 11/04/2024     Priority: Medium    Cognitive change 10/21/2024     Priority: Medium    Cognitive communication deficit 10/21/2024     Priority: Medium    Personal history of noncompliance with medical treatment, presenting hazards to health 09/27/2024     Priority: Medium    Right sided cerebral hemisphere cerebrovascular accident (CVA) (H) 09/17/2024     Priority: Medium    Saccular aneurysm 07/10/2024     Priority: Medium     Large saccular 11 mm right cavernous ICA aneurysm. 6/2024  Small saccular 2 mm right PCA aneurysm. 6/2024      Prediabetes 08/22/2023     Priority: Medium    Cigarette smoker 03/20/2019     Priority: Medium    Cervicalgia 12/26/2018     Priority: Medium    Other chronic rhinitis 02/20/2018     Priority: Medium    Mild intermittent asthma 02/20/2018     Priority: Medium     Deviated nasal septum 02/20/2018     Priority: Medium    Tobacco abuse 02/20/2018     Priority: Medium    Mixed hyperlipidemia 04/18/2017     Priority: Medium    Chronic pain syndrome - Neck 04/18/2017     Priority: Medium    Essential hypertension 03/21/2017     Priority: Medium    Family history of ischemic heart disease 03/21/2017     Priority: Medium    Family history of diabetes mellitus 03/21/2017     Priority: Medium    Personality disorder (H) 05/05/2012     Priority: Medium        Past Medical History:    Past Medical History:   Diagnosis Date    Cervical strain 01/03/2019    Closed head injury with concussion 12/12/2018    Concussion with brief LOC 12/12/2018    Concussion with brief LOC 12/12/2018    Hypertension     Impingement of right ulnar nerve 03/16/2020    Post concussion syndrome 03/26/2019    Post-concussion headache 12/26/2018    Vestibular dysfunction 12/26/2018    Vision disturbance 12/26/2018       Past Surgical History:    Past Surgical History:   Procedure Laterality Date    HERNIA REPAIR Left     IR CAROTID CEREBRAL ANGIOGRAM BILATERAL  9/17/2024    KNEE SURGERY Left     ACL repair, patella graft    SEPTOPLASTY N/A 3/13/2018    Procedure: SEPTOPLASTY;  septoplasty, submucosal resection of the turbinates;  Surgeon: Piotr Quiroz MD;  Location: [facility]       Family History:    Family History   Problem Relation Age of Onset    Diabetes Mother     Myocardial Infarction Father        Social History:  Marital Status:   [4]  Social History     Tobacco Use    Smoking status: Every Day     Current packs/day: 1.00     Average packs/day: 1 pack/day for 43.0 years (43.0 ttl pk-yrs)     Types: Cigarettes     Start date: 1/1/1982    Smokeless tobacco: Never    Tobacco comments:     2-3 cigarettes per day    Vaping Use    Vaping status: Never Used   Substance Use Topics    Alcohol use: Yes     Alcohol/week: 2.0 standard drinks of alcohol     Types: 2 Cans of beer per week     Comment: 2-4  "drinks a week    Drug use: No        Medications:    meclizine (ANTIVERT) 25 MG tablet  acetaminophen (TYLENOL) 325 MG tablet  albuterol (PROAIR HFA/PROVENTIL HFA/VENTOLIN HFA) 108 (90 Base) MCG/ACT inhaler  albuterol (PROVENTIL) (2.5 MG/3ML) 0.083% neb solution  amLODIPine (NORVASC) 5 MG tablet  apixaban ANTICOAGULANT (ELIQUIS) 5 MG tablet  aspirin (ASA) 81 MG chewable tablet  atorvastatin (LIPITOR) 40 MG tablet  fluticasone-salmeterol (ADVAIR) 500-50 MCG/ACT inhaler  lamoTRIgine (LAMICTAL) 100 MG tablet  losartan-hydrochlorothiazide (HYZAAR) 100-25 MG tablet  magnesium oxide (MAG-OX) 400 MG tablet  MEDS UNK - RECORDS REQUESTED  metaxalone (SKELAXIN) 800 MG tablet  naproxen (NAPROSYN) 500 MG tablet  nicotine polacrilex (NICORETTE) 4 MG gum  Omega-3 Fish Oil 500 MG capsule  propranolol ER (INDERAL LA) 120 MG 24 hr capsule  tiotropium (SPIRIVA) 18 MCG inhaled capsule  tiZANidine (ZANAFLEX) 4 MG tablet  valACYclovir (VALTREX) 1000 mg tablet          Review of Systems    Physical Exam   BP: (!) 155/103  Pulse: 105  Temp: 97  F (36.1  C)  Resp: 16  Height: 180.3 cm (5' 11\")  Weight: 102.1 kg (225 lb)  SpO2: 97 %      Physical Exam  General: No acute distress  Head: No obvious trauma to head.  Ears, Nose, Throat:  External ears normal.  Nose normal.  No pharyngeal erythema, swelling or exudate.  Midline uvula. Moist mucus membranes.  Eyes:  Conjunctivae clear. EOMI. PERRL. No nystagmus  Neck: Normal range of motion.  Neck supple.   CV: Regular rate and rhythm.  No murmurs.      Respiratory: Effort normal and breath sounds normal.  No wheezing or crackles.   Gastrointestinal: Soft.  No distension. There is no tenderness.  There is no rigidity, no rebound and no guarding.   Musculoskeletal: Normal range of motion.  Non tender extremities to palpations. No lower extremity edema  Neuro: Alert. Moving all extremities appropriately.  Normal speech.    Skin: Skin is warm and dry.  No rash noted. CN II-XII grossly intact, no " pronator drift, normal finger-nose-finger, visual fields intact.  Gross muscle strength intact of the proximal and distal bilateral upper and lower extremities.  Sensation intact to light touch in all 4 extremities.    Psych: Normal mood and affect. Behavior is normal.       ED Course        Procedures              EKG Interpretation:      Interpreted by Red Medrano MD  Time reviewed: 1436  Symptoms at time of EKG: chest pain   Rhythm: normal sinus   Rate: normal  Axis: normal  Ectopy: none  Conduction: normal  ST Segments/ T Waves: No ST-T wave changes  Q Waves: none  Comparison to prior: 9/23/24: Sinus rhythm replaces junctional rhythm    Clinical Impression: normal EKG      Critical Care time:  none              Results for orders placed or performed during the hospital encounter of 01/10/25 (from the past 24 hours)   CBC with platelets differential    Narrative    The following orders were created for panel order CBC with platelets differential.  Procedure                               Abnormality         Status                     ---------                               -----------         ------                     CBC with platelets and d...[[id number]]  Abnormal            Final result                 Please view results for these tests on the individual orders.   Basic metabolic panel   Result Value Ref Range    Sodium 138 135 - 145 mmol/L    Potassium 3.4 3.4 - 5.3 mmol/L    Chloride 98 98 - 107 mmol/L    Carbon Dioxide (CO2) 27 22 - 29 mmol/L    Anion Gap 13 7 - 15 mmol/L    Urea Nitrogen 13.5 6.0 - 20.0 mg/dL    Creatinine 1.02 0.67 - 1.17 mg/dL    GFR Estimate 87 >60 mL/min/1.73m2    Calcium 9.5 8.8 - 10.4 mg/dL    Glucose 103 (H) 70 - 99 mg/dL   Troponin T, High Sensitivity   Result Value Ref Range    Troponin T, High Sensitivity 17 <=22 ng/L   D dimer quantitative   Result Value Ref Range    D-Dimer Quantitative 0.65 (H) 0.00 - 0.50 ug/mL FEU    Narrative    This D-dimer assay is intended for use in  conjunction with a clinical pretest probability assessment model to exclude pulmonary embolism (PE) and deep venous thrombosis (DVT) in outpatients suspected of PE or DVT. The cut-off value is 0.50 ug/mL FEU.    For patients 50 years of age or older, the application of age-adjusted cut-off values for D-Dimer may increase the specificity without significant effect on sensitivity. The literature suggested calculation age adjusted cut-off in ug/L = age in years x 10 ug/L. The results in this laboratory are reported as ug/mL rather than ug/L. The calculation for age adjusted cut off in ug/mL= age in years x 0.01 ug/mL. For example, the cut off for a 76 year old male is 75 x 0.01 ug/mL = 0.76 ug/mL (760 ug/L).    M Sen et al. Age adjusted D-dimer cut-off levels to rule out pulmonary embolism: The ADJUST-PE Study. ANDRÉS 2014;311:5972-4258.; HJ Donovan et al. Diagnostic accuracy of conventional or age adjusted D-dimer cutoff values in older patients with suspected venous thromboembolism. Systemic review and meta-analysis. BMJ 2013:346:f2492.   CBC with platelets and differential   Result Value Ref Range    WBC Count 10.7 4.0 - 11.0 10e3/uL    RBC Count 5.30 4.40 - 5.90 10e6/uL    Hemoglobin 14.9 13.3 - 17.7 g/dL    Hematocrit 44.3 40.0 - 53.0 %    MCV 84 78 - 100 fL    MCH 28.1 26.5 - 33.0 pg    MCHC 33.6 31.5 - 36.5 g/dL    RDW 13.0 10.0 - 15.0 %    Platelet Count 479 (H) 150 - 450 10e3/uL    % Neutrophils 65 %    % Lymphocytes 24 %    % Monocytes 8 %    % Eosinophils 2 %    % Basophils 0 %    % Immature Granulocytes 0 %    NRBCs per 100 WBC 0 <1 /100    Absolute Neutrophils 7.0 1.6 - 8.3 10e3/uL    Absolute Lymphocytes 2.5 0.8 - 5.3 10e3/uL    Absolute Monocytes 0.9 0.0 - 1.3 10e3/uL    Absolute Eosinophils 0.3 0.0 - 0.7 10e3/uL    Absolute Basophils 0.0 0.0 - 0.2 10e3/uL    Absolute Immature Granulocytes 0.0 <=0.4 10e3/uL    Absolute NRBCs 0.0 10e3/uL   Extra Tube    Narrative    The following orders were created  for panel order Extra Tube.  Procedure                               Abnormality         Status                     ---------                               -----------         ------                     Extra Red Top Tube[[id number]]                               Final result                 Please view results for these tests on the individual orders.   Extra Red Top Tube   Result Value Ref Range    Hold Specimen x    CT Chest Pulmonary Embolism w Contrast    Narrative    Exam:  CT CHEST PULMONARY EMBOLISM W CONTRAST    Exam reason:  L sided chest pain, elevated dimer    Technique:  Contrast enhanced helical CT pulmonary angiography was  performed. Sagittal and coronal reformats as well as coronal MIP  reformats were obtained. This CT was performed using one or more of  the following dose reduction techniques: automated exposure control,  adjustment of the mA and/or kV according to patient size, and/or use  of iterative reconstruction technique.    Meds/Contrast: Isovue 370, 90mL    Comparison:  10/31/2024    FINDINGS:    Technical quality: Diagnostic.    Cardiovascular:   -There are no filling defects in the pulmonary arteries that would  indicate pulmonary embolism.  -No aortic aneurysm.  -The main pulmonary artery is normal caliber.  Lungs/Airways: No mass or consolidation. No concerning pulmonary  nodules. Mild centrilobular emphysema.  Abigail/Mediastinum: No adenopathy or mass.  Pleura: No pleural effusion or pneumothorax.  Chest Wall/Axilla: Unremarkable.    Visualized Upper Abdomen: No acute findings in the visualized upper  abdomen.  Musculoskeletal: No acute osseous abnormalities.      Impression    IMPRESSION:    No pulmonary embolism. No acute findings in the chest.    TRISTON SALAZAR MD         SYSTEM ID:  [id number]   Troponin T, High Sensitivity   Result Value Ref Range    Troponin T, High Sensitivity 17 <=22 ng/L   CT Head w/o Contrast    Narrative    Exam: CT HEAD W/O CONTRAST      Exam reason:  Headache; Chronic HA (>= 3 months); Chronic HA (>= 3  months), no complicating feature    Technique:   Axial images of the head obtained without contrast. Coronal and  sagittal reformations were generated. This CT was performed using one  or more of the following dose reduction techniques: automated exposure  control, adjustment of the mA and/or kV according to patient size,  and/or use of iterative reconstruction technique.    Comparison: 9/23/2024       Findings:      Parenchyma: No evidence of intraparenchymal hemorrhage, mass, acute  cortical infarct or prior infarct in a major vascular territory.      No midline shift. The basilar cisterns are patent.    Extra-axial spaces: No extra-axial fluid collection or hemorrhage.     Ventricles: Normal.  Paranasal sinuses: Clear.   Mastoid air cells: Clear.    Osseous: No acute findings.  Orbits: Normal.    Soft tissues: Unremarkable.       Impression    Impression:  No acute intracranial abnormality.      TRISTON SALAZAR MD         SYSTEM ID:  [id number]   CTA Head Neck with Contrast    Narrative    EXAM: CTA HEAD NECK W CONTRAST  LOCATION: [facility]  DATE: 1/10/2025    INDICATION: Vertigo, headache; Headache; Chronic HA (>= 3 months); Chronic HA (>= 3 months), no complicating feature  COMPARISON: CT 9/23/2024.  CONTRAST: Isovue 370, 75 mL  TECHNIQUE: Head and neck CT angiogram with IV contrast. Axial helical CT images of the head and neck vessels obtained during the arterial phase of intravenous contrast administration. Axial 2D reconstructed images and multiplanar 3D MIP reconstructed   images of the head and neck vessels were performed by the technologist. Dose reduction techniques were used. All stenosis measurements made according to NASCET criteria unless otherwise specified.    FINDINGS:   HEAD CTA:  ANTERIOR CIRCULATION: Right cavernous ICA demonstrates posttreatment changes with suspected mild in-stent stenosis, and minimal filling of  right cavernous ICA aneurysm (series 6, image 266), appearance unchanged from prior. Similar irregular saccular   aneurysm of left cavernous ICA measures 3 x 2 mm (series 6, image 275). Similar left terminal ICA P-comm aneurysm versus prominent infundibulum measuring 3 x 2 mm (series 6, image 258). No high-grade stenosis/occlusion, aneurysm, or high flow vascular   malformation. Standard [place] anatomy.    POSTERIOR CIRCULATION: No stenosis/occlusion, aneurysm, or high flow vascular malformation. Balanced vertebral arteries supply a normal basilar artery.     DURAL VENOUS SINUSES: Expected enhancement of the major dural venous sinuses.    NECK CTA:  RIGHT CAROTID: No measurable stenosis or dissection.    LEFT CAROTID: No measurable stenosis or dissection.    VERTEBRAL ARTERIES: No focal stenosis or dissection. Balanced vertebral arteries.    AORTIC ARCH: Classic aortic arch anatomy with no significant stenosis at the origin of the arch vessels. Mild stenosis proximal left subclavian artery.    NONVASCULAR STRUCTURES: Unremarkable.      Impression    IMPRESSION:   HEAD CTA:   1.  No acute occlusion or flow-limiting stenosis.  2.  No change from prior CT angiogram of 9/23/2024.  3.  Similar right cavernous ICA posttreatment changes with possible in-stent stenosis and minimal filling of treated right cavernous ICA aneurysm.  4.  Stable left ICA aneurysm(s).    NECK CTA:  1.  No hemodynamically significant stenosis in the neck vessels.   2.  No evidence for dissection.       Medications   meclizine (ANTIVERT) tablet 25 mg (has no administration in time range)   meclizine (ANTIVERT) tablet 25 mg (25 mg Oral $Given 1/10/25 1400)   acetaminophen (TYLENOL) tablet 1,000 mg (1,000 mg Oral $Given 1/10/25 1400)   sodium chloride 0.9 % bag 500 mL for CT scan flush use (80 mLs Intravenous $Given 1/10/25 1539)   iopamidol (ISOVUE-370) solution 90 mL (90 mLs Intravenous $Given 1/10/25 1538)   oxyCODONE (ROXICODONE) tablet  5 mg (5 mg Oral $Given 1/10/25 1638)   apixaban ANTICOAGULANT (ELIQUIS) tablet 5 mg (5 mg Oral $Given 1/10/25 1638)   iopamidol (ISOVUE-370) solution 75 mL (75 mLs Intravenous $Given 1/10/25 1648)   sodium chloride 0.9 % bag 500 mL for CT scan flush use (70 mLs Intravenous $Given 1/10/25 1648)       Assessments & Plan (with Medical Decision Making)     I have reviewed the nursing notes.    I have reviewed the findings, diagnosis, plan and need for follow up with the patient.           Medical Decision Making  The patient's presentation was of moderate complexity (an acute illness with systemic symptoms).    The patient's evaluation involved:  history and exam without other MDM data elements    The patient's management necessitated moderate risk (prescription drug management including medications given in the ED).    Patient presents with vertigo and episodic left-sided chest pain.  He is breathing comfortably on room air and is hemodynamically stable.  The dimer is elevated.  CT scan is negative for pulmonary embolism or any other etiology for his chest pain.  Troponin is not elevated x 2.  EKG shows no ischemic changes.  CBC, BMP are grossly unremarkable.  He is given a dose of meclizine and reports it may be slightly helped.  Given his history of previous stroke and his continued symptoms, additional imaging is obtained.  Head CT and CT angio of the head and neck are obtained and remain unchanged from prior.  He is relieved by the negative workup.  I am giving him a prescription for a few tablets of meclizine to see if this does help his symptoms.  He is instructed to contact his outpatient provider to schedule a follow-up appointment as soon as possible.  Return precautions are given and he verbalizes understanding.  He is discharged home in stable condition.        New Prescriptions    MECLIZINE (ANTIVERT) 25 MG TABLET    Take 1 tablet (25 mg) by mouth 3 times daily as needed for dizziness.       Final  diagnoses:   Vertigo       1/10/2025   [facility] AND [facility]       Red Medrano MD  01/10/25 [id number]

## 2025-01-11 NOTE — DISCHARGE INSTRUCTIONS
Continue taking your medication as prescribed.  We are giving you a prescription for a medication that can sometimes help with dizziness.  Take this as needed.  Please follow-up with your outpatient provider.  Return to the emergency department if you develop any new or concerning symptoms.

## 2025-01-13 LAB
ATRIAL RATE - MUSE: 92 BPM
DIASTOLIC BLOOD PRESSURE - MUSE: NORMAL MMHG
INTERPRETATION ECG - MUSE: NORMAL
P AXIS - MUSE: 48 DEGREES
PR INTERVAL - MUSE: 164 MS
QRS DURATION - MUSE: 108 MS
QT - MUSE: 384 MS
QTC - MUSE: 474 MS
R AXIS - MUSE: 65 DEGREES
SYSTOLIC BLOOD PRESSURE - MUSE: NORMAL MMHG
T AXIS - MUSE: 23 DEGREES
VENTRICULAR RATE- MUSE: 92 BPM

## 2025-01-15 ENCOUNTER — THERAPY VISIT (OUTPATIENT)
Dept: SPEECH THERAPY | Facility: OTHER | Age: 55
End: 2025-01-15
Payer: COMMERCIAL

## 2025-01-15 ENCOUNTER — HOSPITAL ENCOUNTER (OUTPATIENT)
Dept: GENERAL RADIOLOGY | Facility: OTHER | Age: 55
Discharge: HOME | End: 2025-01-15
Payer: COMMERCIAL

## 2025-01-15 ENCOUNTER — OFFICE VISIT (OUTPATIENT)
Dept: INTERNAL MEDICINE | Facility: OTHER | Age: 55
End: 2025-01-15
Payer: COMMERCIAL

## 2025-01-15 ENCOUNTER — PATIENT OUTREACH (OUTPATIENT)
Dept: CARE COORDINATION | Facility: CLINIC | Age: 55
End: 2025-01-15

## 2025-01-15 ENCOUNTER — THERAPY VISIT (OUTPATIENT)
Dept: PHYSICAL THERAPY | Facility: OTHER | Age: 55
End: 2025-01-15
Payer: COMMERCIAL

## 2025-01-15 VITALS
HEART RATE: 88 BPM | TEMPERATURE: 96.9 F | BODY MASS INDEX: 32.72 KG/M2 | SYSTOLIC BLOOD PRESSURE: 110 MMHG | DIASTOLIC BLOOD PRESSURE: 80 MMHG | RESPIRATION RATE: 16 BRPM | OXYGEN SATURATION: 97 % | WEIGHT: 234.6 LBS

## 2025-01-15 DIAGNOSIS — M54.2 CERVICALGIA: ICD-10-CM

## 2025-01-15 DIAGNOSIS — M54.6 ACUTE THORACIC BACK PAIN, UNSPECIFIED BACK PAIN LATERALITY: ICD-10-CM

## 2025-01-15 DIAGNOSIS — G89.4 CHRONIC PAIN SYNDROME: Primary | ICD-10-CM

## 2025-01-15 DIAGNOSIS — Z91.199 PERSONAL HISTORY OF NONCOMPLIANCE WITH MEDICAL TREATMENT, PRESENTING HAZARDS TO HEALTH: Primary | ICD-10-CM

## 2025-01-15 DIAGNOSIS — I63.9 RIGHT SIDED CEREBRAL HEMISPHERE CEREBROVASCULAR ACCIDENT (CVA) (H): ICD-10-CM

## 2025-01-15 DIAGNOSIS — H61.23 BILATERAL IMPACTED CERUMEN: ICD-10-CM

## 2025-01-15 DIAGNOSIS — R42 VERTIGO: ICD-10-CM

## 2025-01-15 DIAGNOSIS — R41.841 COGNITIVE COMMUNICATION DEFICIT: Primary | ICD-10-CM

## 2025-01-15 DIAGNOSIS — I69.322 DYSARTHRIA FOLLOWING CEREBRAL INFARCTION: ICD-10-CM

## 2025-01-15 DIAGNOSIS — D75.839 THROMBOCYTOSIS: ICD-10-CM

## 2025-01-15 DIAGNOSIS — G89.4 CHRONIC PAIN SYNDROME: ICD-10-CM

## 2025-01-15 DIAGNOSIS — I10 ESSENTIAL HYPERTENSION: ICD-10-CM

## 2025-01-15 LAB
ALBUMIN UR-MCNC: 10 MG/DL
APPEARANCE UR: CLEAR
BASOPHILS # BLD AUTO: 0.1 10E3/UL (ref 0–0.2)
BASOPHILS NFR BLD AUTO: 1 %
BILIRUB UR QL STRIP: NEGATIVE
COLOR UR AUTO: ABNORMAL
EOSINOPHIL # BLD AUTO: 0.2 10E3/UL (ref 0–0.7)
EOSINOPHIL NFR BLD AUTO: 1 %
ERYTHROCYTE [DISTWIDTH] IN BLOOD BY AUTOMATED COUNT: 13.3 % (ref 10–15)
GLUCOSE UR STRIP-MCNC: NEGATIVE MG/DL
HCT VFR BLD AUTO: 48.9 % (ref 40–53)
HGB BLD-MCNC: 16.3 G/DL (ref 13.3–17.7)
HGB UR QL STRIP: NEGATIVE
HYALINE CASTS: 5 /LPF
IMM GRANULOCYTES # BLD: 0 10E3/UL
IMM GRANULOCYTES NFR BLD: 0 %
KETONES UR STRIP-MCNC: NEGATIVE MG/DL
LEUKOCYTE ESTERASE UR QL STRIP: NEGATIVE
LYMPHOCYTES # BLD AUTO: 3 10E3/UL (ref 0.8–5.3)
LYMPHOCYTES NFR BLD AUTO: 24 %
MAGNESIUM SERPL-MCNC: 2.4 MG/DL (ref 1.7–2.3)
MCH RBC QN AUTO: 27.8 PG (ref 26.5–33)
MCHC RBC AUTO-ENTMCNC: 33.3 G/DL (ref 31.5–36.5)
MCV RBC AUTO: 83 FL (ref 78–100)
MONOCYTES # BLD AUTO: 1 10E3/UL (ref 0–1.3)
MONOCYTES NFR BLD AUTO: 8 %
MUCOUS THREADS #/AREA URNS LPF: PRESENT /LPF
NEUTROPHILS # BLD AUTO: 8.1 10E3/UL (ref 1.6–8.3)
NEUTROPHILS NFR BLD AUTO: 66 %
NITRATE UR QL: NEGATIVE
NRBC # BLD AUTO: 0 10E3/UL
NRBC BLD AUTO-RTO: 0 /100
PH UR STRIP: 6 [PH] (ref 5–9)
PLATELET # BLD AUTO: 508 10E3/UL (ref 150–450)
RBC # BLD AUTO: 5.86 10E6/UL (ref 4.4–5.9)
RBC URINE: 4 /HPF
RETICS # AUTO: 0.07 10E6/UL (ref 0.03–0.1)
RETICS/RBC NFR AUTO: 1.3 % (ref 0.5–2)
SP GR UR STRIP: 1.02 (ref 1–1.03)
T4 FREE SERPL-MCNC: 1.39 NG/DL (ref 0.9–1.7)
TSH SERPL DL<=0.005 MIU/L-ACNC: 4.79 UIU/ML (ref 0.3–4.2)
UROBILINOGEN UR STRIP-MCNC: NORMAL MG/DL
VIT B12 SERPL-MCNC: 977 PG/ML (ref 232–1245)
WBC # BLD AUTO: 12.3 10E3/UL (ref 4–11)
WBC URINE: 1 /HPF

## 2025-01-15 PROCEDURE — 85045 AUTOMATED RETICULOCYTE COUNT: CPT | Mod: ZL

## 2025-01-15 PROCEDURE — 92507 TX SP LANG VOICE COMM INDIV: CPT | Mod: GN

## 2025-01-15 PROCEDURE — 84439 ASSAY OF FREE THYROXINE: CPT | Mod: ZL

## 2025-01-15 PROCEDURE — 85025 COMPLETE CBC W/AUTO DIFF WBC: CPT | Mod: ZL

## 2025-01-15 PROCEDURE — 36415 COLL VENOUS BLD VENIPUNCTURE: CPT | Mod: ZL

## 2025-01-15 PROCEDURE — 81003 URINALYSIS AUTO W/O SCOPE: CPT | Mod: ZL

## 2025-01-15 PROCEDURE — 72070 X-RAY EXAM THORAC SPINE 2VWS: CPT

## 2025-01-15 PROCEDURE — 72040 X-RAY EXAM NECK SPINE 2-3 VW: CPT

## 2025-01-15 PROCEDURE — 83735 ASSAY OF MAGNESIUM: CPT | Mod: ZL

## 2025-01-15 PROCEDURE — 97112 NEUROMUSCULAR REEDUCATION: CPT | Mod: GP,PO

## 2025-01-15 PROCEDURE — 82607 VITAMIN B-12: CPT | Mod: ZL

## 2025-01-15 PROCEDURE — 84443 ASSAY THYROID STIM HORMONE: CPT | Mod: ZL

## 2025-01-15 PROCEDURE — 72100 X-RAY EXAM L-S SPINE 2/3 VWS: CPT

## 2025-01-15 PROCEDURE — 85014 HEMATOCRIT: CPT | Mod: ZL

## 2025-01-15 PROCEDURE — 69210 REMOVE IMPACTED EAR WAX UNI: CPT

## 2025-01-15 RX ORDER — LOSARTAN POTASSIUM AND HYDROCHLOROTHIAZIDE 25; 100 MG/1; MG/1
1 TABLET ORAL DAILY
Qty: 90 TABLET | Refills: 4 | Status: SHIPPED | OUTPATIENT
Start: 2025-01-15

## 2025-01-15 ASSESSMENT — PAIN SCALES - GENERAL: PAINLEVEL_OUTOF10: EXTREME PAIN (9)

## 2025-01-15 ASSESSMENT — PATIENT HEALTH QUESTIONNAIRE - PHQ9
10. IF YOU CHECKED OFF ANY PROBLEMS, HOW DIFFICULT HAVE THESE PROBLEMS MADE IT FOR YOU TO DO YOUR WORK, TAKE CARE OF THINGS AT HOME, OR GET ALONG WITH OTHER PEOPLE: SOMEWHAT DIFFICULT
SUM OF ALL RESPONSES TO PHQ QUESTIONS 1-9: 9
SUM OF ALL RESPONSES TO PHQ QUESTIONS 1-9: 9

## 2025-01-15 NOTE — NURSING NOTE
"Chief Complaint   Patient presents with    RECHECK     vertigo   Patient presents to the clinic today for a ER follow up for Vertigo    Initial There were no vitals taken for this visit. Estimated body mass index is 31.38 kg/m  as calculated from the following:    Height as of 1/10/25: 1.803 m (5' 11\").    Weight as of 1/10/25: 102.1 kg (225 lb).  Meds Reconciled: complete      aCte Ring LPN,LPN on 1/15/2025 at 11:08 AM  Ext. [phone]        Cate Ring LPN  "

## 2025-01-15 NOTE — PROGRESS NOTES
01/15/25 0726   Appointment Info   Signing clinician's name / credentials Nagi Madrigal, PT, OCS   Visits Used 3   Medical Diagnosis Right sided cerebral hemisphere cerebrovascular accident (CVA), Cervicalgia, Chronic pain syndrome - Neck   Progress Note/Certification   Start of Care Date 11/26/24   Onset of illness/injury or Date of Surgery 09/17/24   Therapy Frequency 10   Predicted Duration 8 weeks   Certification date from 01/15/25   Certification date to 03/12/25   GOALS   PT Goals 2;3   PT Goal 1   Goal Identifier Neck pain   Goal Description Patient will report a 50% or greater reduction in neck pain to allow performance of lifting and carrying groceries with less difficulty.   Rationale to maximize safety and independence with performance of ADLs and functional tasks;to maximize safety and independence within the community   Goal Progress Slow progress towards goal secondary to patient's continued symptoms and difficulty attending PT appointment secondary to transportation issues.   Target Date 03/12/25   PT Goal 2   Goal Identifier Weakness   Goal Description Patient will report increased functional strength in right lower extremity to allow performance of ascending and descending stairs without difficulty.   Rationale to maximize safety and independence with performance of ADLs and functional tasks;to maximize safety and independence within the home;to maximize safety and independence within the community   Goal Progress Slow progress towards goal secondary to patient's continued symptoms and difficulty attending PT appointment secondary to transportation issues.   Target Date 03/12/25   PT Goal 3   Goal Identifier Balance   Goal Description Patient will report no episodes of falls or near falls when ambulating on indoor and outdoor surfaces.   Rationale to maximize safety and independence with performance of ADLs and functional tasks;to maximize safety and independence within the home;to maximize safety  "and independence within the community   Goal Progress Slow progress towards goal secondary to patient's continued symptoms and difficulty attending PT appointment secondary to transportation issues.   Target Date 25   Subjective Report   Subjective Report Patient reports his difficulty attending PT appointments secondary to transportation issues.  Patient states he was recently seen in the ER secondary to worsening symptoms of dizziness and imbalance.  ER notes were reviewed was seen for diagnosis of vertigo.. He has been taking meclizine but states its doesn't help with symptoms of imbalance.. He reports increased symptoms of dizziness and imbalance when he look up. He reports symptoms decreased within 30 seconds. He reports these symptoms are \"throwing off my balance\".  He feels less steady on his feet at this time.  He reports neck pain continues.  And he continues to endorse symptoms of tightness over the posterior cervical region worse on the right than left.  He reports his low back pain has been aggravated by his worsening balance symptoms..  Patient also reports he has other additional questions regarding lab values and lab testing that was completed during his ER visit.  He has not yet scheduled a follow up with his primary provider to discuss these questions.   Objective Measures   Objective Measures Objective Measure 1;Objective Measure 2;Objective Measure 3;Objective Measure 4;Objective Measure 5;Objective Measure 6   Objective Measure 1   Objective Measure Blood pressure   Details Seated: 125/87, HR 86. Standin/92, HR 88.   Objective Measure 2   Objective Measure Neck pain   Details 8-9/10 at rest   Objective Measure 3   Objective Measure Low back pain   Details 7-8/10 at rest   Objective Measure 4   Objective Measure Cervical ROM   Details Flexion: 40 degrees with pain reported over the right posterior neck.  Increased radicular symptoms into the right hand noticed tingling in fingers 3 " through 5  Extension: 30 degrees with pain over the right posterior neck. Increased radicular symptoms into the right hand noticed tingling in fingers 3 through 5  Left sidebendin degrees-pain on right  Right sidebendin degrees-pain on right  Left rotation: 45 degrees-pain on right  Right rotation: 45 degrees-pain on right   Objective Measure 5   Objective Measure Balance   Details Standing on floor with feet together and eyes open: Mild increased postural sway  Standing on floor with feet together and eyes closed: Moderate increase in postural sway.  Tandem standing with eyes open: Loss of balance within 10 seconds in each foot position.  Single-leg stand: Loss of balance within 5 seconds bilaterally.   Objective Measure 6   Objective Measure Lumbar ROM   Details Unable to assess due to loss of balance with lumbar flexion and extension motions..   Treatment Interventions (PT)   Interventions Neuromuscular Re-education;Therapeutic Procedure/Exercise;Manual Therapy   Neuromuscular Re-education   Neuromuscular re-ed of mvmt, balance, coord, kinesthetic sense, posture, proprioception minutes (34502) 30   Neuro Re-ed 1 - Details Time spent today reviewing patient's new symptoms and continued symptoms.  Recommended patient follow-up with his primary care provider secondary to new symptoms that did not seem to be improving since his most recent ER visit.  Discussed with patient that if vertigo symptoms continue and he is cleared medically he may benefit from a vertigo assessment by one of our physical therapist who specializes in evaluation and treatment of vertigo.   Skilled Intervention Education, recommendations for primary care follow-up   Patient Response/Progress Patient verbalized understanding.  He plans to contact his primary care provider and schedule a follow-up visit.   Education   Learner/Method Patient;Listening;Reading;Demonstration;Pictures/Video   Plan   Plan for next session Progress exercises  for strength, gait, and balance as symptoms allow.  Progress exercises as indicated as symptoms allow for neck pain.   Total Session Time   Timed Code Treatment Minutes 30   Total Treatment Time (sum of timed and untimed services) 30         [facility]                                                                                   OUTPATIENT PHYSICAL THERAPY    PLAN OF TREATMENT FOR OUTPATIENT REHABILITATION   Patient's Last Name, First Name, Jose Elias Winter YOB: 1970   Provider's Name   [facility]   Medical Record No.  [id number]     Onset Date: 09/17/24  Start of Care Date: 11/26/24     Medical Diagnosis:  Right sided cerebral hemisphere cerebrovascular accident (CVA), Cervicalgia, Chronic pain syndrome - Neck      PT Treatment Diagnosis:    Plan of Treatment  Frequency/Duration: 10/ 8 weeks    Certification date from 01/15/25 to 03/12/25         See note for plan of treatment details and functional goals     Nagi Madrigal, PT                         I CERTIFY THE NEED FOR THESE SERVICES FURNISHED UNDER        THIS PLAN OF TREATMENT AND WHILE UNDER MY CARE     (Physician attestation of this document indicates review and certification of the therapy plan).              Referring Provider:  Dinah Michel    Initial Assessment  See Epic Evaluation- Start of Care Date: 11/26/24

## 2025-01-15 NOTE — PROGRESS NOTES
Answers submitted by the patient for this visit:  Patient Health Questionnaire (Submitted on 1/15/2025)  If you checked off any problems, how difficult have these problems made it for you to do your work, take care of things at home, or get along with other people?: Somewhat difficult  PHQ9 TOTAL SCORE: 9    Assessment & Plan     ICD-10-CM    1. Personal history of noncompliance with medical treatment, presenting hazards to health  Z91.199       2. Thrombocytosis  D75.839 Vitamin B12     Lab Blood Morphology Pathologist Review     CBC and Differential     Vitamin B12     CANCELED: CBC and Differential      3. Vertigo  R42 Physical Therapy  Referral     XR Cervical Spine 2/3 Views      4. Essential hypertension  I10 losartan-hydrochlorothiazide (HYZAAR) 100-25 MG tablet     TSH Reflex GH     Vitamin B12     Magnesium     UA with Microscopic reflex to Culture     TSH Reflex GH     Magnesium     UA with Microscopic reflex to Culture     T4 free     CANCELED: Drug Screen Complete Med Report, Whole Blood     CANCELED: Vitamin B12     CANCELED: Drug Screen Complete Med Report, Whole Blood      5. Acute thoracic back pain, unspecified back pain laterality  M54.6 XR Thoracic Spine 2 Views     XR Lumbar Spine 2/3 Views     diclofenac (VOLTAREN) 1 % topical gel     Spine  Referral      6. Chronic pain syndrome  G89.4 Drugs of Abuse Screen Qualitative, Urine     Drugs of Abuse Screen Qualitative, Urine      7. Cervicalgia  M54.2 MR Cervical Spine w/o & w Contrast     Spine  Referral      8. Right sided cerebral hemisphere cerebrovascular accident (CVA) (H)  I63.9          Vertigo: Discussed possible causes of this including residual symptoms from recent stroke. Reviewed recent imaging performed in ER. Social work will help coordinate patient with Making his follow-up appointment with neurology.  He did have bilateral impacted cerumen which could contribute to his symptoms, ear flush was provided for  this today. We checked labs which did not reveal a cause for his vertigo. He does have chronic ongoing neck pain- we proceeded with x-rays today which shows severe facet joint degenerative changes, osteophyte formation.  We will proceed with a MRI of his cervical spine for further evaluation and refer patient to spine specialist as he also has chronic mid back pain, mild scoliosis, advanced arthritis in his lumbar back.  We will continue having him work with physical therapy with his vertigo symptoms, continue with therapy poststroke.    Thrombocytosis: This has been mild and chronic for the last year, patient expresses concern of this, he does have mild leukocytosis noted on lab work today, suspect these mild abnormalities are a stress response, possibly medication related. regardless, we will proceed with a peripheral blood smear to look for further abnormalities.     Hypertension: We stressed today in clinic importance of having blood pressure is well-controlled with his medical history.  We reviewed his medications, instructed him that if his blood pressures are greater than 140/90 at home he may increase his amlodipine to 10 mg daily.  Instructed him I would like him to follow-up with me in the next 2 weeks to review his home blood pressures.    Social work was present throughout the entire clinic visit and is working with patient closely to help him with transportation, connecting with resources for help with housing, food, setting up appointments, etc.    The longitudinal plan of care for the diagnosis(es)/condition(s) as documented were addressed during this visit. Due to the added complexity in care, I will continue to support Manuel in the subsequent management and with ongoing continuity of care.      Total time: 45 minutes spent on the date of the encounter doing chart review, history and exam, documentation and further activities as noted above.           MED REC REQUIRED  Post Medication Reconciliation  "Status:     BMI  Estimated body mass index is 32.72 kg/m  as calculated from the following:    Height as of 1/10/25: 1.803 m (5' 11\").    Weight as of this encounter: 106.4 kg (234 lb 9.6 oz).           No follow-ups on file.      ANATOLY Chow CNP  [facility]    Review of Systems   HENT:  Positive for tinnitus.    Eyes:  Positive for visual disturbance.   Musculoskeletal:  Positive for arthralgias, back pain, myalgias and neck pain (chronic).   Neurological:  Positive for headaches (chronic,unchanged). Negative for tremors, facial asymmetry and speech difficulty.        +vertigo   All other systems reviewed and are negative.      Subjective   Manuel is a 54 year old, presenting for the following health issues:  RECHECK (ER follow up for vertigo)    HPI     Patient presents to clinic to discuss ongoing vertigo and ongoing neck/thoracic pain. He has presented to the ER on 1/10/25  for evaluation of this. He does have a recent history of treatment of carotid cave aneurysm and suffered a recent CVA after leaving [place] after his surgery and has had vision changes since. He continues to follow with neurology, PT and OT.     Recently he has been communicating with PT and OT and states he is living in a camper, has difficulty keeping his food fresh, and has difficulty with transportation and therefore does not show up for numerous appointments. We have been having social work coordinate care with him.     He continues to feel like the room is spinning and was prescribed meclizine for this in the ER and it has not been helpful. He does have an ongoing history of chronic neck pain.           ED/UC Followup:    Facility:  [facility]   Date of visit: 01/10/25  Reason for visit: Vertigo  Current Status: Unchanged          Objective    /80 (BP Location: Right arm, Patient Position: Sitting, Cuff Size: Adult Large)   Pulse 88   Temp 96.9  F (36.1  C) (Temporal)   Resp 16   Wt 106.4 kg (234 lb 9.6 oz)  "  SpO2 97%   BMI 32.72 kg/m    Body mass index is 32.72 kg/m .  Physical Exam  Vitals reviewed.   Constitutional:       General: He is not in acute distress.     Appearance: He is not ill-appearing or toxic-appearing.   HENT:      Right Ear: There is impacted cerumen.      Left Ear: There is impacted cerumen.   Eyes:      Conjunctiva/sclera: Conjunctivae normal.      Pupils: Pupils are equal, round, and reactive to light.   Pulmonary:      Effort: Pulmonary effort is normal.   Neurological:      General: No focal deficit present.      Mental Status: He is alert and oriented to person, place, and time. Mental status is at baseline.      Cranial Nerves: No cranial nerve deficit.      Motor: No weakness.      Coordination: Coordination normal.        Results for orders placed or performed during the hospital encounter of 01/15/25   XR Lumbar Spine 2/3 Views     Status: None    Narrative    PROCEDURE: XR LUMBAR SPINE 2/3 VIEWS 1/15/2025 12:53 PM    HISTORY: Acute thoracic back pain, unspecified back pain laterality    COMPARISONS: None.    TECHNIQUE: AP and lateral    FINDINGS: There is lumbar scoliosis concave right. There is decrease  in height in the L5-S1 disc with anterior mild posterior osteophytes.  The remainder of the disks appear normal. Vertebral bodies and arches  are intact. Sacrum and sacroiliac joints appear normal.         Impression    IMPRESSION: Advanced degenerative changes at L5-S1.    LOPEZ MUHAMMAD MD         SYSTEM ID:  [id number]   Results for orders placed or performed during the hospital encounter of 01/15/25   XR Cervical Spine 2/3 Views     Status: None    Narrative    PROCEDURE: XR CERVICAL SPINE 2/3 VIEWS 1/15/2025 12:52 PM    HISTORY: Vertigo    COMPARISONS: None.    TECHNIQUE: AP and lateral    FINDINGS: Is decrease in height of the C2-C3 C3-C4 disc with  degenerative osteophyte formation. The C4-C5 disc appears normal.  There is reversal of the cervical lordotic curvature seen at  C5-C6.  There is decrease in height in the C5-C6 and C6-C7 disks with anterior  osteophytes. Severe facet joint degenerative changes are noted  bilaterally. The prevertebral soft tissues are normal.         Impression    IMPRESSION: Advanced degenerative changes of the cervical spine    LOPEZ MUHAMMAD MD         SYSTEM ID:  [id number]   Results for orders placed or performed during the hospital encounter of 01/15/25   XR Thoracic Spine 2 Views     Status: None    Narrative    PROCEDURE: XR THORACIC SPINE 2 VIEWS 1/15/2025 12:52 PM    HISTORY: Acute thoracic back pain, unspecified back pain laterality    COMPARISONS: None.    TECHNIQUE: AP and lateral    FINDINGS: The thoracic discs are normal in height. The thoracic  vertebral bodies and arches are intact. There is mild thoracic  scoliosis. Minimal degenerative osteophytes are seen in the lower  thoracic spine. The paravertebral soft tissues appear normal.         Impression    IMPRESSION: Mild thoracic scoliosis    LOPEZ MUHAMMAD MD         SYSTEM ID:  [id number]   Results for orders placed or performed in visit on 01/15/25   Vitamin B12     Status: Normal   Result Value Ref Range    Vitamin B12 977 232 - 1,245 pg/mL   TSH Reflex GH     Status: Abnormal   Result Value Ref Range    TSH 4.79 (H) 0.30 - 4.20 uIU/mL   Magnesium     Status: Abnormal   Result Value Ref Range    Magnesium 2.4 (H) 1.7 - 2.3 mg/dL   UA with Microscopic reflex to Culture     Status: Abnormal    Specimen: Urine, NOS   Result Value Ref Range    Color Urine Light Yellow Colorless, Straw, Light Yellow, Yellow    Appearance Urine Clear Clear    Glucose Urine Negative Negative mg/dL    Bilirubin Urine Negative Negative    Ketones Urine Negative Negative mg/dL    Specific Gravity Urine 1.024 1.000 - 1.030    Blood Urine Negative Negative    pH Urine 6.0 5.0 - 9.0    Protein Albumin Urine 10 (A) Negative mg/dL    Urobilinogen Urine Normal Normal, 2.0 mg/dL    Nitrite Urine Negative Negative     Leukocyte Esterase Urine Negative Negative    Mucus Urine Present (A) None Seen /LPF    RBC Urine 4 (H) <=2 /HPF    WBC Urine 1 <=5 /HPF    Hyaline Casts Urine 5 (H) <=2 /LPF    Narrative    Urine Culture not indicated   CBC with platelets and differential     Status: Abnormal   Result Value Ref Range    WBC Count 12.3 (H) 4.0 - 11.0 10e3/uL    RBC Count 5.86 4.40 - 5.90 10e6/uL    Hemoglobin 16.3 13.3 - 17.7 g/dL    Hematocrit 48.9 40.0 - 53.0 %    MCV 83 78 - 100 fL    MCH 27.8 26.5 - 33.0 pg    MCHC 33.3 31.5 - 36.5 g/dL    RDW 13.3 10.0 - 15.0 %    Platelet Count 508 (H) 150 - 450 10e3/uL    % Neutrophils 66 %    % Lymphocytes 24 %    % Monocytes 8 %    % Eosinophils 1 %    % Basophils 1 %    % Immature Granulocytes 0 %    NRBCs per 100 WBC 0 <1 /100    Absolute Neutrophils 8.1 1.6 - 8.3 10e3/uL    Absolute Lymphocytes 3.0 0.8 - 5.3 10e3/uL    Absolute Monocytes 1.0 0.0 - 1.3 10e3/uL    Absolute Eosinophils 0.2 0.0 - 0.7 10e3/uL    Absolute Basophils 0.1 0.0 - 0.2 10e3/uL    Absolute Immature Granulocytes 0.0 <=0.4 10e3/uL    Absolute NRBCs 0.0 10e3/uL   Reticulocyte count     Status: Normal   Result Value Ref Range    % Reticulocyte 1.3 0.5 - 2.0 %    Absolute Reticulocyte 0.073 0.025 - 0.095 10e6/uL   Drugs of Abuse Screen Qualitative, Urine     Status: None   Result Value Ref Range    Amphetamines Urine Instrument Value -325 mAbs    Amphetamines Urine Screen Negative Screen Negative    Barbituates Urine Instrument Value -615 mAbs    Barbituates Urine Screen Negative Screen Negative    Benzodiazepines Urine Instrument Value -276 mAbs    Benzodiazepine Urine Screen Negative Screen Negative    Cannabinoids Urine Instrument Value -294 mAbs    Cannabinoids Urine Screen Negative Screen Negative    Cocaine Urine Instrument Value -353 mAbs    Cocaine Urine Screen Negative Screen Negative    Fentanyl Urine Instrument Value      Fentanyl Qual Urine      Opiates Urine Instrument Value -273 mAbs    Opiates Urine Screen  Negative Screen Negative    PCP Urine Instrument Value -412 mAbs    PCP Urine Screen Negative Screen Negative   T4 free     Status: Normal   Result Value Ref Range    Free T4 1.39 0.90 - 1.70 ng/dL   Lab Blood Morphology Pathologist Review     Status: Abnormal (In process)    Narrative    The following orders were created for panel order Lab Blood Morphology Pathologist Review.  Procedure                               Abnormality         Status                     ---------                               -----------         ------                     Bld morphology pathology...[[id number]]                      In process                 CBC with platelets and d...[[id number]]  Abnormal            Final result               Reticulocyte count[[id number]]           Normal              Final result               Morphology Tracking[[id number]]                              Final result                 Please view results for these tests on the individual orders.                   Signed Electronically by: ANATOLY Chow CNP

## 2025-01-15 NOTE — PATIENT INSTRUCTIONS
Continue to monitor blood pressures- if your blood pressure are still greater than 140/90 at home AFTER taking your medications you can take two tablets of amlodipine to equal a total of 10 mg daily.

## 2025-01-16 ENCOUNTER — DOCUMENTATION ONLY (OUTPATIENT)
Dept: INTERNAL MEDICINE | Facility: OTHER | Age: 55
End: 2025-01-16
Payer: COMMERCIAL

## 2025-01-16 LAB
AMPHETAMINES UR QL SCN: NORMAL
BARBITURATES UR QL SCN: -615
BARBITURATES UR QL SCN: NORMAL
BENZODIAZ UR QL SCN: NORMAL
BZE UR QL SCN: NORMAL
CANNABINOIDS UR QL SCN: NORMAL
FENTANYL UR QL: NORMAL
Lab: -273
Lab: -276
Lab: -294
Lab: -325
Lab: -353
Lab: -412
Lab: NORMAL
OPIATES UR QL SCN: NORMAL
PCP QUAL URINE (ROCHE): NORMAL

## 2025-01-16 ASSESSMENT — ENCOUNTER SYMPTOMS
SPEECH DIFFICULTY: 0
HEADACHES: 1
TREMORS: 0
NECK PAIN: 1
BACK PAIN: 1
ARTHRALGIAS: 1
FACIAL ASYMMETRY: 0
MYALGIAS: 1

## 2025-01-16 NOTE — PROGRESS NOTES
Please be notified that Fentanyl from Urine Drug of Abuse Screening from 1/15/25 was cancelled due to interfering substance.    If needed, test can be order as   Drug Confirmatory Panel, Urine with Creatinine    [id number]      Please note that for Drug Confirmatory panel, specimen must be sent to lab in the original container.    Acute care lab [place]

## 2025-01-27 LAB — PATH REPORT.FINAL DX SPEC: NORMAL

## 2025-01-29 ENCOUNTER — MYC MEDICAL ADVICE (OUTPATIENT)
Dept: INTERNAL MEDICINE | Facility: OTHER | Age: 55
End: 2025-01-29
Payer: COMMERCIAL

## 2025-01-29 DIAGNOSIS — M54.2 CERVICALGIA: Primary | ICD-10-CM

## 2025-01-29 RX ORDER — DICLOFENAC SODIUM 30 MG/G
2-4 GEL TOPICAL 2 TIMES DAILY PRN
Qty: 100 G | Refills: 4 | Status: SHIPPED | OUTPATIENT
Start: 2025-01-29

## 2025-01-29 NOTE — TELEPHONE ENCOUNTER
I have no idea what the prescription strength diclofenac gel is.  -- The pharmacist will need to probably verify this or change the strength of the prescription I just sent in.      Peripheral smear was unremarkable.  No obvious concerning abnormalities noted.        Electronically signed by:  Isidro Silverio MD  on January 29, 2025 4:49 PM

## 2025-01-29 NOTE — TELEPHONE ENCOUNTER
Needs Rx strength Diclofenac gel in order for insurance to cover or an alternative medication.   Wondering about blood morphology results and what they mean.     Blood Morphology lab done on 1/15.    Ordered Diclofenac gel 1% ordered at [facility] on 1/15.    Routing to provider to review and respond.  Wesly Hester RN on 1/29/2025 at 1:49 PM

## 2025-01-30 RX ORDER — MAGNESIUM OXIDE 400 MG/1
400 TABLET ORAL DAILY
Qty: 90 TABLET | Refills: 1 | Status: SHIPPED | OUTPATIENT
Start: 2025-01-30

## 2025-01-30 NOTE — TELEPHONE ENCOUNTER
Called [facility] to look in to Diclofenac Sodium Gel that is prescription strength.     The said Diclofenac 1% is what they were referring to.  Should be covered by [employer].      Chao'd up along with refill of requested Mag-Oxide.      Nanette Royal RN on 1/30/2025 at 11:01 AM

## 2025-02-03 NOTE — PROGRESS NOTES
"Clinic Care Coordination Contact  Follow Up Progress Note      Assessment: Unable to set up medical transport for patient as he will not provide an address where his camper is parked. States that his friends \"are letting him be there, but don't like people coming out there.\" He is very vague about where it is, other than 25 minutes out of town, \"down this road, and that road, lots of gravel.\" He then said a friend said the medical rides are unreliable anyway.     Patient had previously stated that it is difficult to eat healthy food as he does not have a working refrigerator while living in a camper parked in a remote location. I asked him to send me a link or example of what small refrigerator would fit his camper to see if we could assist.     Care Gaps:    Health Maintenance Due   Topic Date Due    COLORECTAL CANCER SCREENING  Never done    HEPATITIS B IMMUNIZATION (1 of 3 - 19+ 3-dose series) Never done    ZOSTER IMMUNIZATION (1 of 2) Never done    INFLUENZA VACCINE (1) 09/01/2024    Pneumococcal Vaccine: 50+ Years (2 of 2 - PCV) 09/11/2024     Patient was to check and record his blood pressures. He states that he just put new batteries in his cuff and will start to check as he is having dizzy spells and symptoms with eyes.     He has upcoming MRI tomorrow, then meets the thoracic neurology/surgeon on 2/10/24 here at [facility]. He also promises to make . He reports needing rides, but we have no physical address of where the camper is parked to send transport to. He has cancelled his PT and OT.     Intervention/Education provided during outreach: He provided information regarding his request for a refrigerator and his address for rides.        Plan:  Patient will make it MRI and thoracic OA surgeon. He wants to attend the PT for vertigo and will ask for gas card if needed. He knows I can't set up any rides without an address. He would like help with a refrigerator if possible.     Care Coordinator will continue to " support as able.   Theresa Dooley RN on 2/3/2025 at 2:38 PM

## 2025-02-04 ENCOUNTER — HOSPITAL ENCOUNTER (OUTPATIENT)
Dept: MRI IMAGING | Facility: OTHER | Age: 55
Discharge: HOME | End: 2025-02-04
Payer: COMMERCIAL

## 2025-02-04 ENCOUNTER — PATIENT OUTREACH (OUTPATIENT)
Dept: CARE COORDINATION | Facility: CLINIC | Age: 55
End: 2025-02-04
Payer: COMMERCIAL

## 2025-02-04 DIAGNOSIS — M54.2 CERVICALGIA: ICD-10-CM

## 2025-02-04 PROCEDURE — 72141 MRI NECK SPINE W/O DYE: CPT

## 2025-02-04 NOTE — PROGRESS NOTES
Clinic Care Coordination Contact  Care Team Conversations    Matt request submitted for a small refrigerator for the camper he is living in. He states the camper belongs to him, and friends allow him to park on their land. States that he can use his truck with a friend to pick the refrigerator.   Theresa Dooley RN on 2/4/2025 at 10:37 AM

## 2025-02-05 ENCOUNTER — THERAPY VISIT (OUTPATIENT)
Dept: PHYSICAL THERAPY | Facility: OTHER | Age: 55
End: 2025-02-05
Payer: COMMERCIAL

## 2025-02-05 ENCOUNTER — MYC MEDICAL ADVICE (OUTPATIENT)
Dept: INTERNAL MEDICINE | Facility: OTHER | Age: 55
End: 2025-02-05

## 2025-02-05 DIAGNOSIS — R42 VERTIGO: ICD-10-CM

## 2025-02-05 DIAGNOSIS — M54.2 CERVICALGIA: Primary | ICD-10-CM

## 2025-02-05 DIAGNOSIS — G89.4 CHRONIC PAIN SYNDROME: ICD-10-CM

## 2025-02-05 PROCEDURE — 97162 PT EVAL MOD COMPLEX 30 MIN: CPT | Mod: GP

## 2025-02-05 PROCEDURE — 97112 NEUROMUSCULAR REEDUCATION: CPT | Mod: GP

## 2025-02-05 NOTE — PROGRESS NOTES
PHYSICAL THERAPY EVALUATION  Type of Visit: Evaluation       Fall Risk Screen:  Fall screen completed by: PT  Have you fallen 2 or more times in the past year?: Yes  Have you fallen and had an injury in the past year?: No  Is patient a fall risk?: Department fall risk interventions implemented; Yes    Subjective         Presenting condition or subjective complaint: Pt is a 54 year old male referred to skilled PT services following an increase in dizziness that started after his stroke and stent in his artery. pt reports his dizziness has gotten worse. pt reports any upright movement causes dizziness and feels the worlds is swaying and spinning. pt reports he is still driving but the world can start to move so he has a friend with him. pt reports stroke was moderate but was in his optical area, had a lot of small strokes at that time. pt is trying to get on disability. Pt reports he can become dizziness when moving his eyes and with activity. Pt reports he has steps to get in/out of camper but does have issues with his depth perception. depending on how much he strains he does get black spots in his black spots and in both eyes white spots.  Date of onset: 09/26/24    Relevant medical history:     Past Medical History:   Diagnosis Date    Cervical strain 01/03/2019    Closed head injury with concussion 12/12/2018    Concussion with brief LOC 12/12/2018    Concussion with brief LOC 12/12/2018    Hypertension     Impingement of right ulnar nerve 03/16/2020    Post concussion syndrome 03/26/2019    Post-concussion headache 12/26/2018    Vestibular dysfunction 12/26/2018    Vision disturbance 12/26/2018      Prior diagnostic imaging/testing results: MRI     Prior therapy history for the same diagnosis, illness or injury: No      Prior Level of Function  Transfers: Independent  Ambulation: Independent  ADL: Independent  IADL: Housekeeping, Laundry, Meal preparation, Medication management, Yard work    Living  Environment  Social support: Alone   Type of home: 1 level   Stairs to enter the home: Yes   Is there a railing: No     Ramp: No   Stairs inside the home: No       Help at home: None  Equipment owned:       Employment: Not Applicable trying to get on disabiltiy  Hobbies/Interests: pt likes to play with his dog    Patient goals for therapy: decrease dizziness    Pain assessment: Pain denied     Objective      Cognitive Status Examination  Orientation: Oriented to person, place and time   Level of Consciousness: Alert  Follows Commands and Answers Questions: 100% of the time  Personal Safety and Judgement: Intact, pt understands balance and coordination deficits but is young and frustrated with deficits  Memory:  pt reports good memory but has word finding issues and difficulty with putting together complicated understanding when fatigued which frustrates him.     TRANSFERS: Independent    GAIT:   Level of [place]: Independent  Assistive Device(s): None  Gait Deviations: Base of support increased  Stance time decreased  Stride length decreased  Gait Distance: 50 feet    BALANCE:  CTSIB-M: 80/120 with difficulty with items on the foam         VESTIBULAR EVALUATION  ADDITIONAL HISTORY:  Description of symptoms: Constant dizziness; Off balance; I feel like I am spinning; Likely to fall; Blurry or jumping vision; I feel like the room is spinning  Dizzy attacks:   Start: after stroke   Last attack: daily   Frequency of occurrences: with head motion, eye motion, and transfers   Length of attack: with eye and body motion  Difficulty hearing: Both ears  Noise in ears? Yes tinnitus  Alleviates symptoms: nothing  Worsens symptoms: eye and body motion  Activities that bring on symptoms: Turning while walking; Riding in a car; Shopping at the grocery or mall; Driving a car; Unstable surfaces; Bending over; Walking in the dark; Reaching up       Pertinent visual history: pt wears transition trifocals but are not the right  prescription  Pertinent history of current vestibular problem:  stroke  DHI:      Cervicogenic Screen    Neck ROM Very limited neck motion in all directions due to degeneration in the cervical spine     Oculomotor Screen    Ocular ROM Abnormal   Smooth Pursuit Abnormal   Saccades Abnormal   VOR Abnormal   VOR Cancellation Abnormal   Head Impulse Test Abnormal   Convergence Testing Abnormal   Pt having symptoms and unable to focus or coordinate any of the motion     Infrared Goggle Exam Vestibular Suppressant in Last 24 Hours? No  Exam Completed With:  frenzel lenses   Spontaneous Nystagmus Negative   Gaze Evoked Nystagmus Negative   Head Shake Horizontal Nystagmus Negative            Left Right   Maybee-Hallpike Unable to assess today due to symptoms with other assessments Unable to assess     Dynamic Visual Acuity (DVA)    Static Acuity (LogMar) Positive, unable to read chart at all with head motion   Horizontal Head Movement at 1 Hz (LogMar)    Horizontal Head Movement at 2 Hz (LogMar)           Assessment & Plan   CLINICAL IMPRESSIONS  Medical Diagnosis: vertigo    Treatment Diagnosis: central vestibular dysfunction, motion sensitivity   Impression/Assessment: Patient is a 54 year old male with motion sensitivity and central vestibular complaints.  The following significant findings have been identified: Impaired balance, Impaired gait, Instability, Dizziness, Disequilibrium , and Impaired vision. These impairments interfere with their ability to perform self care tasks, recreational activities, household chores, driving , household mobility, and community mobility as compared to previous level of function.     Clinical Decision Making (Complexity):  Clinical Presentation: Evolving/Changing  Clinical Presentation Rationale: based on medical and personal factors listed in PT evaluation  Clinical Decision Making (Complexity): Moderate complexity    PLAN OF CARE  Treatment Interventions:  Interventions: Gait Training,  Manual Therapy, Neuromuscular Re-education, Therapeutic Activity, Therapeutic Exercise, Self-Care/Home Management, Aquatic Therapy, Canalith Repositioning    Long Term Goals     PT Goal 1  Goal Identifier: transfers  Goal Description: Pt will report ability to sit<>stand without an increase in baseline dizziness for 3 days to increase tolerance to motion  Target Date: 04/02/25  PT Goal 2  Goal Identifier: dizziness  Goal Description: pt will report a 25% reducation in dizziness over 5 days of normal activity to increase safety in his camper  Target Date: 04/16/25  PT Goal 3  Goal Identifier: Eye coordination  Goal Description: Pt will demonstrate ability to complete VOR x 1 in seated horizontal and vertical for 30 sec to increase visual tracking to read and watch TV  Target Date: 04/30/25      Frequency of Treatment: 1-2x/week  Duration of Treatment: 12 weeks    Education Assessment:   Learner/Method: Patient;Listening;Reading;Demonstration;Pictures/Video    Risks and benefits of evaluation/treatment have been explained.   Patient/Family/caregiver agrees with Plan of Care.     Evaluation Time:     PT Eval, Moderate Complexity Minutes (95492): 30     Signing Clinician: Yumiko Fay DPT        [facility]                                                                                   OUTPATIENT PHYSICAL THERAPY      PLAN OF TREATMENT FOR OUTPATIENT REHABILITATION   Patient's Last Name, First Name, Jose Elias Winter YOB: 1970   Provider's Name   [facility]   Medical Record No.  [id number]     Onset Date: 09/26/24  Start of Care Date: 02/05/25     Medical Diagnosis:  vertigo      PT Treatment Diagnosis:  central vestibular dysfunction, motion sensitivity Plan of Treatment  Frequency/Duration: 1-2x/week/ 12 weeks    Certification date from 02/05/25 to 04/30/25         See note for plan of treatment details and functional goals      Yumiko Fay, DPT                         I CERTIFY THE NEED FOR THESE SERVICES FURNISHED UNDER        THIS PLAN OF TREATMENT AND WHILE UNDER MY CARE     (Physician attestation of this document indicates review and certification of the therapy plan).              Referring Provider:  Dinah Michel    Initial Assessment  See Epic Evaluation- Start of Care Date: 02/05/25

## 2025-02-06 NOTE — TELEPHONE ENCOUNTER
Has appt with surgical neurologist on 2/10 but willing to try injections if they will help with pain.     Per comment from [facility] from 2/4:  Manuel- there is a lot of arthritis, narrowing, and nerve compression. Recommend considering meeting with a spine specialist and I can also place an order for a neck injection to help with pain if you are interested. Please let me know how you'd like to proceed.     Chao'd up injection order.     Routing to provider to review and respond.  Wesly Hester RN on 2/6/2025 at 9:12 AM

## 2025-02-10 ENCOUNTER — OFFICE VISIT (OUTPATIENT)
Dept: ORTHOPEDICS | Facility: OTHER | Age: 55
End: 2025-02-10
Payer: COMMERCIAL

## 2025-02-10 VITALS — HEART RATE: 77 BPM | BODY MASS INDEX: 33.05 KG/M2 | WEIGHT: 237 LBS | OXYGEN SATURATION: 97 %

## 2025-02-10 DIAGNOSIS — M54.2 CERVICALGIA: ICD-10-CM

## 2025-02-10 DIAGNOSIS — M54.6 ACUTE THORACIC BACK PAIN, UNSPECIFIED BACK PAIN LATERALITY: ICD-10-CM

## 2025-02-10 DIAGNOSIS — M47.12 MYELOPATHY DUE TO CERVICAL SPONDYLOSIS: Primary | ICD-10-CM

## 2025-02-10 PROCEDURE — G0463 HOSPITAL OUTPT CLINIC VISIT: HCPCS

## 2025-02-10 PROCEDURE — 99204 OFFICE O/P NEW MOD 45 MIN: CPT | Performed by: STUDENT IN AN ORGANIZED HEALTH CARE EDUCATION/TRAINING PROGRAM

## 2025-02-10 ASSESSMENT — PAIN SCALES - GENERAL: PAINLEVEL_OUTOF10: SEVERE PAIN (9)

## 2025-02-10 NOTE — PROGRESS NOTES
RACHAEL    [facility].    As you know he is a pleasant 54-year-old male presenting with a complex array of problems stemming from prior CVA and associated basilar vascular injury in addition to what seems to be cervical compressive pathology.  His primary complaint today is balance and fine motor skill issues.  He states over the past 1 to 2 years his balance has been progressively getting worse.  He states he occasionally drops things and is definitely noticed a strength deficit in his associated right upper extremity specifically his hand intrinsics.  He endorses numbness and what seems to be a nondermatomal distribution in his bilateral upper extremities of the right being worse than his left.  He denies any bowel or bladder issues.  He denies any B type symptoms.  He rates his current level of function is only 25%.  His BIANCA score indicates mild myelopathy.    Patient has a relevant past medical history associated with a posterior basilar stent that was complicated by cerebrovascular accident accident for which she is currently on Eliquis and antiplatelet therapy.  He is also a current smoker for which she has COPD but he is in the process of stopping and is only at 3 cigarettes/day.    Physical exam    Patient is 5 feet 11 230 pounds.  Patient stands in neutral sagittal coronal alignment.  Hip and shoulder exams unremarkable.  Floridly positive bilateral Piotr in addition to Romberg.  He does have a positive Lhermitte.  No sustained clonus.  Full strength in upper and lower extremity myotomes with exception of bilateral hand intrinsics in the left deltoid 4-5.  Reflexes are 1+ and symmetric.  Cervical range of motion is severely limited.  Extended flexion elicits associated numbness in bilateral upper extremities same with extended extension of the cervical spine also elicits bilateral hand numbness.  Thoracolumbar range of motion is normal age-appropriate.   He does have a positive Spurling bilaterally.    Imaging    Cervical spine radiographs delineate multilevel subaxial spondylosis with associated subaxial kyphosis from really 3-6 stemming from an associated kyphotic alignment at the 5 6 level.    MRI    Significant subaxial spondylosis with varying degrees of end-stage space collapse and angular malalignment with associated spondylolisthesis worse at the 5 6 level.  His highest degrees of central stenosis at the 3 4 level with associated cord abutment and moderate compression.  He also has varying degrees of bilateral foraminal stenosis with the right side being worse than the left.    A CTA of the neck was also available for review.  This delineates again multilevel subaxial spondylosis.  No obvious autofusion's.    Assessment    Manuel is a pleasant 54-year-old male presenting with a complex array of problems stemming from both vascular and neural issues.   is unclear exactly how much of his associated balance and fine motor skill issues stemming from his associated spinal pathology versus his supratentorial issues associated with his vascular insult.  We did discuss that regardless of the associated underlying etiology he is in very close to prior to the associated basilar stent placement with it being placed only in September of last year and as such is unlikely that he would be able to come off his anticoagulation and antiplatelet therapy anytime in the near future.  With that being said I recommended him seeing his vascular surgeon to see a timeframe on what when he could potentially come off of the associated anticoagulant and antiplatelet medication.  We did discuss the possibility of addressing his associated cervical deformity and associated neural compression in the form of a 3-6 ACDF.  We discussed the natural history of associated myelopathy and associated mild radiculopathy at length.  We discussed the natural history of its associate condition and  response to prognostic value with conservative alternative therapy.  We also discussed the importance of smoking cessation.  All the patient's question were answered.  We will see him back once he has had a discussion with his vascular doctor regarding potential timing of coming off his associate anticoagulation and antiplatelet medication.    Plan    Follow-up following conversation with vascular provider regarding timing of cessation of anticoagulation and antiplatelet medication for possible C3-6 ACDF.  Smoking cessation.

## 2025-02-18 ENCOUNTER — TELEPHONE (OUTPATIENT)
Dept: NEUROSURGERY | Facility: CLINIC | Age: 55
End: 2025-02-18
Payer: COMMERCIAL

## 2025-02-18 NOTE — TELEPHONE ENCOUNTER
Called patient and scheduled appointment w/ Dr. Raza per Adilia Sepulveda via task. Sent message to Sherri Gan to fax the CTA order to the [facility]. Patient wants to do it there. -KB

## 2025-02-20 ENCOUNTER — PATIENT OUTREACH (OUTPATIENT)
Dept: CARE COORDINATION | Facility: CLINIC | Age: 55
End: 2025-02-20
Payer: COMMERCIAL

## 2025-02-20 ENCOUNTER — OFFICE VISIT (OUTPATIENT)
Dept: INTERNAL MEDICINE | Facility: OTHER | Age: 55
End: 2025-02-20
Payer: COMMERCIAL

## 2025-02-20 ENCOUNTER — DOCUMENTATION ONLY (OUTPATIENT)
Dept: NEUROSURGERY | Facility: CLINIC | Age: 55
End: 2025-02-20
Payer: COMMERCIAL

## 2025-02-20 VITALS
DIASTOLIC BLOOD PRESSURE: 67 MMHG | HEIGHT: 71 IN | WEIGHT: 234 LBS | RESPIRATION RATE: 20 BRPM | TEMPERATURE: 97.6 F | HEART RATE: 88 BPM | OXYGEN SATURATION: 95 % | SYSTOLIC BLOOD PRESSURE: 104 MMHG | BODY MASS INDEX: 32.76 KG/M2

## 2025-02-20 DIAGNOSIS — F60.9 PERSONALITY DISORDER (H): ICD-10-CM

## 2025-02-20 DIAGNOSIS — G89.4 CHRONIC PAIN SYNDROME: ICD-10-CM

## 2025-02-20 DIAGNOSIS — I10 ESSENTIAL HYPERTENSION: ICD-10-CM

## 2025-02-20 DIAGNOSIS — M54.2 CERVICALGIA: Primary | ICD-10-CM

## 2025-02-20 PROCEDURE — G0463 HOSPITAL OUTPT CLINIC VISIT: HCPCS

## 2025-02-20 RX ORDER — AMLODIPINE BESYLATE 10 MG/1
10 TABLET ORAL DAILY
COMMUNITY
Start: 2025-02-20

## 2025-02-20 RX ORDER — LAMOTRIGINE 100 MG/1
100 TABLET ORAL 2 TIMES DAILY
COMMUNITY
Start: 2025-02-20

## 2025-02-20 RX ORDER — BUPRENORPHINE AND NALOXONE 2; .5 MG/1; MG/1
1 FILM, SOLUBLE BUCCAL; SUBLINGUAL DAILY
Qty: 30 FILM | Refills: 0 | Status: SHIPPED | OUTPATIENT
Start: 2025-02-20

## 2025-02-20 ASSESSMENT — ANXIETY QUESTIONNAIRES
6. BECOMING EASILY ANNOYED OR IRRITABLE: MORE THAN HALF THE DAYS
2. NOT BEING ABLE TO STOP OR CONTROL WORRYING: SEVERAL DAYS
IF YOU CHECKED OFF ANY PROBLEMS ON THIS QUESTIONNAIRE, HOW DIFFICULT HAVE THESE PROBLEMS MADE IT FOR YOU TO DO YOUR WORK, TAKE CARE OF THINGS AT HOME, OR GET ALONG WITH OTHER PEOPLE: SOMEWHAT DIFFICULT
7. FEELING AFRAID AS IF SOMETHING AWFUL MIGHT HAPPEN: NOT AT ALL
GAD7 TOTAL SCORE: 8
GAD7 TOTAL SCORE: 8
1. FEELING NERVOUS, ANXIOUS, OR ON EDGE: SEVERAL DAYS
7. FEELING AFRAID AS IF SOMETHING AWFUL MIGHT HAPPEN: NOT AT ALL
5. BEING SO RESTLESS THAT IT IS HARD TO SIT STILL: SEVERAL DAYS
8. IF YOU CHECKED OFF ANY PROBLEMS, HOW DIFFICULT HAVE THESE MADE IT FOR YOU TO DO YOUR WORK, TAKE CARE OF THINGS AT HOME, OR GET ALONG WITH OTHER PEOPLE?: SOMEWHAT DIFFICULT
3. WORRYING TOO MUCH ABOUT DIFFERENT THINGS: SEVERAL DAYS
4. TROUBLE RELAXING: MORE THAN HALF THE DAYS
GAD7 TOTAL SCORE: 8

## 2025-02-20 ASSESSMENT — PATIENT HEALTH QUESTIONNAIRE - PHQ9
SUM OF ALL RESPONSES TO PHQ QUESTIONS 1-9: 12
SUM OF ALL RESPONSES TO PHQ QUESTIONS 1-9: 12
10. IF YOU CHECKED OFF ANY PROBLEMS, HOW DIFFICULT HAVE THESE PROBLEMS MADE IT FOR YOU TO DO YOUR WORK, TAKE CARE OF THINGS AT HOME, OR GET ALONG WITH OTHER PEOPLE: SOMEWHAT DIFFICULT

## 2025-02-20 ASSESSMENT — ENCOUNTER SYMPTOMS
NECK STIFFNESS: 1
SLEEP DISTURBANCE: 1
MYALGIAS: 1
NECK PAIN: 1

## 2025-02-20 ASSESSMENT — PAIN SCALES - GENERAL: PAINLEVEL_OUTOF10: SEVERE PAIN (9)

## 2025-02-20 NOTE — PROGRESS NOTES
Faxed order for CTA to [facility] at [phone].    Msg sent to pt via [employer].

## 2025-02-20 NOTE — PROGRESS NOTES
Clinic Care Coordination Contact  Care Team Conversations    Talked with patient in clinic and helped him to get new Rx for pain medication. Buprenorphine was denied by [employer] without a prior authorization. In the meantime, PCP ordered suboxone which patient was able to  today with no copay.   Theresa Dooley RN on 2/20/2025 at 1:54 PM

## 2025-02-20 NOTE — PROGRESS NOTES
"  Assessment & Plan     ICD-10-CM    1. Cervicalgia  M54.2 Buprenorphine HCl (BELBUCA) 150 MCG FILM buccal film     buprenorphine HCl-naloxone HCl (SUBOXONE) 2-0.5 MG per film     DISCONTINUED: Buprenorphine HCl (BELBUCA) 150 MCG FILM buccal film     DISCONTINUED: Buprenorphine HCl (BELBUCA) 150 MCG FILM buccal film      2. Chronic pain syndrome  G89.4 Buprenorphine HCl (BELBUCA) 150 MCG FILM buccal film     buprenorphine HCl-naloxone HCl (SUBOXONE) 2-0.5 MG per film     DISCONTINUED: Buprenorphine HCl (BELBUCA) 150 MCG FILM buccal film     DISCONTINUED: Buprenorphine HCl (BELBUCA) 150 MCG FILM buccal film      3. Personality disorder (H)  F60.9 lamoTRIgine (LAMICTAL) 100 MG tablet      4. Essential hypertension  I10 amLODIPine (NORVASC) 10 MG tablet         Chronic neck pain: Recommend he continue to follow with his spine specialist, for his pain control in the meantime due to limited options from concurrent Eliquis use we will start patient on Suboxone.  He was unable to get Belbuca covered by insurance.  He will follow-up with me in the next 2 to 3 weeks.  He was provided with written material regarding this medication and potential side effects.    The longitudinal plan of care for the diagnosis(es)/condition(s) as documented were addressed during this visit. Due to the added complexity in care, I will continue to support Manuel in the subsequent management and with ongoing continuity of care.            BMI  Estimated body mass index is 32.41 kg/m  as calculated from the following:    Height as of this encounter: 1.81 m (5' 11.25\").    Weight as of this encounter: 106.1 kg (234 lb).           No follow-ups on file.      ANATOLY Chow [facility]    Review of Systems   Musculoskeletal:  Positive for myalgias, neck pain and neck stiffness.   Psychiatric/Behavioral:  Positive for sleep disturbance.    All other systems reviewed and are negative.        Subjective   Manuel is a 54 year " "old, presenting for the following health issues:  Medication Therapy Management (Pain managment)    Patient presents to clinic for chronic pain management. He has a history of high- grade spinal canal narrowing with impingement of the exciting C4 and C7 nerves bilaterally. He is not a surgical candidate at this time due to inability to pause eliquis due to recent posterior basilar stent placement.  He reports that his pain has become quite severe, unable to take NSAIDs due to Eliquis use.  Has tried Tylenol but is not getting much relief with this.        History of Present Illness       Reason for visit:  Neack pain    He eats 0-1 servings of fruits and vegetables daily.He consumes 1 sweetened beverage(s) daily.He exercises with enough effort to increase his heart rate 9 or less minutes per day.  He exercises with enough effort to increase his heart rate 3 or less days per week. He is missing 1 dose(s) of medications per week.  He is not taking prescribed medications regularly due to remembering to take.                     Objective    /67 (BP Location: Right arm, Patient Position: Sitting, Cuff Size: Adult Regular)   Pulse 88   Temp 97.6  F (36.4  C) (Temporal)   Resp 20   Ht 1.81 m (5' 11.25\")   Wt 106.1 kg (234 lb)   SpO2 95%   BMI 32.41 kg/m    Body mass index is 32.41 kg/m .  Physical Exam  Vitals reviewed.   Eyes:      Pupils: Pupils are equal, round, and reactive to light.   Musculoskeletal:      Cervical back: Pain with movement present. Decreased range of motion.   Neurological:      General: No focal deficit present.      Mental Status: He is alert and oriented to person, place, and time. Mental status is at baseline.                    Signed Electronically by: ANATOLY Chow CNP    "

## 2025-02-20 NOTE — PROGRESS NOTES
Writer informed provider of concern about patients answers to safety screening questions. Writer also provided patient with food bag and informed provider of patients concern for food.

## 2025-02-20 NOTE — NURSING NOTE
"Chief Complaint   Patient presents with    Medication Therapy Management     Pain managment       Initial /67 (BP Location: Right arm, Patient Position: Sitting, Cuff Size: Adult Regular)   Pulse 88   Temp 97.6  F (36.4  C) (Temporal)   Resp 20   Ht 1.81 m (5' 11.25\")   Wt 106.1 kg (234 lb)   SpO2 95%   BMI 32.41 kg/m   Estimated body mass index is 32.41 kg/m  as calculated from the following:    Height as of this encounter: 1.81 m (5' 11.25\").    Weight as of this encounter: 106.1 kg (234 lb).  Medication Review: complete    The next two questions are to help us understand your food security.  If you are feeling you need any assistance in this area, we have resources available to support you today.          2/20/2025   SDOH- Food Insecurity   Within the past 12 months, did you worry that your food would run out before you got money to buy more? Y   Within the past 12 months, did the food you bought just not last and you didn t have money to get more? Y         Health Care Directive:  Patient does not have a Health Care Directive: Discussed advance care planning with patient; however, patient declined at this time.    Mary Parra      "

## 2025-02-20 NOTE — PROGRESS NOTES
Clinic Care Coordination Contact  Care Team Conversations    Patient was in clinic. I was in a meeting when he emailed me to ask for a gas card today. Unfortunately we do not have spare gas cards at this point. He has gotten them and other assistance before.     Plan:  I let him know that I will help him figure out affording his further medical visits to the [facility] area. He does have coverage for medical rides but refuses to use it.   Theresa Dooley RN on 2/20/2025 at 11:14 AM

## 2025-03-13 ENCOUNTER — HOSPITAL ENCOUNTER (OUTPATIENT)
Dept: CT IMAGING | Facility: OTHER | Age: 55
Discharge: HOME | End: 2025-03-13
Attending: RADIOLOGY
Payer: COMMERCIAL

## 2025-03-13 DIAGNOSIS — I67.1 NONRUPTURED CEREBRAL ANEURYSM: ICD-10-CM

## 2025-03-13 PROCEDURE — 70496 CT ANGIOGRAPHY HEAD: CPT

## 2025-03-13 PROCEDURE — 250N000011 HC RX IP 250 OP 636: Performed by: RADIOLOGY

## 2025-03-13 PROCEDURE — 250N000009 HC RX 250: Performed by: RADIOLOGY

## 2025-03-13 RX ORDER — IOPAMIDOL 755 MG/ML
75 INJECTION, SOLUTION INTRAVASCULAR ONCE
Status: COMPLETED | OUTPATIENT
Start: 2025-03-13 | End: 2025-03-13

## 2025-03-13 RX ADMIN — IOPAMIDOL 75 ML: 755 INJECTION, SOLUTION INTRAVENOUS at 17:03

## 2025-03-13 RX ADMIN — SODIUM CHLORIDE 70 ML: 9 INJECTION, SOLUTION INTRAVENOUS at 17:10

## 2025-03-17 ENCOUNTER — TELEPHONE (OUTPATIENT)
Dept: NEUROLOGY | Facility: CLINIC | Age: 55
End: 2025-03-17
Payer: COMMERCIAL

## 2025-03-17 NOTE — TELEPHONE ENCOUNTER
Patient scheduled with Dr. Raza tomorrow, 3/18/25 however patient wanted to know as soon as possible if okay to hold Eliquis for C3-C5 fusion.     Dr. Raza reviewed 3/13/25 CTA head/neck and okayed Eliquis hold. Patient to follow-up tomorrow as scheduled.     Patient informed and verbalized understanding. Patient has many questions about the CTA report. Advised patient to write down his questions and discuss with provider tomorrow.     Kia Heath RN 3/17/2025 2:48 PM

## 2025-03-18 ENCOUNTER — VIRTUAL VISIT (OUTPATIENT)
Dept: NEUROSURGERY | Facility: CLINIC | Age: 55
End: 2025-03-18
Payer: COMMERCIAL

## 2025-03-18 VITALS — HEIGHT: 71 IN | BODY MASS INDEX: 32.34 KG/M2 | WEIGHT: 231 LBS

## 2025-03-18 DIAGNOSIS — I67.1 NONRUPTURED CEREBRAL ANEURYSM: Primary | ICD-10-CM

## 2025-03-18 PROCEDURE — 98005 SYNCH AUDIO-VIDEO EST LOW 20: CPT | Mod: GC | Performed by: RADIOLOGY

## 2025-03-18 ASSESSMENT — PAIN SCALES - GENERAL: PAINLEVEL_OUTOF10: SEVERE PAIN (9)

## 2025-03-18 ASSESSMENT — PATIENT HEALTH QUESTIONNAIRE - PHQ9: SUM OF ALL RESPONSES TO PHQ QUESTIONS 1-9: 12

## 2025-03-18 NOTE — NURSING NOTE
Current patient location:  [address]  [facility] [address]    Is the patient currently in the state of [place]? YES    Visit mode: VIDEO    If the visit is dropped, the patient can be reconnected by:VIDEO VISIT: Text to cell phone:   Telephone Information:   Mobile [phone]       Will anyone else be joining the visit? NO  (If patient encounters technical issues they should call [phone])    Are changes needed to the allergy or medication list? Pt stated no changes to allergies and Pt stated no med changes    Are refills needed on medications prescribed by this physician? NO    Rooming Documentation:  Not applicable    Reason for visit: RECHKWAME Pandya VVF    Depression Response    Patient completed the PHQ-9 assessment for depression and scored >9? Yes  Question 9 on the PHQ-9 was positive for suicidality? No  Does patient have current mental health provider? No    Is this a virtual visit? Yes   Does patient have suicidal ideation (positive question 9)? No - offer to place Mental Health Referral.  Patient declined referral/not needed    I personally notified the following: visit provider

## 2025-03-18 NOTE — PROGRESS NOTES
"Virtual Visit Details    Type of service:  Video Visit   Video Start Time: {video visit start/end time for provider to select:366642}  Video End Time:{video visit start/end time for provider to select:798413}    Originating Location (pt. Location): {video visit patient location:920702::\"Home\"}  {PROVIDER LOCATION On-site should be selected for visits conducted from your clinic location or adjoining [facility] hospital, academic office, or other nearby [facility] building. Off-site should be selected for all other provider locations, including home:[id number]}  Distant Location (provider location):  {virtual location provider:277467}  Platform used for Video Visit: {Virtual Visit Platforms:677983::\"[email or web address]\"}  "

## 2025-03-18 NOTE — PATIENT INSTRUCTIONS
We will work to get you set up for angiogram with angioplasty at [facility] in the next few weeks.     Stroke & Endovascular RN Care Coordinators:    Adilia Sepulveda, RN, BSN  Kia Heath, RN, CNRN, SCRN    If you have any questions please contact the RN Care Coordinators at [phone], option 1.    After business hours call the  at [phone] and have the Neuro-Interventional Fellow paged.    Thank you for choosing [facility] for your health care needs.

## 2025-03-18 NOTE — PROGRESS NOTES
Neuroendovascular Clinic Note:     Reason for clinic visit: Unruptured aneurysm     History of present illness: Manuel Reaves is a 54 year old man with history of current cigarette smoking, HTN, HLD, and TBI who was incidentally found to have an unruptured right ICA cavernous segment 11 mm aneurysm. He has no personal history of aneurysm rupture. Two uncles reportedly passed in their sleep of aneurysm ruptures. He currently is trying to quit smoking. He is not using any prescription medications or nicotine supplements to attempt to quit due to cost. His hypertension is managed by his PCP. He is on three agents that he takes reliably, and typically his BP runs 130/80, but rarely when he is not feeling well, the systolic will be in the 190s. 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      Interval History:  Patient underwent attempted SHAUN X deployment however there was complication of deployment resulting in the SHAUN X being attempted to be retrieved and becoming situated within the internal carotid artery.  This was attempted to be retrieved within LVIS  stent but was not successful and he underwent angioplasty.  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     He has black and grey spots, and is getting headaches daily and having lightheadedness. The right eye dark grey spot is left lower corner it is the same, white grey spots everywhere. He will sometimes have sparkly lights all over goes from left to right. He will have worsening of ringing in ears and headache that occurs afterwards that gain intensity quickly. The headaches haven't changed in morphology since onset after the surgery. He states that he saw optometry and is waiting for new glasses to arrive. He is attending therapies and notes that it is helping. He notes a lot of headaches, he describes pounding, but they are improved  "after he breathes and tries to relax. Takes tylenol twice a day but \"doesn't touch it.\"   Right side is weaker.      He works as a  and has difficulty with seeing street signs.        Interval History:  He is doing alright, he has been up and down.     He has not had any new symptoms, he has some lightheaded and feeling like the room is spinning( positional in nature and last 20 minutes, they are better with sitting and lying down they get better), he has trouble with his vision it has gotten a little bit worse. He notes 30% vision change since the stroke, he has double vision- make it difficult to drive. He does get palpitations, sweating that are mild.     No left sided weakness, numbness or tingling. No new dysphagia symptoms.   He has trouble with balance as well, speech changes that have gotten better.   He wants to undergo surgical cervical fusion for his neck.     SBPs States 130/144 is his average range    He does note dropping things from his left arm, he has some left arm numbness in the fingertips, he will drop objects and isn't aware that he is dropping it, he will have weakness in the hand intermittently. Duration: will last 4-5 minutes, he will occur 2-3 x/ day. Onset since stent placed.           Past Medical History:  Past Medical History        Past Medical History:   Diagnosis Date    Cervical strain 01/03/2019    Closed head injury with concussion 12/12/2018    Concussion with brief LOC 12/12/2018    Concussion with brief LOC 12/12/2018    Hypertension      Impingement of right ulnar nerve 03/16/2020    Post concussion syndrome 03/26/2019    Post-concussion headache 12/26/2018    Vestibular dysfunction 12/26/2018    Vision disturbance 12/26/2018            Past Surgical History:  Past Surgical History         Past Surgical History:   Procedure Laterality Date    HERNIA REPAIR Left      IR CAROTID CEREBRAL ANGIOGRAM BILATERAL   9/17/2024    KNEE SURGERY Left       ACL repair, patella graft    " SEPTOPLASTY N/A 3/13/2018     Procedure: SEPTOPLASTY;  septoplasty, submucosal resection of the turbinates;  Surgeon: Piotr Quiroz MD;  Location:  OR            Social History:  Social History   Social History            Socioeconomic History    Marital status:        Spouse name: Not on file    Number of children: Not on file    Years of education: Not on file    Highest education level: Not on file   Occupational History    Not on file   Tobacco Use    Smoking status: Every Day       Current packs/day: 1.00       Average packs/day: 1 pack/day for 42.7 years (42.7 ttl pk-yrs)       Types: Cigarettes       Start date: 1/1/1982    Smokeless tobacco: Never    Tobacco comments:       2-3 cigarettes per day    Vaping Use    Vaping status: Never Used   Substance and Sexual Activity    Alcohol use: Yes       Alcohol/week: 2.0 standard drinks of alcohol       Types: 2 Cans of beer per week       Comment: 2-4 drinks a week    Drug use: No    Sexual activity: Yes       Partners: Female   Other Topics Concern    Parent/sibling w/ CABG, MI or angioplasty before 65F 55M? Not Asked   Social History Narrative    Not on file      Social Determinants of Health           Financial Resource Strain: Unknown (9/17/2024)     Financial Resource Strain      Within the past 12 months, have you or your family members you live with been unable to get utilities (heat, electricity) when it was really needed?: Patient declined   Food Insecurity: Patient Declined (9/20/2024)     Received from [facility]     Hunger Vital Sign      Worried About Running Out of Food in the Last Year: Patient declined      Ran Out of Food in the Last Year: Patient declined   Transportation Needs: Patient Declined (9/20/2024)     Received from [facility]     PRAPARE - Transportation      Lack of Transportation (Medical): Patient declined      Lack of Transportation (Non-Medical):  Patient declined   Physical Activity: Not on file   Stress: Not on file   Social Connections: Not on file   Interpersonal Safety: Not At Risk (9/19/2024)     Received from [facility]     [facility]      Do you feel UNSAFE in any of your personal relationships with your family members or any other acquaintances?: No   Housing Stability: Unknown (9/17/2024)     Housing Stability      Do you have housing? : Patient declined      Are you worried about losing your housing?: Patient declined            Family History:  Family History         Family History   Problem Relation Age of Onset    Diabetes Mother      Myocardial Infarction Father              Home Medications:  Current Facility-Administered Medications          Current Outpatient Medications   Medication Sig Dispense Refill    acetaminophen (TYLENOL) 325 MG tablet Take 3 tablets (975 mg) by mouth daily. 300 tablet 3    albuterol (PROAIR HFA/PROVENTIL HFA/VENTOLIN HFA) 108 (90 Base) MCG/ACT inhaler Inhale 2 puffs into the lungs every 4 hours as needed for shortness of breath or wheezing 18 g 11    albuterol (PROVENTIL) (2.5 MG/3ML) 0.083% neb solution Take 1 vial (2.5 mg) by nebulization every 6 hours as needed for shortness of breath, wheezing or cough 90 mL 5    amLODIPine (NORVASC) 5 MG tablet Take 1 tablet (5 mg) by mouth daily. 90 tablet 4    apixaban ANTICOAGULANT (ELIQUIS) 5 MG tablet Take 5 mg by mouth 2 times daily.        aspirin (ASA) 81 MG chewable tablet Take 81 mg by mouth daily.        atorvastatin (LIPITOR) 40 MG tablet Take 1 tablet (40 mg) by mouth daily. 90 tablet 4    fluticasone-salmeterol (ADVAIR) 500-50 MCG/ACT inhaler Inhale 1 puff into the lungs every 12 hours 60 each 11    lamoTRIgine (LAMICTAL) 100 MG tablet Take 0.5 tablets (50 mg) by mouth 2 times daily for 7 days, THEN 1 tablet (100 mg) 2 times daily for 90 days. - For Irritability / Anger 187 tablet 4    losartan-hydrochlorothiazide (HYZAAR)  100-25 MG tablet Take 1 tablet by mouth daily. 90 tablet 4    MEDS UNK - RECORDS REQUESTED For cholesterol        metaxalone (SKELAXIN) 800 MG tablet Take 1 tablet (800 mg) by mouth 3 times daily. 90 tablet 4    naproxen (NAPROSYN) 500 MG tablet TAKE 1 TABLET(500 MG) BY MOUTH TWICE DAILY AS NEEDED FOR PAIN 90 tablet 2    nicotine polacrilex (NICORETTE) 4 MG gum Place 1 each (4 mg) inside cheek every hour as needed for nicotine withdrawal symptoms. 240 each 1    Omega-3 Fish Oil 500 MG capsule Take 1 capsule (500 mg) by mouth daily 90 capsule 4    propranolol ER (INDERAL LA) 120 MG 24 hr capsule Take 1 capsule (120 mg) by mouth daily. 30 capsule 2    tiotropium (SPIRIVA) 18 MCG inhaled capsule Inhale 1 capsule (18 mcg) into the lungs daily 90 capsule 4    valACYclovir (VALTREX) 1000 mg tablet Take 1 tablet (1,000 mg) by mouth 3 times daily for 7 days 21 tablet 0      No current facility-administered medications for this visit.            Allergies:  Allergies         Allergies   Allergen Reactions    Penicillins Anaphylaxis       Tolerated cefazolin on 09/16/2015.    Clindamycin Hives    Gabapentin         Mood disturbance             Physical Examination:  Constitutional: Awake, no acute distress  HENT: normcephalic,   Respiratory: normal rate, no acute distress  Neuro:  Mental status: awake, oriented   CN: EOMI, face movements symmetric, no dysarthria, hearing intact  Motor: 5/5 strength in upper extremities bilaterally          Laboratory findings:  Reviewed     Imaging findings:  CTA: MPRESSION:   CT head: No acute findings. Small periapical abscess involving the  right lateral mandibular incisor.     CTA head: Right cavernous ICA posttreatment changes with some minimal  filling of the aneurysm neck, otherwise no significant filling of  treated aneurysm. Associated at least moderate in-stent stenosis of  the right cavernous ICA. Stable 2 mm bilateral P-comm aneurysms versus  infundibuli. Stable left cavernous ICA  aneurysm.     Impression:  Right internal carotid artery cavernous segment 11 mm aneurysm- risk of rupture is <1% over 5 years per ISUIA, and if rupture were to occur, intracranial hemorrhage is unlikely due to its location- carotid-cavernous fistula formation is more likely. He underwent flow diverter placement which was complicated by inadequate placement which was attempted to be retrieved however failed, and LVIS Blue stent was then attempted to be placed but was not able to be placed correctly so angioplasty was performed instead.  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  He has stable residual aneurysm and in stent stenosis.          Plan:  -Continue aspirin 325mg daily , continue Eliquis would not hold for surgery,   -Return to clinic after repeat imaging  -Diagnostic cerebral angiogram with potential angioplasty to evaluate right ICA stent stenosis  -Recommend primary care evaluation for near syncope([place] or [place], would prefer to have it done up there due to issues with driving.       Radha Anthony MD  Endovascular Surgical Neuroradiology Fellow  [facility]  [phone]    Endovascular Surgical Neuroradiology staff is Dr. Raza

## 2025-03-18 NOTE — LETTER
3/18/2025       RE: Jose Elias Reaves  [address]  [facility] [address]     Dear Colleague,    Thank you for referring your patient, Jose Elias Reaves, to the [facility] NEUROSURGERY CLINIC [place] at [facility]. Please see a copy of my visit note below.    Neuroendovascular Clinic Note:     Reason for clinic visit: Unruptured aneurysm     History of present illness: Manuel Reaves is a 54 year old man with history of current cigarette smoking, HTN, HLD, and TBI who was incidentally found to have an unruptured right ICA cavernous segment 11 mm aneurysm. He has no personal history of aneurysm rupture. Two uncles reportedly passed in their sleep of aneurysm ruptures. He currently is trying to quit smoking. He is not using any prescription medications or nicotine supplements to attempt to quit due to cost. His hypertension is managed by his PCP. He is on three agents that he takes reliably, and typically his BP runs 130/80, but rarely when he is not feeling well, the systolic will be in the 190s. 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      Interval History:  Patient underwent attempted SHAUN X deployment however there was complication of deployment resulting in the SHAUN X being attempted to be retrieved and becoming situated within the internal carotid artery.  This was attempted to be retrieved within LVIS  stent but was not successful and he underwent angioplasty.  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     He has black and grey spots, and is getting headaches daily and having lightheadedness. The right eye dark grey spot is left lower corner it is the same, white grey spots everywhere. He will sometimes have sparkly lights all over goes from left to right. He will have worsening of ringing in ears and headache that occurs  "afterwards that gain intensity quickly. The headaches haven't changed in morphology since onset after the surgery. He states that he saw optometry and is waiting for new glasses to arrive. He is attending therapies and notes that it is helping. He notes a lot of headaches, he describes pounding, but they are improved after he breathes and tries to relax. Takes tylenol twice a day but \"doesn't touch it.\"   Right side is weaker.      He works as a  and has difficulty with seeing street signs.        Interval History:  He is doing alright, he has been up and down.     He has not had any new symptoms, he has some lightheaded and feeling like the room is spinning( positional in nature and last 20 minutes, they are better with sitting and lying down they get better), he has trouble with his vision it has gotten a little bit worse. He notes 30% vision change since the stroke, he has double vision- make it difficult to drive. He does get palpitations, sweating that are mild.     No left sided weakness, numbness or tingling. No new dysphagia symptoms.   He has trouble with balance as well, speech changes that have gotten better.   He wants to undergo surgical cervical fusion for his neck.     SBPs States 130/144 is his average range    He does note dropping things from his left arm, he has some left arm numbness in the fingertips, he will drop objects and isn't aware that he is dropping it, he will have weakness in the hand intermittently. Duration: will last 4-5 minutes, he will occur 2-3 x/ day. Onset since stent placed.           Past Medical History:  Past Medical History        Past Medical History:   Diagnosis Date     Cervical strain 01/03/2019     Closed head injury with concussion 12/12/2018     Concussion with brief LOC 12/12/2018     Concussion with brief LOC 12/12/2018     Hypertension       Impingement of right ulnar nerve 03/16/2020     Post concussion syndrome 03/26/2019     Post-concussion headache " 12/26/2018     Vestibular dysfunction 12/26/2018     Vision disturbance 12/26/2018            Past Surgical History:  Past Surgical History         Past Surgical History:   Procedure Laterality Date     HERNIA REPAIR Left       IR CAROTID CEREBRAL ANGIOGRAM BILATERAL   9/17/2024     KNEE SURGERY Left       ACL repair, patella graft     SEPTOPLASTY N/A 3/13/2018     Procedure: SEPTOPLASTY;  septoplasty, submucosal resection of the turbinates;  Surgeon: Piotr Quiroz MD;  Location:  OR            Social History:  Social History   Social History            Socioeconomic History     Marital status:        Spouse name: Not on file     Number of children: Not on file     Years of education: Not on file     Highest education level: Not on file   Occupational History     Not on file   Tobacco Use     Smoking status: Every Day       Current packs/day: 1.00       Average packs/day: 1 pack/day for 42.7 years (42.7 ttl pk-yrs)       Types: Cigarettes       Start date: 1/1/1982     Smokeless tobacco: Never     Tobacco comments:       2-3 cigarettes per day    Vaping Use     Vaping status: Never Used   Substance and Sexual Activity     Alcohol use: Yes       Alcohol/week: 2.0 standard drinks of alcohol       Types: 2 Cans of beer per week       Comment: 2-4 drinks a week     Drug use: No     Sexual activity: Yes       Partners: Female   Other Topics Concern     Parent/sibling w/ CABG, MI or angioplasty before 65F 55M? Not Asked   Social History Narrative     Not on file      Social Determinants of Health           Financial Resource Strain: Unknown (9/17/2024)     Financial Resource Strain       Within the past 12 months, have you or your family members you live with been unable to get utilities (heat, electricity) when it was really needed?: Patient declined   Food Insecurity: Patient Declined (9/20/2024)     Received from [facility]     Hunger Vital Sign       Worried About  Running Out of Food in the Last Year: Patient declined       Ran Out of Food in the Last Year: Patient declined   Transportation Needs: Patient Declined (9/20/2024)     Received from [facility]     PRAPARE - Transportation       Lack of Transportation (Medical): Patient declined       Lack of Transportation (Non-Medical): Patient declined   Physical Activity: Not on file   Stress: Not on file   Social Connections: Not on file   Interpersonal Safety: Not At Risk (9/19/2024)     Received from [facility]       Do you feel UNSAFE in any of your personal relationships with your family members or any other acquaintances?: No   Housing Stability: Unknown (9/17/2024)     Housing Stability       Do you have housing? : Patient declined       Are you worried about losing your housing?: Patient declined            Family History:  Family History         Family History   Problem Relation Age of Onset     Diabetes Mother       Myocardial Infarction Father              Home Medications:  Current Facility-Administered Medications          Current Outpatient Medications   Medication Sig Dispense Refill     acetaminophen (TYLENOL) 325 MG tablet Take 3 tablets (975 mg) by mouth daily. 300 tablet 3     albuterol (PROAIR HFA/PROVENTIL HFA/VENTOLIN HFA) 108 (90 Base) MCG/ACT inhaler Inhale 2 puffs into the lungs every 4 hours as needed for shortness of breath or wheezing 18 g 11     albuterol (PROVENTIL) (2.5 MG/3ML) 0.083% neb solution Take 1 vial (2.5 mg) by nebulization every 6 hours as needed for shortness of breath, wheezing or cough 90 mL 5     amLODIPine (NORVASC) 5 MG tablet Take 1 tablet (5 mg) by mouth daily. 90 tablet 4     apixaban ANTICOAGULANT (ELIQUIS) 5 MG tablet Take 5 mg by mouth 2 times daily.         aspirin (ASA) 81 MG chewable tablet Take 81 mg by mouth daily.         atorvastatin (LIPITOR) 40 MG tablet Take 1 tablet (40 mg)  by mouth daily. 90 tablet 4     fluticasone-salmeterol (ADVAIR) 500-50 MCG/ACT inhaler Inhale 1 puff into the lungs every 12 hours 60 each 11     lamoTRIgine (LAMICTAL) 100 MG tablet Take 0.5 tablets (50 mg) by mouth 2 times daily for 7 days, THEN 1 tablet (100 mg) 2 times daily for 90 days. - For Irritability / Anger 187 tablet 4     losartan-hydrochlorothiazide (HYZAAR) 100-25 MG tablet Take 1 tablet by mouth daily. 90 tablet 4     MEDS UNK - RECORDS REQUESTED For cholesterol         metaxalone (SKELAXIN) 800 MG tablet Take 1 tablet (800 mg) by mouth 3 times daily. 90 tablet 4     naproxen (NAPROSYN) 500 MG tablet TAKE 1 TABLET(500 MG) BY MOUTH TWICE DAILY AS NEEDED FOR PAIN 90 tablet 2     nicotine polacrilex (NICORETTE) 4 MG gum Place 1 each (4 mg) inside cheek every hour as needed for nicotine withdrawal symptoms. 240 each 1     Omega-3 Fish Oil 500 MG capsule Take 1 capsule (500 mg) by mouth daily 90 capsule 4     propranolol ER (INDERAL LA) 120 MG 24 hr capsule Take 1 capsule (120 mg) by mouth daily. 30 capsule 2     tiotropium (SPIRIVA) 18 MCG inhaled capsule Inhale 1 capsule (18 mcg) into the lungs daily 90 capsule 4     valACYclovir (VALTREX) 1000 mg tablet Take 1 tablet (1,000 mg) by mouth 3 times daily for 7 days 21 tablet 0      No current facility-administered medications for this visit.            Allergies:  Allergies         Allergies   Allergen Reactions     Penicillins Anaphylaxis       Tolerated cefazolin on 09/16/2015.     Clindamycin Hives     Gabapentin         Mood disturbance             Physical Examination:  Constitutional: Awake, no acute distress  HENT: normcephalic,   Respiratory: normal rate, no acute distress  Neuro:  Mental status: awake, oriented   CN: EOMI, face movements symmetric, no dysarthria, hearing intact  Motor: 5/5 strength in upper extremities bilaterally          Laboratory findings:  Reviewed     Imaging findings:  CTA: MPRESSION:   CT head: No acute findings. Small  periapical abscess involving the  right lateral mandibular incisor.     CTA head: Right cavernous ICA posttreatment changes with some minimal  filling of the aneurysm neck, otherwise no significant filling of  treated aneurysm. Associated at least moderate in-stent stenosis of  the right cavernous ICA. Stable 2 mm bilateral P-comm aneurysms versus  infundibuli. Stable left cavernous ICA aneurysm.     Impression:  Right internal carotid artery cavernous segment 11 mm aneurysm- risk of rupture is <1% over 5 years per ISUIA, and if rupture were to occur, intracranial hemorrhage is unlikely due to its location- carotid-cavernous fistula formation is more likely. He underwent flow diverter placement which was complicated by inadequate placement which was attempted to be retrieved however failed, and LVIS Blue stent was then attempted to be placed but was not able to be placed correctly so angioplasty was performed instead.  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  He has stable residual aneurysm and in stent stenosis.          Plan:  -Continue aspirin 325mg daily , continue Eliquis would not hold for surgery,   -Return to clinic after repeat imaging  -Diagnostic cerebral angiogram with potential angioplasty to evaluate right ICA stent stenosis  -Recommend primary care evaluation for near syncope([place] [facility] or [place], would prefer to have it done up there due to issues with driving.       Radha Anthony MD  Endovascular Surgical Neuroradiology Fellow  [facility]  [phone]    Endovascular Surgical Neuroradiology staff is Dr. Raza        Attestation signed by Octavio Raza MD at 3/18/2025 10:31 AM:  I personally spoke with Mr Reaves . He would like to get his angiogram done in [place]. I will contact Dr. Butcher and see if he can do this at [facility]. I assured the patient that Dr. Butcher has many years of experience and will take  excellent care of him. I will also see if Dr Wilson from [facility] in [place] can see him for C spine issues. We will see him abck in 6 months,  For now, he needs to stay on the Eliquis.      Again, thank you for allowing me to participate in the care of your patient.      Sincerely,    Octavio Raza MD

## 2025-03-26 ENCOUNTER — TELEPHONE (OUTPATIENT)
Dept: NEUROSURGERY | Facility: CLINIC | Age: 55
End: 2025-03-26
Payer: COMMERCIAL

## 2025-03-26 DIAGNOSIS — I67.1 NONRUPTURED CEREBRAL ANEURYSM: Primary | ICD-10-CM

## 2025-03-26 NOTE — TELEPHONE ENCOUNTER
Per Dr. Anthony - This patient needs to be set up for diagnostic cerebral angiogram with angioplasty at [facility] in the next 2 weeks. Dr. Raza is going to ask his colleague who goes to [place] if they are able to do the procedure.     3/18/25 OV: Attestation signed by Octavio Raza MD at 3/18/2025 10:31 AM  I personally spoke with Mr Jean Paul . He would like to get his angiogram done in [place]. I will contact Dr. Butcher and see if he can do this at [facility]. I assured the patient that Dr. Butcher has many years of experience and will take excellent care of him. I will also see if Dr Wilson from [facility] in [place] can see him for C spine issues. We will see him abck in 6 months,  For now, he needs to stay on the Eliquis.    Per Dr. Raza - Dr. Ashwini Butcher ([phone]) has agreed to do procedure at [facility] the week of 4/14/25.     LVMM for Marilin, Dr. Butcher's nurse to return call today or Friday.     Kia Heath RN 3/26/2025 3:03 PM     Addendum: incoming call from Marilin. Requesting notes, demographic sheet faxed, and images pushed to PACS. Fax# [phone] Attn: Marilin    Routed to Navigator Team to complete above.     Kia Heath RN 3/26/2025 4:05 PM

## 2025-03-27 ENCOUNTER — DOCUMENTATION ONLY (OUTPATIENT)
Dept: NEUROSURGERY | Facility: CLINIC | Age: 55
End: 2025-03-27
Payer: COMMERCIAL

## 2025-03-27 NOTE — PROGRESS NOTES
Visit notes,angio,face sheet was fax [facility] @ [phone] also request for imaging head and neck be push to  pacs @ [phone]    Thank you    [facility]

## 2025-03-31 ENCOUNTER — DOCUMENTATION ONLY (OUTPATIENT)
Dept: NEUROSURGERY | Facility: CLINIC | Age: 55
End: 2025-03-31
Payer: COMMERCIAL

## 2025-04-02 ENCOUNTER — PATIENT OUTREACH (OUTPATIENT)
Dept: CARE COORDINATION | Facility: CLINIC | Age: 55
End: 2025-04-02

## 2025-04-02 ENCOUNTER — OFFICE VISIT (OUTPATIENT)
Dept: INTERNAL MEDICINE | Facility: OTHER | Age: 55
End: 2025-04-02
Payer: COMMERCIAL

## 2025-04-02 VITALS
RESPIRATION RATE: 16 BRPM | TEMPERATURE: 97.6 F | HEART RATE: 77 BPM | BODY MASS INDEX: 32.94 KG/M2 | WEIGHT: 236.2 LBS | OXYGEN SATURATION: 97 % | DIASTOLIC BLOOD PRESSURE: 82 MMHG | SYSTOLIC BLOOD PRESSURE: 136 MMHG

## 2025-04-02 DIAGNOSIS — H50.00 ESOTROPIA: ICD-10-CM

## 2025-04-02 DIAGNOSIS — E78.2 MIXED HYPERLIPIDEMIA: ICD-10-CM

## 2025-04-02 DIAGNOSIS — J44.0 CHRONIC OBSTRUCTIVE PULMONARY DISEASE WITH ACUTE LOWER RESPIRATORY INFECTION (H): ICD-10-CM

## 2025-04-02 DIAGNOSIS — M47.812 CERVICAL ARTHRITIS: ICD-10-CM

## 2025-04-02 DIAGNOSIS — Z01.818 PREOP GENERAL PHYSICAL EXAM: Primary | ICD-10-CM

## 2025-04-02 DIAGNOSIS — Z98.890 S/P CEREBRAL ANEURYSM REPAIR: ICD-10-CM

## 2025-04-02 DIAGNOSIS — Z86.79 S/P CEREBRAL ANEURYSM REPAIR: ICD-10-CM

## 2025-04-02 DIAGNOSIS — M54.2 CERVICALGIA: ICD-10-CM

## 2025-04-02 DIAGNOSIS — I63.9 RIGHT SIDED CEREBRAL HEMISPHERE CEREBROVASCULAR ACCIDENT (CVA) (H): ICD-10-CM

## 2025-04-02 DIAGNOSIS — G89.4 CHRONIC PAIN SYNDROME: ICD-10-CM

## 2025-04-02 PROBLEM — E78.5 DYSLIPIDEMIA: Chronic | Status: ACTIVE | Noted: 2020-02-17

## 2025-04-02 PROBLEM — J45.20 MILD INTERMITTENT ASTHMA: Status: RESOLVED | Noted: 2018-02-20 | Resolved: 2025-04-02

## 2025-04-02 PROBLEM — J31.0 OTHER CHRONIC RHINITIS: Status: RESOLVED | Noted: 2018-02-20 | Resolved: 2025-04-02

## 2025-04-02 LAB
ALBUMIN SERPL BCG-MCNC: 4.4 G/DL (ref 3.5–5.2)
ALP SERPL-CCNC: 98 U/L (ref 40–150)
ALT SERPL W P-5'-P-CCNC: 21 U/L (ref 0–70)
ANION GAP SERPL CALCULATED.3IONS-SCNC: 12 MMOL/L (ref 7–15)
AST SERPL W P-5'-P-CCNC: 18 U/L (ref 0–45)
BASOPHILS # BLD AUTO: 0.1 10E3/UL (ref 0–0.2)
BASOPHILS NFR BLD AUTO: 1 %
BILIRUB SERPL-MCNC: 0.2 MG/DL
BUN SERPL-MCNC: 11.1 MG/DL (ref 6–20)
CALCIUM SERPL-MCNC: 9.5 MG/DL (ref 8.8–10.4)
CHLORIDE SERPL-SCNC: 101 MMOL/L (ref 98–107)
CHOLEST SERPL-MCNC: 166 MG/DL
CREAT SERPL-MCNC: 0.93 MG/DL (ref 0.67–1.17)
EGFRCR SERPLBLD CKD-EPI 2021: >90 ML/MIN/1.73M2
EOSINOPHIL # BLD AUTO: 0.2 10E3/UL (ref 0–0.7)
EOSINOPHIL NFR BLD AUTO: 2 %
ERYTHROCYTE [DISTWIDTH] IN BLOOD BY AUTOMATED COUNT: 15.6 % (ref 10–15)
FASTING STATUS PATIENT QL REPORTED: ABNORMAL
FASTING STATUS PATIENT QL REPORTED: ABNORMAL
GLUCOSE SERPL-MCNC: 113 MG/DL (ref 70–99)
HCO3 SERPL-SCNC: 26 MMOL/L (ref 22–29)
HCT VFR BLD AUTO: 42.5 % (ref 40–53)
HDLC SERPL-MCNC: 37 MG/DL
HGB BLD-MCNC: 14.5 G/DL (ref 13.3–17.7)
IMM GRANULOCYTES # BLD: 0.1 10E3/UL
IMM GRANULOCYTES NFR BLD: 1 %
LDLC SERPL CALC-MCNC: 82 MG/DL
LYMPHOCYTES # BLD AUTO: 2 10E3/UL (ref 0.8–5.3)
LYMPHOCYTES NFR BLD AUTO: 21 %
MCH RBC QN AUTO: 28.4 PG (ref 26.5–33)
MCHC RBC AUTO-ENTMCNC: 34.1 G/DL (ref 31.5–36.5)
MCV RBC AUTO: 83 FL (ref 78–100)
MONOCYTES # BLD AUTO: 0.9 10E3/UL (ref 0–1.3)
MONOCYTES NFR BLD AUTO: 9 %
NEUTROPHILS # BLD AUTO: 6.5 10E3/UL (ref 1.6–8.3)
NEUTROPHILS NFR BLD AUTO: 67 %
NONHDLC SERPL-MCNC: 129 MG/DL
NRBC # BLD AUTO: 0 10E3/UL
NRBC BLD AUTO-RTO: 0 /100
PLATELET # BLD AUTO: 487 10E3/UL (ref 150–450)
POTASSIUM SERPL-SCNC: 3.7 MMOL/L (ref 3.4–5.3)
PROT SERPL-MCNC: 7.6 G/DL (ref 6.4–8.3)
RBC # BLD AUTO: 5.1 10E6/UL (ref 4.4–5.9)
SODIUM SERPL-SCNC: 139 MMOL/L (ref 135–145)
TRIGL SERPL-MCNC: 233 MG/DL
TSH SERPL DL<=0.005 MIU/L-ACNC: 1.41 UIU/ML (ref 0.3–4.2)
WBC # BLD AUTO: 9.7 10E3/UL (ref 4–11)

## 2025-04-02 PROCEDURE — 85004 AUTOMATED DIFF WBC COUNT: CPT | Mod: ZL

## 2025-04-02 PROCEDURE — G0463 HOSPITAL OUTPT CLINIC VISIT: HCPCS

## 2025-04-02 PROCEDURE — 84155 ASSAY OF PROTEIN SERUM: CPT | Mod: ZL

## 2025-04-02 PROCEDURE — 85014 HEMATOCRIT: CPT | Mod: ZL

## 2025-04-02 PROCEDURE — 84443 ASSAY THYROID STIM HORMONE: CPT | Mod: ZL

## 2025-04-02 PROCEDURE — 80061 LIPID PANEL: CPT | Mod: ZL

## 2025-04-02 PROCEDURE — 36415 COLL VENOUS BLD VENIPUNCTURE: CPT | Mod: ZL

## 2025-04-02 PROCEDURE — 80051 ELECTROLYTE PANEL: CPT | Mod: ZL

## 2025-04-02 RX ORDER — HYDROCODONE BITARTRATE AND ACETAMINOPHEN 5; 325 MG/1; MG/1
1-2 TABLET ORAL EVERY 6 HOURS PRN
Qty: 60 TABLET | Refills: 0 | Status: SHIPPED | OUTPATIENT
Start: 2025-04-02 | End: 2025-05-02

## 2025-04-02 ASSESSMENT — PAIN SCALES - GENERAL: PAINLEVEL_OUTOF10: SEVERE PAIN (9)

## 2025-04-02 NOTE — NURSING NOTE
"Chief Complaint   Patient presents with    Pre-Op Exam     Eye surgery      Patient presents to the clinic today for a pre op for eye surgery   Initial There were no vitals taken for this visit. Estimated body mass index is 32.22 kg/m  as calculated from the following:    Height as of 3/18/25: 1.803 m (5' 11\").    Weight as of 3/18/25: 104.8 kg (231 lb).  Meds Reconciled: complete      Cate Ring LPN,LPN on 4/2/2025 at 10:52 AM  Ext. [phone]        Cate Ring LPN  "

## 2025-04-02 NOTE — PROGRESS NOTES
Clinic Care Coordination Contact  Follow Up Progress Note      Face-to-face in clinic     Assessment: Patient's main concern for me today is desire to find housing. He is finding it increasingly difficult to live in a camper. He thought he had an apartment in [facility], and tells me they later turned him down to poor credit. SSDI income of $1575 is secure now.    He has eye surgery 4/25/25 at [facility]. He will have a referral for vascular surgeon at [facility], a spinal referral for neck, then later a cardiology referral. He wants to take it step-by-step so it is less overwhelming.     Care Plans  Feels that housing stability would help with anxiety and overall health.    Intervention/Education provided during outreach: Offer to check into some programs     Plan:   He has eye surgery coming up and will have pre-op 4/15/25.   He will continue to put in applications with private rental companies. I will check with community contacts about possible rental options for poor credit.    Care Coordinator will continue to support.   Theresa Dooley RN on 4/2/2025 at 2:33 PM

## 2025-04-02 NOTE — PROGRESS NOTES
Preoperative Evaluation  [facility]  [address]  [place] [address]  Phone: [phone]  Fax: [phone]  Primary Provider: ANATOLY Chow CNP  Pre-op Performing Provider: ANATOLY Chow CNP  Apr 2, 2025 4/2/2025   Surgical Information   What procedure is being done? Strabismus surgery   Facility or Hospital where procedure/surgery will be performed: [facility].   Who is doing the procedure / surgery?    Date of surgery / procedure: 4/25/25   Time of surgery / procedure: TBD   Where do you plan to recover after surgery? at home alone     Fax number for surgical facility: [phone] Dr. Martinez    Assessment & Plan     The proposed surgical procedure is considered INTERMEDIATE risk.      ICD-10-CM    1. Preop general physical exam  Z01.818 TSH Reflex GH     CBC and Differential     Comprehensive Metabolic Panel     Lipid Panel     TSH Reflex GH     CBC and Differential     Comprehensive Metabolic Panel     Lipid Panel      2. Esotropia  H50.00       3. Chronic pain syndrome  G89.4 HYDROcodone-acetaminophen (NORCO) 5-325 MG tablet      4. Cervicalgia  M54.2 HYDROcodone-acetaminophen (NORCO) 5-325 MG tablet      5. Cervical arthritis  M47.812 Spine  Referral      6. Mixed hyperlipidemia  E78.2       7. Chronic obstructive pulmonary disease with acute lower respiratory infection (H)  J44.0 Fluticasone-Umeclidin-Vilant (TRELEGY ELLIPTA) 100-62.5-25 MCG/ACT oral inhaler      8. Right sided cerebral hemisphere cerebrovascular accident (CVA) (H)  I63.9       9. S/P cerebral aneurysm repair  Z98.890     Z86.79     cerebral aneurysm, internal carotid artery 9/2024           Risks and Recommendations  The patient has the following additional risks and recommendations for perioperative complications:  Social and Substance:    - Patient is taking medications for chronic pain- PDMP website reviewed and is appropriate- patient continues on 1-2  tablets of hydrocodone daily for chronic cervicalgia- referral to spine specialist at [facility]   - Active nicotine user, advised smoking cessation    Preoperative Medication Instructions  Antiplatelet or Anticoagulation Medication Instructions   - aspirin: Patient is at increased risk of thrombosis (e.g. stenting within the last year), continue aspirin without modification. Consider consultation with cardiology.    - apixaban (Eliquis), edoxaban (Savaysa), rivaroxaban (Xarelto): As directed by your surgical team/vascular specialist. Likely will need to bridge since you are not able to hold.     Additional Medication Instructions  Take all scheduled medications on the day of surgery    Recommendation  Approval given to proceed with proposed procedure, without further diagnostic evaluation.    Raúl Jackson is a 54 year old, presenting for the following:  Pre-Op Exam (Eye surgery )      HPI: Patient presents to clinic for preoperative evaluation for strabismus surgery for esotropia.   Patient denies having any difficulties with anesthesia in the past, and is able to meet > 4 METS.           4/2/2025   Pre-Op Questionnaire   Have you ever had a heart attack or stroke? (!) YES CVA 9/2024   Have you ever had surgery on your heart or blood vessels, such as a stent placement, a coronary artery bypass, or surgery on an artery in your head, neck, heart, or legs? (!) YES Cavernous carotid aneurysm repair   Do you have chest pain with activity? No   Do you have a history of heart failure? No   Do you currently have a cold, bronchitis or symptoms of other infection? No   Do you have a cough, shortness of breath, or wheezing? (!) YES Chronic shortness of breath/coughing/COPD   Do you or anyone in your family have previous history of blood clots? No   Do you or does anyone in your family have a serious bleeding problem such as prolonged bleeding following surgeries or cuts? (!) UNKNOWN    Have you ever had problems with  anemia or been told to take iron pills? No   Have you had any abnormal blood loss such as black, tarry or bloody stools? No   Have you ever had a blood transfusion? No   Are you willing to have a blood transfusion if it is medically needed before, during, or after your surgery? Yes   Have you or any of your relatives ever had problems with anesthesia? No   Do you have sleep apnea, excessive snoring or daytime drowsiness? (!) UNKNOWN   Do you have any artifical heart valves or other implanted medical devices like a pacemaker, defibrillator, or continuous glucose monitor? No   Do you have artificial joints? No   Are you allergic to latex? No     Health Care Directive  Patient does not have a Health Care Directive: Discussed advance care planning with patient; however, patient declined at this time.    Preoperative Review of    reviewed - controlled substances reflected in medication list.      Status of Chronic Conditions:  See problem list for active medical problems.  Problems all longstanding and stable, except as noted/documented.  See ROS for pertinent symptoms related to these conditions.    COPD - Patient has a longstanding history of moderate-severe COPD . Patient has been doing well overall noting COUGH and WHEEZING. He admits he does not use his inhalers on a regular basis.     HYPERLIPIDEMIA - Patient has a long history of significant Hyperlipidemia requiring medication for treatment with recent fair control. Patient reports no problems or side effects with the medication.     HYPERTENSION - Patient has longstanding history of HTN , currently denies any symptoms referable to elevated blood pressure. Specifically denies chest pain, palpitations, dyspnea, orthopnea, PND or peripheral edema. Blood pressure readings have been in normal range. Current medication regimen is as listed below. Patient denies any side effects of medication.     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 6/2024  Small saccular 2 mm right PCA aneurysm. 6/2024      Prediabetes 08/22/2023     Priority: Medium    Dyslipidemia 02/17/2020     Priority: Medium    Cigarette smoker 03/20/2019     Priority: Medium    Cervicalgia 12/26/2018     Priority: Medium    Deviated nasal septum 02/20/2018     Priority: Medium    Tobacco abuse 02/20/2018     Priority: Medium    Mixed hyperlipidemia 04/18/2017     Priority: Medium    Chronic pain syndrome - Neck 04/18/2017     Priority: Medium    Essential hypertension 03/21/2017     Priority: Medium    Family history of ischemic heart disease 03/21/2017     Priority: Medium    Family history of diabetes mellitus 03/21/2017     Priority: Medium    Personality disorder (H) 05/05/2012     Priority: Medium      Past Medical History:   Diagnosis Date    Cervical strain 01/03/2019    Closed head injury with concussion 12/12/2018    Concussion with brief LOC 12/12/2018    Concussion with brief LOC 12/12/2018    Hypertension     Impingement of right ulnar nerve 03/16/2020    Post concussion syndrome 03/26/2019    Post-concussion headache 12/26/2018    Vestibular dysfunction 12/26/2018    Vision disturbance 12/26/2018     Past Surgical History:   Procedure Laterality Date    HERNIA REPAIR Left     IR CAROTID CEREBRAL ANGIOGRAM  BILATERAL  9/17/2024    KNEE SURGERY Left     ACL repair, patella graft    SEPTOPLASTY N/A 3/13/2018    Procedure: SEPTOPLASTY;  septoplasty, submucosal resection of the turbinates;  Surgeon: Piotr Quiroz MD;  Location:  OR     Current Outpatient Medications   Medication Sig Dispense Refill    Fluticasone-Umeclidin-Vilant (TRELEGY ELLIPTA) 100-62.5-25 MCG/ACT oral inhaler Inhale 1 puff into the lungs daily. Rinse mouth well after use to prevent Thrush 180 each 2    HYDROcodone-acetaminophen (NORCO) 5-325 MG tablet Take 1-2 tablets by mouth every 6 hours as needed for severe pain or moderate to severe pain. 60 tablet 0    acetaminophen (TYLENOL) 325 MG tablet Take 3 tablets (975 mg) by mouth daily. 300 tablet 3    albuterol (PROAIR HFA/PROVENTIL HFA/VENTOLIN HFA) 108 (90 Base) MCG/ACT inhaler Inhale 2 puffs into the lungs every 4 hours as needed for shortness of breath or wheezing. 54 g 1    albuterol (PROVENTIL) (2.5 MG/3ML) 0.083% neb solution Take 1 vial (2.5 mg) by nebulization every 6 hours as needed for shortness of breath, wheezing or cough 90 mL 5    amLODIPine (NORVASC) 10 MG tablet Take 1 tablet (10 mg) by mouth daily.      apixaban ANTICOAGULANT (ELIQUIS) 5 MG tablet Take 1 tablet (5 mg) by mouth 2 times daily. 60 tablet 5    aspirin (ASA) 81 MG chewable tablet Take 81 mg by mouth daily.      atorvastatin (LIPITOR) 40 MG tablet Take 1 tablet (40 mg) by mouth daily. 90 tablet 4    diclofenac (VOLTAREN) 1 % topical gel Apply 4 g topically 4 times daily. 100 g 11    Diclofenac Sodium 3 % GEL Externally apply 2-4 g topically 2 times daily as needed (joint pain). ---- Requesting prescription strength ----  Not OTC, okay to substitute concentration if needed ---- (Patient not taking: Reported on 2/20/2025) 100 g 4    fluticasone (FLONASE) 50 MCG/ACT nasal spray Spray 1 spray into both nostrils daily. 16 g 0    fluticasone-salmeterol (ADVAIR) 500-50 MCG/ACT inhaler Inhale 1 puff into the lungs every 12  "hours. 60 each 11    lamoTRIgine (LAMICTAL) 100 MG tablet Take 1 tablet (100 mg) by mouth 2 times daily. - For Irritability / Anger      losartan-hydrochlorothiazide (HYZAAR) 100-25 MG tablet Take 1 tablet by mouth daily. 90 tablet 4    magnesium oxide (MAG-OX) 400 MG tablet Take 1 tablet (400 mg) by mouth daily. 90 tablet 1    MEDS UNK - RECORDS REQUESTED For cholesterol      propranolol ER (INDERAL LA) 120 MG 24 hr capsule TAKE 1 CAPSULE(120 MG) BY MOUTH DAILY 90 capsule 0    tiotropium (SPIRIVA) 18 MCG inhaled capsule Inhale 1 capsule (18 mcg) into the lungs daily 90 capsule 4    tiZANidine (ZANAFLEX) 4 MG tablet Take 1 tablet (4 mg) by mouth 3 times daily as needed for muscle spasms. 90 tablet 4       Allergies   Allergen Reactions    Penicillins Anaphylaxis     Tolerated cefazolin on 09/16/2015.    Clindamycin Hives    Gabapentin      Mood disturbance         Social History     Tobacco Use    Smoking status: Every Day     Current packs/day: 1.00     Average packs/day: 1 pack/day for 43.3 years (43.3 ttl pk-yrs)     Types: Cigarettes     Start date: 1/1/1982    Smokeless tobacco: Never    Tobacco comments:     2-3 cigarettes per day    Substance Use Topics    Alcohol use: Not Currently     Alcohol/week: 2.0 standard drinks of alcohol     Types: 2 Cans of beer per week       History   Drug Use No             Review of Systems  CONSTITUTIONAL: NEGATIVE for fever, chills, change in weight  ENT/MOUTH: NEGATIVE for ear, mouth and throat problems  RESP:NEGATIVE for significant cough or SOB, Hx COPD, and smoking  CV: NEGATIVE for chest pain, palpitations or peripheral edema    Objective    /82 (BP Location: Right arm, Patient Position: Sitting, Cuff Size: Adult Large)   Pulse 77   Temp 97.6  F (36.4  C) (Temporal)   Resp 16   Wt 107.1 kg (236 lb 3.2 oz)   SpO2 97%   BMI 32.94 kg/m     Estimated body mass index is 32.94 kg/m  as calculated from the following:    Height as of 3/18/25: 1.803 m (5' 11\").    " Weight as of this encounter: 107.1 kg (236 lb 3.2 oz).    Physical Exam  Vitals reviewed.   Constitutional:       General: He is not in acute distress.  Cardiovascular:      Rate and Rhythm: Normal rate and regular rhythm.      Pulses: Normal pulses.      Heart sounds: Normal heart sounds. No murmur heard.  Pulmonary:      Effort: Pulmonary effort is normal.      Breath sounds: Normal breath sounds. No wheezing.   Neurological:      Mental Status: He is alert.   Psychiatric:         Mood and Affect: Mood normal.         Behavior: Behavior normal.          Diagnostics  Recent Results (from the past 720 hours)   TSH Reflex GH    Collection Time: 04/02/25 12:11 PM   Result Value Ref Range    TSH 1.41 0.30 - 4.20 uIU/mL   Comprehensive Metabolic Panel    Collection Time: 04/02/25 12:11 PM   Result Value Ref Range    Sodium 139 135 - 145 mmol/L    Potassium 3.7 3.4 - 5.3 mmol/L    Carbon Dioxide (CO2) 26 22 - 29 mmol/L    Anion Gap 12 7 - 15 mmol/L    Urea Nitrogen 11.1 6.0 - 20.0 mg/dL    Creatinine 0.93 0.67 - 1.17 mg/dL    GFR Estimate >90 >60 mL/min/1.73m2    Calcium 9.5 8.8 - 10.4 mg/dL    Chloride 101 98 - 107 mmol/L    Glucose 113 (H) 70 - 99 mg/dL    Alkaline Phosphatase 98 40 - 150 U/L    AST 18 0 - 45 U/L    ALT 21 0 - 70 U/L    Protein Total 7.6 6.4 - 8.3 g/dL    Albumin 4.4 3.5 - 5.2 g/dL    Bilirubin Total 0.2 <=1.2 mg/dL    Patient Fasting > 8hrs? Unknown    Lipid Panel    Collection Time: 04/02/25 12:11 PM   Result Value Ref Range    Cholesterol 166 <200 mg/dL    Triglycerides 233 (H) <150 mg/dL    Direct Measure HDL 37 (L) >=40 mg/dL    LDL Cholesterol Calculated 82 <100 mg/dL    Non HDL Cholesterol 129 <130 mg/dL    Patient Fasting > 8hrs? Unknown    CBC with platelets and differential    Collection Time: 04/02/25 12:11 PM   Result Value Ref Range    WBC Count 9.7 4.0 - 11.0 10e3/uL    RBC Count 5.10 4.40 - 5.90 10e6/uL    Hemoglobin 14.5 13.3 - 17.7 g/dL    Hematocrit 42.5 40.0 - 53.0 %    MCV 83 78 -  100 fL    MCH 28.4 26.5 - 33.0 pg    MCHC 34.1 31.5 - 36.5 g/dL    RDW 15.6 (H) 10.0 - 15.0 %    Platelet Count 487 (H) 150 - 450 10e3/uL    % Neutrophils 67 %    % Lymphocytes 21 %    % Monocytes 9 %    % Eosinophils 2 %    % Basophils 1 %    % Immature Granulocytes 1 %    NRBCs per 100 WBC 0 <1 /100    Absolute Neutrophils 6.5 1.6 - 8.3 10e3/uL    Absolute Lymphocytes 2.0 0.8 - 5.3 10e3/uL    Absolute Monocytes 0.9 0.0 - 1.3 10e3/uL    Absolute Eosinophils 0.2 0.0 - 0.7 10e3/uL    Absolute Basophils 0.1 0.0 - 0.2 10e3/uL    Absolute Immature Granulocytes 0.1 <=0.4 10e3/uL    Absolute NRBCs 0.0 10e3/uL      No EKG this visit, completed in the last 90 days.    Revised Cardiac Risk Index (RCRI)  The patient has the following serious cardiovascular risks for perioperative complications:   - Cerebrovascular Disease (TIA or CVA) = 1 point     RCRI Interpretation: 1 point: Class II (low risk - 0.9% complication rate)         Signed Electronically by: ANATOLY Chow CNP  A copy of this evaluation report is provided to the requesting physician.    The longitudinal plan of care for the diagnosis(es)/condition(s) as documented were addressed during this visit. Due to the added complexity in care, I will continue to support Manuel in the subsequent management and with ongoing continuity of care.

## 2025-04-02 NOTE — PATIENT INSTRUCTIONS
How to Take Your Medication Before Surgery  Preoperative Medication Instructions   Antiplatelet or Anticoagulation Medication Instructions   - aspirin: Patient is at increased risk of thrombosis (e.g. stenting within the last year), continue aspirin without modification. Consider consultation with cardiology.    - apixaban (Eliquis), edoxaban (Savaysa), rivaroxaban (Xarelto): As directed by your surgical team/vascular specialist. Likely will need to bridge since you are not able to hold.     Additional Medication Instructions  Take all scheduled medications on the day of surgery       Patient Education   Preparing for Your Surgery  For Adults  Getting started  In most cases, a nurse will call to review your health history and instructions. They will give you an arrival time based on your scheduled surgery time. Please be ready to share:  Your doctor's clinic name and phone number  Your medical, surgical, and anesthesia history  A list of allergies and sensitivities  A list of medicines, including herbal treatments and over-the-counter drugs  Whether the patient has a legal guardian (ask how to send us the papers in advance)  Note: You may not receive a call if you were seen at our PAC (Preoperative Assessment Center).  Please tell us if you're pregnant--or if there's any chance you might be pregnant. Some surgeries may injure a fetus (unborn baby), so they require a pregnancy test. Surgeries that are safe for a fetus don't always need a test, and you can choose whether to have one.   Preparing for surgery  Within 10 to 30 days of surgery: Have a pre-op exam (sometimes called an H&P, or History and Physical). This can be done at a clinic or pre-operative center.  If you're having a , you may not need this exam. Talk to your care team.  At your pre-op exam, talk to your care team about all medicines you take. (This includes CBD oil and any drugs, such as THC, marijuana, and other forms of cannabis.) If you  need to stop any medicine before surgery, ask when to start taking it again.  This is for your safety. Many medicines and drugs can make you bleed too much during surgery. Some change how well surgery (anesthesia) drugs work.  Call your insurance company to let them know you're having surgery. (If you don't have insurance, call [phone].)  Call your clinic if there's any change in your health. This includes a scrape or scratch near the surgery site, or any signs of a cold (sore throat, runny nose, cough, rash, fever).  Eating and drinking guidelines  For your safety: Unless your surgeon tells you otherwise, follow the guidelines below.  Eat and drink as normal until 8 hours before you arrive for surgery. After that, no food or milk. You can spit out gum when you arrive.  Drink clear liquids until 2 hours before you arrive. These are liquids you can see through, like water, Gatorade, and Propel Water. They also include plain black coffee and tea (no cream or milk).  No alcohol for 24 hours before you arrive. The night before surgery, stop any drinks that contain THC.  If your care team tells you to take medicine on the morning of surgery, it's okay to take it with a sip of water. No other medicines or drugs are allowed (including CBD oil)--follow your care team's instructions.  If you have questions the day of surgery, call your hospital or surgery center.   Preventing infection  Shower or bathe the night before and the morning of surgery. Follow the instructions your clinic gave you. (If no instructions, use regular soap.)  Don't shave or clip hair near your surgery site. We'll remove the hair if needed.  Don't smoke or vape the morning of surgery. No chewing tobacco for 6 hours before you arrive. A nicotine patch is okay. You may spit out nicotine gum when you arrive.  For some surgeries, the surgeon will tell you to fully quit smoking and nicotine.  We will make every effort to keep you safe from infection. We  will:  Clean our hands often with soap and water (or an alcohol-based hand rub).  Clean the skin at your surgery site with a special soap that kills germs.  Give you a special gown to keep you warm. (Cold raises the risk of infection.)  Wear hair covers, masks, gowns, and gloves during surgery.  Give antibiotic medicine, if prescribed. Not all surgeries need this medicine.  What to bring on the day of surgery  Photo ID and insurance card  Copy of your health care directive, if you have one  Glasses and hearing aids (bring cases)  You can't wear contacts during surgery  Inhaler and eye drops, if you use them (tell us about these when you arrive)  CPAP machine or breathing device, if you use them  A few personal items, if spending the night  If you have . . .  A pacemaker, ICD (cardiac defibrillator), or other implant: Bring the ID card.  An implanted stimulator: Bring the remote control.  A legal guardian: Bring a copy of the certified (court-stamped) guardianship papers.  Please remove any jewelry, including body piercings. Leave jewelry and other valuables at home.  If you're going home the day of surgery  You must have a responsible adult drive you home. They should stay with you overnight as well.  If you don't have someone to stay with you, and you aren't safe to go home alone, we may keep you overnight. Insurance often won't pay for this.  After surgery  If it's hard to control your pain or you need more pain medicine, please call your surgeon's office.  Questions?   If you have any questions for your care team, list them here:   ____________________________________________________________________________________________________________________________________________________________________________________________________________________________________________________________  For informational purposes only. Not to replace the advice of your health care provider. Copyright   2003, 2019 [facility]. All rights reserved. Clinically reviewed by oGlden Fu MD. [employer] [id number] - REV 08/24.

## 2025-04-07 ENCOUNTER — PATIENT OUTREACH (OUTPATIENT)
Dept: CARE COORDINATION | Facility: CLINIC | Age: 55
End: 2025-04-07
Payer: COMMERCIAL

## 2025-04-07 ENCOUNTER — TELEPHONE (OUTPATIENT)
Dept: INTERNAL MEDICINE | Facility: OTHER | Age: 55
End: 2025-04-07
Payer: COMMERCIAL

## 2025-04-07 ENCOUNTER — MYC MEDICAL ADVICE (OUTPATIENT)
Dept: INTERNAL MEDICINE | Facility: OTHER | Age: 55
End: 2025-04-07
Payer: COMMERCIAL

## 2025-04-07 DIAGNOSIS — F60.9 PERSONALITY DISORDER (H): ICD-10-CM

## 2025-04-07 DIAGNOSIS — J44.9 CHRONIC OBSTRUCTIVE PULMONARY DISEASE, UNSPECIFIED COPD TYPE (H): ICD-10-CM

## 2025-04-07 RX ORDER — LAMOTRIGINE 100 MG/1
100 TABLET ORAL 2 TIMES DAILY
Qty: 180 TABLET | Refills: 3 | Status: SHIPPED | OUTPATIENT
Start: 2025-04-07

## 2025-04-07 NOTE — TELEPHONE ENCOUNTER
"Dinah,    The last time you \"reordered\" his Lamictal on 2/20, it was \"historical\" so didn't go anywhere.     Now he thinks you've stopped it.     Chao'd up for 3 months with 3 refills.     Nanette Royal RN on 4/7/2025 at 12:18 PM    "

## 2025-04-07 NOTE — PROGRESS NOTES
Clinic Care Coordination Contact  Care Team Conversations    Situation:  Per PCP, the neurosurgeon at [facility] has concerns that patient should not go off of anticoagulants to have eye surgery unless it is necessary to save his vision. Otherwise, he recommends waiting for another six months before scheduling the procedure.     Also, an email was sent to patient with resource to access a housing advocates that will be available to help people tomorrow at the [facility]       Plan:  Message was left for Dr. Martinez's office at [facility]. Dr. Martinez's RN, Franc, returned call and left message saying they will follow neurosurgeon's recommendation and postpone eye surgery for six months. Updated PCP and patient.   Theresa Dooley, RN on 4/7/2025 at 2:58 PM

## 2025-04-07 NOTE — TELEPHONE ENCOUNTER
Received call from Kaycee with [facility] regarding PA for Trelegy.  She states there are formulary options.  Can the patient be switched to one of these options.  Questions call Kaycee with [facility].    ADAPALENE GEL (TOPICAL) BENZOYL PEROXIDE 10% WASH OTC (TOPICAL) BENZOYL PEROXIDE 3% CLEANSER OTC (TOPICAL) BENZOYL PEROXIDE 5% WASH OTC (TOPICAL) BENZOYL PEROXIDE 6% CLEANSER OTC (TOPICAL) BENZOYL PEROXIDE 9% CLEANSER OTC (TOPICAL) BENZOYL PEROXIDE GEL (TOPICAL) BENZOYL PEROXIDE LOTION OTC (TOPICAL) CLINDAMYCIN / BENZOYL PEROXIDE (BENZACLIN) (TOPICAL) CLINDAMYCIN / BENZOYL PEROXIDE (DUAC) (TOPICAL) CLINDAMYCIN PHOSPHATE GEL (TOPICAL) CLINDAMYCIN PHOSPHATE LOTION (TOPICAL) CLINDAMYCIN PHOSPHATE MED. SWAB (TOPICAL) CLINDAMYCIN PHOSPHATE SOLUTION (TOPICAL) ERYTHROMYCIN GEL (TOPICAL) ERYTHROMYCIN MED. SWAB (TOPICAL) ERYTHROMYCIN SOLUTION (TOPICAL) ERYTHROMYCIN-BENZOYL PEROXIDE (TOPICAL) RETIN-A CREAM (TOPICAL) RETIN-A GEL (TOPICAL) SULFACETAMIDE/SULFUR CLEANSER (TOPICAL SULFACETAMIDE SODIUM/SULFUR (TOPICAL) SULFACETAMIDE SUSPENSION (TOPICAL)  ACANYA

## 2025-04-08 RX ORDER — FLUTICASONE PROPIONATE AND SALMETEROL 500; 50 UG/1; UG/1
1 POWDER RESPIRATORY (INHALATION) EVERY 12 HOURS
Qty: 60 EACH | Refills: 11 | Status: SHIPPED | OUTPATIENT
Start: 2025-04-08

## 2025-04-08 RX ORDER — TIOTROPIUM BROMIDE 18 UG/1
18 CAPSULE ORAL; RESPIRATORY (INHALATION) DAILY
Qty: 90 CAPSULE | Refills: 4 | Status: SHIPPED | OUTPATIENT
Start: 2025-04-08

## 2025-04-09 ENCOUNTER — PATIENT OUTREACH (OUTPATIENT)
Dept: CARE COORDINATION | Facility: CLINIC | Age: 55
End: 2025-04-09
Payer: COMMERCIAL

## 2025-04-09 NOTE — PROGRESS NOTES
Clinic Care Coordination Contact  Follow Up Progress Note      Assessment: Discussed recommendations to wait on the eye surgery with Dr. Martinez at the recommendation of neurosurgeon. Dr. Martinez's office replied that they will follow those recommendations, so after talking with patient I cancelled his procedure on 4/25/25. He may message Dr. Martinez about possibility of getting prism glasses ordered.     Patient has found an apartment in [place] which will allow his dog. He is paying the deposit and first month's rent, so will have little left for food this month. He is happy about finding a place to settle in.     Care Gaps:    Health Maintenance Due   Topic Date Due    COLORECTAL CANCER SCREENING  Never done    HEPATITIS B IMMUNIZATION (1 of 3 - 19+ 3-dose series) Never done    ZOSTER IMMUNIZATION (1 of 2) Never done    INFLUENZA VACCINE (1) 09/01/2024    Pneumococcal Vaccine: 50+ Years (2 of 2 - PCV) 09/11/2024         Intervention/Education provided during outreach: Patient is pleased to have found an apartment now.        Plan:   Patient is seeing neurology at [facility], Dr. Butcher, on 4/16/25. The eye surgery has been postponed with patient and care team in agreement. He is moving into an apartment in [place], but wants to maintain primary care here as long as possible.   Care Coordinator will continue to support.  Theresa Dooley RN on 4/9/2025 at 1:05 PM

## 2025-04-17 ENCOUNTER — MYC REFILL (OUTPATIENT)
Dept: INTERNAL MEDICINE | Facility: OTHER | Age: 55
End: 2025-04-17
Payer: COMMERCIAL

## 2025-04-17 DIAGNOSIS — J44.0 CHRONIC OBSTRUCTIVE PULMONARY DISEASE WITH ACUTE LOWER RESPIRATORY INFECTION (H): ICD-10-CM

## 2025-04-17 RX ORDER — ALBUTEROL SULFATE 0.83 MG/ML
2.5 SOLUTION RESPIRATORY (INHALATION) EVERY 6 HOURS PRN
Qty: 90 ML | Refills: 5 | Status: SHIPPED | OUTPATIENT
Start: 2025-04-17

## 2025-04-17 NOTE — TELEPHONE ENCOUNTER
Pt is out.     Prescription approved per [facility] Refill Protocol.     Requested Prescriptions   Pending Prescriptions Disp Refills    albuterol (PROVENTIL) (2.5 MG/3ML) 0.083% neb solution 90 mL 5     Sig: Take 1 vial (2.5 mg) by nebulization every 6 hours as needed for shortness of breath, wheezing or cough.       Short-Acting Beta Agonist Inhalers Protocol  Passed - 4/17/2025 12:33 PM        Passed - Patient is age 12 or older        Passed - Medication is active on med list and the sig matches. RN to manually verify dose and sig if red X/fail.     If the protocol passes (green check), you do not need to verify med dose and sig.    A prescription matches if they are the same clinical intention.    For Example: once daily and every morning are the same.    The protocol can not identify upper and lower case letters as matching and will fail.     For Example: Take 1 tablet (50 mg) by mouth daily     TAKE 1 TABLET (50 MG) BY MOUTH DAILY    For all fails (red x), verify dose and sig.    If the refill does match what is on file, the RN can still proceed to approve the refill request.       If they do not match, route to the appropriate provider.             Passed - Recent (12 mo) or future (90 days) visit within the authorizing provider's specialty     The patient must have completed an in-person or virtual visit within the past 12 months or has a future visit scheduled within the next 90 days with the authorizing provider s specialty.  Urgent care and e-visits do not qualify as an office visit for this protocol.               Last Prescription Date:   7/10/2024  Last Fill Qty/Refills:         90ml, R-5    Last Office Visit:              4/2/2025   Future Office visit:           5/5/2025    Next 5 appointments (look out 90 days)      May 05, 2025 11:30 AM  (Arrive by 11:15 AM)  Provider Visit with ANATOLY Chow [facility] ([facility]) [address]  [place] [address]  [phone]         Nanette Royal RN on 4/17/2025 at 12:34 PM

## 2025-05-08 DIAGNOSIS — I63.9 RIGHT SIDED CEREBRAL HEMISPHERE CEREBROVASCULAR ACCIDENT (CVA) (H): ICD-10-CM

## 2025-05-08 DIAGNOSIS — I10 ESSENTIAL HYPERTENSION: ICD-10-CM

## 2025-05-08 RX ORDER — PROPRANOLOL HYDROCHLORIDE 120 MG/1
120 CAPSULE, EXTENDED RELEASE ORAL DAILY
Qty: 90 CAPSULE | Refills: 0 | Status: SHIPPED | OUTPATIENT
Start: 2025-05-08

## 2025-05-08 RX ORDER — APIXABAN 5 MG/1
5 TABLET, FILM COATED ORAL
Qty: 180 TABLET | Refills: 0 | Status: SHIPPED | OUTPATIENT
Start: 2025-05-08

## 2025-05-08 NOTE — TELEPHONE ENCOUNTER
[id number] - [place], [place] - [address] AT [place] [address] [facility]  sent Rx request for the following:      Requested Prescriptions   Pending Prescriptions Disp Refills    ELIQUIS ANTICOAGULANT 5 MG tablet [Pharmacy Med Name: ELIQUIS 5MG TABLETS] 60 tablet 5     Sig: TAKE 1 TABLET BY MOUTH TWICE DAILY   Last Prescription Date:   10/3/24  Last Fill Qty/Refills:         60, R-5      Anticoagulant Agents Failed - 5/8/2025  2:50 PM        Failed - Normal Platelets on file in past 12 months     Recent Labs   Lab Test 04/02/25  1211   *           Failed - Medication is active on med list and the sig matches. RN to manually verify dose and sig if red X/fail.     If the protocol passes (green check), you do not need to verify med dose and sig.    A prescription matches if they are the same clinical intention.    For Example: once daily and every morning are the same.    The protocol can not identify upper and lower case letters as matching and will fail.     For Example: Take 1 tablet (50 mg) by mouth daily     TAKE 1 TABLET (50 MG) BY MOUTH DAILY    For all fails (red x), verify dose and sig.    If the refill does match what is on file, the RN can still proceed to approve the refill request.       If they do not match, route to the appropriate provider.        Failed - Medication indicated for associated diagnosis     Medication is associated with one or more of the following diagnoses:  Atrial fibrillation  Deep venous thrombosis  Heparin-induced thrombocytopenia  Pulmonary embolism  Venous thromboembolism  H/O: Pulmonary embolus  Atrial flutter  Coronary artery finding  Transient cerebral ischemia  Percutaneous transluminal coronary angioplasty  Stable angina  Intermittent claudication  Arteriosclerotic vascular disease       propranolol ER (INDERAL LA) 120 MG 24 hr capsule [Pharmacy Med Name: PROPRANOLOL ER 120MG CAPSULES] 90 capsule 0     Sig: TAKE 1 CAPSULE(120 MG) BY MOUTH DAILY    Last Prescription Date:   2/7/25  Last Fill Qty/Refills:         90, R-0      Last Office Visit:              4/2/25 (preop)   Future Office visit:             Next 5 appointments (look out 90 days)      May 16, 2025 9:00 AM  (Arrive by 8:45 AM)  Provider Visit with ANATOLY Chow [facility] ([facility]) [address]  [place] [address] [phone]     Unable to complete prescription refill per RN Medication Refill Policy. Theresa Ireland, Refill RN .............. 5/8/2025  2:51 PM

## 2025-05-16 ENCOUNTER — OFFICE VISIT (OUTPATIENT)
Dept: INTERNAL MEDICINE | Facility: OTHER | Age: 55
End: 2025-05-16
Payer: COMMERCIAL

## 2025-05-16 ENCOUNTER — PATIENT OUTREACH (OUTPATIENT)
Dept: CARE COORDINATION | Facility: CLINIC | Age: 55
End: 2025-05-16

## 2025-05-16 ENCOUNTER — APPOINTMENT (OUTPATIENT)
Dept: LAB | Facility: OTHER | Age: 55
End: 2025-05-16
Payer: COMMERCIAL

## 2025-05-16 ENCOUNTER — RESULTS FOLLOW-UP (OUTPATIENT)
Dept: INTERNAL MEDICINE | Facility: OTHER | Age: 55
End: 2025-05-16

## 2025-05-16 ENCOUNTER — HOSPITAL ENCOUNTER (OUTPATIENT)
Dept: GENERAL RADIOLOGY | Facility: OTHER | Age: 55
Discharge: HOME | End: 2025-05-16
Payer: COMMERCIAL

## 2025-05-16 VITALS
RESPIRATION RATE: 16 BRPM | HEART RATE: 98 BPM | OXYGEN SATURATION: 96 % | WEIGHT: 240 LBS | DIASTOLIC BLOOD PRESSURE: 80 MMHG | SYSTOLIC BLOOD PRESSURE: 110 MMHG | BODY MASS INDEX: 33.47 KG/M2 | TEMPERATURE: 97.6 F

## 2025-05-16 DIAGNOSIS — J44.9 CHRONIC OBSTRUCTIVE PULMONARY DISEASE, UNSPECIFIED COPD TYPE (H): ICD-10-CM

## 2025-05-16 DIAGNOSIS — J45.909 UNCOMPLICATED ASTHMA, UNSPECIFIED ASTHMA SEVERITY, UNSPECIFIED WHETHER PERSISTENT: ICD-10-CM

## 2025-05-16 DIAGNOSIS — I10 ESSENTIAL HYPERTENSION: ICD-10-CM

## 2025-05-16 DIAGNOSIS — Z98.890 S/P CEREBRAL ANEURYSM REPAIR: Primary | ICD-10-CM

## 2025-05-16 DIAGNOSIS — G89.4 CHRONIC PAIN SYNDROME: ICD-10-CM

## 2025-05-16 DIAGNOSIS — Z86.79 S/P CEREBRAL ANEURYSM REPAIR: Primary | ICD-10-CM

## 2025-05-16 DIAGNOSIS — E78.5 DYSLIPIDEMIA: Chronic | ICD-10-CM

## 2025-05-16 DIAGNOSIS — F17.200 NICOTINE DEPENDENCE, UNCOMPLICATED, UNSPECIFIED NICOTINE PRODUCT TYPE: ICD-10-CM

## 2025-05-16 DIAGNOSIS — Z72.0 TOBACCO ABUSE: ICD-10-CM

## 2025-05-16 DIAGNOSIS — H61.23 BILATERAL IMPACTED CERUMEN: ICD-10-CM

## 2025-05-16 DIAGNOSIS — J02.9 SORE THROAT: ICD-10-CM

## 2025-05-16 DIAGNOSIS — M54.2 CERVICALGIA: ICD-10-CM

## 2025-05-16 LAB
ALBUMIN SERPL BCG-MCNC: 4.3 G/DL (ref 3.5–5.2)
ALP SERPL-CCNC: 100 U/L (ref 40–150)
ALT SERPL W P-5'-P-CCNC: 21 U/L (ref 0–70)
AMPHETAMINES UR QL SCN: NORMAL
ANION GAP SERPL CALCULATED.3IONS-SCNC: 13 MMOL/L (ref 7–15)
AST SERPL W P-5'-P-CCNC: 18 U/L (ref 0–45)
BARBITURATES UR QL SCN: NORMAL
BASOPHILS # BLD AUTO: 0.1 10E3/UL (ref 0–0.2)
BASOPHILS NFR BLD AUTO: 1 %
BENZODIAZ UR QL SCN: NORMAL
BILIRUB SERPL-MCNC: 0.2 MG/DL
BUN SERPL-MCNC: 15.4 MG/DL (ref 6–20)
BZE UR QL SCN: NORMAL
CALCIUM SERPL-MCNC: 9.3 MG/DL (ref 8.8–10.4)
CANNABINOIDS UR QL SCN: NORMAL
CHLORIDE SERPL-SCNC: 102 MMOL/L (ref 98–107)
CHOLEST SERPL-MCNC: 155 MG/DL
CREAT SERPL-MCNC: 0.93 MG/DL (ref 0.67–1.17)
EGFRCR SERPLBLD CKD-EPI 2021: >90 ML/MIN/1.73M2
EOSINOPHIL # BLD AUTO: 0.7 10E3/UL (ref 0–0.7)
EOSINOPHIL NFR BLD AUTO: 5 %
ERYTHROCYTE [DISTWIDTH] IN BLOOD BY AUTOMATED COUNT: 14.6 % (ref 10–15)
FASTING STATUS PATIENT QL REPORTED: YES
FASTING STATUS PATIENT QL REPORTED: YES
FENTANYL UR QL: NORMAL
GLUCOSE SERPL-MCNC: 121 MG/DL (ref 70–99)
HCO3 SERPL-SCNC: 22 MMOL/L (ref 22–29)
HCT VFR BLD AUTO: 43.3 % (ref 40–53)
HDLC SERPL-MCNC: 43 MG/DL
HGB BLD-MCNC: 14.5 G/DL (ref 13.3–17.7)
IMM GRANULOCYTES # BLD: 0.1 10E3/UL
IMM GRANULOCYTES NFR BLD: 1 %
LDLC SERPL CALC-MCNC: 78 MG/DL
LYMPHOCYTES # BLD AUTO: 2.3 10E3/UL (ref 0.8–5.3)
LYMPHOCYTES NFR BLD AUTO: 17 %
MAGNESIUM SERPL-MCNC: 2 MG/DL (ref 1.7–2.3)
MCH RBC QN AUTO: 28.5 PG (ref 26.5–33)
MCHC RBC AUTO-ENTMCNC: 33.5 G/DL (ref 31.5–36.5)
MCV RBC AUTO: 85 FL (ref 78–100)
MONOCYTES # BLD AUTO: 1 10E3/UL (ref 0–1.3)
MONOCYTES NFR BLD AUTO: 7 %
NEUTROPHILS # BLD AUTO: 9.7 10E3/UL (ref 1.6–8.3)
NEUTROPHILS NFR BLD AUTO: 70 %
NONHDLC SERPL-MCNC: 112 MG/DL
NRBC # BLD AUTO: 0 10E3/UL
NRBC BLD AUTO-RTO: 0 /100
OPIATES UR QL SCN: NORMAL
PCP QUAL URINE (ROCHE): NORMAL
PLATELET # BLD AUTO: 461 10E3/UL (ref 150–450)
POTASSIUM SERPL-SCNC: 3.9 MMOL/L (ref 3.4–5.3)
PROT SERPL-MCNC: 7.3 G/DL (ref 6.4–8.3)
RBC # BLD AUTO: 5.08 10E6/UL (ref 4.4–5.9)
S PYO DNA THROAT QL NAA+PROBE: NOT DETECTED
SODIUM SERPL-SCNC: 137 MMOL/L (ref 135–145)
TRIGL SERPL-MCNC: 169 MG/DL
TSH SERPL DL<=0.005 MIU/L-ACNC: 3.97 UIU/ML (ref 0.3–4.2)
WBC # BLD AUTO: 13.8 10E3/UL (ref 4–11)

## 2025-05-16 PROCEDURE — 82247 BILIRUBIN TOTAL: CPT | Mod: ZL

## 2025-05-16 PROCEDURE — 82465 ASSAY BLD/SERUM CHOLESTEROL: CPT | Mod: ZL

## 2025-05-16 PROCEDURE — 83735 ASSAY OF MAGNESIUM: CPT | Mod: ZL

## 2025-05-16 PROCEDURE — 80307 DRUG TEST PRSMV CHEM ANLYZR: CPT | Mod: ZL

## 2025-05-16 PROCEDURE — 71046 X-RAY EXAM CHEST 2 VIEWS: CPT

## 2025-05-16 PROCEDURE — 36415 COLL VENOUS BLD VENIPUNCTURE: CPT | Mod: ZL

## 2025-05-16 PROCEDURE — 71046 X-RAY EXAM CHEST 2 VIEWS: CPT | Mod: 26 | Performed by: RADIOLOGY

## 2025-05-16 PROCEDURE — 84443 ASSAY THYROID STIM HORMONE: CPT | Mod: ZL

## 2025-05-16 PROCEDURE — 69209 REMOVE IMPACTED EAR WAX UNI: CPT

## 2025-05-16 PROCEDURE — 87651 STREP A DNA AMP PROBE: CPT | Mod: ZL

## 2025-05-16 PROCEDURE — 85004 AUTOMATED DIFF WBC COUNT: CPT | Mod: ZL

## 2025-05-16 PROCEDURE — G0463 HOSPITAL OUTPT CLINIC VISIT: HCPCS | Mod: 25

## 2025-05-16 RX ORDER — HYDROCODONE BITARTRATE AND ACETAMINOPHEN 5; 325 MG/1; MG/1
1-2 TABLET ORAL EVERY 6 HOURS PRN
Qty: 60 TABLET | Refills: 0 | Status: SHIPPED | OUTPATIENT
Start: 2025-06-13 | End: 2025-05-16

## 2025-05-16 RX ORDER — HYDROCODONE BITARTRATE AND ACETAMINOPHEN 5; 325 MG/1; MG/1
1-2 TABLET ORAL EVERY 6 HOURS PRN
Qty: 60 TABLET | Refills: 0 | Status: SHIPPED | OUTPATIENT
Start: 2025-06-13

## 2025-05-16 RX ORDER — HYDROCODONE BITARTRATE AND ACETAMINOPHEN 5; 325 MG/1; MG/1
1-2 TABLET ORAL EVERY 6 HOURS PRN
Qty: 60 TABLET | Refills: 0 | Status: SHIPPED | OUTPATIENT
Start: 2025-05-16

## 2025-05-16 RX ORDER — METAPROTERENOL SULFATE 10 MG
500 TABLET ORAL DAILY
Qty: 90 CAPSULE | Refills: 4 | Status: SHIPPED | OUTPATIENT
Start: 2025-05-16

## 2025-05-16 RX ORDER — CELECOXIB 100 MG/1
100-200 CAPSULE ORAL 2 TIMES DAILY
Qty: 360 CAPSULE | Refills: 4 | Status: SHIPPED | OUTPATIENT
Start: 2025-05-16 | End: 2025-08-14

## 2025-05-16 RX ORDER — AZITHROMYCIN 250 MG/1
TABLET, FILM COATED ORAL
Qty: 6 TABLET | Refills: 0 | Status: SHIPPED | OUTPATIENT
Start: 2025-05-16 | End: 2025-05-21

## 2025-05-16 RX ORDER — ALBUTEROL SULFATE 90 UG/1
2 INHALANT RESPIRATORY (INHALATION) EVERY 4 HOURS PRN
Qty: 54 G | Refills: 1 | Status: SHIPPED | OUTPATIENT
Start: 2025-05-16

## 2025-05-16 RX ORDER — HYDROCODONE BITARTRATE AND ACETAMINOPHEN 5; 325 MG/1; MG/1
1-2 TABLET ORAL EVERY 6 HOURS PRN
Qty: 60 TABLET | Refills: 0 | Status: SHIPPED | OUTPATIENT
Start: 2025-07-11

## 2025-05-16 RX ORDER — HYDROCODONE BITARTRATE AND ACETAMINOPHEN 5; 325 MG/1; MG/1
1-2 TABLET ORAL EVERY 6 HOURS PRN
Qty: 60 TABLET | Refills: 0 | Status: SHIPPED | OUTPATIENT
Start: 2025-07-11 | End: 2025-05-16

## 2025-05-16 RX ORDER — HYDROCODONE BITARTRATE AND ACETAMINOPHEN 5; 325 MG/1; MG/1
1-2 TABLET ORAL EVERY 6 HOURS PRN
Qty: 60 TABLET | Refills: 0 | Status: SHIPPED | OUTPATIENT
Start: 2025-05-16 | End: 2025-05-16

## 2025-05-16 RX ORDER — POLYETHYLENE GLYCOL 3350 17 G
POWDER IN PACKET (EA) ORAL
Qty: 108 LOZENGE | Refills: 5 | Status: SHIPPED | OUTPATIENT
Start: 2025-05-16

## 2025-05-16 RX ORDER — ALBUTEROL SULFATE 90 UG/1
2 INHALANT RESPIRATORY (INHALATION) EVERY 4 HOURS PRN
Qty: 54 G | Refills: 1 | Status: SHIPPED | OUTPATIENT
Start: 2025-05-16 | End: 2025-05-16

## 2025-05-16 RX ORDER — HYDROCODONE BITARTRATE AND ACETAMINOPHEN 5; 325 MG/1; MG/1
1-2 TABLET ORAL EVERY 6 HOURS PRN
COMMUNITY
End: 2025-05-16

## 2025-05-16 ASSESSMENT — ANXIETY QUESTIONNAIRES
GAD7 TOTAL SCORE: 6
7. FEELING AFRAID AS IF SOMETHING AWFUL MIGHT HAPPEN: NOT AT ALL
7. FEELING AFRAID AS IF SOMETHING AWFUL MIGHT HAPPEN: NOT AT ALL
2. NOT BEING ABLE TO STOP OR CONTROL WORRYING: SEVERAL DAYS
GAD7 TOTAL SCORE: 6
6. BECOMING EASILY ANNOYED OR IRRITABLE: SEVERAL DAYS
GAD7 TOTAL SCORE: 6
1. FEELING NERVOUS, ANXIOUS, OR ON EDGE: SEVERAL DAYS
IF YOU CHECKED OFF ANY PROBLEMS ON THIS QUESTIONNAIRE, HOW DIFFICULT HAVE THESE PROBLEMS MADE IT FOR YOU TO DO YOUR WORK, TAKE CARE OF THINGS AT HOME, OR GET ALONG WITH OTHER PEOPLE: NOT DIFFICULT AT ALL
4. TROUBLE RELAXING: SEVERAL DAYS
5. BEING SO RESTLESS THAT IT IS HARD TO SIT STILL: SEVERAL DAYS
3. WORRYING TOO MUCH ABOUT DIFFERENT THINGS: SEVERAL DAYS
8. IF YOU CHECKED OFF ANY PROBLEMS, HOW DIFFICULT HAVE THESE MADE IT FOR YOU TO DO YOUR WORK, TAKE CARE OF THINGS AT HOME, OR GET ALONG WITH OTHER PEOPLE?: NOT DIFFICULT AT ALL

## 2025-05-16 ASSESSMENT — PAIN SCALES - PAIN ENJOYMENT GENERAL ACTIVITY SCALE (PEG)
INTERFERED_GENERAL_ACTIVITY: 9
INTERFERED_ENJOYMENT_LIFE: 9
PEG_TOTALSCORE: 9.33
PEG_TOTALSCORE: 9.33
INTERFERED_ENJOYMENT_LIFE: 9
INTERFERED_GENERAL_ACTIVITY: 9
AVG_PAIN_PASTWEEK: 10 - PAIN AS BAD AS YOU CAN IMAGINE
AVG_PAIN_PASTWEEK: 10

## 2025-05-16 ASSESSMENT — PATIENT HEALTH QUESTIONNAIRE - PHQ9
SUM OF ALL RESPONSES TO PHQ QUESTIONS 1-9: 9
SUM OF ALL RESPONSES TO PHQ QUESTIONS 1-9: 9
10. IF YOU CHECKED OFF ANY PROBLEMS, HOW DIFFICULT HAVE THESE PROBLEMS MADE IT FOR YOU TO DO YOUR WORK, TAKE CARE OF THINGS AT HOME, OR GET ALONG WITH OTHER PEOPLE: SOMEWHAT DIFFICULT

## 2025-05-16 ASSESSMENT — PAIN SCALES - GENERAL: PAINLEVEL_OUTOF10: SEVERE PAIN (10)

## 2025-05-16 NOTE — NURSING NOTE
"Chief Complaint   Patient presents with    RECHECK     Chronic pain        Patient presents to the clinic today for a recheck for chronic pain   Initial There were no vitals taken for this visit. Estimated body mass index is 32.94 kg/m  as calculated from the following:    Height as of 3/18/25: 1.803 m (5' 11\").    Weight as of 4/2/25: 107.1 kg (236 lb 3.2 oz).  Meds Reconciled: complete      Cate Ring LPN,LPN on 5/16/2025 at 9:05 AM  Ext. [phone]        Cate Ring LPN  "

## 2025-05-16 NOTE — PROGRESS NOTES
Assessment & Plan     ICD-10-CM    1. S/P cerebral aneurysm repair  Z98.890     Z86.79       2. Chronic pain syndrome - Neck  G89.4 Urine Drug Screen     tiZANidine (ZANAFLEX) 4 MG tablet     diclofenac (VOLTAREN) 1 % topical gel     Urine Drug Screen      3. Cervicalgia  M54.2 tiZANidine (ZANAFLEX) 4 MG tablet     celecoxib (CELEBREX) 100 MG capsule     HYDROcodone-acetaminophen (NORCO) 5-325 MG tablet     HYDROcodone-acetaminophen (NORCO) 5-325 MG tablet     HYDROcodone-acetaminophen (NORCO) 5-325 MG tablet     DISCONTINUED: HYDROcodone-acetaminophen (NORCO) 5-325 MG tablet     DISCONTINUED: HYDROcodone-acetaminophen (NORCO) 5-325 MG tablet     DISCONTINUED: HYDROcodone-acetaminophen (NORCO) 5-325 MG tablet      4. Uncomplicated asthma, unspecified asthma severity, unspecified whether persistent  J45.909 albuterol (PROAIR HFA/PROVENTIL HFA/VENTOLIN HFA) 108 (90 Base) MCG/ACT inhaler     DISCONTINUED: albuterol (PROAIR HFA/PROVENTIL HFA/VENTOLIN HFA) 108 (90 Base) MCG/ACT inhaler      5. Sore throat  J02.9 Group A Streptococcus PCR Throat Swab      6. Chronic obstructive pulmonary disease, unspecified COPD type (H)  J44.9 azithromycin (ZITHROMAX) 250 MG tablet     XR Chest 2 Views      7. Essential hypertension  I10 CBC and Differential     Comprehensive Metabolic Panel     Lipid Panel     TSH Reflex GH     Magnesium     CBC and Differential     Comprehensive Metabolic Panel     Lipid Panel     TSH Reflex GH     Magnesium      8. Nicotine dependence, uncomplicated, unspecified nicotine product type  F17.200 [place] Quit Partner Referral     nicotine (COMMIT) 2 MG lozenge      9. Dyslipidemia  E78.5 Omega-3 Fish Oil 500 MG capsule      10. Tobacco abuse  Z72.0       11. Bilateral impacted cerumen  H61.23 [place] REMOVAL IMPACTED CERUMEN IRRIGATION/LVG UNILAT             HYPERTENSION - Ongoing. Blood pressure is currently well controlled.  Medication side effects: None. Denies syncope or presyncope.  Continue current  medications -amlodipine, losartan-hydrochlorothiazide.   Medication list reviewed/updated. Refills completed as needed.      MIXED HYPERLIPIDEMIA.  Ongoing. LDL is at goal: Yes. Triglycerides are at goal: No.  Hopefully lifestyle modifications will improve cholesterol levels, otherwise will consider additional medication dose adjustments or medication changes.  Medication side effects reported: None.   Continue current medications for now -atorvastatin 40 mg. Medication list reviewed/updated. Refills completed as needed.  Discussed smoking cessation which will help his cholesterol levels become closer to goal.  Recent Labs   Lab Test 05/16/25  1009 04/02/25  1211   CHOL 155 166   HDL 43 37*   LDL 78 82   TRIG 169* 233*        COPD - Patient has a longstanding history of COPD. Patient has been doing reasonably well overall noting SOB and COUGH and continues on Advair, Spiriva, albuterol.  He has noticed increased shortness of breath and cough on exertion.  Encouraged continued inhaler compliance, utilize nebulizers as needed.  Due to flareup in symptoms we will treat him a course of with azithromycin.  Chest x-ray was performed today to rule out secondary pneumonia, this came back negative.  Instruction given to return if symptoms worsen or do not improve. Medication regimen without adverse reactions or side effects.      Sore throat: Suspect this is viral in nature, strep test negative.  Continue with conservative measures at home, follow-up if symptoms worsen or do not improve.    Status post cerebral angiogram: Continue to follow with vascular, patient reports that he returns for ongoing follow-up in 1 year.    Chronic pain syndrome.  Chronic continuous opiate use.  Currently utilizing hydrocodone/ibuprofen (Vicoprofen)   for chronic pain management.  Seems to be doing well with current medication regimen.   website reviewed, No abnormal findings noted. No benzodiazepine use  Proper medication use and misuse  "reviewed, patient has been using medications appropriately.  Urine drug screen is up-to-date.  Controlled substance agreement is up-to-date.  Prescriptions refilled as noted.  Discussed that hydrocodone will be used in the short-term until he is able to receive his next surgery.  We discussed additional medications, prescription for Celebrex sent to pharmacy as he has no longer taking Eliquis.  He will take 100 to 200 mg twice daily as needed, continue with Zanaflex 4 to 8 mg 3 times daily as needed for muscle spasms.  Continue with topical Voltaren as needed.    Counseled patient today on smoking cessation.    History   Smoking Status    Every Day    Types: Cigarettes   Smokeless Tobacco    Never        reports that he has been smoking cigarettes. He started smoking about 43 years ago. He has a 43.4 pack-year smoking history. He has never used smokeless tobacco.  Patient is ready to quit, open to education.    Provided information on quit partner website, discussed impact and health consequences of smoking.    Time spent: 3 minutes.     The longitudinal plan of care for the diagnosis(es)/condition(s) as documented were addressed during this visit. Due to the added complexity in care, I will continue to support Manuel in the subsequent management and with ongoing continuity of care.      Total time: 45 minutes spent on the date of the encounter doing chart review, history and exam, documentation and further activities as noted above.            BMI  Estimated body mass index is 33.47 kg/m  as calculated from the following:    Height as of 3/18/25: 1.803 m (5' 11\").    Weight as of this encounter: 108.9 kg (240 lb).           No follow-ups on file.      ANATOLY Chow [facility]    Review of Systems   Constitutional:  Negative for chills and fever.   HENT:  Positive for sore throat. Negative for trouble swallowing and voice change.    Respiratory:  Positive for cough, shortness of " breath and wheezing.    Musculoskeletal:  Positive for arthralgias, myalgias, neck pain and neck stiffness.   All other systems reviewed and are negative.        Subjective   Manuel is a 55 year old, presenting for the following health issues:  RECHECK (Chronic pain /)    Patient presents to clinic for ongoing follow-up.  Had a recent cerebral angiogram for post stent coiling stenosis evaluation.  Minimal stenosis was found, patient was instructed that he no longer needed to continue on Eliquis.  Continues to struggle with chronic cervical neck pain, would like to rediscuss surgical options with spine specialist now that he is no longer on Eliquis.  He reports that he has an upcoming appointment with Dr. Theodore.  He needs to also reschedule his eye surgery as well.  Pain is overall unchanged, continues with hydrocodone 1 to 2 tablets daily, Tylenol, Zanaflex.    He has noted over the last several days he has had a sore throat, feels as though his COPD is not as well-managed as usual.  He reports that he continues to take his Advair and Spiriva on a regular basis.  He has been coughing more than usual.          History of Present Illness       Reason for visit:  Other    He eats 0-1 servings of fruits and vegetables daily.He consumes 1 sweetened beverage(s) daily.He exercises with enough effort to increase his heart rate 60 or more minutes per day.  He exercises with enough effort to increase his heart rate 7 days per week. He is missing 1 dose(s) of medications per week.  He is not taking prescribed medications regularly due to remembering to take.                      Objective    /80 (BP Location: Right arm, Patient Position: Sitting, Cuff Size: Adult Large)   Pulse 98   Temp 97.6  F (36.4  C) (Temporal)   Resp 16   Wt 108.9 kg (240 lb)   SpO2 96%   BMI 33.47 kg/m    Body mass index is 33.47 kg/m .  Physical Exam  Vitals reviewed.   Constitutional:       General: He is not in acute distress.  HENT:       Right Ear: There is impacted cerumen.      Left Ear: There is impacted cerumen.      Mouth/Throat:      Mouth: Mucous membranes are moist.      Pharynx: Oropharynx is clear. No oropharyngeal exudate or posterior oropharyngeal erythema.   Eyes:      General:         Right eye: No discharge.         Left eye: No discharge.      Pupils: Pupils are equal, round, and reactive to light.   Cardiovascular:      Rate and Rhythm: Normal rate and regular rhythm.      Pulses: Normal pulses.      Heart sounds: Normal heart sounds. No murmur heard.  Pulmonary:      Effort: Pulmonary effort is normal.      Breath sounds: Normal breath sounds. No wheezing.   Neurological:      Mental Status: He is alert. Mental status is at baseline.   Psychiatric:         Mood and Affect: Mood normal.         Behavior: Behavior normal.        Results for orders placed or performed during the hospital encounter of 05/16/25   XR Chest 2 Views     Status: None    Narrative    PROCEDURE: XR CHEST 2 VIEWS 5/16/2025 11:44 AM    HISTORY: Chronic obstructive pulmonary disease, unspecified COPD type  (H)    COMPARISONS: CT in 1/10/2025.    TECHNIQUE: 2 views.    FINDINGS: Heart and pulmonary vasculature are normal. Lungs are clear  and no pleural effusion is seen.         Impression    IMPRESSION: No acute infiltrate.    TRISTAN MALONEY MD         SYSTEM ID:  [id number]   Results for orders placed or performed in visit on 05/16/25   Comprehensive Metabolic Panel     Status: Abnormal   Result Value Ref Range    Sodium 137 135 - 145 mmol/L    Potassium 3.9 3.4 - 5.3 mmol/L    Carbon Dioxide (CO2) 22 22 - 29 mmol/L    Anion Gap 13 7 - 15 mmol/L    Urea Nitrogen 15.4 6.0 - 20.0 mg/dL    Creatinine 0.93 0.67 - 1.17 mg/dL    GFR Estimate >90 >60 mL/min/1.73m2    Calcium 9.3 8.8 - 10.4 mg/dL    Chloride 102 98 - 107 mmol/L    Glucose 121 (H) 70 - 99 mg/dL    Alkaline Phosphatase 100 40 - 150 U/L    AST 18 0 - 45 U/L    ALT 21 0 - 70 U/L    Protein Total 7.3 6.4  - 8.3 g/dL    Albumin 4.3 3.5 - 5.2 g/dL    Bilirubin Total 0.2 <=1.2 mg/dL    Patient Fasting > 8hrs? Yes    Lipid Panel     Status: Abnormal   Result Value Ref Range    Cholesterol 155 <200 mg/dL    Triglycerides 169 (H) <150 mg/dL    Direct Measure HDL 43 >=40 mg/dL    LDL Cholesterol Calculated 78 <100 mg/dL    Non HDL Cholesterol 112 <130 mg/dL    Patient Fasting > 8hrs? Yes     Narrative    Cholesterol  Desirable: < 200 mg/dL  Borderline High: 200 - 239 mg/dL  High: >= 240 mg/dL    Triglycerides  Normal: < 150 mg/dL  Borderline High: 150 - 199 mg/dL  High: 200-499 mg/dL  Very High: >= 500 mg/dL    Direct Measure HDL  Female: >= 50 mg/dL   Male: >= 40 mg/dL    LDL Cholesterol  Desirable: < 100 mg/dL  Above Desirable: 100 - 129 mg/dL   Borderline High: 130 - 159 mg/dL   High:  160 - 189 mg/dL   Very High: >= 190 mg/dL    Non HDL Cholesterol  Desirable: < 130 mg/dL  Above Desirable: 130 - 159 mg/dL  Borderline High: 160 - 189 mg/dL  High: 190 - 219 mg/dL  Very High: >= 220 mg/dL   TSH Reflex GH     Status: Normal   Result Value Ref Range    TSH 3.97 0.30 - 4.20 uIU/mL   Magnesium     Status: Normal   Result Value Ref Range    Magnesium 2.0 1.7 - 2.3 mg/dL   Urine Drug Screen Panel     Status: Normal   Result Value Ref Range    Amphetamines Urine Screen Negative Screen Negative    Barbituates Urine Screen Negative Screen Negative    Benzodiazepine Urine Screen Negative Screen Negative    Cannabinoids Urine Screen Negative Screen Negative    Cocaine Urine Screen Negative Screen Negative    Fentanyl Qual Urine Screen Negative Screen Negative    Opiates Urine Screen Negative Screen Negative    PCP Urine Screen Negative Screen Negative   CBC with platelets and differential     Status: Abnormal   Result Value Ref Range    WBC Count 13.8 (H) 4.0 - 11.0 10e3/uL    RBC Count 5.08 4.40 - 5.90 10e6/uL    Hemoglobin 14.5 13.3 - 17.7 g/dL    Hematocrit 43.3 40.0 - 53.0 %    MCV 85 78 - 100 fL    MCH 28.5 26.5 - 33.0 pg     MCHC 33.5 31.5 - 36.5 g/dL    RDW 14.6 10.0 - 15.0 %    Platelet Count 461 (H) 150 - 450 10e3/uL    % Neutrophils 70 %    % Lymphocytes 17 %    % Monocytes 7 %    % Eosinophils 5 %    % Basophils 1 %    % Immature Granulocytes 1 %    NRBCs per 100 WBC 0 <1 /100    Absolute Neutrophils 9.7 (H) 1.6 - 8.3 10e3/uL    Absolute Lymphocytes 2.3 0.8 - 5.3 10e3/uL    Absolute Monocytes 1.0 0.0 - 1.3 10e3/uL    Absolute Eosinophils 0.7 0.0 - 0.7 10e3/uL    Absolute Basophils 0.1 0.0 - 0.2 10e3/uL    Absolute Immature Granulocytes 0.1 <=0.4 10e3/uL    Absolute NRBCs 0.0 10e3/uL   Group A Streptococcus PCR Throat Swab     Status: Normal    Specimen: Throat; Swab   Result Value Ref Range    Group A strep by PCR Not Detected Not Detected    Narrative    The Xpert Xpress Strep A test, performed on the [employer], is a rapid, qualitative in vitro diagnostic test for the detection of Streptococcus pyogenes (Group A ß-hemolytic Streptococcus, Strep A) in throat swab specimens from patients with signs and symptoms of pharyngitis. The Xpert Xpress Strep A test can be used as an aid in the diagnosis of Group A Streptococcal pharyngitis. The assay is not intended to monitor treatment for Group A Streptococcus infections. The Xpert Xpress Strep A test utilizes an automated real-time polymerase chain reaction (PCR) to detect Streptococcus pyogenes DNA.   Urine Drug Screen     Status: Normal    Narrative    The following orders were created for panel order Urine Drug Screen.  Procedure                               Abnormality         Status                     ---------                               -----------         ------                     Urine Drug Screen Panel[[id number]]     Normal              Final result                 Please view results for these tests on the individual orders.   CBC and Differential     Status: Abnormal    Narrative    The following orders were created for panel order CBC and  Differential.  Procedure                               Abnormality         Status                     ---------                               -----------         ------                     CBC with platelets and ...[[id number]]  Abnormal            Final result                 Please view results for these tests on the individual orders.                    Signed Electronically by: ANATOLY Chow CNP

## 2025-05-16 NOTE — PROGRESS NOTES
Clinic Care Coordination Contact  Follow Up Progress Note      Assessment: Patient seen while in clinic. He wants to see if he can get the eye surgery as he has now been off of Eliquis.     Intervention/Education provided during outreach: I called him after he left clinic to inform that he would not be able to  narcotic unless a urine sample was given. He did return and complete UA.        Plan:   Patient wants to move forward with spinal consult and eye surgery. PCP sent email to eye surgeon. I ensured that his spinal referral is sent to [facility] as he prefers.   Care Coordinator provided a grocery card.   Theresa Dooley RN on 5/21/2025 at 1:04 PM

## 2025-05-16 NOTE — LETTER

## 2025-05-17 ASSESSMENT — ENCOUNTER SYMPTOMS
VOICE CHANGE: 0
WHEEZING: 1
SHORTNESS OF BREATH: 1
NECK STIFFNESS: 1
SORE THROAT: 1
TROUBLE SWALLOWING: 0
NECK PAIN: 1
FEVER: 0
ARTHRALGIAS: 1
CHILLS: 0
MYALGIAS: 1
COUGH: 1

## 2025-05-22 ENCOUNTER — TELEPHONE (OUTPATIENT)
Dept: NEUROSURGERY | Facility: CLINIC | Age: 55
End: 2025-05-22
Payer: COMMERCIAL

## 2025-05-22 NOTE — TELEPHONE ENCOUNTER
Left Voicemail (1st Attempt) for the patient to call back and schedule the following:    Appointment type: Rtn vascular neurosurg - in person or virtual  Provider: Dr. Raza  Return date: Beginning of Nov 2025  Specialty phone number: [phone]  Additional appointment(s) needed: N/A  Additonal Notes: 6 Month f/u from Angio.

## 2025-05-27 ENCOUNTER — TELEPHONE (OUTPATIENT)
Dept: NEUROSURGERY | Facility: CLINIC | Age: 55
End: 2025-05-27
Payer: COMMERCIAL

## 2025-05-27 NOTE — TELEPHONE ENCOUNTER
Left Voicemail (1st Attempt) for the patient to call back and schedule the following:    Appointment type: Rtn vascular neurosurg - in person or virtual  Provider: Dr. Raza  Return date: Beginning of Nov 2025  Specialty phone number: [phone]  Additional appointment(s) needed: N/A  Additonal Notes: 6 Month f/u from Angio.

## 2025-06-13 ENCOUNTER — MYC MEDICAL ADVICE (OUTPATIENT)
Dept: INTERNAL MEDICINE | Facility: OTHER | Age: 55
End: 2025-06-13
Payer: COMMERCIAL

## 2025-06-13 DIAGNOSIS — F17.210 CIGARETTE SMOKER: Primary | ICD-10-CM

## 2025-06-16 NOTE — TELEPHONE ENCOUNTER
Pt requesting order for Nicorette gum. The lozenges did not work.     Chao'd up order for Nicorette gum.    Routing to provider to review and respond.  Wesly Hester RN on 6/16/2025 at 11:18 AM

## 2025-07-07 ENCOUNTER — PATIENT OUTREACH (OUTPATIENT)
Dept: CARE COORDINATION | Facility: CLINIC | Age: 55
End: 2025-07-07
Payer: COMMERCIAL

## 2025-07-07 NOTE — PROGRESS NOTES
"Clinic Care Coordination Contact  Follow Up Progress Note      Background:   Patient has not heard from [facility] about his neurology and would like help following up on his referral for his neck. He would like to officially update his address.    Assessment:    He states he has had some increase with SOB with activity, such as walking or being at the street dance last week. At times it comes with a sharp pain under the left armpit and lasts about 20 - 40 minutes. Sometimes he gets dizzy and the ringing of ears \"amps up\". Says that his girlfriend has been concerned. He states he chalked it up to \"my COPD\". No symptoms right now. He also said he is out of aspirin as his pharmacy didn't fill it.     Intervention/Education provided during outreach: Emphasized that if SOB and chest pain occur he should be evaluated at the nearest emergency department to rule out cardiac concerns.He would also like me to check on his [place] coverage for rides to medical appointments as he recently moved to [place].     Plan:     Patient would like me to follow up on his spine neurology referral with [facility]. He plans to see PCP for pre-op for  on 7/28/25 and for eye surgery on 8/1/25. [facility] said that he would remain eligible for rides through them, but needs to get his case transferred to [facility]. Patient updated. [facility]'s travel reimbursement form sent to him by mail.     Note forwarded to PCP for consideration.   Care Coordinator will continue to support.  Theresa Dooley RN on 7/7/2025 at 11:46 AM    "

## 2025-07-28 ENCOUNTER — OFFICE VISIT (OUTPATIENT)
Dept: INTERNAL MEDICINE | Facility: OTHER | Age: 55
End: 2025-07-28
Payer: COMMERCIAL

## 2025-07-28 ENCOUNTER — HOSPITAL ENCOUNTER (OUTPATIENT)
Dept: GENERAL RADIOLOGY | Facility: OTHER | Age: 55
Discharge: HOME | End: 2025-07-28
Payer: COMMERCIAL

## 2025-07-28 VITALS
HEIGHT: 71 IN | BODY MASS INDEX: 33.57 KG/M2 | OXYGEN SATURATION: 95 % | TEMPERATURE: 97.2 F | RESPIRATION RATE: 16 BRPM | DIASTOLIC BLOOD PRESSURE: 82 MMHG | SYSTOLIC BLOOD PRESSURE: 120 MMHG | HEART RATE: 72 BPM | WEIGHT: 239.8 LBS

## 2025-07-28 DIAGNOSIS — I10 ESSENTIAL HYPERTENSION: ICD-10-CM

## 2025-07-28 DIAGNOSIS — Z86.79 S/P CEREBRAL ANEURYSM REPAIR: ICD-10-CM

## 2025-07-28 DIAGNOSIS — L57.0 ACTINIC KERATOSIS: ICD-10-CM

## 2025-07-28 DIAGNOSIS — D75.839 THROMBOCYTOSIS: ICD-10-CM

## 2025-07-28 DIAGNOSIS — E78.2 MIXED HYPERLIPIDEMIA: ICD-10-CM

## 2025-07-28 DIAGNOSIS — E66.811 CLASS 1 OBESITY WITH SERIOUS COMORBIDITY AND BODY MASS INDEX (BMI) OF 33.0 TO 33.9 IN ADULT, UNSPECIFIED OBESITY TYPE: ICD-10-CM

## 2025-07-28 DIAGNOSIS — F17.200 NICOTINE DEPENDENCE, UNCOMPLICATED, UNSPECIFIED NICOTINE PRODUCT TYPE: ICD-10-CM

## 2025-07-28 DIAGNOSIS — Z01.818 PREOP GENERAL PHYSICAL EXAM: Primary | ICD-10-CM

## 2025-07-28 DIAGNOSIS — E11.9 TYPE 2 DIABETES, HBA1C GOAL < 7% (H): ICD-10-CM

## 2025-07-28 DIAGNOSIS — I63.9 RIGHT SIDED CEREBRAL HEMISPHERE CEREBROVASCULAR ACCIDENT (CVA) (H): ICD-10-CM

## 2025-07-28 DIAGNOSIS — M25.551 BILATERAL HIP PAIN: ICD-10-CM

## 2025-07-28 DIAGNOSIS — H50.9 STRABISMUS: ICD-10-CM

## 2025-07-28 DIAGNOSIS — Z98.890 S/P CEREBRAL ANEURYSM REPAIR: ICD-10-CM

## 2025-07-28 DIAGNOSIS — M25.552 BILATERAL HIP PAIN: ICD-10-CM

## 2025-07-28 DIAGNOSIS — J44.0 CHRONIC OBSTRUCTIVE PULMONARY DISEASE WITH ACUTE LOWER RESPIRATORY INFECTION (H): ICD-10-CM

## 2025-07-28 DIAGNOSIS — M54.2 CERVICALGIA: ICD-10-CM

## 2025-07-28 DIAGNOSIS — F60.9 PERSONALITY DISORDER (H): ICD-10-CM

## 2025-07-28 DIAGNOSIS — Z12.5 ENCOUNTER FOR SCREENING FOR MALIGNANT NEOPLASM OF PROSTATE: ICD-10-CM

## 2025-07-28 DIAGNOSIS — J44.9 CHRONIC OBSTRUCTIVE PULMONARY DISEASE, UNSPECIFIED COPD TYPE (H): ICD-10-CM

## 2025-07-28 DIAGNOSIS — Z72.0 TOBACCO ABUSE: ICD-10-CM

## 2025-07-28 LAB
ALBUMIN SERPL BCG-MCNC: 4.4 G/DL (ref 3.5–5.2)
ALP SERPL-CCNC: 96 U/L (ref 40–150)
ALT SERPL W P-5'-P-CCNC: 18 U/L (ref 0–70)
ANION GAP SERPL CALCULATED.3IONS-SCNC: 13 MMOL/L (ref 7–15)
AST SERPL W P-5'-P-CCNC: 19 U/L (ref 0–45)
BASOPHILS # BLD AUTO: 0.1 10E3/UL (ref 0–0.2)
BASOPHILS NFR BLD AUTO: 1 %
BILIRUB SERPL-MCNC: 0.3 MG/DL
BUN SERPL-MCNC: 17.2 MG/DL (ref 6–20)
CALCIUM SERPL-MCNC: 9.3 MG/DL (ref 8.8–10.4)
CHLORIDE SERPL-SCNC: 102 MMOL/L (ref 98–107)
CHOLEST SERPL-MCNC: 163 MG/DL
CREAT SERPL-MCNC: 1.24 MG/DL (ref 0.67–1.17)
EGFRCR SERPLBLD CKD-EPI 2021: 68 ML/MIN/1.73M2
EOSINOPHIL # BLD AUTO: 0.3 10E3/UL (ref 0–0.7)
EOSINOPHIL NFR BLD AUTO: 3 %
ERYTHROCYTE [DISTWIDTH] IN BLOOD BY AUTOMATED COUNT: 14.2 % (ref 10–15)
EST. AVERAGE GLUCOSE BLD GHB EST-MCNC: 148 MG/DL
FASTING STATUS PATIENT QL REPORTED: ABNORMAL
FASTING STATUS PATIENT QL REPORTED: ABNORMAL
GLUCOSE SERPL-MCNC: 114 MG/DL (ref 70–99)
HBA1C MFR BLD: 6.8 %
HCO3 SERPL-SCNC: 25 MMOL/L (ref 22–29)
HCT VFR BLD AUTO: 45 % (ref 40–53)
HDLC SERPL-MCNC: 36 MG/DL
HGB BLD-MCNC: 14.8 G/DL (ref 13.3–17.7)
IMM GRANULOCYTES # BLD: 0 10E3/UL
IMM GRANULOCYTES NFR BLD: 0 %
LDLC SERPL CALC-MCNC: 91 MG/DL
LYMPHOCYTES # BLD AUTO: 2.5 10E3/UL (ref 0.8–5.3)
LYMPHOCYTES NFR BLD AUTO: 24 %
MCH RBC QN AUTO: 28 PG (ref 26.5–33)
MCHC RBC AUTO-ENTMCNC: 32.9 G/DL (ref 31.5–36.5)
MCV RBC AUTO: 85 FL (ref 78–100)
MONOCYTES # BLD AUTO: 0.8 10E3/UL (ref 0–1.3)
MONOCYTES NFR BLD AUTO: 8 %
NEUTROPHILS # BLD AUTO: 6.7 10E3/UL (ref 1.6–8.3)
NEUTROPHILS NFR BLD AUTO: 64 %
NONHDLC SERPL-MCNC: 127 MG/DL
NRBC # BLD AUTO: 0 10E3/UL
NRBC BLD AUTO-RTO: 0 /100
PLATELET # BLD AUTO: 460 10E3/UL (ref 150–450)
POTASSIUM SERPL-SCNC: 4.4 MMOL/L (ref 3.4–5.3)
PROT SERPL-MCNC: 7.6 G/DL (ref 6.4–8.3)
PSA SERPL DL<=0.01 NG/ML-MCNC: 1.28 NG/ML (ref 0–3.5)
RBC # BLD AUTO: 5.28 10E6/UL (ref 4.4–5.9)
SODIUM SERPL-SCNC: 140 MMOL/L (ref 135–145)
TRIGL SERPL-MCNC: 182 MG/DL
WBC # BLD AUTO: 10.5 10E3/UL (ref 4–11)

## 2025-07-28 PROCEDURE — 83036 HEMOGLOBIN GLYCOSYLATED A1C: CPT | Mod: ZL

## 2025-07-28 PROCEDURE — G0103 PSA SCREENING: HCPCS | Mod: ZL

## 2025-07-28 PROCEDURE — G0463 HOSPITAL OUTPT CLINIC VISIT: HCPCS | Mod: 25

## 2025-07-28 PROCEDURE — 85025 COMPLETE CBC W/AUTO DIFF WBC: CPT | Mod: ZL

## 2025-07-28 PROCEDURE — 80061 LIPID PANEL: CPT | Mod: ZL

## 2025-07-28 PROCEDURE — 73522 X-RAY EXAM HIPS BI 3-4 VIEWS: CPT | Mod: 26 | Performed by: RADIOLOGY

## 2025-07-28 PROCEDURE — 73522 X-RAY EXAM HIPS BI 3-4 VIEWS: CPT

## 2025-07-28 PROCEDURE — 36415 COLL VENOUS BLD VENIPUNCTURE: CPT | Mod: ZL

## 2025-07-28 PROCEDURE — 99406 BEHAV CHNG SMOKING 3-10 MIN: CPT

## 2025-07-28 PROCEDURE — 80053 COMPREHEN METABOLIC PANEL: CPT | Mod: ZL

## 2025-07-28 RX ORDER — FLUTICASONE PROPIONATE AND SALMETEROL 500; 50 UG/1; UG/1
1 POWDER RESPIRATORY (INHALATION) EVERY 12 HOURS
Qty: 60 EACH | Refills: 11 | Status: SHIPPED | OUTPATIENT
Start: 2025-07-28

## 2025-07-28 RX ORDER — PROPRANOLOL HYDROCHLORIDE 120 MG/1
120 CAPSULE, EXTENDED RELEASE ORAL DAILY
Qty: 90 CAPSULE | Refills: 4 | Status: SHIPPED | OUTPATIENT
Start: 2025-07-28

## 2025-07-28 RX ORDER — HYDROCODONE BITARTRATE AND ACETAMINOPHEN 5; 325 MG/1; MG/1
1-2 TABLET ORAL EVERY 6 HOURS PRN
Qty: 60 TABLET | Refills: 0 | Status: SHIPPED | OUTPATIENT
Start: 2025-08-25

## 2025-07-28 RX ORDER — HYDROCODONE BITARTRATE AND ACETAMINOPHEN 5; 325 MG/1; MG/1
1-2 TABLET ORAL EVERY 6 HOURS PRN
Qty: 60 TABLET | Refills: 0 | Status: SHIPPED | OUTPATIENT
Start: 2025-09-22

## 2025-07-28 RX ORDER — AMLODIPINE BESYLATE 5 MG/1
5 TABLET ORAL DAILY
Qty: 90 TABLET | Refills: 4 | Status: SHIPPED | OUTPATIENT
Start: 2025-07-28

## 2025-07-28 RX ORDER — ALBUTEROL SULFATE 0.83 MG/ML
2.5 SOLUTION RESPIRATORY (INHALATION) EVERY 6 HOURS PRN
Qty: 90 ML | Refills: 5 | Status: SHIPPED | OUTPATIENT
Start: 2025-07-28

## 2025-07-28 RX ORDER — TIOTROPIUM BROMIDE 18 UG/1
18 CAPSULE ORAL; RESPIRATORY (INHALATION) DAILY
Qty: 90 CAPSULE | Refills: 4 | Status: SHIPPED | OUTPATIENT
Start: 2025-07-28

## 2025-07-28 RX ORDER — HYDROCODONE BITARTRATE AND ACETAMINOPHEN 5; 325 MG/1; MG/1
1-2 TABLET ORAL EVERY 6 HOURS PRN
Qty: 60 TABLET | Refills: 0 | Status: SHIPPED | OUTPATIENT
Start: 2025-07-28

## 2025-07-28 RX ORDER — LAMOTRIGINE 100 MG/1
100 TABLET ORAL 2 TIMES DAILY
Qty: 180 TABLET | Refills: 4 | Status: SHIPPED | OUTPATIENT
Start: 2025-07-28

## 2025-07-28 RX ORDER — ASPIRIN 81 MG/1
81 TABLET, CHEWABLE ORAL DAILY
Qty: 90 TABLET | Refills: 4 | Status: SHIPPED | OUTPATIENT
Start: 2025-07-28

## 2025-07-28 RX ORDER — METAPROTERENOL SULFATE 10 MG
500 TABLET ORAL DAILY
Qty: 90 CAPSULE | Refills: 4 | Status: SHIPPED | OUTPATIENT
Start: 2025-07-28

## 2025-07-28 ASSESSMENT — PAIN SCALES - GENERAL: PAINLEVEL_OUTOF10: SEVERE PAIN (8)

## 2025-07-28 NOTE — NURSING NOTE
"Chief Complaint   Patient presents with    Pre-Op Exam     DOS: 8/1/25, Dr. Niya Martinez, [facility], Fax: [phone], Surgery for his cross eyes, OU       Initial /82 (BP Location: Right arm, Patient Position: Sitting, Cuff Size: Adult Large)   Pulse 72   Temp 97.2  F (36.2  C) (Temporal)   Resp 16   Ht 1.803 m (5' 11\")   Wt 108.8 kg (239 lb 12.8 oz)   SpO2 95%   BMI 33.45 kg/m   Estimated body mass index is 33.45 kg/m  as calculated from the following:    Height as of this encounter: 1.803 m (5' 11\").    Weight as of this encounter: 108.8 kg (239 lb 12.8 oz).  Medication Review: complete    The next two questions are to help us understand your food security.  If you are feeling you need any assistance in this area, we have resources available to support you today.          2/20/2025   SDOH- Food Insecurity   Within the past 12 months, did you worry that your food would run out before you got money to buy more? Y   Within the past 12 months, did the food you bought just not last and you didn t have money to get more? Y        Data saved with a previous flowsheet row definition         Health Care Directive:  Patient does not have a Health Care Directive: Discussed advance care planning with patient; however, patient declined at this time.    Prachi Sales      "

## 2025-07-28 NOTE — PATIENT INSTRUCTIONS
How to Take Your Medication Before Surgery  Preoperative Medication Instructions   Antiplatelet or Anticoagulation Medication Instructions   - aspirin: Discontinue aspirin 7 days prior to procedure to reduce bleeding risk. It should be resumed postoperatively.     Additional Medication Instructions  Take all scheduled medications on the day of surgery       Patient Education   Preparing for Your Surgery  For Adults  Getting started  In most cases, a nurse will call to review your health history and instructions. They will give you an arrival time based on your scheduled surgery time. Please be ready to share:  Your doctor's clinic name and phone number  Your medical, surgical, and anesthesia history  A list of allergies and sensitivities  A list of medicines, including herbal treatments and over-the-counter drugs  Whether the patient has a legal guardian (ask how to send us the papers in advance)  Note: You may not receive a call if you were seen at our PAC (Preoperative Assessment Center).  Please tell us if you're pregnant--or if there's any chance you might be pregnant. Some surgeries may injure a fetus (unborn baby), so they require a pregnancy test. Surgeries that are safe for a fetus don't always need a test, and you can choose whether to have one.   Preparing for surgery  Within 10 to 30 days of surgery: Have a pre-op exam (sometimes called an H&P, or History and Physical). This can be done at a clinic or pre-operative center.  If you're having a , you may not need this exam. Talk to your care team.  At your pre-op exam, talk to your care team about all medicines you take. (This includes CBD oil and any drugs, such as THC, marijuana, and other forms of cannabis.) If you need to stop any medicine before surgery, ask when to start taking it again.  This is for your safety. Many medicines and drugs can make you bleed too much during surgery. Some change how well surgery (anesthesia) drugs work.  Call  your insurance company to let them know you're having surgery. (If you don't have insurance, call [phone].)  Call your clinic if there's any change in your health. This includes a scrape or scratch near the surgery site, or any signs of a cold (sore throat, runny nose, cough, rash, fever).  Eating and drinking guidelines  For your safety: Unless your surgeon tells you otherwise, follow the guidelines below.  Eat and drink as normal until 8 hours before you arrive for surgery. After that, no food or milk. You can spit out gum when you arrive.  Drink clear liquids until 2 hours before you arrive. These are liquids you can see through, like water, Gatorade, and Propel Water. They also include plain black coffee and tea (no cream or milk).  No alcohol for 24 hours before you arrive. The night before surgery, stop any drinks that contain THC.  If your care team tells you to take medicine on the morning of surgery, it's okay to take it with a sip of water. No other medicines or drugs are allowed (including CBD oil)--follow your care team's instructions.  If you have questions the day of surgery, call your hospital or surgery center.   Preventing infection  Shower or bathe the night before and the morning of surgery. Follow the instructions your clinic gave you. (If no instructions, use regular soap.)  Don't shave or clip hair near your surgery site. We'll remove the hair if needed.  Don't smoke or vape the morning of surgery. No chewing tobacco for 6 hours before you arrive. A nicotine patch is okay. You may spit out nicotine gum when you arrive.  For some surgeries, the surgeon will tell you to fully quit smoking and nicotine.  We will make every effort to keep you safe from infection. We will:  Clean our hands often with soap and water (or an alcohol-based hand rub).  Clean the skin at your surgery site with a special soap that kills germs.  Give you a special gown to keep you warm. (Cold raises the risk of  infection.)  Wear hair covers, masks, gowns, and gloves during surgery.  Give antibiotic medicine, if prescribed. Not all surgeries need this medicine.  What to bring on the day of surgery  Photo ID and insurance card  Copy of your health care directive, if you have one  Glasses and hearing aids (bring cases)  You can't wear contacts during surgery  Inhaler and eye drops, if you use them (tell us about these when you arrive)  CPAP machine or breathing device, if you use them  A few personal items, if spending the night  If you have . . .  A pacemaker, ICD (cardiac defibrillator), or other implant: Bring the ID card.  An implanted stimulator: Bring the remote control.  A legal guardian: Bring a copy of the certified (court-stamped) guardianship papers.  Please remove any jewelry, including body piercings. Leave jewelry and other valuables at home.  If you're going home the day of surgery  You must have a support person drive you home. They should stay with you overnight, and they may need to help with your self-care.  If you don't have a support person, please tells us as soon as possible. We can help.  After surgery  If it's hard to control your pain or you need more pain medicine, please call your surgeon's office.  Questions?   If you have any questions for your care team, list them here:   ____________________________________________________________________________________________________________________________________________________________________________________________________________________________________________________________  For informational purposes only. Not to replace the advice of your health care provider. Copyright   2003, 2019 [employer]. All rights reserved. Clinically reviewed by Golden Fu MD. [employer] 362608 - REV 02/25.

## 2025-07-28 NOTE — PROGRESS NOTES
Preoperative Evaluation  [facility] AND [facility]  [address]  [place] [address]  Phone: [phone]  Fax: [phone]  Primary Provider: Physician No Ref-Primary  Pre-op Performing Provider: ANATOLY Chow CNP  Jul 28, 2025 7/28/2025   Surgical Information   What procedure is being done? Per op   Facility or Hospital where procedure/surgery will be performed: [facility]   Who is doing the procedure / surgery? Dayan Martinez MD   Date of surgery / procedure: August 1,2025   Time of surgery / procedure: Unknown - Fixing Crosseyes - OU   Where do you plan to recover after surgery? at home with family     Fax number for surgical facility: [phone]    Assessment & Plan     The proposed surgical procedure is considered INTERMEDIATE risk.      ICD-10-CM    1. Preop general physical exam  Z01.818 CBC and Differential     Comprehensive Metabolic Panel     CBC and Differential     Comprehensive Metabolic Panel      2. Strabismus  H50.9       3. Right sided cerebral hemisphere cerebrovascular accident (CVA) (H)  I63.9 aspirin (ASA) 81 MG chewable tablet      4. S/P cerebral aneurysm repair  Z98.890     Z86.79       5. Mixed hyperlipidemia  E78.2 Omega-3 Fish Oil 500 MG capsule     Lipid Panel     semaglutide-weight management (WEGOVY) 0.25 MG/0.5ML pen     Lipid Panel      6. Bilateral hip pain  M25.551 XR Pelvis and Hip Bilateral 2 Views    M25.552 Orthopedic  Referral      7. Type 2 diabetes, HbA1c goal < 7% (H)  E11.9 semaglutide-weight management (WEGOVY) 0.25 MG/0.5ML pen     semaglutide (OZEMPIC) 2 MG/1.5ML SOPN pen     Semaglutide, 1 MG/DOSE, (OZEMPIC, 1 MG/DOSE,) 4 MG/3ML pen      8. Actinic keratosis  L57.0 Adult Dermatology  Referral      9. Chronic obstructive pulmonary disease, unspecified COPD type (H)  J44.9 fluticasone-salmeterol (ADVAIR) 500-50 MCG/ACT inhaler     tiotropium (SPIRIVA) 18 MCG inhaled capsule      10. Personality disorder (H)  F60.9  lamoTRIgine (LAMICTAL) 100 MG tablet      11. Essential hypertension  I10 propranolol ER (INDERAL LA) 120 MG 24 hr capsule     amLODIPine (NORVASC) 5 MG tablet     aspirin (ASA) 81 MG chewable tablet     Hemoglobin A1c     Hemoglobin A1c      12. Nicotine dependence, uncomplicated, unspecified nicotine product type  F17.200 [place] Quit Partner Referral     SMOKING CESSATION COUNSELING 3-10 MIN     nicotine polacrilex (NICORETTE) 4 MG gum     DISCONTINUED: nicotine polacrilex (NICORETTE) 4 MG gum      13. Chronic obstructive pulmonary disease with acute lower respiratory infection (H)  J44.0 albuterol (PROVENTIL) (2.5 MG/3ML) 0.083% neb solution      14. Cervicalgia  M54.2 HYDROcodone-acetaminophen (NORCO) 5-325 MG tablet     HYDROcodone-acetaminophen (NORCO) 5-325 MG tablet     HYDROcodone-acetaminophen (NORCO) 5-325 MG tablet      15. Class 1 obesity with serious comorbidity and body mass index (BMI) of 33.0 to 33.9 in adult, unspecified obesity type  E66.811 semaglutide-weight management (WEGOVY) 0.25 MG/0.5ML pen    Z68.33 semaglutide (OZEMPIC) 2 MG/1.5ML SOPN pen     Semaglutide, 1 MG/DOSE, (OZEMPIC, 1 MG/DOSE,) 4 MG/3ML pen      16. Encounter for screening for malignant neoplasm of prostate  Z12.5 PSA Screen GH     PSA Screen GH      17. Tobacco abuse  Z72.0       18. Thrombocytosis  D75.839 Adult Oncology/Hematology  Referral             Risks and Recommendations  The patient has the following additional risks and recommendations for perioperative complications:  Diabetes:  - Patient is not on insulin therapy: regular NPO guidelines can be followed.   Social and Substance:    - Patient is taking medications for chronic pain   - Active nicotine user, advised smoking cessation    Preoperative Medication Instructions  Antiplatelet or Anticoagulation Medication Instructions   - aspirin: Discontinue aspirin 7 days prior to procedure to reduce bleeding risk. It should be resumed postoperatively.     Additional  Medication Instructions  Take all scheduled medications on the day of surgery    Recommendation  Approval given to proceed with proposed procedure, without further diagnostic evaluation.    I verified with his vascular team that he no longer needs to be on aspirin- but should be on at least an aspirin which I instructed him to restart after surgery.     I have placed a referral to hematology for persistent thrombocytosis that has been present for approximately one year.         Raúl Jackson is a 55 year old, presenting for the following:  Pre-Op Exam (DOS: 8/1/25, Dr. Niya Martinez, [facility], Fax: [phone], Surgery for his cross eyes, OU)        HPI: Patient presents to clinic for preoperative evaluation for treatment of strabismus scheduled on 8/1/25. Patient denies having any difficulties with anesthesia in the past, and is able to meet > 4 METS.      Patient also complains of ongoing hip/pelvis/knee pain- he would like a referral to orthopedics.     He reports that he is no longer taking eliquis or aspirin at the instruction of his vascular team.           7/28/2025   Pre-Op Questionnaire   Have you ever had a heart attack or stroke? (!) YES CVA    Have you ever had surgery on your heart or blood vessels, such as a stent placement, a coronary artery bypass, or surgery on an artery in your head, neck, heart, or legs? (!) YES s/p cerebral aneurysm repair   Do you have chest pain with activity? No   Do you have a history of heart failure? No   Do you currently have a cold, bronchitis or symptoms of other infection? No   Do you have a cough, shortness of breath, or wheezing? (!) YES Chronic COPD   Do you or anyone in your family have previous history of blood clots? (!) YES   Do you or does anyone in your family have a serious bleeding problem such as prolonged bleeding following surgeries or cuts? (!) YES    Have you ever had problems with anemia or been told to take iron pills? No    Have you had any abnormal blood loss such as black, tarry or bloody stools? No   Have you ever had a blood transfusion? No   Are you willing to have a blood transfusion if it is medically needed before, during, or after your surgery? Yes   Have you or any of your relatives ever had problems with anesthesia? No   Do you have sleep apnea, excessive snoring or daytime drowsiness? No   Do you have any artifical heart valves or other implanted medical devices like a pacemaker, defibrillator, or continuous glucose monitor? No   Do you have artificial joints? No   Are you allergic to latex? No     Advance Care Planning    Discussed advance care planning with patient; however, patient declined at this time.    Preoperative Review of    reviewed - controlled substances reflected in medication list.      Status of Chronic Conditions:  See problem list for active medical problems.  Problems all longstanding and stable, except as noted/documented.  See ROS for pertinent symptoms related to these conditions.    DIABETES - New diagnosis today with A1C of 6.8 which remains well controlled. Discussed diet/exercise- script for ozempic sent to pharmacy to help further manage/hopefully also help assist with weight loss.       HYPERLIPIDEMIA - Patient has a long history of significant Hyperlipidemia requiring medication for treatment with recent good control. Patient reports no problems or side effects with the medication.     HYPERTENSION - Patient has longstanding history of HTN , currently denies any symptoms referable to elevated blood pressure. Specifically denies chest pain, palpitations, dyspnea, orthopnea, PND or peripheral edema. Blood pressure readings have been in normal range. Current medication regimen is as listed below. Patient denies any side effects of medication.     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 6/2024  Small saccular 2 mm right PCA aneurysm. 6/2024      Prediabetes 08/22/2023     Priority: Medium    Dyslipidemia 02/17/2020     Priority: Medium    Cigarette smoker 03/20/2019     Priority: Medium    Cervicalgia 12/26/2018     Priority: Medium    Deviated nasal septum 02/20/2018     Priority: Medium    Tobacco abuse 02/20/2018     Priority: Medium    Mixed hyperlipidemia 04/18/2017     Priority: Medium    Chronic pain syndrome - Neck 04/18/2017     Priority: Medium    Essential hypertension 03/21/2017     Priority: Medium    Family history of ischemic heart disease 03/21/2017     Priority: Medium    Family history of diabetes mellitus 03/21/2017     Priority: Medium    Personality disorder (H) 05/05/2012     Priority: Medium      Past Medical History:   Diagnosis Date    Cervical strain 01/03/2019    Closed head injury with concussion 12/12/2018    Concussion with brief LOC 12/12/2018    Concussion with brief LOC 12/12/2018    Hypertension     Impingement of right ulnar nerve 03/16/2020    Post concussion syndrome 03/26/2019    Post-concussion headache 12/26/2018    Vestibular dysfunction 12/26/2018    Vision disturbance 12/26/2018     Past Surgical History:   Procedure Laterality Date    HERNIA REPAIR Left     IR CAROTID CEREBRAL ANGIOGRAM  BILATERAL  9/17/2024    KNEE SURGERY Left     ACL repair, patella graft    SEPTOPLASTY N/A 3/13/2018    Procedure: SEPTOPLASTY;  septoplasty, submucosal resection of the turbinates;  Surgeon: Piotr Quiroz MD;  Location: [facility] OR     Current Outpatient Medications   Medication Sig Dispense Refill    acetaminophen (TYLENOL) 325 MG tablet Take 3 tablets (975 mg) by mouth daily. 300 tablet 3    albuterol (PROAIR HFA/PROVENTIL HFA/VENTOLIN HFA) 108 (90 Base) MCG/ACT inhaler Inhale 2 puffs into the lungs every 4 hours as needed for shortness of breath or wheezing. 54 g 1    albuterol (PROVENTIL) (2.5 MG/3ML) 0.083% neb solution Take 1 vial (2.5 mg) by nebulization every 6 hours as needed for shortness of breath, wheezing or cough. 90 mL 5    amLODIPine (NORVASC) 5 MG tablet Take 1 tablet (5 mg) by mouth daily. 90 tablet 4    aspirin (ASA) 81 MG chewable tablet Take 1 tablet (81 mg) by mouth daily. 90 tablet 4    atorvastatin (LIPITOR) 40 MG tablet Take 1 tablet (40 mg) by mouth daily. 90 tablet 4    celecoxib (CELEBREX) 100 MG capsule Take 1-2 capsules (100-200 mg) by mouth 2 times daily. 360 capsule 4    diclofenac (VOLTAREN) 1 % topical gel Apply 4 g topically 4 times daily. 100 g 11    fluticasone-salmeterol (ADVAIR) 500-50 MCG/ACT inhaler Inhale 1 puff into the lungs every 12 hours. 60 each 11    HYDROcodone-acetaminophen (NORCO) 5-325 MG tablet Take 1-2 tablets by mouth every 6 hours as needed for moderate to severe pain. 60 tablet 0    [START ON 8/25/2025] HYDROcodone-acetaminophen (NORCO) 5-325 MG tablet Take 1-2 tablets by mouth every 6 hours as needed for moderate to severe pain. 60 tablet 0    [START ON 9/22/2025] HYDROcodone-acetaminophen (NORCO) 5-325 MG tablet Take 1-2 tablets by mouth every 6 hours as needed for moderate to severe pain. 60 tablet 0    lamoTRIgine (LAMICTAL) 100 MG tablet Take 1 tablet (100 mg) by mouth 2 times daily. - For Irritability / Anger 180 tablet 4    losartan-hydrochlorothiazide  "(HYZAAR) 100-25 MG tablet Take 1 tablet by mouth daily. 90 tablet 4    MEDS UNK - RECORDS REQUESTED For cholesterol      nicotine polacrilex (NICORETTE) 4 MG gum How to take it: When the urge to smoke occurs, chew gum until you feel a tingle, then \"park\" the gum in your cheek until the tingle is gone. Re-chew every few minutes and \"park\" again, chewing one piece for 30 minutes. Do not eat or drink while chewing. Follow this schedule: Weeks 1 to 6: One piece of gum every 1 to 2 hours. Use at least 9 pieces a day, but no more than 24. Weeks 7 to 9: One piece of gum every 2 to 4 hours. Weeks 10 to 12: One piece of gum every 4 to 8 hours. 100 each 5    Omega-3 Fish Oil 500 MG capsule Take 1 capsule (500 mg) by mouth daily. 90 capsule 4    propranolol ER (INDERAL LA) 120 MG 24 hr capsule Take 1 capsule (120 mg) by mouth daily. 90 capsule 4    semaglutide (OZEMPIC) 2 MG/1.5ML SOPN pen Inject 0.25 mg subcutaneously every 7 days for 30 days, THEN 0.5 mg every 7 days. 3 mL 0    Semaglutide, 1 MG/DOSE, (OZEMPIC, 1 MG/DOSE,) 4 MG/3ML pen Inject 1 mg subcutaneously every 7 days. -- Start after completion of 0.5 mg SubQ (Weeks 9 - 12) 3 mL 0    semaglutide-weight management (WEGOVY) 0.25 MG/0.5ML pen Inject 0.5 mLs (0.25 mg) subcutaneously every 7 days. x4 doses - for Diabetes and Obesity - Then increase to 0.5 mg weekly 2 mL 0    tiotropium (SPIRIVA) 18 MCG inhaled capsule Inhale 1 capsule (18 mcg) into the lungs daily. 90 capsule 4    tiZANidine (ZANAFLEX) 4 MG tablet Take 1-2 tablets (4-8 mg) by mouth 3 times daily as needed for muscle spasms. 90 tablet 4    diclofenac (VOLTAREN) 1 % topical gel Apply 4 g topically 4 times daily. 150 g 0    Diclofenac Sodium 3 % GEL Externally apply 2-4 g topically 2 times daily as needed (joint pain). ---- Requesting prescription strength ----  Not OTC, okay to substitute concentration if needed ---- 100 g 4    fluticasone (FLONASE) 50 MCG/ACT nasal spray Spray 1 spray into both nostrils " daily. (Patient not taking: Reported on 7/28/2025) 16 g 0    magnesium oxide (MAG-OX) 400 MG tablet Take 1 tablet (400 mg) by mouth daily. (Patient not taking: Reported on 7/28/2025) 90 tablet 1    nicotine (COMMIT) 2 MG lozenge How to take it: When the urge to smoke occurs, suck (do not chew) on 1 lozenge to release nicotine. Do not eat or drink while the lozenge is in your mouth. Follow this schedule: Weeks 1 to 6: One lozenge every 1 to 2 hours. Use at least 9 lozenges a day, but no more than 20. Weeks 7 to 9: One lozenge every 2 to 4 hours. Weeks 10 to 12: One lozenge every 4 to 8 hours (Patient not taking: Reported on 6/16/2025) 108 lozenge 5    nicotine (NICORETTE) 2 MG gum Place 1 each (2 mg) inside cheek every hour as needed for nicotine withdrawal symptoms. (Patient not taking: Reported on 7/28/2025) 240 each 2    Omega-3 Fish Oil 500 MG capsule Take 1 capsule (500 mg) by mouth daily. (Patient not taking: Reported on 7/28/2025) 90 capsule 4       Allergies   Allergen Reactions    Penicillins Anaphylaxis     Tolerated cefazolin on 09/16/2015.    Clindamycin Hives    Gabapentin      Mood disturbance         Social History     Tobacco Use    Smoking status: Every Day     Current packs/day: 1.00     Average packs/day: 1 pack/day for 43.6 years (43.6 ttl pk-yrs)     Types: Cigarettes     Start date: 1/1/1982    Smokeless tobacco: Never    Tobacco comments:     2-3 cigarettes per day      7/28/25 - smoking between half to three quarters ppd   Substance Use Topics    Alcohol use: Yes     Alcohol/week: 1.0 standard drink of alcohol     Types: 1 Glasses of wine per week     Comment: once every 6 months       History   Drug Use No             Review of Systems  CONSTITUTIONAL: NEGATIVE for fever, chills, change in weight  ENT/MOUTH: NEGATIVE for ear, mouth and throat problems  RESP:Hx COPD and smoking  CV: NEGATIVE for chest pain, palpitations or peripheral edema    Objective    /82 (BP Location: Right arm,  "Patient Position: Sitting, Cuff Size: Adult Large)   Pulse 72   Temp 97.2  F (36.2  C) (Temporal)   Resp 16   Ht 1.803 m (5' 11\")   Wt 108.8 kg (239 lb 12.8 oz)   SpO2 95%   BMI 33.45 kg/m     Estimated body mass index is 33.45 kg/m  as calculated from the following:    Height as of this encounter: 1.803 m (5' 11\").    Weight as of this encounter: 108.8 kg (239 lb 12.8 oz).  Physical Exam  Vitals reviewed.   Constitutional:       General: He is not in acute distress.     Appearance: He is well-developed. He is obese.   HENT:      Head: Normocephalic and atraumatic.      Nose: Nose normal.      Mouth/Throat:      Pharynx: No oropharyngeal exudate.   Eyes:      General: No scleral icterus.     Conjunctiva/sclera: Conjunctivae normal.      Pupils: Pupils are equal, round, and reactive to light.   Neck:      Thyroid: No thyromegaly.   Cardiovascular:      Rate and Rhythm: Normal rate and regular rhythm.      Pulses: Normal pulses.      Heart sounds: Normal heart sounds. No murmur heard.  Pulmonary:      Effort: Pulmonary effort is normal. No respiratory distress.      Breath sounds: Normal breath sounds. No wheezing.   Musculoskeletal:         General: No tenderness or deformity. Normal range of motion.      Cervical back: Normal range of motion and neck supple.   Skin:     General: Skin is warm and dry.      Findings: No rash.   Neurological:      Mental Status: He is alert and oriented to person, place, and time.      Cranial Nerves: No cranial nerve deficit.      Coordination: Coordination normal.   Psychiatric:         Behavior: Behavior normal.         Thought Content: Thought content normal.         Judgment: Judgment normal.         Diagnostics  Recent Results (from the past 48 hours)   Comprehensive Metabolic Panel    Collection Time: 07/28/25 12:36 PM   Result Value Ref Range    Sodium 140 135 - 145 mmol/L    Potassium 4.4 3.4 - 5.3 mmol/L    Carbon Dioxide (CO2) 25 22 - 29 mmol/L    Anion Gap 13 7 - 15 " mmol/L    Urea Nitrogen 17.2 6.0 - 20.0 mg/dL    Creatinine 1.24 (H) 0.67 - 1.17 mg/dL    GFR Estimate 68 >60 mL/min/1.73m2    Calcium 9.3 8.8 - 10.4 mg/dL    Chloride 102 98 - 107 mmol/L    Glucose 114 (H) 70 - 99 mg/dL    Alkaline Phosphatase 96 40 - 150 U/L    AST 19 0 - 45 U/L    ALT 18 0 - 70 U/L    Protein Total 7.6 6.4 - 8.3 g/dL    Albumin 4.4 3.5 - 5.2 g/dL    Bilirubin Total 0.3 <=1.2 mg/dL    Patient Fasting > 8hrs? Unknown    Lipid Panel    Collection Time: 07/28/25 12:36 PM   Result Value Ref Range    Cholesterol 163 <200 mg/dL    Triglycerides 182 (H) <150 mg/dL    Direct Measure HDL 36 (L) >=40 mg/dL    LDL Cholesterol Calculated 91 <100 mg/dL    Non HDL Cholesterol 127 <130 mg/dL    Patient Fasting > 8hrs? Unknown    Hemoglobin A1c    Collection Time: 07/28/25 12:36 PM   Result Value Ref Range    Estimated Average Glucose 148 (H) <117 mg/dL    Hemoglobin A1C 6.8 (H) <5.7 %   PSA Screen GH    Collection Time: 07/28/25 12:36 PM   Result Value Ref Range    Prostate Specific Antigen Screen 1.28 0.00 - 3.50 ng/mL   CBC with platelets and differential    Collection Time: 07/28/25 12:36 PM   Result Value Ref Range    WBC Count 10.5 4.0 - 11.0 10e3/uL    RBC Count 5.28 4.40 - 5.90 10e6/uL    Hemoglobin 14.8 13.3 - 17.7 g/dL    Hematocrit 45.0 40.0 - 53.0 %    MCV 85 78 - 100 fL    MCH 28.0 26.5 - 33.0 pg    MCHC 32.9 31.5 - 36.5 g/dL    RDW 14.2 10.0 - 15.0 %    Platelet Count 460 (H) 150 - 450 10e3/uL    % Neutrophils 64 %    % Lymphocytes 24 %    % Monocytes 8 %    % Eosinophils 3 %    % Basophils 1 %    % Immature Granulocytes 0 %    NRBCs per 100 WBC 0 <1 /100    Absolute Neutrophils 6.7 1.6 - 8.3 10e3/uL    Absolute Lymphocytes 2.5 0.8 - 5.3 10e3/uL    Absolute Monocytes 0.8 0.0 - 1.3 10e3/uL    Absolute Eosinophils 0.3 0.0 - 0.7 10e3/uL    Absolute Basophils 0.1 0.0 - 0.2 10e3/uL    Absolute Immature Granulocytes 0.0 <=0.4 10e3/uL    Absolute NRBCs 0.0 10e3/uL        Revised Cardiac Risk Index  (RCRI)  The patient has the following serious cardiovascular risks for perioperative complications:   - Cerebrovascular Disease (TIA or CVA) = 1 point     RCRI Interpretation: 1 point: Class II (low risk - 0.9% complication rate)         Signed Electronically by: ANATOLY Chow CNP  A copy of this evaluation report is provided to the requesting physician.    The longitudinal plan of care for the diagnosis(es)/condition(s) as documented were addressed during this visit. Due to the added complexity in care, I will continue to support Manuel in the subsequent management and with ongoing continuity of care.      Answers submitted by the patient for this visit:  Patient Health Questionnaire (Submitted on 7/28/2025)  If you checked off any problems, how difficult have these problems made it for you to do your work, take care of things at home, or get along with other people?: Somewhat difficult  PHQ9 TOTAL SCORE: 9

## 2025-07-29 ENCOUNTER — RESULTS FOLLOW-UP (OUTPATIENT)
Dept: INTERNAL MEDICINE | Facility: OTHER | Age: 55
End: 2025-07-29
Payer: COMMERCIAL

## 2025-07-29 RX ORDER — SEMAGLUTIDE 1.34 MG/ML
1 INJECTION, SOLUTION SUBCUTANEOUS
Qty: 3 ML | Refills: 0 | Status: SHIPPED | OUTPATIENT
Start: 2025-07-29

## 2025-07-30 ENCOUNTER — PATIENT OUTREACH (OUTPATIENT)
Dept: ONCOLOGY | Facility: OTHER | Age: 55
End: 2025-07-30
Payer: COMMERCIAL

## 2025-07-30 NOTE — PROGRESS NOTES
Oncology/Hematology Care Coordination - Referral Review    Referred by:  Dinah Michel    Diagnosis:  D75.839 (ICD-10-CM) - Thrombocytosis     Most recent Imaging:      Most recent Lab:  7/28/25    Surgery/Biopsy:      Pathology:      Outside Records:      Marcella Maloney RN on 7/30/2025 at 9:19 AM

## 2025-08-06 ENCOUNTER — PATIENT OUTREACH (OUTPATIENT)
Dept: CARE COORDINATION | Facility: CLINIC | Age: 55
End: 2025-08-06
Payer: COMMERCIAL

## 2025-08-25 ENCOUNTER — OFFICE VISIT (OUTPATIENT)
Dept: INTERNAL MEDICINE | Facility: OTHER | Age: 55
End: 2025-08-25
Payer: COMMERCIAL

## 2025-08-25 VITALS
RESPIRATION RATE: 16 BRPM | TEMPERATURE: 96.1 F | OXYGEN SATURATION: 99 % | HEART RATE: 84 BPM | SYSTOLIC BLOOD PRESSURE: 107 MMHG | DIASTOLIC BLOOD PRESSURE: 73 MMHG | WEIGHT: 239.8 LBS | BODY MASS INDEX: 33.45 KG/M2

## 2025-08-25 DIAGNOSIS — E66.811 CLASS 1 OBESITY WITH SERIOUS COMORBIDITY AND BODY MASS INDEX (BMI) OF 33.0 TO 33.9 IN ADULT, UNSPECIFIED OBESITY TYPE: ICD-10-CM

## 2025-08-25 DIAGNOSIS — I10 ESSENTIAL HYPERTENSION: ICD-10-CM

## 2025-08-25 DIAGNOSIS — G89.4 CHRONIC PAIN SYNDROME: ICD-10-CM

## 2025-08-25 DIAGNOSIS — M54.2 CERVICALGIA: ICD-10-CM

## 2025-08-25 DIAGNOSIS — E11.9 TYPE 2 DIABETES, HBA1C GOAL < 7% (H): ICD-10-CM

## 2025-08-25 DIAGNOSIS — J45.909 UNCOMPLICATED ASTHMA, UNSPECIFIED ASTHMA SEVERITY, UNSPECIFIED WHETHER PERSISTENT: ICD-10-CM

## 2025-08-25 DIAGNOSIS — J44.9 CHRONIC OBSTRUCTIVE PULMONARY DISEASE, UNSPECIFIED COPD TYPE (H): ICD-10-CM

## 2025-08-25 DIAGNOSIS — E78.2 MIXED HYPERLIPIDEMIA: ICD-10-CM

## 2025-08-25 DIAGNOSIS — F17.200 NICOTINE DEPENDENCE, UNCOMPLICATED, UNSPECIFIED NICOTINE PRODUCT TYPE: Primary | ICD-10-CM

## 2025-08-25 PROCEDURE — G0463 HOSPITAL OUTPT CLINIC VISIT: HCPCS

## 2025-08-25 RX ORDER — HYDROCODONE BITARTRATE AND ACETAMINOPHEN 5; 325 MG/1; MG/1
1-2 TABLET ORAL EVERY 6 HOURS PRN
Qty: 60 TABLET | Refills: 0 | Status: SHIPPED | OUTPATIENT
Start: 2025-10-20

## 2025-08-25 RX ORDER — ALBUTEROL SULFATE 90 UG/1
2 INHALANT RESPIRATORY (INHALATION) EVERY 4 HOURS PRN
Qty: 54 G | Refills: 1 | Status: SHIPPED | OUTPATIENT
Start: 2025-08-25

## 2025-08-25 RX ORDER — LOSARTAN POTASSIUM AND HYDROCHLOROTHIAZIDE 25; 100 MG/1; MG/1
1 TABLET ORAL DAILY
Qty: 90 TABLET | Refills: 4 | Status: SHIPPED | OUTPATIENT
Start: 2025-08-25

## 2025-08-25 RX ORDER — FLUTICASONE PROPIONATE AND SALMETEROL 500; 50 UG/1; UG/1
1 POWDER RESPIRATORY (INHALATION) EVERY 12 HOURS
Qty: 60 EACH | Refills: 11 | Status: SHIPPED | OUTPATIENT
Start: 2025-08-25

## 2025-08-25 RX ORDER — TIOTROPIUM BROMIDE 18 UG/1
18 CAPSULE ORAL; RESPIRATORY (INHALATION) DAILY
Qty: 90 CAPSULE | Refills: 4 | Status: SHIPPED | OUTPATIENT
Start: 2025-08-25

## 2025-08-25 RX ORDER — METFORMIN HYDROCHLORIDE 500 MG/1
500 TABLET, EXTENDED RELEASE ORAL
Qty: 90 TABLET | Refills: 4 | Status: SHIPPED | OUTPATIENT
Start: 2025-08-25

## 2025-08-25 RX ORDER — HYDROCODONE BITARTRATE AND ACETAMINOPHEN 5; 325 MG/1; MG/1
1-2 TABLET ORAL EVERY 6 HOURS PRN
Qty: 60 TABLET | Refills: 0 | Status: SHIPPED | OUTPATIENT
Start: 2025-08-25

## 2025-08-25 RX ORDER — ATORVASTATIN CALCIUM 40 MG/1
40 TABLET, FILM COATED ORAL DAILY
Qty: 90 TABLET | Refills: 4 | Status: SHIPPED | OUTPATIENT
Start: 2025-08-25

## 2025-08-25 RX ORDER — HYDROCODONE BITARTRATE AND ACETAMINOPHEN 5; 325 MG/1; MG/1
1-2 TABLET ORAL EVERY 6 HOURS PRN
Qty: 60 TABLET | Refills: 0 | Status: SHIPPED | OUTPATIENT
Start: 2025-09-22

## 2025-08-25 SDOH — HEALTH STABILITY: PHYSICAL HEALTH: ON AVERAGE, HOW MANY DAYS PER WEEK DO YOU ENGAGE IN MODERATE TO STRENUOUS EXERCISE (LIKE A BRISK WALK)?: 7 DAYS

## 2025-08-25 SDOH — HEALTH STABILITY: PHYSICAL HEALTH: ON AVERAGE, HOW MANY MINUTES DO YOU ENGAGE IN EXERCISE AT THIS LEVEL?: 10 MIN

## 2025-08-25 ASSESSMENT — PATIENT HEALTH QUESTIONNAIRE - PHQ9
SUM OF ALL RESPONSES TO PHQ QUESTIONS 1-9: 14
SUM OF ALL RESPONSES TO PHQ QUESTIONS 1-9: 14
10. IF YOU CHECKED OFF ANY PROBLEMS, HOW DIFFICULT HAVE THESE PROBLEMS MADE IT FOR YOU TO DO YOUR WORK, TAKE CARE OF THINGS AT HOME, OR GET ALONG WITH OTHER PEOPLE: VERY DIFFICULT

## 2025-08-25 ASSESSMENT — ANXIETY QUESTIONNAIRES
2. NOT BEING ABLE TO STOP OR CONTROL WORRYING: NEARLY EVERY DAY
IF YOU CHECKED OFF ANY PROBLEMS ON THIS QUESTIONNAIRE, HOW DIFFICULT HAVE THESE PROBLEMS MADE IT FOR YOU TO DO YOUR WORK, TAKE CARE OF THINGS AT HOME, OR GET ALONG WITH OTHER PEOPLE: VERY DIFFICULT
3. WORRYING TOO MUCH ABOUT DIFFERENT THINGS: MORE THAN HALF THE DAYS
GAD7 TOTAL SCORE: 10
7. FEELING AFRAID AS IF SOMETHING AWFUL MIGHT HAPPEN: NOT AT ALL
8. IF YOU CHECKED OFF ANY PROBLEMS, HOW DIFFICULT HAVE THESE MADE IT FOR YOU TO DO YOUR WORK, TAKE CARE OF THINGS AT HOME, OR GET ALONG WITH OTHER PEOPLE?: VERY DIFFICULT
GAD7 TOTAL SCORE: 10
4. TROUBLE RELAXING: SEVERAL DAYS
5. BEING SO RESTLESS THAT IT IS HARD TO SIT STILL: SEVERAL DAYS
6. BECOMING EASILY ANNOYED OR IRRITABLE: SEVERAL DAYS
7. FEELING AFRAID AS IF SOMETHING AWFUL MIGHT HAPPEN: NOT AT ALL
1. FEELING NERVOUS, ANXIOUS, OR ON EDGE: MORE THAN HALF THE DAYS
GAD7 TOTAL SCORE: 10

## 2025-08-25 ASSESSMENT — PAIN SCALES - PAIN ENJOYMENT GENERAL ACTIVITY SCALE (PEG)
PEG_TOTALSCORE: 9.33
INTERFERED_GENERAL_ACTIVITY: 10 - COMPLETELY INTERFERES
PEG_TOTALSCORE: 9.33
INTERFERED_ENJOYMENT_LIFE: 10
INTERFERED_ENJOYMENT_LIFE: 10 - COMPLETELY INTERFERES
INTERFERED_GENERAL_ACTIVITY: 10
AVG_PAIN_PASTWEEK: 8
AVG_PAIN_PASTWEEK: 8

## 2025-08-25 ASSESSMENT — PAIN SCALES - GENERAL: PAINLEVEL_OUTOF10: SEVERE PAIN (9)

## 2025-08-25 ASSESSMENT — ENCOUNTER SYMPTOMS: FREQUENCY: 1

## 2025-08-26 ASSESSMENT — ENCOUNTER SYMPTOMS
TROUBLE SWALLOWING: 0
FEVER: 0
MYALGIAS: 1
ARTHRALGIAS: 1
SHORTNESS OF BREATH: 1
CHILLS: 0
NECK STIFFNESS: 1
COUGH: 1
VOICE CHANGE: 0
NECK PAIN: 1
WHEEZING: 1

## (undated) DEVICE — SU ETHILON 3-0 PS-2 18" 1669H

## (undated) DEVICE — PACK HEAD NECK CUSTOM

## (undated) DEVICE — TAPE TRANSPORE 1/2"

## (undated) DEVICE — GLOVE PROTEXIS W/NEU-THERA 8.0  2D73TE80

## (undated) DEVICE — SU CHROMIC 4-0 FS-2 27" 635H

## (undated) DEVICE — BLADE SHAVER OLYMPUS 2MM SMR TYPE A BB2000SA

## (undated) DEVICE — SPONGE COTTONOID 1/2X3" 80-1407

## (undated) DEVICE — BLADE KNIFE SURG 15 371115

## (undated) DEVICE — NDL ANGIOCATH 14GA 1.25" 3068

## (undated) DEVICE — TUBING SET OLYMPUS MULTI-DEBRIDER DECLOG TS101DC

## (undated) RX ORDER — EPHEDRINE SULFATE 50 MG/ML
INJECTION, SOLUTION INTRAMUSCULAR; INTRAVENOUS; SUBCUTANEOUS
Status: DISPENSED
Start: 2018-03-13

## (undated) RX ORDER — ONDANSETRON 2 MG/ML
INJECTION INTRAMUSCULAR; INTRAVENOUS
Status: DISPENSED
Start: 2024-09-17

## (undated) RX ORDER — LIDOCAINE HYDROCHLORIDE 20 MG/ML
INJECTION, SOLUTION EPIDURAL; INFILTRATION; INTRACAUDAL; PERINEURAL
Status: DISPENSED
Start: 2018-03-13

## (undated) RX ORDER — MORPHINE SULFATE 4 MG/ML
INJECTION, SOLUTION INTRAMUSCULAR; INTRAVENOUS
Status: DISPENSED
Start: 2023-07-11

## (undated) RX ORDER — HYDROMORPHONE HYDROCHLORIDE 1 MG/ML
INJECTION, SOLUTION INTRAMUSCULAR; INTRAVENOUS; SUBCUTANEOUS
Status: DISPENSED
Start: 2024-09-23

## (undated) RX ORDER — PROPRANOLOL HYDROCHLORIDE 40 MG/1
TABLET ORAL
Status: DISPENSED
Start: 2023-08-07

## (undated) RX ORDER — HEPARIN SODIUM 200 [USP'U]/100ML
INJECTION, SOLUTION INTRAVENOUS
Status: DISPENSED
Start: 2024-09-17

## (undated) RX ORDER — METHYLPREDNISOLONE SODIUM SUCCINATE 125 MG/2ML
INJECTION, POWDER, LYOPHILIZED, FOR SOLUTION INTRAMUSCULAR; INTRAVENOUS
Status: DISPENSED
Start: 2023-10-23

## (undated) RX ORDER — LOSARTAN POTASSIUM 25 MG/1
TABLET ORAL
Status: DISPENSED
Start: 2023-08-07

## (undated) RX ORDER — LIDOCAINE HYDROCHLORIDE 10 MG/ML
INJECTION, SOLUTION EPIDURAL; INFILTRATION; INTRACAUDAL; PERINEURAL
Status: DISPENSED
Start: 2024-09-17

## (undated) RX ORDER — LOSARTAN POTASSIUM 50 MG/1
TABLET ORAL
Status: DISPENSED
Start: 2023-08-07

## (undated) RX ORDER — FENTANYL CITRATE 50 UG/ML
INJECTION, SOLUTION INTRAMUSCULAR; INTRAVENOUS
Status: DISPENSED
Start: 2024-09-17

## (undated) RX ORDER — MUPIROCIN 20 MG/G
OINTMENT TOPICAL
Status: DISPENSED
Start: 2018-03-13

## (undated) RX ORDER — EPINEPHRINE 1 MG/ML
INJECTION, SOLUTION, CONCENTRATE INTRAVENOUS
Status: DISPENSED
Start: 2018-03-13

## (undated) RX ORDER — ALBUMIN (HUMAN) 12.5 G/50ML
SOLUTION INTRAVENOUS
Status: DISPENSED
Start: 2024-09-17

## (undated) RX ORDER — HEPARIN SODIUM 1000 [USP'U]/ML
INJECTION, SOLUTION INTRAVENOUS; SUBCUTANEOUS
Status: DISPENSED
Start: 2024-09-17

## (undated) RX ORDER — PROPOFOL 10 MG/ML
INJECTION, EMULSION INTRAVENOUS
Status: DISPENSED
Start: 2018-03-13

## (undated) RX ORDER — TERBUTALINE SULFATE 1 MG/ML
INJECTION, SOLUTION SUBCUTANEOUS
Status: DISPENSED
Start: 2023-10-23

## (undated) RX ORDER — PROPOFOL 10 MG/ML
INJECTION, EMULSION INTRAVENOUS
Status: DISPENSED
Start: 2024-09-17

## (undated) RX ORDER — ACETAMINOPHEN 325 MG/1
TABLET ORAL
Status: DISPENSED
Start: 2023-10-26

## (undated) RX ORDER — EPHEDRINE SULFATE 50 MG/ML
INJECTION, SOLUTION INTRAMUSCULAR; INTRAVENOUS; SUBCUTANEOUS
Status: DISPENSED
Start: 2024-09-17

## (undated) RX ORDER — FENTANYL CITRATE 50 UG/ML
INJECTION, SOLUTION INTRAMUSCULAR; INTRAVENOUS
Status: DISPENSED
Start: 2018-03-13

## (undated) RX ORDER — IPRATROPIUM BROMIDE AND ALBUTEROL SULFATE 2.5; .5 MG/3ML; MG/3ML
SOLUTION RESPIRATORY (INHALATION)
Status: DISPENSED
Start: 2023-10-23

## (undated) RX ORDER — FENTANYL CITRATE-0.9 % NACL/PF 10 MCG/ML
PLASTIC BAG, INJECTION (ML) INTRAVENOUS
Status: DISPENSED
Start: 2024-09-17

## (undated) RX ORDER — PROTAMINE SULFATE 10 MG/ML
INJECTION, SOLUTION INTRAVENOUS
Status: DISPENSED
Start: 2024-09-17

## (undated) RX ORDER — FENTANYL CITRATE 50 UG/ML
INJECTION, SOLUTION INTRAMUSCULAR; INTRAVENOUS
Status: DISPENSED
Start: 2020-10-15

## (undated) RX ORDER — MECLIZINE HCL 12.5 MG 12.5 MG/1
TABLET ORAL
Status: DISPENSED
Start: 2025-01-10

## (undated) RX ORDER — OXYCODONE AND ACETAMINOPHEN 5; 325 MG/1; MG/1
TABLET ORAL
Status: DISPENSED
Start: 2023-11-10

## (undated) RX ORDER — CYCLOBENZAPRINE HCL 10 MG
TABLET ORAL
Status: DISPENSED
Start: 2023-07-11

## (undated) RX ORDER — PREDNISONE 20 MG/1
TABLET ORAL
Status: DISPENSED
Start: 2023-11-10

## (undated) RX ORDER — KETOROLAC TROMETHAMINE 15 MG/ML
INJECTION, SOLUTION INTRAMUSCULAR; INTRAVENOUS
Status: DISPENSED
Start: 2023-07-11

## (undated) RX ORDER — LORAZEPAM 0.5 MG/1
TABLET ORAL
Status: DISPENSED
Start: 2020-03-23

## (undated) RX ORDER — ALBUTEROL SULFATE 90 UG/1
AEROSOL, METERED RESPIRATORY (INHALATION)
Status: DISPENSED
Start: 2024-09-17

## (undated) RX ORDER — KETOROLAC TROMETHAMINE 15 MG/ML
INJECTION, SOLUTION INTRAMUSCULAR; INTRAVENOUS
Status: DISPENSED
Start: 2023-08-07

## (undated) RX ORDER — OXYMETAZOLINE HYDROCHLORIDE 0.05 G/100ML
SPRAY NASAL
Status: DISPENSED
Start: 2018-03-13

## (undated) RX ORDER — OXYCODONE HYDROCHLORIDE 5 MG/1
TABLET ORAL
Status: DISPENSED
Start: 2024-09-17

## (undated) RX ORDER — KETOROLAC TROMETHAMINE 30 MG/ML
INJECTION, SOLUTION INTRAMUSCULAR; INTRAVENOUS
Status: DISPENSED
Start: 2020-10-15

## (undated) RX ORDER — VALACYCLOVIR HYDROCHLORIDE 500 MG/1
TABLET, FILM COATED ORAL
Status: DISPENSED
Start: 2023-11-10

## (undated) RX ORDER — LIDOCAINE HYDROCHLORIDE 10 MG/ML
INJECTION, SOLUTION EPIDURAL; INFILTRATION; INTRACAUDAL; PERINEURAL
Status: DISPENSED
Start: 2018-03-13

## (undated) RX ORDER — ACETAMINOPHEN 500 MG
TABLET ORAL
Status: DISPENSED
Start: 2025-01-10

## (undated) RX ORDER — IBUPROFEN 200 MG
TABLET ORAL
Status: DISPENSED
Start: 2023-10-26

## (undated) RX ORDER — GLYCOPYRROLATE 0.2 MG/ML
INJECTION, SOLUTION INTRAMUSCULAR; INTRAVENOUS
Status: DISPENSED
Start: 2024-09-17

## (undated) RX ORDER — IPRATROPIUM BROMIDE AND ALBUTEROL SULFATE 2.5; .5 MG/3ML; MG/3ML
SOLUTION RESPIRATORY (INHALATION)
Status: DISPENSED
Start: 2023-08-07

## (undated) RX ORDER — OXYCODONE HYDROCHLORIDE 5 MG/1
TABLET ORAL
Status: DISPENSED
Start: 2025-01-10